# Patient Record
Sex: FEMALE | Race: WHITE | NOT HISPANIC OR LATINO | Employment: OTHER | URBAN - METROPOLITAN AREA
[De-identification: names, ages, dates, MRNs, and addresses within clinical notes are randomized per-mention and may not be internally consistent; named-entity substitution may affect disease eponyms.]

---

## 2017-06-12 ENCOUNTER — OFFICE VISIT (OUTPATIENT)
Dept: RETAIL CLINIC | Facility: CLINIC | Age: 57
End: 2017-06-12

## 2017-06-12 DIAGNOSIS — Z02.83 ENCOUNTER FOR DRUG SCREENING: Primary | ICD-10-CM

## 2017-06-12 NOTE — PROGRESS NOTES
Presents to clinic for urine drug screen. [facility] ID number and picture ID obtained from donor. Consent signed and dated. Urine drug screening performed per policy.       See scanned documents for Custody Control Form.

## 2019-07-18 ENCOUNTER — TRANSCRIBE ORDERS (OUTPATIENT)
Dept: ADMINISTRATIVE | Facility: HOSPITAL | Age: 59
End: 2019-07-18

## 2019-07-18 DIAGNOSIS — R10.11 RUQ ABDOMINAL PAIN: Primary | ICD-10-CM

## 2019-07-26 ENCOUNTER — HOSPITAL ENCOUNTER (OUTPATIENT)
Dept: ULTRASOUND IMAGING | Facility: HOSPITAL | Age: 59
Discharge: HOME | End: 2019-07-26
Admitting: NURSE PRACTITIONER

## 2019-07-26 DIAGNOSIS — R10.11 RUQ ABDOMINAL PAIN: ICD-10-CM

## 2019-07-26 PROCEDURE — 76705 ECHO EXAM OF ABDOMEN: CPT

## 2019-08-02 ENCOUNTER — LAB (OUTPATIENT)
Dept: LAB | Facility: HOSPITAL | Age: 59
End: 2019-08-02

## 2019-08-02 ENCOUNTER — CONSULT (OUTPATIENT)
Dept: ONCOLOGY | Facility: CLINIC | Age: 59
End: 2019-08-02

## 2019-08-02 VITALS
SYSTOLIC BLOOD PRESSURE: 127 MMHG | WEIGHT: 194.7 LBS | DIASTOLIC BLOOD PRESSURE: 67 MMHG | OXYGEN SATURATION: 95 % | BODY MASS INDEX: 30.56 KG/M2 | HEIGHT: 67 IN | HEART RATE: 77 BPM | TEMPERATURE: 97.3 F | RESPIRATION RATE: 20 BRPM

## 2019-08-02 DIAGNOSIS — I87.1 MAY-THURNER SYNDROME: ICD-10-CM

## 2019-08-02 DIAGNOSIS — I87.1 MAY-THURNER SYNDROME: Primary | ICD-10-CM

## 2019-08-02 LAB
ANION GAP SERPL CALCULATED.3IONS-SCNC: 12 MMOL/L (ref 5–15)
BUN BLD-MCNC: 15 MG/DL (ref 8–23)
BUN/CREAT SERPL: 12.9 (ref 7–25)
CALCIUM SPEC-SCNC: 10.2 MG/DL (ref 8.6–10.5)
CHLORIDE SERPL-SCNC: 104 MMOL/L (ref 98–107)
CO2 SERPL-SCNC: 26 MMOL/L (ref 22–29)
CREAT BLD-MCNC: 1.16 MG/DL (ref 0.57–1)
ERYTHROCYTE [DISTWIDTH] IN BLOOD BY AUTOMATED COUNT: 18.2 % (ref 12.3–15.4)
GFR SERPL CREATININE-BSD FRML MDRD: 47 ML/MIN/1.73
GLUCOSE BLD-MCNC: 108 MG/DL (ref 65–99)
HCT VFR BLD AUTO: 49.2 % (ref 34–46.6)
HGB BLD-MCNC: 16.1 G/DL (ref 12–15.9)
LYMPHOCYTES # BLD AUTO: 1.2 10*3/MM3 (ref 0.7–3.1)
LYMPHOCYTES NFR BLD AUTO: 15.2 % (ref 19.6–45.3)
MCH RBC QN AUTO: 27.4 PG (ref 26.6–33)
MCHC RBC AUTO-ENTMCNC: 32.7 G/DL (ref 31.5–35.7)
MCV RBC AUTO: 83.7 FL (ref 79–97)
MONOCYTES # BLD AUTO: 0.1 10*3/MM3 (ref 0.1–0.9)
MONOCYTES NFR BLD AUTO: 1.7 % (ref 5–12)
NEUTROPHILS # BLD AUTO: 6.3 10*3/MM3 (ref 1.7–7)
NEUTROPHILS NFR BLD AUTO: 83.1 % (ref 42.7–76)
PLATELET # BLD AUTO: 655 10*3/MM3 (ref 140–450)
PMV BLD AUTO: 6.1 FL (ref 6–12)
POTASSIUM BLD-SCNC: 4.4 MMOL/L (ref 3.5–5.2)
RBC # BLD AUTO: 5.88 10*6/MM3 (ref 3.77–5.28)
SODIUM BLD-SCNC: 142 MMOL/L (ref 136–145)
WBC NRBC COR # BLD: 7.6 10*3/MM3 (ref 3.4–10.8)

## 2019-08-02 PROCEDURE — 99205 OFFICE O/P NEW HI 60 MIN: CPT | Performed by: INTERNAL MEDICINE

## 2019-08-02 PROCEDURE — 80048 BASIC METABOLIC PNL TOTAL CA: CPT

## 2019-08-02 PROCEDURE — 36415 COLL VENOUS BLD VENIPUNCTURE: CPT

## 2019-08-02 PROCEDURE — 85025 COMPLETE CBC W/AUTO DIFF WBC: CPT

## 2019-08-02 RX ORDER — PRAVASTATIN SODIUM 40 MG
40 TABLET ORAL DAILY
Refills: 0 | COMMUNITY
Start: 2019-06-04

## 2019-08-02 NOTE — PROGRESS NOTES
CHIEF COMPLAINT: Recurrent DVT    REASON FOR REFERRAL: Same      RECORDS OBTAINED  Records of the patients history including those obtained from [facility] were reviewed and summarized in detail.    Oncology/Hematology History    1. Recurrent DVT and PE due to problem #2  2. May Thurner  3. Asending aortic aneurysm  4. History of SOLOMON  5. Obstructive sleep apnea  6. Contrast-induced nephropathy  7. Reflux with nausea  8. Thrombocytosis    -8/2/2019 initial [facility] medical oncology consultation: May 2016 developed unprovoked left lower extremity deep vein thrombosis treated with left common iliac stent, thrombolyzes, temporary vena cava filter placement and retrieval.  Saw hematologist who told her she might have a low level lupus anticoagulant but she does not have that data and they were told that they did not think that was significant enough to warrant lifetime anticoagulation.  She stopped Xarelto after 8 months.  Starting the end of June she was developing pain in the left lower extremity again with progressive swelling and shortness of breath.  Went to [facility] ER on 4 July.  Was found to have left lower extremity extensive deep vein thrombosis and right lower lobe pulmonary embolus.  She was treated with thrombectomy and thrombolysis by Rigoberto and Pavel.  Previously she could not afford her Xarelto and they have switched her from heparin to Coumadin but before discharge switched her to Xarelto.  Her creatinine during her hospitalization increased to 1.38 from a baseline of 1.2.  She comes to hematology at [facility] on 8/2/2019 with labs showing creatinine of 1.23 on 7/11/2019, 1.69 on 7/17/2019, and she says that it was repeated again but she does not have that lab and was told that it was back down but not at normal.  Her CBC reveals platelets of 697,000 on 7/17/2019 and 589,000 on 7/10/2019.Labs from 7/10/2019 revealed platelet count of 490,000 with hematocrit 54.7%.  Creatinine on 7/3/2019 was  0.92.  She understandably had a d-dimer elevation to 1.22 on 7/3/2019 with her clot.  Given the magnitude of her clot and the likelihood of may Thurner syndrome, she needs lifetime anticoagulation.  On patients with deep vein thrombosis and/or PE and indefinite anticoagulation, use of Xarelto in patients with GFR less than 30 is recommended to be avoided varied will need to watch that.  With her prior recent elevation of hematocrit and persistent elevation of platelets, we will continue to watch that and if they remain persistent suggesting this being more than just reactive to her PE and clots, then we need to look for myeloproliferative disorder contributing to her clotting.  She is going to get her records from her other hematologist she saw back in 2016 just for completeness sake but I would not repeat, nor would I do for the first time now, her hypercoagulable panel as any hypercoagulable disorder we stumbled upon would not alter her need for lifetime full dose anticoagulation.  The only question is which drug, and whether her creatinine will allow DOAC's.  I will check her BMP weekly for the next month to make sure her creatinine is doing adequate relative to her use of Xarelto and will get her blood count now and again prior to return in a month and if the platelets are remaining elevated we will do Collin 2 with reflex testing.  Since her discharge from the hospital she had significant nausea for which she had an ultrasound that showed fatty liver but normal gallbladder.  She had an EGD in the past few days with Dr. Heath that showed significant reflux and on Protonix her nausea has improved.  She will need close follow-up of her ascending aortic aneurysm as well with her thoracic surgeons.        May-Thurner syndrome    8/2/2019 Initial Diagnosis     May-Thurner syndrome            HISTORY OF PRESENT ILLNESS:  The patient is a 59 y.o.  female, referred for recurrent DVT.  See above for hematology history of  "present illness.    REVIEW OF SYSTEMS:  A 14 point review of systems was performed and is negative except as noted above.    Past Medical History:   Diagnosis Date   • Deep vein thrombophlebitis of calf, left (CMS/HCC)      Past Surgical History:   Procedure Laterality Date   • BILATERAL BREAST REDUCTION     • CAROTID STENT     •  SECTION     • ELBOW FUSION     • HYSTERECTOMY     • TONSILLECTOMY         Current Outpatient Medications on File Prior to Visit   Medication Sig Dispense Refill   • pravastatin (PRAVACHOL) 40 MG tablet Take 40 mg by mouth Daily.  0   • rivaroxaban (XARELTO) 15 MG tablet Every 12 (Twelve) Hours.       No current facility-administered medications on file prior to visit.        Allergies   Allergen Reactions   • Clarithromycin Other (See Comments)       Social History     Socioeconomic History   • Marital status:      Spouse name: Not on file   • Number of children: Not on file   • Years of education: Not on file   • Highest education level: Not on file   Tobacco Use   • Smoking status: Never Smoker   • Smokeless tobacco: Never Used   Substance and Sexual Activity   • Alcohol use: No     Frequency: Never   • Drug use: No       Family History   Problem Relation Age of Onset   • Atrial fibrillation Mother    • Dementia Mother    • Diabetes Mother    • Heart disease Mother    • Colon cancer Maternal Aunt    • Leukemia Paternal Grandmother    • Colon cancer Cousin    • Lung cancer Cousin        PHYSICAL EXAM:    /67   Pulse 77   Temp 97.3 °F (36.3 °C) (Temporal)   Resp 20   Ht 170.2 cm (67\")   Wt 88.3 kg (194 lb 11.2 oz)   SpO2 95%   BMI 30.49 kg/m²     ECOG: (0) Fully active, able to carry on all predisease performance without restriction  General: well appearing female in no acute distress  HEENT: sclera anicteric, oropharynx clear  Lymphatics: no cervical, supraclavicular, inguinal, or axillary adenopathy  Cardiovascular: regular rate and rhythm, no murmurs  Neck: " Supple; No thyromegaly  Lungs: clear to auscultation bilaterally. No respiratory distress.   Abdomen: soft, nontender, nondistended.  No palpable organomegaly  Extremities: no cyanosis, clubbing, edema, or cords  Skin: no rashes, lesions, bruising, or petechiae  Neuro: Alert and oriented x 4; Moving all extremities.  Psych: No anxiety or depression    No results found for: HGB, HCT, MCV, PLT, WBC, NEUTROABS, LYMPHSABS, MONOSABS, EOSABS, BASOSABS  No results found for: GLUCOSE, BUN, CREATININE, NA, K, CL, CO2, CALCIUM, PROTEINTOT, ALBUMIN, BILITOT, ALKPHOS, AST, ALT        Assessment/Plan   1. Recurrent DVT and PE due to problem #2  2. May Thurner  3. Asending aortic aneurysm  4. History of SOLOMON  5. Obstructive sleep apnea  6. Contrast-induced nephropathy  7. Reflux with nausea  8. Thrombocytosis    -8/2/2019 initial [facility], [place], operated by [facility] medical oncology consultation: May 2016 developed unprovoked left lower extremity deep vein thrombosis treated with left common iliac stent, thrombolyzes, temporary vena cava filter placement and retrieval.  Saw hematologist who told her she might have a low level lupus anticoagulant but she does not have that data and they were told that they did not think that was significant enough to warrant lifetime anticoagulation.  She stopped Xarelto after 8 months.  Starting the end of June she was developing pain in the left lower extremity again with progressive swelling and shortness of breath.  Went to [facility] ER on 4 July.  Was found to have left lower extremity extensive deep vein thrombosis and right lower lobe pulmonary embolus.  She was treated with thrombectomy and thrombolysis by Rigoberto and Pavel.  Previously she could not afford her Xarelto and they have switched her from heparin to Coumadin but before discharge switched her to Xarelto.  Her creatinine during her hospitalization increased to 1.38 from a baseline of 1.2.  She comes to hematology at [facility], [place], operated by [facility] on 8/2/2019 with labs  showing creatinine of 1.23 on 7/11/2019, 1.69 on 7/17/2019, and she says that it was repeated again but she does not have that lab and was told that it was back down but not at normal.  Her CBC reveals platelets of 697,000 on 7/17/2019 and 589,000 on 7/10/2019.Labs from 7/10/2019 revealed platelet count of 490,000 with hematocrit 54.7%.  Creatinine on 7/3/2019 was 0.92.  She understandably had a d-dimer elevation to 1.22 on 7/3/2019 with her clot.  Given the magnitude of her clot and the likelihood of may Thurner syndrome, she needs lifetime anticoagulation.  On patients with deep vein thrombosis and/or PE and indefinite anticoagulation, use of Xarelto in patients with GFR less than 30 is recommended to be avoided varied will need to watch that.  With her prior recent elevation of hematocrit and persistent elevation of platelets, we will continue to watch that and if they remain persistent suggesting this being more than just reactive to her PE and clots, then we need to look for myeloproliferative disorder contributing to her clotting.  She is going to get her records from her other hematologist she saw back in 2016 just for completeness sake but I would not repeat, nor would I do for the first time now, her hypercoagulable panel as any hypercoagulable disorder we stumbled upon would not alter her need for lifetime full dose anticoagulation.  The only question is which drug, and whether her creatinine will allow DOAC's.  I will check her BMP weekly for the next month to make sure her creatinine is doing adequate relative to her use of Xarelto and will get her blood count now and again prior to return in a month and if the platelets are remaining elevated we will do Collin 2 with reflex testing.  Since her discharge from the hospital she had significant nausea for which she had an ultrasound that showed fatty liver but normal gallbladder.  She had an EGD in the past few days with Dr. Heath that showed significant  reflux and on Protonix her nausea has improved.  She will need close follow-up of her ascending aortic aneurysm as well with her thoracic surgeons.    Discussed with patient 1 hour greater than 50% spent counseling face-to-face regarding the complexity of the above decision tree.  Yogesh aDmon MD    8/2/2019

## 2019-08-09 ENCOUNTER — LAB (OUTPATIENT)
Dept: LAB | Facility: HOSPITAL | Age: 59
End: 2019-08-09

## 2019-08-09 DIAGNOSIS — I87.1 MAY-THURNER SYNDROME: ICD-10-CM

## 2019-08-09 LAB
ANION GAP SERPL CALCULATED.3IONS-SCNC: 13 MMOL/L (ref 5–15)
BASOPHILS # BLD AUTO: 0.09 10*3/MM3 (ref 0–0.2)
BASOPHILS NFR BLD AUTO: 1.3 % (ref 0–1.5)
BUN BLD-MCNC: 17 MG/DL (ref 8–23)
BUN/CREAT SERPL: 15.9 (ref 7–25)
CALCIUM SPEC-SCNC: 10.1 MG/DL (ref 8.6–10.5)
CHLORIDE SERPL-SCNC: 105 MMOL/L (ref 98–107)
CO2 SERPL-SCNC: 23 MMOL/L (ref 22–29)
CREAT BLD-MCNC: 1.07 MG/DL (ref 0.57–1)
DEPRECATED RDW RBC AUTO: 57.8 FL (ref 37–54)
EOSINOPHIL # BLD AUTO: 0.3 10*3/MM3 (ref 0–0.4)
EOSINOPHIL NFR BLD AUTO: 4.2 % (ref 0.3–6.2)
ERYTHROCYTE [DISTWIDTH] IN BLOOD BY AUTOMATED COUNT: 19.3 % (ref 12.3–15.4)
GFR SERPL CREATININE-BSD FRML MDRD: 52 ML/MIN/1.73
GLUCOSE BLD-MCNC: 126 MG/DL (ref 65–99)
HCT VFR BLD AUTO: 50.9 % (ref 34–46.6)
HGB BLD-MCNC: 15.7 G/DL (ref 12–15.9)
IMM GRANULOCYTES # BLD AUTO: 0.05 10*3/MM3 (ref 0–0.05)
IMM GRANULOCYTES NFR BLD AUTO: 0.7 % (ref 0–0.5)
LYMPHOCYTES # BLD AUTO: 1.21 10*3/MM3 (ref 0.7–3.1)
LYMPHOCYTES NFR BLD AUTO: 17.1 % (ref 19.6–45.3)
MCH RBC QN AUTO: 27 PG (ref 26.6–33)
MCHC RBC AUTO-ENTMCNC: 30.8 G/DL (ref 31.5–35.7)
MCV RBC AUTO: 87.5 FL (ref 79–97)
MONOCYTES # BLD AUTO: 0.31 10*3/MM3 (ref 0.1–0.9)
MONOCYTES NFR BLD AUTO: 4.4 % (ref 5–12)
NEUTROPHILS # BLD AUTO: 5.11 10*3/MM3 (ref 1.7–7)
NEUTROPHILS NFR BLD AUTO: 72.3 % (ref 42.7–76)
NRBC BLD AUTO-RTO: 0 /100 WBC (ref 0–0.2)
PLATELET # BLD AUTO: 570 10*3/MM3 (ref 140–450)
PMV BLD AUTO: 8.9 FL (ref 6–12)
POTASSIUM BLD-SCNC: 4.2 MMOL/L (ref 3.5–5.2)
RBC # BLD AUTO: 5.82 10*6/MM3 (ref 3.77–5.28)
SODIUM BLD-SCNC: 141 MMOL/L (ref 136–145)
WBC NRBC COR # BLD: 7.07 10*3/MM3 (ref 3.4–10.8)

## 2019-08-09 PROCEDURE — 85025 COMPLETE CBC W/AUTO DIFF WBC: CPT

## 2019-08-09 PROCEDURE — 36415 COLL VENOUS BLD VENIPUNCTURE: CPT

## 2019-08-09 PROCEDURE — 80048 BASIC METABOLIC PNL TOTAL CA: CPT

## 2019-08-16 ENCOUNTER — LAB (OUTPATIENT)
Dept: LAB | Facility: HOSPITAL | Age: 59
End: 2019-08-16

## 2019-08-16 DIAGNOSIS — I87.1 MAY-THURNER SYNDROME: ICD-10-CM

## 2019-08-16 LAB
ANION GAP SERPL CALCULATED.3IONS-SCNC: 10 MMOL/L (ref 5–15)
BUN BLD-MCNC: 17 MG/DL (ref 8–23)
BUN/CREAT SERPL: 18.3 (ref 7–25)
CALCIUM SPEC-SCNC: 10.2 MG/DL (ref 8.6–10.5)
CHLORIDE SERPL-SCNC: 105 MMOL/L (ref 98–107)
CO2 SERPL-SCNC: 25 MMOL/L (ref 22–29)
CREAT BLD-MCNC: 0.93 MG/DL (ref 0.57–1)
GFR SERPL CREATININE-BSD FRML MDRD: 61 ML/MIN/1.73
GLUCOSE BLD-MCNC: 123 MG/DL (ref 65–99)
POTASSIUM BLD-SCNC: 4.5 MMOL/L (ref 3.5–5.2)
SODIUM BLD-SCNC: 140 MMOL/L (ref 136–145)

## 2019-08-16 PROCEDURE — 36415 COLL VENOUS BLD VENIPUNCTURE: CPT

## 2019-08-16 PROCEDURE — 80048 BASIC METABOLIC PNL TOTAL CA: CPT

## 2019-08-23 ENCOUNTER — LAB (OUTPATIENT)
Dept: LAB | Facility: HOSPITAL | Age: 59
End: 2019-08-23

## 2019-08-23 DIAGNOSIS — I87.1 MAY-THURNER SYNDROME: ICD-10-CM

## 2019-08-23 DIAGNOSIS — I87.1 MAY-THURNER SYNDROME: Primary | ICD-10-CM

## 2019-08-23 LAB
ANION GAP SERPL CALCULATED.3IONS-SCNC: 12 MMOL/L (ref 5–15)
BUN BLD-MCNC: 17 MG/DL (ref 8–23)
BUN/CREAT SERPL: 17.9 (ref 7–25)
CALCIUM SPEC-SCNC: 9.7 MG/DL (ref 8.6–10.5)
CHLORIDE SERPL-SCNC: 105 MMOL/L (ref 98–107)
CO2 SERPL-SCNC: 25 MMOL/L (ref 22–29)
CREAT BLD-MCNC: 0.95 MG/DL (ref 0.57–1)
GFR SERPL CREATININE-BSD FRML MDRD: 60 ML/MIN/1.73
GLUCOSE BLD-MCNC: 114 MG/DL (ref 65–99)
POTASSIUM BLD-SCNC: 4.1 MMOL/L (ref 3.5–5.2)
SODIUM BLD-SCNC: 142 MMOL/L (ref 136–145)

## 2019-08-23 PROCEDURE — 80048 BASIC METABOLIC PNL TOTAL CA: CPT

## 2019-08-23 PROCEDURE — 36415 COLL VENOUS BLD VENIPUNCTURE: CPT

## 2019-08-30 ENCOUNTER — LAB (OUTPATIENT)
Dept: LAB | Facility: HOSPITAL | Age: 59
End: 2019-08-30

## 2019-08-30 DIAGNOSIS — I87.1 MAY-THURNER SYNDROME: ICD-10-CM

## 2019-08-30 LAB
ANION GAP SERPL CALCULATED.3IONS-SCNC: 15 MMOL/L (ref 5–15)
BASOPHILS # BLD AUTO: 0.13 10*3/MM3 (ref 0–0.2)
BASOPHILS NFR BLD AUTO: 1.6 % (ref 0–1.5)
BUN BLD-MCNC: 17 MG/DL (ref 8–23)
BUN/CREAT SERPL: 17.7 (ref 7–25)
CALCIUM SPEC-SCNC: 10 MG/DL (ref 8.6–10.5)
CHLORIDE SERPL-SCNC: 104 MMOL/L (ref 98–107)
CO2 SERPL-SCNC: 24 MMOL/L (ref 22–29)
CREAT BLD-MCNC: 0.96 MG/DL (ref 0.57–1)
DEPRECATED RDW RBC AUTO: 58.4 FL (ref 37–54)
EOSINOPHIL # BLD AUTO: 0.24 10*3/MM3 (ref 0–0.4)
EOSINOPHIL NFR BLD AUTO: 2.9 % (ref 0.3–6.2)
ERYTHROCYTE [DISTWIDTH] IN BLOOD BY AUTOMATED COUNT: 19.2 % (ref 12.3–15.4)
GFR SERPL CREATININE-BSD FRML MDRD: 59 ML/MIN/1.73
GLUCOSE BLD-MCNC: 101 MG/DL (ref 65–99)
HCT VFR BLD AUTO: 52.7 % (ref 34–46.6)
HGB BLD-MCNC: 16.4 G/DL (ref 12–15.9)
IMM GRANULOCYTES # BLD AUTO: 0.1 10*3/MM3 (ref 0–0.05)
IMM GRANULOCYTES NFR BLD AUTO: 1.2 % (ref 0–0.5)
LYMPHOCYTES # BLD AUTO: 1.38 10*3/MM3 (ref 0.7–3.1)
LYMPHOCYTES NFR BLD AUTO: 16.6 % (ref 19.6–45.3)
MCH RBC QN AUTO: 27.2 PG (ref 26.6–33)
MCHC RBC AUTO-ENTMCNC: 31.1 G/DL (ref 31.5–35.7)
MCV RBC AUTO: 87.3 FL (ref 79–97)
MONOCYTES # BLD AUTO: 0.39 10*3/MM3 (ref 0.1–0.9)
MONOCYTES NFR BLD AUTO: 4.7 % (ref 5–12)
NEUTROPHILS # BLD AUTO: 6.08 10*3/MM3 (ref 1.7–7)
NEUTROPHILS NFR BLD AUTO: 73 % (ref 42.7–76)
NRBC BLD AUTO-RTO: 0 /100 WBC (ref 0–0.2)
PLATELET # BLD AUTO: 807 10*3/MM3 (ref 140–450)
PMV BLD AUTO: 8.9 FL (ref 6–12)
POTASSIUM BLD-SCNC: 4.3 MMOL/L (ref 3.5–5.2)
RBC # BLD AUTO: 6.04 10*6/MM3 (ref 3.77–5.28)
SODIUM BLD-SCNC: 143 MMOL/L (ref 136–145)
WBC NRBC COR # BLD: 8.32 10*3/MM3 (ref 3.4–10.8)

## 2019-08-30 PROCEDURE — 80048 BASIC METABOLIC PNL TOTAL CA: CPT

## 2019-08-30 PROCEDURE — 85025 COMPLETE CBC W/AUTO DIFF WBC: CPT

## 2019-08-30 PROCEDURE — 36415 COLL VENOUS BLD VENIPUNCTURE: CPT

## 2019-09-06 ENCOUNTER — APPOINTMENT (OUTPATIENT)
Dept: LAB | Facility: HOSPITAL | Age: 59
End: 2019-09-06

## 2019-09-09 ENCOUNTER — OFFICE VISIT (OUTPATIENT)
Dept: ONCOLOGY | Facility: CLINIC | Age: 59
End: 2019-09-09

## 2019-09-09 ENCOUNTER — LAB (OUTPATIENT)
Dept: LAB | Facility: HOSPITAL | Age: 59
End: 2019-09-09

## 2019-09-09 VITALS
DIASTOLIC BLOOD PRESSURE: 67 MMHG | BODY MASS INDEX: 31.39 KG/M2 | HEART RATE: 73 BPM | HEIGHT: 67 IN | RESPIRATION RATE: 16 BRPM | WEIGHT: 200 LBS | OXYGEN SATURATION: 95 % | TEMPERATURE: 97.8 F | SYSTOLIC BLOOD PRESSURE: 122 MMHG

## 2019-09-09 DIAGNOSIS — I87.1 MAY-THURNER SYNDROME: Chronic | ICD-10-CM

## 2019-09-09 DIAGNOSIS — I87.1 MAY-THURNER SYNDROME: Primary | Chronic | ICD-10-CM

## 2019-09-09 PROCEDURE — 99215 OFFICE O/P EST HI 40 MIN: CPT | Performed by: INTERNAL MEDICINE

## 2019-09-09 PROCEDURE — 36415 COLL VENOUS BLD VENIPUNCTURE: CPT

## 2019-09-09 PROCEDURE — 81270 JAK2 GENE: CPT

## 2019-09-09 PROCEDURE — 82668 ASSAY OF ERYTHROPOIETIN: CPT

## 2019-09-09 RX ORDER — RIVAROXABAN 20 MG/1
20 TABLET, FILM COATED ORAL EVERY EVENING
Refills: 0 | COMMUNITY
Start: 2019-08-12

## 2019-09-09 NOTE — PROGRESS NOTES
CHIEF COMPLAINT: Recurrent DVT with thrombocytosis and erythrocytosis    Problem List:  Oncology/Hematology History    1. Recurrent DVT and PE due to problem #2  2. May Thurner  3. Asending aortic aneurysm  4. History of SOLOMON  5. Obstructive sleep apnea  6. Contrast-induced nephropathy  7. Reflux with nausea  8. Thrombocytosis  9. Erythrocytosis    -8/2/2019 initial [facility] medical oncology consultation: May 2016 developed unprovoked left lower extremity deep vein thrombosis treated with left common iliac stent, thrombolyzes, temporary vena cava filter placement and retrieval.  Saw hematologist who told her she might have a low level lupus anticoagulant but she does not have that data and they were told that they did not think that was significant enough to warrant lifetime anticoagulation.  She stopped Xarelto after 8 months.  Starting the end of June she was developing pain in the left lower extremity again with progressive swelling and shortness of breath.  Went to [facility] ER on 4 July.  Was found to have left lower extremity extensive deep vein thrombosis and right lower lobe pulmonary embolus.  She was treated with thrombectomy and thrombolysis by Rigoberto and Pavel.  Previously she could not afford her Xarelto and they have switched her from heparin to Coumadin but before discharge switched her to Xarelto.  Her creatinine during her hospitalization increased to 1.38 from a baseline of 1.2.  She comes to hematology at [facility] on 8/2/2019 with labs showing creatinine of 1.23 on 7/11/2019, 1.69 on 7/17/2019, and she says that it was repeated again but she does not have that lab and was told that it was back down but not at normal.  Her CBC reveals platelets of 697,000 on 7/17/2019 and 589,000 on 7/10/2019.Labs from 7/10/2019 revealed platelet count of 490,000 with hematocrit 54.7%.  Creatinine on 7/3/2019 was 0.92.  She understandably had a d-dimer elevation to 1.22 on 7/3/2019 with her clot.  Given  the magnitude of her clot and the likelihood of may Thurner syndrome, she needs lifetime anticoagulation.  On patients with deep vein thrombosis and/or PE and indefinite anticoagulation, use of Xarelto in patients with GFR less than 30 is recommended to be avoided varied will need to watch that.  With her prior recent elevation of hematocrit and persistent elevation of platelets, we will continue to watch that and if they remain persistent suggesting this being more than just reactive to her PE and clots, then we need to look for myeloproliferative disorder contributing to her clotting.  She is going to get her records from her other hematologist she saw back in 2016 just for completeness sake but I would not repeat, nor would I do for the first time now, her hypercoagulable panel as any hypercoagulable disorder we stumbled upon would not alter her need for lifetime full dose anticoagulation.  The only question is which drug, and whether her creatinine will allow DOAC's.  I will check her BMP weekly for the next month to make sure her creatinine is doing adequate relative to her use of Xarelto and will get her blood count now and again prior to return in a month and if the platelets are remaining elevated we will do Collin 2 with reflex testing.  Since her discharge from the hospital she had significant nausea for which she had an ultrasound that showed fatty liver but normal gallbladder.  She had an EGD in the past few days with Dr. Heath that showed significant reflux and on Protonix her nausea has improved.  She will need close follow-up of her ascending aortic aneurysm as well with her thoracic surgeons.    -9/9/2019 medical oncology follow-up: Platelets persistently elevated 807,000 with hematocrit 52.7% and white count 8320 on 8/30/2019.  GFR staying over 59 on Xarelto.  We will check Collin 2 with reflex testing and treat with Hydrea plus or minus phlebotomy if we confirm myeloproliferative disorder.  Will need  bone marrow before start of that to assess for myelofibrosis if this is confirmed.  Records from Dr.Scott Major of 2016 mention that she had been anemic during her hospitalization.  Factor V Leiden, prothrombin mutation, Antithrombin III, protein C&S levels were all normal and the anticardiolipin and beta-2 microglobulin were all negative according to Dr. Major.  The lupus anticoagulant was borderline positive at 10.5 with him and he recommended repeat of that done 3 months apart to prove the diagnosis but this cannot be done accurately while he on anticoagulants which she continues.  Given her presence of May Thurner despite the stenting, I would be inclined towards keeping her on anticoagulants regardless.  She brings to me records that includes labs back to  where her white count, hemoglobin and platelets were normal but as recently as 2019 her hematocrit was 52% whereas back in May 2016 her hemoglobin was actually down in the eights with normochromic normocytic indices.  On Xarelto, there is increased risk with aspirin I will hold on that for present but we may have to make such a decision as Xarelto may not be sufficient for viscosity issues related to her elevated platelets and hematocrit if this is a myeloproliferative disorder.        May-Thurner syndrome    2019 Initial Diagnosis     May-Thurner syndrome            HISTORY OF PRESENT ILLNESS:  The patient is a 59 y.o. female, here for follow up on management of erythrocytosis and thrombocytosis with history of recurrent deep vein thrombosis and pulmonary embolus in the face of May Thurner syndrome.      Past Medical History:   Diagnosis Date   • Deep vein thrombophlebitis of calf, left (CMS/HCC)      Past Surgical History:   Procedure Laterality Date   • BILATERAL BREAST REDUCTION     • CAROTID STENT     •  SECTION     • ELBOW FUSION     • HYSTERECTOMY     • TONSILLECTOMY         Allergies   Allergen Reactions   • Clarithromycin  "Other (See Comments)       Family History and Social History reviewed and changed as necessary      REVIEW OF SYSTEM:   Review of Systems   Constitutional: Negative for appetite change, chills, diaphoresis, fatigue, fever and unexpected weight change.   HENT:   Negative for mouth sores, sore throat and trouble swallowing.    Eyes: Negative for icterus.   Respiratory: Negative for cough, hemoptysis and shortness of breath.    Cardiovascular: Negative for chest pain, leg swelling and palpitations.   Gastrointestinal: Negative for abdominal distention, abdominal pain, blood in stool, constipation, diarrhea, nausea and vomiting.   Endocrine: Negative for hot flashes.   Genitourinary: Negative for bladder incontinence, difficulty urinating, dysuria, frequency and hematuria.    Musculoskeletal: Negative for gait problem, neck pain and neck stiffness.   Skin: Negative for rash.   Neurological: Negative for dizziness, gait problem, headaches, light-headedness and numbness.   Hematological: Negative for adenopathy. Does not bruise/bleed easily.   Psychiatric/Behavioral: Negative for depression. The patient is not nervous/anxious.    All other systems reviewed and are negative.       PHYSICAL EXAM    Vitals:    09/09/19 0825   BP: 122/67   Pulse: 73   Resp: 16   Temp: 97.8 °F (36.6 °C)   SpO2: 95%   Weight: 90.7 kg (200 lb)   Height: 170.2 cm (67\")     Constitutional: Appears well-developed and well-nourished. No distress.   ECOG: (0) Fully active, able to carry on all predisease performance without restriction  HENT:   Head: Normocephalic.   Mouth/Throat: Oropharynx is clear and moist.   Eyes: Conjunctivae are normal. Pupils are equal, round, and reactive to light. No scleral icterus.   Neck: Neck supple. No JVD present. No thyromegaly present.   Cardiovascular: Normal rate, regular rhythm and normal heart sounds.    Pulmonary/Chest: Breath sounds normal. No respiratory distress.   Abdominal: Soft. Exhibits no distension and " no mass. There is no hepatosplenomegaly. There is no tenderness. There is no rebound and no guarding.   Musculoskeletal:Exhibits no edema, tenderness or deformity.   Neurological: Alert and oriented to person, place, and time. Exhibits normal muscle tone.   Skin: No ecchymosis, no petechiae and no rash noted. Not diaphoretic. No cyanosis. Nails show no clubbing.   Psychiatric: Normal mood and affect.   Vitals reviewed.      Lab Results   Component Value Date    HGB 16.4 (H) 08/30/2019    HCT 52.7 (H) 08/30/2019    MCV 87.3 08/30/2019     (H) 08/30/2019    WBC 8.32 08/30/2019    NEUTROABS 6.08 08/30/2019    LYMPHSABS 1.38 08/30/2019    MONOSABS 0.39 08/30/2019    EOSABS 0.24 08/30/2019    BASOSABS 0.13 08/30/2019       Lab Results   Component Value Date    GLUCOSE 101 (H) 08/30/2019    BUN 17 08/30/2019    CREATININE 0.96 08/30/2019     08/30/2019    K 4.3 08/30/2019     08/30/2019    CO2 24.0 08/30/2019    CALCIUM 10.0 08/30/2019                   ASSESSMENT & PLAN:    10. Recurrent DVT and PE due to problem #2  11. May Thurner  12. Asending aortic aneurysm  13. History of SOLOMON  14. Obstructive sleep apnea  15. Contrast-induced nephropathy  16. Reflux with nausea  17. Thrombocytosis  18. Erythrocytosis    -8/2/2019 initial [facility] medical oncology consultation: May 2016 developed unprovoked left lower extremity deep vein thrombosis treated with left common iliac stent, thrombolyzes, temporary vena cava filter placement and retrieval.  Saw hematologist who told her she might have a low level lupus anticoagulant but she does not have that data and they were told that they did not think that was significant enough to warrant lifetime anticoagulation.  She stopped Xarelto after 8 months.  Starting the end of June she was developing pain in the left lower extremity again with progressive swelling and shortness of breath.  Went to [facility] ER on 4 July.  Was found to have left lower extremity  extensive deep vein thrombosis and right lower lobe pulmonary embolus.  She was treated with thrombectomy and thrombolysis by Rigoberto and Pavel.  Previously she could not afford her Xarelto and they have switched her from heparin to Coumadin but before discharge switched her to Xarelto.  Her creatinine during her hospitalization increased to 1.38 from a baseline of 1.2.  She comes to hematology at [facility] on 8/2/2019 with labs showing creatinine of 1.23 on 7/11/2019, 1.69 on 7/17/2019, and she says that it was repeated again but she does not have that lab and was told that it was back down but not at normal.  Her CBC reveals platelets of 697,000 on 7/17/2019 and 589,000 on 7/10/2019.Labs from 7/10/2019 revealed platelet count of 490,000 with hematocrit 54.7%.  Creatinine on 7/3/2019 was 0.92.  She understandably had a d-dimer elevation to 1.22 on 7/3/2019 with her clot.  Given the magnitude of her clot and the likelihood of may Thurner syndrome, she needs lifetime anticoagulation.  On patients with deep vein thrombosis and/or PE and indefinite anticoagulation, use of Xarelto in patients with GFR less than 30 is recommended to be avoided varied will need to watch that.  With her prior recent elevation of hematocrit and persistent elevation of platelets, we will continue to watch that and if they remain persistent suggesting this being more than just reactive to her PE and clots, then we need to look for myeloproliferative disorder contributing to her clotting.  She is going to get her records from her other hematologist she saw back in 2016 just for completeness sake but I would not repeat, nor would I do for the first time now, her hypercoagulable panel as any hypercoagulable disorder we stumbled upon would not alter her need for lifetime full dose anticoagulation.  The only question is which drug, and whether her creatinine will allow DOAC's.  I will check her BMP weekly for the next month to make sure her  creatinine is doing adequate relative to her use of Xarelto and will get her blood count now and again prior to return in a month and if the platelets are remaining elevated we will do Collin 2 with reflex testing.  Since her discharge from the hospital she had significant nausea for which she had an ultrasound that showed fatty liver but normal gallbladder.  She had an EGD in the past few days with Dr. eHath that showed significant reflux and on Protonix her nausea has improved.  She will need close follow-up of her ascending aortic aneurysm as well with her thoracic surgeons.    -9/9/2019 medical oncology follow-up: Platelets persistently elevated 807,000 with hematocrit 52.7% and white count 8320 on 8/30/2019.  GFR staying over 59 on Xarelto.  We will check Collin 2 with reflex testing and treat with Hydrea plus or minus phlebotomy if we confirm myeloproliferative disorder.  Will need bone marrow before start of that to assess for myelofibrosis if this is confirmed.  Records from Dr.Scott Major of 6/7/2016 mention that she had been anemic during her hospitalization.  Factor V Leiden, prothrombin mutation, Antithrombin III, protein C&S levels were all normal and the anticardiolipin and beta-2 microglobulin were all negative according to Dr. Major.  The lupus anticoagulant was borderline positive at 10.5 with him and he recommended repeat of that done 3 months apart to prove the diagnosis but this cannot be done accurately while he on anticoagulants which she continues.  Given her presence of May Thurner despite the stenting, I would be inclined towards keeping her on anticoagulants regardless.  She brings to me records that includes labs back to 2016 where her white count, hemoglobin and platelets were normal but as recently as June 2019 her hematocrit was 52% whereas back in May 2016 her hemoglobin was actually down in the eights with normochromic normocytic indices.  On Xarelto, there is increased risk with aspirin  I will hold on that for present but we may have to make such a decision as Xarelto may not be sufficient for viscosity issues related to her elevated platelets and hematocrit if this is a myeloproliferative disorder.    I reviewed the above data and discussed with the patient face-to-face the complexity of this decision tree as I have outlined above for over 40 minutes greater than 50% spent counseling face-to-face regarding this very complex process and decision tree.    Yogesh Damon MD    09/09/2019

## 2019-09-10 LAB — ETHNIC BACKGROUND STATED: 1 MIU/ML (ref 2.6–18.5)

## 2019-09-12 LAB
JAK2 P.V617F BLD/T QL: ABNORMAL
LAB DIRECTOR NAME PROVIDER: ABNORMAL
LABORATORY COMMENT REPORT: ABNORMAL
REFLEX: ABNORMAL
SPECIMEN PREPARATION: ABNORMAL

## 2019-09-30 ENCOUNTER — SPECIALTY PHARMACY (OUTPATIENT)
Dept: ONCOLOGY | Facility: HOSPITAL | Age: 59
End: 2019-09-30

## 2019-09-30 ENCOUNTER — OFFICE VISIT (OUTPATIENT)
Dept: ONCOLOGY | Facility: CLINIC | Age: 59
End: 2019-09-30

## 2019-09-30 VITALS
WEIGHT: 198 LBS | BODY MASS INDEX: 31.08 KG/M2 | OXYGEN SATURATION: 95 % | SYSTOLIC BLOOD PRESSURE: 128 MMHG | DIASTOLIC BLOOD PRESSURE: 71 MMHG | HEART RATE: 77 BPM | RESPIRATION RATE: 16 BRPM | TEMPERATURE: 98.4 F | HEIGHT: 67 IN

## 2019-09-30 DIAGNOSIS — D45 POLYCYTHEMIA RUBRA VERA (HCC): Primary | Chronic | ICD-10-CM

## 2019-09-30 DIAGNOSIS — D45 POLYCYTHEMIA RUBRA VERA (HCC): Primary | ICD-10-CM

## 2019-09-30 PROCEDURE — 99215 OFFICE O/P EST HI 40 MIN: CPT | Performed by: INTERNAL MEDICINE

## 2019-09-30 RX ORDER — PANTOPRAZOLE SODIUM 40 MG/1
40 TABLET, DELAYED RELEASE ORAL DAILY
COMMUNITY
End: 2022-08-15

## 2019-09-30 RX ORDER — HYDROXYUREA 500 MG/1
500 CAPSULE ORAL DAILY
Qty: 28 CAPSULE | Refills: 3 | Status: SHIPPED | OUTPATIENT
Start: 2019-09-30 | End: 2019-12-02 | Stop reason: SDUPTHER

## 2019-09-30 NOTE — PROGRESS NOTES
CHIEF COMPLAINT: PRV    Problem List:  Oncology/Hematology History    1. Collin 2+ polycythemia rubra vera  2. Recurrent DVT and PE due to problem #2  3. May Thurner  4. Asending aortic aneurysm  5. History of SOLOMON  6. Obstructive sleep apnea  7. Contrast-induced nephropathy  8. Reflux with nausea  9. Thrombocytosis  10. Erythrocytosis    -8/2/2019 initial [facility] medical oncology consultation: May 2016 developed unprovoked left lower extremity deep vein thrombosis treated with left common iliac stent, thrombolyzes, temporary vena cava filter placement and retrieval.  Saw hematologist who told her she might have a low level lupus anticoagulant but she does not have that data and they were told that they did not think that was significant enough to warrant lifetime anticoagulation.  She stopped Xarelto after 8 months.  Starting the end of June she was developing pain in the left lower extremity again with progressive swelling and shortness of breath.  Went to [facility] ER on 4 July.  Was found to have left lower extremity extensive deep vein thrombosis and right lower lobe pulmonary embolus.  She was treated with thrombectomy and thrombolysis by Rigoberto and Pavel.  Previously she could not afford her Xarelto and they have switched her from heparin to Coumadin but before discharge switched her to Xarelto.  Her creatinine during her hospitalization increased to 1.38 from a baseline of 1.2.  She comes to hematology at [facility] on 8/2/2019 with labs showing creatinine of 1.23 on 7/11/2019, 1.69 on 7/17/2019, and she says that it was repeated again but she does not have that lab and was told that it was back down but not at normal.  Her CBC reveals platelets of 697,000 on 7/17/2019 and 589,000 on 7/10/2019.Labs from 7/10/2019 revealed platelet count of 490,000 with hematocrit 54.7%.  Creatinine on 7/3/2019 was 0.92.  She understandably had a d-dimer elevation to 1.22 on 7/3/2019 with her clot.  Given the magnitude  of her clot and the likelihood of may Thurner syndrome, she needs lifetime anticoagulation.  On patients with deep vein thrombosis and/or PE and indefinite anticoagulation, use of Xarelto in patients with GFR less than 30 is recommended to be avoided varied will need to watch that.  With her prior recent elevation of hematocrit and persistent elevation of platelets, we will continue to watch that and if they remain persistent suggesting this being more than just reactive to her PE and clots, then we need to look for myeloproliferative disorder contributing to her clotting.  She is going to get her records from her other hematologist she saw back in 2016 just for completeness sake but I would not repeat, nor would I do for the first time now, her hypercoagulable panel as any hypercoagulable disorder we stumbled upon would not alter her need for lifetime full dose anticoagulation.  The only question is which drug, and whether her creatinine will allow DOAC's.  I will check her BMP weekly for the next month to make sure her creatinine is doing adequate relative to her use of Xarelto and will get her blood count now and again prior to return in a month and if the platelets are remaining elevated we will do Collin 2 with reflex testing.  Since her discharge from the hospital she had significant nausea for which she had an ultrasound that showed fatty liver but normal gallbladder.  She had an EGD in the past few days with Dr. Heath that showed significant reflux and on Protonix her nausea has improved.  She will need close follow-up of her ascending aortic aneurysm as well with her thoracic surgeons.    -9/9/2019 medical oncology follow-up: Platelets persistently elevated 807,000 with hematocrit 52.7% and white count 8320 on 8/30/2019.  GFR staying over 59 on Xarelto.  We will check Collin 2 with reflex testing and treat with Hydrea plus or minus phlebotomy if we confirm myeloproliferative disorder.  Will need bone marrow  before start of that to assess for myelofibrosis if this is confirmed.  Records from Dr.Scott Major of 6/7/2016 mention that she had been anemic during her hospitalization.  Factor V Leiden, prothrombin mutation, Antithrombin III, protein C&S levels were all normal and the anticardiolipin and beta-2 microglobulin were all negative according to Dr. Major.  The lupus anticoagulant was borderline positive at 10.5 with him and he recommended repeat of that done 3 months apart to prove the diagnosis but this came back low positive according to his notes and he suggested switching to aspirin after a full 6 months of anticoagulation.  This was of course before the low-dose DOAC trials have been published she had had thrombolytics and stenting of her May Thurner according to his records.  Given her presence of May Thurner despite the stenting, I would be inclined towards keeping her on anticoagulants regardless.  She brings to me records that includes labs back to 2016 where her white count, hemoglobin and platelets were normal but as recently as June 2019 her hematocrit was 52% whereas back in May 2016 her hemoglobin was actually down in the eights with normochromic normocytic indices.  On Xarelto, there is increased risk with aspirin I will hold on that for present but we may have to make such a decision as Xarelto may not be sufficient for viscosity issues related to her elevated platelets and hematocrit if this is a myeloproliferative disorder.    -9/30/2019 [facility] hematology oncology follow-up visit: 9/9/2019 Collin 2 V617F mutation positive with erythropoietin level low at 1 commensurate with polycythemia rubra vera and a hematocrit of 53% with platelets of 807,000 and normal white count.  Normally would do bone marrow to look for myelofibrosis but would not risk taking her off Xarelto at the moment.  We will start her on Hydrea 500 mg daily with CBC weekly and if we get her hematocrit below 45% platelets in the  400,000 range without significant leukopenia that would be my goal.  No phlebotomies for now until we see what Hydrea does.  After we see her back, if her platelets are down and tolerating Hydrea, I will consider stopping the Xarelto and switching her to full dose aspirin for life for the PRV.  This PRV did not cause her original venous thromboembolic phenomenon in fact was anemic when Dr. Major saw her in 2016.  We talked about the potential natural history of PRV including spent phase, leukemic transformation, myelofibrosis.        May-Thurner syndrome    2019 Initial Diagnosis     May-Thurner syndrome            HISTORY OF PRESENT ILLNESS:  The patient is a 59 y.o. female, here for follow up on management of PRV with history of low titer lupus anticoagulant and history of VTE as above.      Past Medical History:   Diagnosis Date   • Deep vein thrombophlebitis of calf, left (CMS/HCC)      Past Surgical History:   Procedure Laterality Date   • BILATERAL BREAST REDUCTION     • CAROTID STENT     •  SECTION     • ELBOW FUSION     • HYSTERECTOMY     • TONSILLECTOMY         Allergies   Allergen Reactions   • Clarithromycin Other (See Comments)       Family History and Social History reviewed and changed as necessary      REVIEW OF SYSTEM:   Review of Systems   Constitutional: Negative for appetite change, chills, diaphoresis, fatigue, fever and unexpected weight change.   HENT:   Negative for mouth sores, sore throat and trouble swallowing.    Eyes: Negative for icterus.   Respiratory: Negative for cough, hemoptysis and shortness of breath.    Cardiovascular: Negative for chest pain, leg swelling and palpitations.   Gastrointestinal: Negative for abdominal distention, abdominal pain, blood in stool, constipation, diarrhea, nausea and vomiting.   Endocrine: Negative for hot flashes.   Genitourinary: Negative for bladder incontinence, difficulty urinating, dysuria, frequency and hematuria.    Musculoskeletal:  "Negative for gait problem, neck pain and neck stiffness.   Skin: Negative for rash.   Neurological: Negative for dizziness, gait problem, headaches, light-headedness and numbness.   Hematological: Negative for adenopathy. Does not bruise/bleed easily.   Psychiatric/Behavioral: Negative for depression. The patient is not nervous/anxious.    All other systems reviewed and are negative.       PHYSICAL EXAM    Vitals:    09/30/19 1452   BP: 128/71   Pulse: 77   Resp: 16   Temp: 98.4 °F (36.9 °C)   SpO2: 95%   Weight: 89.8 kg (198 lb)   Height: 170.2 cm (67\")     Constitutional: Appears well-developed and well-nourished. No distress.   ECOG: (0) Fully active, able to carry on all predisease performance without restriction  HENT:   Head: Normocephalic.   Mouth/Throat: Oropharynx is clear and moist.   Eyes: Conjunctivae are normal. Pupils are equal, round, and reactive to light. No scleral icterus.   Neck: Neck supple. No JVD present. No thyromegaly present.   Cardiovascular: Normal rate, regular rhythm and normal heart sounds.    Pulmonary/Chest: Breath sounds normal. No respiratory distress.   Abdominal: Soft. Exhibits no distension and no mass. There is no hepatosplenomegaly. There is no tenderness. There is no rebound and no guarding.   Musculoskeletal:Exhibits no edema, tenderness or deformity.   Neurological: Alert and oriented to person, place, and time. Exhibits normal muscle tone.   Skin: No ecchymosis, no petechiae and no rash noted. Not diaphoretic. No cyanosis. Nails show no clubbing.   Psychiatric: Normal mood and affect.   Vitals reviewed.      Lab Results   Component Value Date    HGB 16.4 (H) 08/30/2019    HCT 52.7 (H) 08/30/2019    MCV 87.3 08/30/2019     (H) 08/30/2019    WBC 8.32 08/30/2019    NEUTROABS 6.08 08/30/2019    LYMPHSABS 1.38 08/30/2019    MONOSABS 0.39 08/30/2019    EOSABS 0.24 08/30/2019    BASOSABS 0.13 08/30/2019       Lab Results   Component Value Date    GLUCOSE 101 (H) 08/30/2019 "    BUN 17 08/30/2019    CREATININE 0.96 08/30/2019     08/30/2019    K 4.3 08/30/2019     08/30/2019    CO2 24.0 08/30/2019    CALCIUM 10.0 08/30/2019                   ASSESSMENT & PLAN:    1. PRV newly diagnosed  2. May Thurner  3. VTE  4. AAA    Discussion: 9/9/2019 Collin 2 V617F mutation positive with erythropoietin level low at 1 commensurate with polycythemia rubra vera and a hematocrit of 53% with platelets of 807,000 and normal white count.  Normally would do bone marrow to look for myelofibrosis but would not risk taking her off Xarelto at the moment.  We will start her on Hydrea 500 mg daily with CBC weekly and if we get her hematocrit below 45% platelets in the 400,000 range without significant leukopenia that would be my goal.  No phlebotomies for now until we see what Hydrea does.  After we see her back, if her platelets are down and tolerating Hydrea, I will consider stopping the Xarelto and switching her to full dose aspirin for life for the PRV.  This PRV did not cause her original venous thromboembolic phenomenon in fact was anemic when Dr. Major saw her in 2016.  We talked about the potential natural history of PRV including spent phase, leukemic transformation, myelofibrosis.  She will need AAA follow-up with surgeons.  Discussed with patient 45 minutes face-to-face greater than 50% spent counseling regarding this complex decision tree as outlined above.    Discussion:      Yogesh Damon MD    09/30/2019

## 2019-09-30 NOTE — PROGRESS NOTES
Oral Chemotherapy Teaching      Patient Name/:  Gisell Wylie   1960  Oral Chemotherapy Regimen: Hydrea 500mg PO daily  Date Started Medication: pending medication acquisition    Additional Notes: Received referral from , plan to initiate Hydrea 500mg PO daily. Submitted new script for Hydrea 500mg PO daily #28 with 3 RF to [facility] to begin processing.  Will plan for education and financial navigation on 10/1/19 in oral chemo clinic.

## 2019-10-01 ENCOUNTER — HOSPITAL ENCOUNTER (OUTPATIENT)
Dept: ONCOLOGY | Facility: HOSPITAL | Age: 59
Discharge: HOME | End: 2019-10-01

## 2019-10-01 ENCOUNTER — SPECIALTY PHARMACY (OUTPATIENT)
Dept: ONCOLOGY | Facility: HOSPITAL | Age: 59
End: 2019-10-01

## 2019-10-01 DIAGNOSIS — D45 POLYCYTHEMIA RUBRA VERA (HCC): Primary | ICD-10-CM

## 2019-10-01 RX ORDER — ONDANSETRON HYDROCHLORIDE 8 MG/1
8 TABLET, FILM COATED ORAL 3 TIMES DAILY PRN
Qty: 30 TABLET | Refills: 5 | Status: SHIPPED | OUTPATIENT
Start: 2019-10-01 | End: 2022-08-15

## 2019-10-01 NOTE — PROGRESS NOTES
Oral Chemotherapy Teaching        Patient Name/:      Gisell Wylie  1960  Oral Chemotherapy Regimen:  Hydrea 500mg PO daily  Date Started Medication: patient plans to start tomorrow morning (10/2/19)           Initial Teaching Follow Up Comments      Safety      Storage instructions (away from children; away from heat/cold, sunlight, or moisture), handling - use of gloves (caregivers), washing hands after touching pills, managing waste       “How are you storing your medications?”, reminders on storage, proper handling (caregivers using gloves, washing hands, away from children, managing waste, etc.), disposal of medication with D/C or dosage change     Patient counseled on hazardous handling and storage precautions. Discussed plan to wash hands after handling. Caregivers to handle with latex gloves. Store at room temp, away from pets and children. Pt verbalized understanding.       Adherence       patient and/or caregiver on how to take medication, take with/without food, assess their adherence potential, stress importance of adherence, ways to manage adherence (pill boxes, phone reminders, calendars), what to do if miss a dose    “How are you taking your medication?” “How are you remembering to take your medication?”, “How many doses have you missed?”, determine reasons for non-adherence (not remembering, side effects, etc), ways to improve, overadherence? Remind patient of ways to improve/maintain adherence    Discussed plan for Hydrea 500mg PO daily. May be taken with or without meals. Reviewed plan to take medication at same time each day (morning). Provided drug information handout along with treatment calendar.       Side Effects/Adverse Reactions       patient on potential side effects, s/s, ways to manage, when to call MD/seek help      Determine if patient experiencing side effects, ways to manage    Reviewed the following side effects including but not limited to low blood counts,  sun sensitivity, hair thinning/texture changes, N/V/D, mouth sores, and black/tarry stools. Zofran script sent to pharmacy for patient to pickup today along with Hydrea.      Miscellaneous      Food interactions, DDIs, financial issues Determine if patient started any new medications since being placed on oral chemo (analyze for DDI) No current DDIs, but patient was made aware if begins taking a new medication or OTC drug to call and ask if there is an interaction. Also, pt states she has met deductible for this year, but expresses financial concern for when January comes. Patient met with [facility] and was made aware of financial help available when the time comes.      Additional Notes:Discussed aforementioned material with patient in clinic. All questions and concerns addressed. Provided patient with my contact information, as well as the [facility] Center phone number, and instructions to call should additional questions arise. Obtained signed CCA and consent.

## 2019-10-09 ENCOUNTER — LAB (OUTPATIENT)
Dept: LAB | Facility: HOSPITAL | Age: 59
End: 2019-10-09

## 2019-10-09 DIAGNOSIS — D45 POLYCYTHEMIA RUBRA VERA (HCC): ICD-10-CM

## 2019-10-09 LAB
ALBUMIN SERPL-MCNC: 4.3 G/DL (ref 3.5–5.2)
ALBUMIN/GLOB SERPL: 1.2 G/DL
ALP SERPL-CCNC: 87 U/L (ref 39–117)
ALT SERPL W P-5'-P-CCNC: 12 U/L (ref 1–33)
ANION GAP SERPL CALCULATED.3IONS-SCNC: 9 MMOL/L (ref 5–15)
AST SERPL-CCNC: 18 U/L (ref 1–32)
BASOPHILS # BLD AUTO: 0.17 10*3/MM3 (ref 0–0.2)
BASOPHILS NFR BLD AUTO: 2.3 % (ref 0–1.5)
BILIRUB SERPL-MCNC: 0.6 MG/DL (ref 0.2–1.2)
BUN BLD-MCNC: 19 MG/DL (ref 8–23)
BUN/CREAT SERPL: 20.2 (ref 7–25)
CALCIUM SPEC-SCNC: 10.3 MG/DL (ref 8.6–10.5)
CHLORIDE SERPL-SCNC: 106 MMOL/L (ref 98–107)
CO2 SERPL-SCNC: 27 MMOL/L (ref 22–29)
CREAT BLD-MCNC: 0.94 MG/DL (ref 0.57–1)
DEPRECATED RDW RBC AUTO: 50.9 FL (ref 37–54)
EOSINOPHIL # BLD AUTO: 0.2 10*3/MM3 (ref 0–0.4)
EOSINOPHIL NFR BLD AUTO: 2.7 % (ref 0.3–6.2)
ERYTHROCYTE [DISTWIDTH] IN BLOOD BY AUTOMATED COUNT: 17.4 % (ref 12.3–15.4)
GFR SERPL CREATININE-BSD FRML MDRD: 61 ML/MIN/1.73
GLOBULIN UR ELPH-MCNC: 3.5 GM/DL
GLUCOSE BLD-MCNC: 100 MG/DL (ref 65–99)
HCT VFR BLD AUTO: 54.5 % (ref 34–46.6)
HGB BLD-MCNC: 16.5 G/DL (ref 12–15.9)
IMM GRANULOCYTES # BLD AUTO: 0.05 10*3/MM3 (ref 0–0.05)
IMM GRANULOCYTES NFR BLD AUTO: 0.7 % (ref 0–0.5)
LYMPHOCYTES # BLD AUTO: 1.23 10*3/MM3 (ref 0.7–3.1)
LYMPHOCYTES NFR BLD AUTO: 16.5 % (ref 19.6–45.3)
MCH RBC QN AUTO: 25.7 PG (ref 26.6–33)
MCHC RBC AUTO-ENTMCNC: 30.3 G/DL (ref 31.5–35.7)
MCV RBC AUTO: 85 FL (ref 79–97)
MONOCYTES # BLD AUTO: 0.3 10*3/MM3 (ref 0.1–0.9)
MONOCYTES NFR BLD AUTO: 4 % (ref 5–12)
NEUTROPHILS # BLD AUTO: 5.52 10*3/MM3 (ref 1.7–7)
NEUTROPHILS NFR BLD AUTO: 73.8 % (ref 42.7–76)
NRBC BLD AUTO-RTO: 0 /100 WBC (ref 0–0.2)
PLATELET # BLD AUTO: 772 10*3/MM3 (ref 140–450)
PMV BLD AUTO: 8.7 FL (ref 6–12)
POTASSIUM BLD-SCNC: 4.6 MMOL/L (ref 3.5–5.2)
PROT SERPL-MCNC: 7.8 G/DL (ref 6–8.5)
RBC # BLD AUTO: 6.41 10*6/MM3 (ref 3.77–5.28)
SODIUM BLD-SCNC: 142 MMOL/L (ref 136–145)
WBC NRBC COR # BLD: 7.47 10*3/MM3 (ref 3.4–10.8)

## 2019-10-09 PROCEDURE — 85025 COMPLETE CBC W/AUTO DIFF WBC: CPT

## 2019-10-09 PROCEDURE — 80053 COMPREHEN METABOLIC PANEL: CPT

## 2019-10-09 PROCEDURE — 36415 COLL VENOUS BLD VENIPUNCTURE: CPT

## 2019-10-16 ENCOUNTER — LAB (OUTPATIENT)
Dept: LAB | Facility: HOSPITAL | Age: 59
End: 2019-10-16

## 2019-10-16 DIAGNOSIS — D45 POLYCYTHEMIA RUBRA VERA (HCC): ICD-10-CM

## 2019-10-16 LAB
ALBUMIN SERPL-MCNC: 4.3 G/DL (ref 3.5–5.2)
ALBUMIN/GLOB SERPL: 1.2 G/DL
ALP SERPL-CCNC: 86 U/L (ref 39–117)
ALT SERPL W P-5'-P-CCNC: 12 U/L (ref 1–33)
ANION GAP SERPL CALCULATED.3IONS-SCNC: 9 MMOL/L (ref 5–15)
AST SERPL-CCNC: 27 U/L (ref 1–32)
BASOPHILS # BLD AUTO: 0.11 10*3/MM3 (ref 0–0.2)
BASOPHILS NFR BLD AUTO: 1.4 % (ref 0–1.5)
BILIRUB SERPL-MCNC: 0.5 MG/DL (ref 0.2–1.2)
BUN BLD-MCNC: 18 MG/DL (ref 8–23)
BUN/CREAT SERPL: 22.5 (ref 7–25)
CALCIUM SPEC-SCNC: 10.4 MG/DL (ref 8.6–10.5)
CHLORIDE SERPL-SCNC: 106 MMOL/L (ref 98–107)
CO2 SERPL-SCNC: 27 MMOL/L (ref 22–29)
CREAT BLD-MCNC: 0.8 MG/DL (ref 0.57–1)
DEPRECATED RDW RBC AUTO: 50.7 FL (ref 37–54)
EOSINOPHIL # BLD AUTO: 0.21 10*3/MM3 (ref 0–0.4)
EOSINOPHIL NFR BLD AUTO: 2.8 % (ref 0.3–6.2)
ERYTHROCYTE [DISTWIDTH] IN BLOOD BY AUTOMATED COUNT: 17.5 % (ref 12.3–15.4)
GFR SERPL CREATININE-BSD FRML MDRD: 73 ML/MIN/1.73
GLOBULIN UR ELPH-MCNC: 3.7 GM/DL
GLUCOSE BLD-MCNC: 81 MG/DL (ref 65–99)
HCT VFR BLD AUTO: 54.8 % (ref 34–46.6)
HGB BLD-MCNC: 16.3 G/DL (ref 12–15.9)
IMM GRANULOCYTES # BLD AUTO: 0.05 10*3/MM3 (ref 0–0.05)
IMM GRANULOCYTES NFR BLD AUTO: 0.7 % (ref 0–0.5)
LYMPHOCYTES # BLD AUTO: 1.16 10*3/MM3 (ref 0.7–3.1)
LYMPHOCYTES NFR BLD AUTO: 15.2 % (ref 19.6–45.3)
MCH RBC QN AUTO: 25.3 PG (ref 26.6–33)
MCHC RBC AUTO-ENTMCNC: 29.7 G/DL (ref 31.5–35.7)
MCV RBC AUTO: 85 FL (ref 79–97)
MONOCYTES # BLD AUTO: 0.37 10*3/MM3 (ref 0.1–0.9)
MONOCYTES NFR BLD AUTO: 4.9 % (ref 5–12)
NEUTROPHILS # BLD AUTO: 5.72 10*3/MM3 (ref 1.7–7)
NEUTROPHILS NFR BLD AUTO: 75 % (ref 42.7–76)
NRBC BLD AUTO-RTO: 0 /100 WBC (ref 0–0.2)
PLATELET # BLD AUTO: 744 10*3/MM3 (ref 140–450)
PMV BLD AUTO: 8.5 FL (ref 6–12)
POTASSIUM BLD-SCNC: 4.3 MMOL/L (ref 3.5–5.2)
PROT SERPL-MCNC: 8 G/DL (ref 6–8.5)
RBC # BLD AUTO: 6.45 10*6/MM3 (ref 3.77–5.28)
SODIUM BLD-SCNC: 142 MMOL/L (ref 136–145)
WBC NRBC COR # BLD: 7.62 10*3/MM3 (ref 3.4–10.8)

## 2019-10-16 PROCEDURE — 80053 COMPREHEN METABOLIC PANEL: CPT

## 2019-10-16 PROCEDURE — 85025 COMPLETE CBC W/AUTO DIFF WBC: CPT

## 2019-10-16 PROCEDURE — 36415 COLL VENOUS BLD VENIPUNCTURE: CPT

## 2019-10-18 ENCOUNTER — TELEPHONE (OUTPATIENT)
Dept: ONCOLOGY | Facility: CLINIC | Age: 59
End: 2019-10-18

## 2019-10-18 NOTE — TELEPHONE ENCOUNTER
Labs reviewed by Dr. Damon and are stable.  Per Dr. Damon, it may be a few months before her counts start to drop.  Patient notified and verbalized understanding.

## 2019-10-18 NOTE — TELEPHONE ENCOUNTER
----- Message from Audeliahola Borja sent at 10/18/2019  8:55 AM EDT -----  Regarding: [facility]-CHEMO MEDICATION  Contact: [phone]  Patient called and wants to know her lab results and wants to see if the chemo medication is making a dent in the her diagnosis of polycythemia vera? Please call.

## 2019-10-23 ENCOUNTER — LAB (OUTPATIENT)
Dept: LAB | Facility: HOSPITAL | Age: 59
End: 2019-10-23

## 2019-10-23 DIAGNOSIS — D45 POLYCYTHEMIA RUBRA VERA (HCC): ICD-10-CM

## 2019-10-23 LAB
ALBUMIN SERPL-MCNC: 4.3 G/DL (ref 3.5–5.2)
ALBUMIN/GLOB SERPL: 1.2 G/DL
ALP SERPL-CCNC: 88 U/L (ref 39–117)
ALT SERPL W P-5'-P-CCNC: 12 U/L (ref 1–33)
ANION GAP SERPL CALCULATED.3IONS-SCNC: 11 MMOL/L (ref 5–15)
AST SERPL-CCNC: 26 U/L (ref 1–32)
BASOPHILS # BLD AUTO: 0.13 10*3/MM3 (ref 0–0.2)
BASOPHILS NFR BLD AUTO: 1.7 % (ref 0–1.5)
BILIRUB SERPL-MCNC: 0.7 MG/DL (ref 0.2–1.2)
BUN BLD-MCNC: 18 MG/DL (ref 8–23)
BUN/CREAT SERPL: 21.2 (ref 7–25)
CALCIUM SPEC-SCNC: 10.1 MG/DL (ref 8.6–10.5)
CHLORIDE SERPL-SCNC: 105 MMOL/L (ref 98–107)
CO2 SERPL-SCNC: 24 MMOL/L (ref 22–29)
CREAT BLD-MCNC: 0.85 MG/DL (ref 0.57–1)
DEPRECATED RDW RBC AUTO: 50.2 FL (ref 37–54)
EOSINOPHIL # BLD AUTO: 0.22 10*3/MM3 (ref 0–0.4)
EOSINOPHIL NFR BLD AUTO: 2.8 % (ref 0.3–6.2)
ERYTHROCYTE [DISTWIDTH] IN BLOOD BY AUTOMATED COUNT: 17.6 % (ref 12.3–15.4)
GFR SERPL CREATININE-BSD FRML MDRD: 68 ML/MIN/1.73
GLOBULIN UR ELPH-MCNC: 3.6 GM/DL
GLUCOSE BLD-MCNC: 94 MG/DL (ref 65–99)
HCT VFR BLD AUTO: 55.5 % (ref 34–46.6)
HGB BLD-MCNC: 16.6 G/DL (ref 12–15.9)
IMM GRANULOCYTES # BLD AUTO: 0.04 10*3/MM3 (ref 0–0.05)
IMM GRANULOCYTES NFR BLD AUTO: 0.5 % (ref 0–0.5)
LYMPHOCYTES # BLD AUTO: 1.18 10*3/MM3 (ref 0.7–3.1)
LYMPHOCYTES NFR BLD AUTO: 15.1 % (ref 19.6–45.3)
MCH RBC QN AUTO: 25.3 PG (ref 26.6–33)
MCHC RBC AUTO-ENTMCNC: 29.9 G/DL (ref 31.5–35.7)
MCV RBC AUTO: 84.5 FL (ref 79–97)
MONOCYTES # BLD AUTO: 0.32 10*3/MM3 (ref 0.1–0.9)
MONOCYTES NFR BLD AUTO: 4.1 % (ref 5–12)
NEUTROPHILS # BLD AUTO: 5.93 10*3/MM3 (ref 1.7–7)
NEUTROPHILS NFR BLD AUTO: 75.8 % (ref 42.7–76)
NRBC BLD AUTO-RTO: 0 /100 WBC (ref 0–0.2)
PLATELET # BLD AUTO: 669 10*3/MM3 (ref 140–450)
PMV BLD AUTO: 8.4 FL (ref 6–12)
POTASSIUM BLD-SCNC: 4.1 MMOL/L (ref 3.5–5.2)
PROT SERPL-MCNC: 7.9 G/DL (ref 6–8.5)
RBC # BLD AUTO: 6.57 10*6/MM3 (ref 3.77–5.28)
SODIUM BLD-SCNC: 140 MMOL/L (ref 136–145)
WBC NRBC COR # BLD: 7.82 10*3/MM3 (ref 3.4–10.8)

## 2019-10-23 PROCEDURE — 85025 COMPLETE CBC W/AUTO DIFF WBC: CPT

## 2019-10-23 PROCEDURE — 36415 COLL VENOUS BLD VENIPUNCTURE: CPT

## 2019-10-23 PROCEDURE — 80053 COMPREHEN METABOLIC PANEL: CPT

## 2019-10-30 ENCOUNTER — LAB (OUTPATIENT)
Dept: LAB | Facility: HOSPITAL | Age: 59
End: 2019-10-30

## 2019-10-30 DIAGNOSIS — D45 POLYCYTHEMIA RUBRA VERA (HCC): ICD-10-CM

## 2019-10-30 LAB
BASOPHILS # BLD AUTO: 0.14 10*3/MM3 (ref 0–0.2)
BASOPHILS NFR BLD AUTO: 1.7 % (ref 0–1.5)
DEPRECATED RDW RBC AUTO: 50.4 FL (ref 37–54)
EOSINOPHIL # BLD AUTO: 0.24 10*3/MM3 (ref 0–0.4)
EOSINOPHIL NFR BLD AUTO: 2.9 % (ref 0.3–6.2)
ERYTHROCYTE [DISTWIDTH] IN BLOOD BY AUTOMATED COUNT: 17.7 % (ref 12.3–15.4)
HCT VFR BLD AUTO: 53.2 % (ref 34–46.6)
HGB BLD-MCNC: 16 G/DL (ref 12–15.9)
IMM GRANULOCYTES # BLD AUTO: 0.05 10*3/MM3 (ref 0–0.05)
IMM GRANULOCYTES NFR BLD AUTO: 0.6 % (ref 0–0.5)
LYMPHOCYTES # BLD AUTO: 1.35 10*3/MM3 (ref 0.7–3.1)
LYMPHOCYTES NFR BLD AUTO: 16.6 % (ref 19.6–45.3)
MCH RBC QN AUTO: 25.1 PG (ref 26.6–33)
MCHC RBC AUTO-ENTMCNC: 30.1 G/DL (ref 31.5–35.7)
MCV RBC AUTO: 83.4 FL (ref 79–97)
MONOCYTES # BLD AUTO: 0.43 10*3/MM3 (ref 0.1–0.9)
MONOCYTES NFR BLD AUTO: 5.3 % (ref 5–12)
NEUTROPHILS # BLD AUTO: 5.94 10*3/MM3 (ref 1.7–7)
NEUTROPHILS NFR BLD AUTO: 72.9 % (ref 42.7–76)
NRBC BLD AUTO-RTO: 0 /100 WBC (ref 0–0.2)
PLATELET # BLD AUTO: 626 10*3/MM3 (ref 140–450)
PMV BLD AUTO: 8.7 FL (ref 6–12)
RBC # BLD AUTO: 6.38 10*6/MM3 (ref 3.77–5.28)
WBC NRBC COR # BLD: 8.15 10*3/MM3 (ref 3.4–10.8)

## 2019-10-30 PROCEDURE — 85025 COMPLETE CBC W/AUTO DIFF WBC: CPT

## 2019-10-30 PROCEDURE — 36415 COLL VENOUS BLD VENIPUNCTURE: CPT

## 2019-11-06 ENCOUNTER — LAB (OUTPATIENT)
Dept: LAB | Facility: HOSPITAL | Age: 59
End: 2019-11-06

## 2019-11-06 DIAGNOSIS — D45 POLYCYTHEMIA RUBRA VERA (HCC): ICD-10-CM

## 2019-11-06 LAB
ALBUMIN SERPL-MCNC: 4.1 G/DL (ref 3.5–5.2)
ALBUMIN/GLOB SERPL: 1.1 G/DL
ALP SERPL-CCNC: 92 U/L (ref 39–117)
ALT SERPL W P-5'-P-CCNC: 12 U/L (ref 1–33)
ANION GAP SERPL CALCULATED.3IONS-SCNC: 13 MMOL/L (ref 5–15)
AST SERPL-CCNC: 16 U/L (ref 1–32)
BASOPHILS # BLD AUTO: 0.14 10*3/MM3 (ref 0–0.2)
BASOPHILS NFR BLD AUTO: 1.8 % (ref 0–1.5)
BILIRUB SERPL-MCNC: 0.5 MG/DL (ref 0.2–1.2)
BUN BLD-MCNC: 17 MG/DL (ref 8–23)
BUN/CREAT SERPL: 19.3 (ref 7–25)
CALCIUM SPEC-SCNC: 10.2 MG/DL (ref 8.6–10.5)
CHLORIDE SERPL-SCNC: 104 MMOL/L (ref 98–107)
CO2 SERPL-SCNC: 26 MMOL/L (ref 22–29)
CREAT BLD-MCNC: 0.88 MG/DL (ref 0.57–1)
DEPRECATED RDW RBC AUTO: 51.6 FL (ref 37–54)
EOSINOPHIL # BLD AUTO: 0.19 10*3/MM3 (ref 0–0.4)
EOSINOPHIL NFR BLD AUTO: 2.4 % (ref 0.3–6.2)
ERYTHROCYTE [DISTWIDTH] IN BLOOD BY AUTOMATED COUNT: 18.3 % (ref 12.3–15.4)
GFR SERPL CREATININE-BSD FRML MDRD: 65 ML/MIN/1.73
GLOBULIN UR ELPH-MCNC: 3.6 GM/DL
GLUCOSE BLD-MCNC: 99 MG/DL (ref 65–99)
HCT VFR BLD AUTO: 55.1 % (ref 34–46.6)
HGB BLD-MCNC: 16.4 G/DL (ref 12–15.9)
IMM GRANULOCYTES # BLD AUTO: 0.04 10*3/MM3 (ref 0–0.05)
IMM GRANULOCYTES NFR BLD AUTO: 0.5 % (ref 0–0.5)
LYMPHOCYTES # BLD AUTO: 1.1 10*3/MM3 (ref 0.7–3.1)
LYMPHOCYTES NFR BLD AUTO: 13.8 % (ref 19.6–45.3)
MCH RBC QN AUTO: 25 PG (ref 26.6–33)
MCHC RBC AUTO-ENTMCNC: 29.8 G/DL (ref 31.5–35.7)
MCV RBC AUTO: 84.1 FL (ref 79–97)
MONOCYTES # BLD AUTO: 0.36 10*3/MM3 (ref 0.1–0.9)
MONOCYTES NFR BLD AUTO: 4.5 % (ref 5–12)
NEUTROPHILS # BLD AUTO: 6.16 10*3/MM3 (ref 1.7–7)
NEUTROPHILS NFR BLD AUTO: 77 % (ref 42.7–76)
NRBC BLD AUTO-RTO: 0 /100 WBC (ref 0–0.2)
PLATELET # BLD AUTO: 710 10*3/MM3 (ref 140–450)
PMV BLD AUTO: 8.5 FL (ref 6–12)
POTASSIUM BLD-SCNC: 4.5 MMOL/L (ref 3.5–5.2)
PROT SERPL-MCNC: 7.7 G/DL (ref 6–8.5)
RBC # BLD AUTO: 6.55 10*6/MM3 (ref 3.77–5.28)
SODIUM BLD-SCNC: 143 MMOL/L (ref 136–145)
WBC NRBC COR # BLD: 7.99 10*3/MM3 (ref 3.4–10.8)

## 2019-11-06 PROCEDURE — 85025 COMPLETE CBC W/AUTO DIFF WBC: CPT

## 2019-11-06 PROCEDURE — 36415 COLL VENOUS BLD VENIPUNCTURE: CPT

## 2019-11-06 PROCEDURE — 80053 COMPREHEN METABOLIC PANEL: CPT

## 2019-11-13 ENCOUNTER — LAB (OUTPATIENT)
Dept: LAB | Facility: HOSPITAL | Age: 59
End: 2019-11-13

## 2019-11-13 DIAGNOSIS — D45 POLYCYTHEMIA RUBRA VERA (HCC): ICD-10-CM

## 2019-11-13 LAB
ALBUMIN SERPL-MCNC: 4.2 G/DL (ref 3.5–5.2)
ALBUMIN/GLOB SERPL: 1.2 G/DL
ALP SERPL-CCNC: 91 U/L (ref 39–117)
ALT SERPL W P-5'-P-CCNC: 10 U/L (ref 1–33)
ANION GAP SERPL CALCULATED.3IONS-SCNC: 15 MMOL/L (ref 5–15)
AST SERPL-CCNC: 18 U/L (ref 1–32)
BASOPHILS # BLD AUTO: 0.17 10*3/MM3 (ref 0–0.2)
BASOPHILS NFR BLD AUTO: 2.2 % (ref 0–1.5)
BILIRUB SERPL-MCNC: 0.5 MG/DL (ref 0.2–1.2)
BUN BLD-MCNC: 23 MG/DL (ref 8–23)
BUN/CREAT SERPL: 25.3 (ref 7–25)
CALCIUM SPEC-SCNC: 9.9 MG/DL (ref 8.6–10.5)
CHLORIDE SERPL-SCNC: 102 MMOL/L (ref 98–107)
CO2 SERPL-SCNC: 22 MMOL/L (ref 22–29)
CREAT BLD-MCNC: 0.91 MG/DL (ref 0.57–1)
DEPRECATED RDW RBC AUTO: 51 FL (ref 37–54)
EOSINOPHIL # BLD AUTO: 0.18 10*3/MM3 (ref 0–0.4)
EOSINOPHIL NFR BLD AUTO: 2.3 % (ref 0.3–6.2)
ERYTHROCYTE [DISTWIDTH] IN BLOOD BY AUTOMATED COUNT: 18.6 % (ref 12.3–15.4)
GFR SERPL CREATININE-BSD FRML MDRD: 63 ML/MIN/1.73
GLOBULIN UR ELPH-MCNC: 3.4 GM/DL
GLUCOSE BLD-MCNC: 111 MG/DL (ref 65–99)
HCT VFR BLD AUTO: 54.9 % (ref 34–46.6)
HGB BLD-MCNC: 16.6 G/DL (ref 12–15.9)
IMM GRANULOCYTES # BLD AUTO: 0.04 10*3/MM3 (ref 0–0.05)
IMM GRANULOCYTES NFR BLD AUTO: 0.5 % (ref 0–0.5)
LYMPHOCYTES # BLD AUTO: 1.23 10*3/MM3 (ref 0.7–3.1)
LYMPHOCYTES NFR BLD AUTO: 15.9 % (ref 19.6–45.3)
MCH RBC QN AUTO: 25.2 PG (ref 26.6–33)
MCHC RBC AUTO-ENTMCNC: 30.2 G/DL (ref 31.5–35.7)
MCV RBC AUTO: 83.2 FL (ref 79–97)
MONOCYTES # BLD AUTO: 0.32 10*3/MM3 (ref 0.1–0.9)
MONOCYTES NFR BLD AUTO: 4.1 % (ref 5–12)
NEUTROPHILS # BLD AUTO: 5.82 10*3/MM3 (ref 1.7–7)
NEUTROPHILS NFR BLD AUTO: 75 % (ref 42.7–76)
NRBC BLD AUTO-RTO: 0 /100 WBC (ref 0–0.2)
PLATELET # BLD AUTO: 712 10*3/MM3 (ref 140–450)
PMV BLD AUTO: 9.2 FL (ref 6–12)
POTASSIUM BLD-SCNC: 4.6 MMOL/L (ref 3.5–5.2)
PROT SERPL-MCNC: 7.6 G/DL (ref 6–8.5)
RBC # BLD AUTO: 6.6 10*6/MM3 (ref 3.77–5.28)
SODIUM BLD-SCNC: 139 MMOL/L (ref 136–145)
WBC NRBC COR # BLD: 7.76 10*3/MM3 (ref 3.4–10.8)

## 2019-11-13 PROCEDURE — 36415 COLL VENOUS BLD VENIPUNCTURE: CPT

## 2019-11-13 PROCEDURE — 85025 COMPLETE CBC W/AUTO DIFF WBC: CPT

## 2019-11-13 PROCEDURE — 80053 COMPREHEN METABOLIC PANEL: CPT

## 2019-11-20 ENCOUNTER — LAB (OUTPATIENT)
Dept: LAB | Facility: HOSPITAL | Age: 59
End: 2019-11-20

## 2019-11-20 DIAGNOSIS — D45 POLYCYTHEMIA RUBRA VERA (HCC): ICD-10-CM

## 2019-11-20 LAB
ALBUMIN SERPL-MCNC: 4.3 G/DL (ref 3.5–5.2)
ALBUMIN/GLOB SERPL: 1.2 G/DL
ALP SERPL-CCNC: 84 U/L (ref 39–117)
ALT SERPL W P-5'-P-CCNC: 10 U/L (ref 1–33)
ANION GAP SERPL CALCULATED.3IONS-SCNC: 11 MMOL/L (ref 5–15)
AST SERPL-CCNC: 18 U/L (ref 1–32)
BASOPHILS # BLD AUTO: 0.17 10*3/MM3 (ref 0–0.2)
BASOPHILS NFR BLD AUTO: 2.3 % (ref 0–1.5)
BILIRUB SERPL-MCNC: 0.5 MG/DL (ref 0.2–1.2)
BUN BLD-MCNC: 24 MG/DL (ref 8–23)
BUN/CREAT SERPL: 26.4 (ref 7–25)
CALCIUM SPEC-SCNC: 10.1 MG/DL (ref 8.6–10.5)
CHLORIDE SERPL-SCNC: 107 MMOL/L (ref 98–107)
CO2 SERPL-SCNC: 25 MMOL/L (ref 22–29)
CREAT BLD-MCNC: 0.91 MG/DL (ref 0.57–1)
DEPRECATED RDW RBC AUTO: 53.9 FL (ref 37–54)
EOSINOPHIL # BLD AUTO: 0.19 10*3/MM3 (ref 0–0.4)
EOSINOPHIL NFR BLD AUTO: 2.6 % (ref 0.3–6.2)
ERYTHROCYTE [DISTWIDTH] IN BLOOD BY AUTOMATED COUNT: 19 % (ref 12.3–15.4)
GFR SERPL CREATININE-BSD FRML MDRD: 63 ML/MIN/1.73
GLOBULIN UR ELPH-MCNC: 3.5 GM/DL
GLUCOSE BLD-MCNC: 98 MG/DL (ref 65–99)
HCT VFR BLD AUTO: 54.1 % (ref 34–46.6)
HGB BLD-MCNC: 15.8 G/DL (ref 12–15.9)
IMM GRANULOCYTES # BLD AUTO: 0.04 10*3/MM3 (ref 0–0.05)
IMM GRANULOCYTES NFR BLD AUTO: 0.5 % (ref 0–0.5)
LYMPHOCYTES # BLD AUTO: 1.06 10*3/MM3 (ref 0.7–3.1)
LYMPHOCYTES NFR BLD AUTO: 14.3 % (ref 19.6–45.3)
MCH RBC QN AUTO: 24.7 PG (ref 26.6–33)
MCHC RBC AUTO-ENTMCNC: 29.2 G/DL (ref 31.5–35.7)
MCV RBC AUTO: 84.5 FL (ref 79–97)
MONOCYTES # BLD AUTO: 0.37 10*3/MM3 (ref 0.1–0.9)
MONOCYTES NFR BLD AUTO: 5 % (ref 5–12)
NEUTROPHILS # BLD AUTO: 5.57 10*3/MM3 (ref 1.7–7)
NEUTROPHILS NFR BLD AUTO: 75.3 % (ref 42.7–76)
NRBC BLD AUTO-RTO: 0 /100 WBC (ref 0–0.2)
PLATELET # BLD AUTO: 624 10*3/MM3 (ref 140–450)
PMV BLD AUTO: 8.7 FL (ref 6–12)
POTASSIUM BLD-SCNC: 4.7 MMOL/L (ref 3.5–5.2)
PROT SERPL-MCNC: 7.8 G/DL (ref 6–8.5)
RBC # BLD AUTO: 6.4 10*6/MM3 (ref 3.77–5.28)
SODIUM BLD-SCNC: 143 MMOL/L (ref 136–145)
WBC NRBC COR # BLD: 7.4 10*3/MM3 (ref 3.4–10.8)

## 2019-11-20 PROCEDURE — 36415 COLL VENOUS BLD VENIPUNCTURE: CPT

## 2019-11-20 PROCEDURE — 85025 COMPLETE CBC W/AUTO DIFF WBC: CPT

## 2019-11-20 PROCEDURE — 80053 COMPREHEN METABOLIC PANEL: CPT

## 2019-11-27 ENCOUNTER — APPOINTMENT (OUTPATIENT)
Dept: LAB | Facility: HOSPITAL | Age: 59
End: 2019-11-27

## 2019-11-27 ENCOUNTER — LAB REQUISITION (OUTPATIENT)
Dept: LAB | Facility: HOSPITAL | Age: 59
End: 2019-11-27

## 2019-11-27 LAB
BASOPHILS # BLD AUTO: 0.13 10*3/MM3 (ref 0–0.2)
BASOPHILS NFR BLD AUTO: 2 % (ref 0–1.5)
DEPRECATED RDW RBC AUTO: 54.2 FL (ref 37–54)
EOSINOPHIL # BLD AUTO: 0.17 10*3/MM3 (ref 0–0.4)
EOSINOPHIL NFR BLD AUTO: 2.6 % (ref 0.3–6.2)
ERYTHROCYTE [DISTWIDTH] IN BLOOD BY AUTOMATED COUNT: 19.6 % (ref 12.3–15.4)
HCT VFR BLD AUTO: 55.1 % (ref 34–46.6)
HGB BLD-MCNC: 16.6 G/DL (ref 12–15.9)
IMM GRANULOCYTES # BLD AUTO: 0.02 10*3/MM3 (ref 0–0.05)
IMM GRANULOCYTES NFR BLD AUTO: 0.3 % (ref 0–0.5)
LYMPHOCYTES # BLD AUTO: 1.04 10*3/MM3 (ref 0.7–3.1)
LYMPHOCYTES NFR BLD AUTO: 15.6 % (ref 19.6–45.3)
MCH RBC QN AUTO: 25.3 PG (ref 26.6–33)
MCHC RBC AUTO-ENTMCNC: 30.1 G/DL (ref 31.5–35.7)
MCV RBC AUTO: 84 FL (ref 79–97)
MONOCYTES # BLD AUTO: 0.36 10*3/MM3 (ref 0.1–0.9)
MONOCYTES NFR BLD AUTO: 5.4 % (ref 5–12)
NEUTROPHILS # BLD AUTO: 4.93 10*3/MM3 (ref 1.7–7)
NEUTROPHILS NFR BLD AUTO: 74.1 % (ref 42.7–76)
NRBC BLD AUTO-RTO: 0 /100 WBC (ref 0–0.2)
PLATELET # BLD AUTO: 602 10*3/MM3 (ref 140–450)
PMV BLD AUTO: 8.8 FL (ref 6–12)
RBC # BLD AUTO: 6.56 10*6/MM3 (ref 3.77–5.28)
WBC NRBC COR # BLD: 6.65 10*3/MM3 (ref 3.4–10.8)

## 2019-11-27 PROCEDURE — 85025 COMPLETE CBC W/AUTO DIFF WBC: CPT

## 2019-11-27 PROCEDURE — 80053 COMPREHEN METABOLIC PANEL: CPT

## 2019-11-28 LAB
ALBUMIN SERPL-MCNC: 4.6 G/DL (ref 3.5–5.2)
ALBUMIN/GLOB SERPL: 1.4 G/DL
ALP SERPL-CCNC: 92 U/L (ref 39–117)
ALT SERPL W P-5'-P-CCNC: 13 U/L (ref 1–33)
ANION GAP SERPL CALCULATED.3IONS-SCNC: 11.7 MMOL/L (ref 5–15)
AST SERPL-CCNC: 19 U/L (ref 1–32)
BILIRUB SERPL-MCNC: 0.8 MG/DL (ref 0.2–1.2)
BUN BLD-MCNC: 18 MG/DL (ref 8–23)
BUN/CREAT SERPL: 22 (ref 7–25)
CALCIUM SPEC-SCNC: 10.1 MG/DL (ref 8.6–10.5)
CHLORIDE SERPL-SCNC: 103 MMOL/L (ref 98–107)
CO2 SERPL-SCNC: 27.3 MMOL/L (ref 22–29)
CREAT BLD-MCNC: 0.82 MG/DL (ref 0.57–1)
GFR SERPL CREATININE-BSD FRML MDRD: 71 ML/MIN/1.73
GLOBULIN UR ELPH-MCNC: 3.3 GM/DL
GLUCOSE BLD-MCNC: 95 MG/DL (ref 65–99)
POTASSIUM BLD-SCNC: 4.9 MMOL/L (ref 3.5–5.2)
PROT SERPL-MCNC: 7.9 G/DL (ref 6–8.5)
SODIUM BLD-SCNC: 142 MMOL/L (ref 136–145)

## 2019-12-02 ENCOUNTER — SPECIALTY PHARMACY (OUTPATIENT)
Dept: ONCOLOGY | Facility: HOSPITAL | Age: 59
End: 2019-12-02

## 2019-12-02 ENCOUNTER — OFFICE VISIT (OUTPATIENT)
Dept: ONCOLOGY | Facility: CLINIC | Age: 59
End: 2019-12-02

## 2019-12-02 VITALS
OXYGEN SATURATION: 95 % | HEIGHT: 67 IN | HEART RATE: 74 BPM | SYSTOLIC BLOOD PRESSURE: 136 MMHG | TEMPERATURE: 98 F | DIASTOLIC BLOOD PRESSURE: 68 MMHG | BODY MASS INDEX: 31.39 KG/M2 | WEIGHT: 200 LBS | RESPIRATION RATE: 18 BRPM

## 2019-12-02 DIAGNOSIS — D45 POLYCYTHEMIA RUBRA VERA (HCC): ICD-10-CM

## 2019-12-02 DIAGNOSIS — D45 POLYCYTHEMIA RUBRA VERA (HCC): Primary | Chronic | ICD-10-CM

## 2019-12-02 PROCEDURE — 99214 OFFICE O/P EST MOD 30 MIN: CPT | Performed by: INTERNAL MEDICINE

## 2019-12-02 RX ORDER — HYDROXYUREA 500 MG/1
1000 CAPSULE ORAL DAILY
Qty: 60 CAPSULE | Refills: 11 | Status: SHIPPED | OUTPATIENT
Start: 2019-12-02 | End: 2020-12-09 | Stop reason: SDUPTHER

## 2019-12-02 NOTE — PROGRESS NOTES
Oral Chemotherapy Teaching      Patient Name/:  Gisell Wylie   1960  Oral Chemotherapy Regimen: Hydrea 500mg PO daily  Date Started Medication: 10/2/19    Additional Notes: Received referral from , plan to initiate INCREASE  Hydrea to Hydrea 1000mg PO daily. Received verbal order for Hydrea 1000mg PO daily #60 with 11 RF. Submitted to [facility] for processing. Pt stopped by pharmacy to  updated script, given recent fill, insurance will not process until the end of the week as refill too soon. Instructed patient to utilize current home supply to increase to Hydrea 1000mg PO daily (two tablets once a day). Will plan to reach out to patient next Monday to refill script through insurance.

## 2019-12-02 NOTE — PROGRESS NOTES
CHIEF COMPLAINT: Follow-up PRV    Problem List:  Oncology/Hematology History    1. Collin 2+ polycythemia rubra vera  2. Recurrent DVT and PE due to problem #2 with low titer positive lupus anticoagulant per prior testing by Dr. Major apparently on 2 separate occasions confirmatory 3 months apart  3. May Thurner  4. Asending aortic aneurysm  5. History of SOLOMON  6. Obstructive sleep apnea  7. Contrast-induced nephropathy  8. Reflux with nausea      -8/2/2019 initial [facility] medical oncology consultation: May 2016 developed unprovoked left lower extremity deep vein thrombosis treated with left common iliac stent, thrombolyzes, temporary vena cava filter placement and retrieval.  Saw hematologist who told her she might have a low level lupus anticoagulant but she does not have that data and they were told that they did not think that was significant enough to warrant lifetime anticoagulation.  She stopped Xarelto after 8 months.  Starting the end of June she was developing pain in the left lower extremity again with progressive swelling and shortness of breath.  Went to [facility] ER on 4 July.  Was found to have left lower extremity extensive deep vein thrombosis and right lower lobe pulmonary embolus.  She was treated with thrombectomy and thrombolysis by Rigoberto and Pavel.  Previously she could not afford her Xarelto and they have switched her from heparin to Coumadin but before discharge switched her to Xarelto.  Her creatinine during her hospitalization increased to 1.38 from a baseline of 1.2.  She comes to hematology at [facility] on 8/2/2019 with labs showing creatinine of 1.23 on 7/11/2019, 1.69 on 7/17/2019, and she says that it was repeated again but she does not have that lab and was told that it was back down but not at normal.  Her CBC reveals platelets of 697,000 on 7/17/2019 and 589,000 on 7/10/2019.Labs from 7/10/2019 revealed platelet count of 490,000 with hematocrit 54.7%.  Creatinine on  7/3/2019 was 0.92.  She understandably had a d-dimer elevation to 1.22 on 7/3/2019 with her clot.  Given the magnitude of her clot and the likelihood of may Thurner syndrome, she needs lifetime anticoagulation.  On patients with deep vein thrombosis and/or PE and indefinite anticoagulation, use of Xarelto in patients with GFR less than 30 is recommended to be avoided varied will need to watch that.  With her prior recent elevation of hematocrit and persistent elevation of platelets, we will continue to watch that and if they remain persistent suggesting this being more than just reactive to her PE and clots, then we need to look for myeloproliferative disorder contributing to her clotting.  She is going to get her records from her other hematologist she saw back in 2016 just for completeness sake but I would not repeat, nor would I do for the first time now, her hypercoagulable panel as any hypercoagulable disorder we stumbled upon would not alter her need for lifetime full dose anticoagulation.  The only question is which drug, and whether her creatinine will allow DOAC's.  I will check her BMP weekly for the next month to make sure her creatinine is doing adequate relative to her use of Xarelto and will get her blood count now and again prior to return in a month and if the platelets are remaining elevated we will do Collin 2 with reflex testing.  Since her discharge from the hospital she had significant nausea for which she had an ultrasound that showed fatty liver but normal gallbladder.  She had an EGD in the past few days with Dr. Heath that showed significant reflux and on Protonix her nausea has improved.  She will need close follow-up of her ascending aortic aneurysm as well with her thoracic surgeons.    -9/9/2019 medical oncology follow-up: Platelets persistently elevated 807,000 with hematocrit 52.7% and white count 8320 on 8/30/2019.  GFR staying over 59 on Xarelto.  We will check Collin 2 with reflex  testing and treat with Hydrea plus or minus phlebotomy if we confirm myeloproliferative disorder.  Will need bone marrow before start of that to assess for myelofibrosis if this is confirmed.  Records from Dr.Scott Major of 6/7/2016 mention that she had been anemic during her hospitalization.  Factor V Leiden, prothrombin mutation, Antithrombin III, protein C&S levels were all normal and the anticardiolipin and beta-2 microglobulin were all negative according to Dr. Major.  The lupus anticoagulant was borderline positive at 10.5 with him and he recommended repeat of that done 3 months apart to prove the diagnosis but this came back low positive according to his notes and he suggested switching to aspirin after a full 6 months of anticoagulation.  This was of course before the low-dose DOAC trials have been published she had had thrombolytics and stenting of her May Thurner according to his records.  Given her presence of May Thurner despite the stenting, I would be inclined towards keeping her on anticoagulants regardless.  She brings to me records that includes labs back to 2016 where her white count, hemoglobin and platelets were normal but as recently as June 2019 her hematocrit was 52% whereas back in May 2016 her hemoglobin was actually down in the eights with normochromic normocytic indices.  On Xarelto, there is increased risk with aspirin I will hold on that for present but we may have to make such a decision as Xarelto may not be sufficient for viscosity issues related to her elevated platelets and hematocrit if this is a myeloproliferative disorder.    -9/30/2019 [facility] hematology oncology follow-up visit: 9/9/2019 Collin 2 [id number] mutation positive with erythropoietin level low at 1 commensurate with polycythemia rubra vera and a hematocrit of 53% with platelets of 807,000 and normal white count.  Normally would do bone marrow to look for myelofibrosis but would not risk taking her off Xarelto at the  moment.  We will start her on Hydrea 500 mg daily with CBC weekly and if we get her hematocrit below 45% platelets in the 400,000 range without significant leukopenia that would be my goal.  No phlebotomies for now until we see what Hydrea does.  After we see her back, if her platelets are down and tolerating Hydrea, I will consider stopping the Xarelto and switching her to full dose aspirin for life for the PRV.  This PRV did not cause her original venous thromboembolic phenomenon in fact was anemic when Dr. Major saw her in 2016.  We talked about the potential natural history of PRV including spent phase, leukemic transformation, myelofibrosis.  She will need AAA follow-up with surgeons.        May-Thurner syndrome    2019 Initial Diagnosis     May-Thurner syndrome            HISTORY OF PRESENT ILLNESS:  The patient is a 59 y.o. female, here for follow up on management of PRV.  On Hydrea her hematocrit is staying 55% with platelets 602,000 presently.  On aspirin and 1 Hydrea tablet a day.  No complications.      Past Medical History:   Diagnosis Date   • Deep vein thrombophlebitis of calf, left (CMS/HCC)      Past Surgical History:   Procedure Laterality Date   • BILATERAL BREAST REDUCTION     • CAROTID STENT     •  SECTION     • ELBOW FUSION     • HYSTERECTOMY     • TONSILLECTOMY         Allergies   Allergen Reactions   • Clarithromycin Other (See Comments)       Family History and Social History reviewed and changed as necessary      REVIEW OF SYSTEM:   Review of Systems   Constitutional: Negative for appetite change, chills, diaphoresis, fatigue, fever and unexpected weight change.   HENT:   Negative for mouth sores, sore throat and trouble swallowing.    Eyes: Negative for icterus.   Respiratory: Negative for cough, hemoptysis and shortness of breath.    Cardiovascular: Negative for chest pain, leg swelling and palpitations.   Gastrointestinal: Negative for abdominal distention, abdominal pain,  "blood in stool, constipation, diarrhea, nausea and vomiting.   Endocrine: Negative for hot flashes.   Genitourinary: Negative for bladder incontinence, difficulty urinating, dysuria, frequency and hematuria.    Musculoskeletal: Negative for gait problem, neck pain and neck stiffness.   Skin: Negative for rash.   Neurological: Negative for dizziness, gait problem, headaches, light-headedness and numbness.   Hematological: Negative for adenopathy. Does not bruise/bleed easily.   Psychiatric/Behavioral: Negative for depression. The patient is not nervous/anxious.    All other systems reviewed and are negative.       PHYSICAL EXAM    Vitals:    12/02/19 1507   BP: 136/68   Pulse: 74   Resp: 18   Temp: 98 °F (36.7 °C)   SpO2: 95%   Weight: 90.7 kg (200 lb)   Height: 170.2 cm (67\")     Constitutional: Appears well-developed and well-nourished. No distress.   ECOG: (1) Restricted in physically strenuous activity, ambulatory and able to do work of light nature  HENT:   Head: Normocephalic.   Mouth/Throat: Oropharynx is clear and moist.   Eyes: Conjunctivae are normal. Pupils are equal, round, and reactive to light. No scleral icterus.   Neck: Neck supple. No JVD present. No thyromegaly present.   Cardiovascular: Normal rate, regular rhythm and normal heart sounds.    Pulmonary/Chest: Breath sounds normal. No respiratory distress.   Abdominal: Soft. Exhibits no distension and no mass. There is no hepatosplenomegaly. There is no tenderness. There is no rebound and no guarding.   Musculoskeletal:Exhibits no edema, tenderness or deformity.   Neurological: Alert and oriented to person, place, and time. Exhibits normal muscle tone.   Skin: No ecchymosis, no petechiae and no rash noted. Not diaphoretic. No cyanosis. Nails show no clubbing.   Psychiatric: Normal mood and affect.   Vitals reviewed.      Lab Results   Component Value Date    HGB 16.6 (H) 11/27/2019    HCT 55.1 (H) 11/27/2019    MCV 84.0 11/27/2019     (H) " 11/27/2019    WBC 6.65 11/27/2019    NEUTROABS 4.93 11/27/2019    LYMPHSABS 1.04 11/27/2019    MONOSABS 0.36 11/27/2019    EOSABS 0.17 11/27/2019    BASOSABS 0.13 11/27/2019       Lab Results   Component Value Date    GLUCOSE 95 11/27/2019    BUN 18 11/27/2019    CREATININE 0.82 11/27/2019     11/27/2019    K 4.9 11/27/2019     11/27/2019    CO2 27.3 11/27/2019    CALCIUM 10.1 11/27/2019    PROTEINTOT 7.9 11/27/2019    ALBUMIN 4.60 11/27/2019    BILITOT 0.8 11/27/2019    ALKPHOS 92 11/27/2019    AST 19 11/27/2019    ALT 13 11/27/2019                   ASSESSMENT & PLAN:  1. Collin 2+ polycythemia rubra vera  2. Recurrent DVT and PE due to problem #2 with low titer positive lupus anticoagulant per prior testing by Dr. Major apparently on 2 separate occasions confirmatory 3 months apart  3. May Thurner  4. Ascending aortic aneurysm  5. History of SOLOMON  6. Obstructive sleep apnea  7. Contrast-induced nephropathy  8. Reflux with nausea    Discussion: Her hematocrit is staying 55% with platelets 602,000.  Will increase Hydrea to 2 tablets daily we will see her back in 3 months with blood counts serially.  She needs to follow-up with surgery regarding surveillance of her ascending aortic aneurysm.  Discussed with patient 25 minutes face-to-face greater than 50% spent counseling regarding this plan.      Yogesh Damon MD    12/02/2019

## 2019-12-09 ENCOUNTER — SPECIALTY PHARMACY (OUTPATIENT)
Dept: ONCOLOGY | Facility: HOSPITAL | Age: 59
End: 2019-12-09

## 2019-12-09 NOTE — PROGRESS NOTES
Oral Chemotherapy Teaching      Patient Name/:  Gisell Wylie   1960  Oral Chemotherapy Regimen: Hydrea 1000mg PO daily  Date Started Medication: 10/2/19    Additional Notes: Reached out to patient, ~ 8 tablets remaining on hand. Recently increased dose at last MD visit. Originally too early to refill last week after MD appt. Pt requesting refill to  in person this week from [facility]. Reached out to [facility] to [facility] for dispense. Pt confirms administration of Hydrea 1000mg ( 2 tablets) by mouth once daily.

## 2019-12-11 ENCOUNTER — LAB (OUTPATIENT)
Dept: LAB | Facility: HOSPITAL | Age: 59
End: 2019-12-11

## 2019-12-11 DIAGNOSIS — D45 POLYCYTHEMIA RUBRA VERA (HCC): ICD-10-CM

## 2019-12-11 LAB
ALBUMIN SERPL-MCNC: 4.3 G/DL (ref 3.5–5.2)
ALBUMIN/GLOB SERPL: 1.3 G/DL
ALP SERPL-CCNC: 90 U/L (ref 39–117)
ALT SERPL W P-5'-P-CCNC: 15 U/L (ref 1–33)
ANION GAP SERPL CALCULATED.3IONS-SCNC: 12 MMOL/L (ref 5–15)
AST SERPL-CCNC: 19 U/L (ref 1–32)
BASOPHILS # BLD AUTO: 0.12 10*3/MM3 (ref 0–0.2)
BASOPHILS NFR BLD AUTO: 1.6 % (ref 0–1.5)
BILIRUB SERPL-MCNC: 0.5 MG/DL (ref 0.2–1.2)
BUN BLD-MCNC: 18 MG/DL (ref 8–23)
BUN/CREAT SERPL: 23.1 (ref 7–25)
CALCIUM SPEC-SCNC: 9.6 MG/DL (ref 8.6–10.5)
CHLORIDE SERPL-SCNC: 103 MMOL/L (ref 98–107)
CO2 SERPL-SCNC: 24 MMOL/L (ref 22–29)
CREAT BLD-MCNC: 0.78 MG/DL (ref 0.57–1)
DEPRECATED RDW RBC AUTO: 55.9 FL (ref 37–54)
EOSINOPHIL # BLD AUTO: 0.2 10*3/MM3 (ref 0–0.4)
EOSINOPHIL NFR BLD AUTO: 2.7 % (ref 0.3–6.2)
ERYTHROCYTE [DISTWIDTH] IN BLOOD BY AUTOMATED COUNT: 20.3 % (ref 12.3–15.4)
GFR SERPL CREATININE-BSD FRML MDRD: 75 ML/MIN/1.73
GLOBULIN UR ELPH-MCNC: 3.4 GM/DL
GLUCOSE BLD-MCNC: 96 MG/DL (ref 65–99)
HCT VFR BLD AUTO: 54.2 % (ref 34–46.6)
HGB BLD-MCNC: 16.4 G/DL (ref 12–15.9)
IMM GRANULOCYTES # BLD AUTO: 0.05 10*3/MM3 (ref 0–0.05)
IMM GRANULOCYTES NFR BLD AUTO: 0.7 % (ref 0–0.5)
LYMPHOCYTES # BLD AUTO: 1.07 10*3/MM3 (ref 0.7–3.1)
LYMPHOCYTES NFR BLD AUTO: 14.4 % (ref 19.6–45.3)
MCH RBC QN AUTO: 25 PG (ref 26.6–33)
MCHC RBC AUTO-ENTMCNC: 30.3 G/DL (ref 31.5–35.7)
MCV RBC AUTO: 82.6 FL (ref 79–97)
MONOCYTES # BLD AUTO: 0.4 10*3/MM3 (ref 0.1–0.9)
MONOCYTES NFR BLD AUTO: 5.4 % (ref 5–12)
NEUTROPHILS # BLD AUTO: 5.58 10*3/MM3 (ref 1.7–7)
NEUTROPHILS NFR BLD AUTO: 75.2 % (ref 42.7–76)
NRBC BLD AUTO-RTO: 0 /100 WBC (ref 0–0.2)
PLATELET # BLD AUTO: 609 10*3/MM3 (ref 140–450)
PMV BLD AUTO: 8.5 FL (ref 6–12)
POTASSIUM BLD-SCNC: 4.7 MMOL/L (ref 3.5–5.2)
PROT SERPL-MCNC: 7.7 G/DL (ref 6–8.5)
RBC # BLD AUTO: 6.56 10*6/MM3 (ref 3.77–5.28)
SODIUM BLD-SCNC: 139 MMOL/L (ref 136–145)
WBC NRBC COR # BLD: 7.42 10*3/MM3 (ref 3.4–10.8)

## 2019-12-11 PROCEDURE — 36415 COLL VENOUS BLD VENIPUNCTURE: CPT

## 2019-12-11 PROCEDURE — 80053 COMPREHEN METABOLIC PANEL: CPT

## 2019-12-11 PROCEDURE — 85025 COMPLETE CBC W/AUTO DIFF WBC: CPT

## 2019-12-18 ENCOUNTER — LAB (OUTPATIENT)
Dept: LAB | Facility: HOSPITAL | Age: 59
End: 2019-12-18

## 2019-12-18 DIAGNOSIS — D45 POLYCYTHEMIA RUBRA VERA (HCC): ICD-10-CM

## 2019-12-18 LAB
ALBUMIN SERPL-MCNC: 4.2 G/DL (ref 3.5–5.2)
ALBUMIN/GLOB SERPL: 1.2 G/DL
ALP SERPL-CCNC: 86 U/L (ref 39–117)
ALT SERPL W P-5'-P-CCNC: 12 U/L (ref 1–33)
ANION GAP SERPL CALCULATED.3IONS-SCNC: 11 MMOL/L (ref 5–15)
AST SERPL-CCNC: 18 U/L (ref 1–32)
BASOPHILS # BLD AUTO: 0.13 10*3/MM3 (ref 0–0.2)
BASOPHILS NFR BLD AUTO: 1.9 % (ref 0–1.5)
BILIRUB SERPL-MCNC: 0.5 MG/DL (ref 0.2–1.2)
BUN BLD-MCNC: 19 MG/DL (ref 8–23)
BUN/CREAT SERPL: 20.4 (ref 7–25)
CALCIUM SPEC-SCNC: 10.1 MG/DL (ref 8.6–10.5)
CHLORIDE SERPL-SCNC: 104 MMOL/L (ref 98–107)
CO2 SERPL-SCNC: 25 MMOL/L (ref 22–29)
CREAT BLD-MCNC: 0.93 MG/DL (ref 0.57–1)
DEPRECATED RDW RBC AUTO: 60.8 FL (ref 37–54)
EOSINOPHIL # BLD AUTO: 0.15 10*3/MM3 (ref 0–0.4)
EOSINOPHIL NFR BLD AUTO: 2.2 % (ref 0.3–6.2)
ERYTHROCYTE [DISTWIDTH] IN BLOOD BY AUTOMATED COUNT: 21.6 % (ref 12.3–15.4)
GFR SERPL CREATININE-BSD FRML MDRD: 61 ML/MIN/1.73
GLOBULIN UR ELPH-MCNC: 3.4 GM/DL
GLUCOSE BLD-MCNC: 79 MG/DL (ref 65–99)
HCT VFR BLD AUTO: 54.9 % (ref 34–46.6)
HGB BLD-MCNC: 16.3 G/DL (ref 12–15.9)
IMM GRANULOCYTES # BLD AUTO: 0.03 10*3/MM3 (ref 0–0.05)
IMM GRANULOCYTES NFR BLD AUTO: 0.4 % (ref 0–0.5)
LYMPHOCYTES # BLD AUTO: 1.2 10*3/MM3 (ref 0.7–3.1)
LYMPHOCYTES NFR BLD AUTO: 17.2 % (ref 19.6–45.3)
MCH RBC QN AUTO: 25 PG (ref 26.6–33)
MCHC RBC AUTO-ENTMCNC: 29.7 G/DL (ref 31.5–35.7)
MCV RBC AUTO: 84.3 FL (ref 79–97)
MONOCYTES # BLD AUTO: 0.36 10*3/MM3 (ref 0.1–0.9)
MONOCYTES NFR BLD AUTO: 5.2 % (ref 5–12)
NEUTROPHILS # BLD AUTO: 5.1 10*3/MM3 (ref 1.7–7)
NEUTROPHILS NFR BLD AUTO: 73.1 % (ref 42.7–76)
NRBC BLD AUTO-RTO: 0 /100 WBC (ref 0–0.2)
PLATELET # BLD AUTO: 559 10*3/MM3 (ref 140–450)
PMV BLD AUTO: 8.7 FL (ref 6–12)
POTASSIUM BLD-SCNC: 4.8 MMOL/L (ref 3.5–5.2)
PROT SERPL-MCNC: 7.6 G/DL (ref 6–8.5)
RBC # BLD AUTO: 6.51 10*6/MM3 (ref 3.77–5.28)
SODIUM BLD-SCNC: 140 MMOL/L (ref 136–145)
WBC NRBC COR # BLD: 6.97 10*3/MM3 (ref 3.4–10.8)

## 2019-12-18 PROCEDURE — 36415 COLL VENOUS BLD VENIPUNCTURE: CPT

## 2019-12-18 PROCEDURE — 85025 COMPLETE CBC W/AUTO DIFF WBC: CPT

## 2019-12-18 PROCEDURE — 80053 COMPREHEN METABOLIC PANEL: CPT

## 2020-01-06 ENCOUNTER — SPECIALTY PHARMACY (OUTPATIENT)
Dept: ONCOLOGY | Facility: HOSPITAL | Age: 60
End: 2020-01-06

## 2020-01-06 NOTE — PROGRESS NOTES
Oral Chemotherapy Teaching      Patient Name/:  Gisell Wylie   1960  Oral Chemotherapy Regimen: Hydrea 1000mg PO daily  Date Started Medication: 10/2/19    Additional Notes: Reached out to patient about refill request, patient is unaware of current amount of tablets left but reports being due for a refill soon. Pt requesting refill to  in person next Tuesday(20) at [facility].  Pt confirms taking and tolerating Hydrea 1000mg ( 2 tablets) by mouth once daily. She has no new concerns or side effects to report.

## 2020-01-08 ENCOUNTER — APPOINTMENT (OUTPATIENT)
Dept: LAB | Facility: HOSPITAL | Age: 60
End: 2020-01-08

## 2020-01-08 ENCOUNTER — LAB (OUTPATIENT)
Dept: LAB | Facility: HOSPITAL | Age: 60
End: 2020-01-08

## 2020-01-08 DIAGNOSIS — D45 POLYCYTHEMIA RUBRA VERA (HCC): ICD-10-CM

## 2020-01-08 LAB
ALBUMIN SERPL-MCNC: 4.3 G/DL (ref 3.5–5.2)
ALBUMIN/GLOB SERPL: 1.3 G/DL
ALP SERPL-CCNC: 82 U/L (ref 39–117)
ALT SERPL W P-5'-P-CCNC: 10 U/L (ref 1–33)
ANION GAP SERPL CALCULATED.3IONS-SCNC: 13 MMOL/L (ref 5–15)
AST SERPL-CCNC: 18 U/L (ref 1–32)
BASOPHILS # BLD AUTO: 0.11 10*3/MM3 (ref 0–0.2)
BASOPHILS NFR BLD AUTO: 1.8 % (ref 0–1.5)
BILIRUB SERPL-MCNC: 0.7 MG/DL (ref 0.2–1.2)
BUN BLD-MCNC: 18 MG/DL (ref 8–23)
BUN/CREAT SERPL: 22.2 (ref 7–25)
CALCIUM SPEC-SCNC: 10 MG/DL (ref 8.6–10.5)
CHLORIDE SERPL-SCNC: 103 MMOL/L (ref 98–107)
CO2 SERPL-SCNC: 23 MMOL/L (ref 22–29)
CREAT BLD-MCNC: 0.81 MG/DL (ref 0.57–1)
DEPRECATED RDW RBC AUTO: 75.2 FL (ref 37–54)
EOSINOPHIL # BLD AUTO: 0.13 10*3/MM3 (ref 0–0.4)
EOSINOPHIL NFR BLD AUTO: 2.1 % (ref 0.3–6.2)
ERYTHROCYTE [DISTWIDTH] IN BLOOD BY AUTOMATED COUNT: 24.5 % (ref 12.3–15.4)
GFR SERPL CREATININE-BSD FRML MDRD: 72 ML/MIN/1.73
GLOBULIN UR ELPH-MCNC: 3.2 GM/DL
GLUCOSE BLD-MCNC: 92 MG/DL (ref 65–99)
HCT VFR BLD AUTO: 53.3 % (ref 34–46.6)
HGB BLD-MCNC: 16.3 G/DL (ref 12–15.9)
IMM GRANULOCYTES # BLD AUTO: 0.05 10*3/MM3 (ref 0–0.05)
IMM GRANULOCYTES NFR BLD AUTO: 0.8 % (ref 0–0.5)
LYMPHOCYTES # BLD AUTO: 0.96 10*3/MM3 (ref 0.7–3.1)
LYMPHOCYTES NFR BLD AUTO: 15.9 % (ref 19.6–45.3)
MCH RBC QN AUTO: 26.6 PG (ref 26.6–33)
MCHC RBC AUTO-ENTMCNC: 30.6 G/DL (ref 31.5–35.7)
MCV RBC AUTO: 86.9 FL (ref 79–97)
MONOCYTES # BLD AUTO: 0.27 10*3/MM3 (ref 0.1–0.9)
MONOCYTES NFR BLD AUTO: 4.5 % (ref 5–12)
NEUTROPHILS # BLD AUTO: 4.53 10*3/MM3 (ref 1.7–7)
NEUTROPHILS NFR BLD AUTO: 74.9 % (ref 42.7–76)
NRBC BLD AUTO-RTO: 0 /100 WBC (ref 0–0.2)
PLATELET # BLD AUTO: 489 10*3/MM3 (ref 140–450)
PMV BLD AUTO: 8.8 FL (ref 6–12)
POTASSIUM BLD-SCNC: 5 MMOL/L (ref 3.5–5.2)
PROT SERPL-MCNC: 7.5 G/DL (ref 6–8.5)
RBC # BLD AUTO: 6.13 10*6/MM3 (ref 3.77–5.28)
SODIUM BLD-SCNC: 139 MMOL/L (ref 136–145)
WBC NRBC COR # BLD: 6.05 10*3/MM3 (ref 3.4–10.8)

## 2020-01-08 PROCEDURE — 80053 COMPREHEN METABOLIC PANEL: CPT

## 2020-01-08 PROCEDURE — 36415 COLL VENOUS BLD VENIPUNCTURE: CPT

## 2020-01-08 PROCEDURE — 85025 COMPLETE CBC W/AUTO DIFF WBC: CPT

## 2020-01-13 ENCOUNTER — SPECIALTY PHARMACY (OUTPATIENT)
Dept: ONCOLOGY | Facility: HOSPITAL | Age: 60
End: 2020-01-13

## 2020-01-13 NOTE — PROGRESS NOTES
Oral Chemotherapy Teaching        Patient Name/:     Gisell Wylie   1960  Oral Chemotherapy Regimen: Hydrea 1000mg PO daily  Date Started Medication: 10/2/19    Additional Notes: Patient had a question about taking Hydrea and Vitamin B12 (cyanocobalamin) concurrently. Pt was informed over the phone of a lack of major drug- drug interactions between Hydrea and Vitamin B12 per [facility] Drug Interaction Results. Follow up with patient as needed.

## 2020-01-15 ENCOUNTER — LAB (OUTPATIENT)
Dept: LAB | Facility: HOSPITAL | Age: 60
End: 2020-01-15

## 2020-01-15 DIAGNOSIS — D45 POLYCYTHEMIA RUBRA VERA (HCC): ICD-10-CM

## 2020-01-15 LAB
ALBUMIN SERPL-MCNC: 4.1 G/DL (ref 3.5–5.2)
ALBUMIN/GLOB SERPL: 1.2 G/DL
ALP SERPL-CCNC: 77 U/L (ref 39–117)
ALT SERPL W P-5'-P-CCNC: 12 U/L (ref 1–33)
ANION GAP SERPL CALCULATED.3IONS-SCNC: 12 MMOL/L (ref 5–15)
AST SERPL-CCNC: 18 U/L (ref 1–32)
BASOPHILS # BLD AUTO: 0.08 10*3/MM3 (ref 0–0.2)
BASOPHILS NFR BLD AUTO: 1.4 % (ref 0–1.5)
BILIRUB SERPL-MCNC: 0.7 MG/DL (ref 0.2–1.2)
BUN BLD-MCNC: 24 MG/DL (ref 8–23)
BUN/CREAT SERPL: 28.2 (ref 7–25)
CALCIUM SPEC-SCNC: 9.9 MG/DL (ref 8.6–10.5)
CHLORIDE SERPL-SCNC: 104 MMOL/L (ref 98–107)
CO2 SERPL-SCNC: 24 MMOL/L (ref 22–29)
CREAT BLD-MCNC: 0.85 MG/DL (ref 0.57–1)
DEPRECATED RDW RBC AUTO: 78 FL (ref 37–54)
EOSINOPHIL # BLD AUTO: 0.15 10*3/MM3 (ref 0–0.4)
EOSINOPHIL NFR BLD AUTO: 2.6 % (ref 0.3–6.2)
ERYTHROCYTE [DISTWIDTH] IN BLOOD BY AUTOMATED COUNT: 24.9 % (ref 12.3–15.4)
GFR SERPL CREATININE-BSD FRML MDRD: 68 ML/MIN/1.73
GLOBULIN UR ELPH-MCNC: 3.5 GM/DL
GLUCOSE BLD-MCNC: 92 MG/DL (ref 65–99)
HCT VFR BLD AUTO: 49.8 % (ref 34–46.6)
HGB BLD-MCNC: 15.5 G/DL (ref 12–15.9)
IMM GRANULOCYTES # BLD AUTO: 0.03 10*3/MM3 (ref 0–0.05)
IMM GRANULOCYTES NFR BLD AUTO: 0.5 % (ref 0–0.5)
LYMPHOCYTES # BLD AUTO: 0.93 10*3/MM3 (ref 0.7–3.1)
LYMPHOCYTES NFR BLD AUTO: 16.4 % (ref 19.6–45.3)
MCH RBC QN AUTO: 27.5 PG (ref 26.6–33)
MCHC RBC AUTO-ENTMCNC: 31.1 G/DL (ref 31.5–35.7)
MCV RBC AUTO: 88.5 FL (ref 79–97)
MONOCYTES # BLD AUTO: 0.33 10*3/MM3 (ref 0.1–0.9)
MONOCYTES NFR BLD AUTO: 5.8 % (ref 5–12)
NEUTROPHILS # BLD AUTO: 4.16 10*3/MM3 (ref 1.7–7)
NEUTROPHILS NFR BLD AUTO: 73.3 % (ref 42.7–76)
NRBC BLD AUTO-RTO: 0 /100 WBC (ref 0–0.2)
PLATELET # BLD AUTO: 473 10*3/MM3 (ref 140–450)
PMV BLD AUTO: 8.8 FL (ref 6–12)
POTASSIUM BLD-SCNC: 4.2 MMOL/L (ref 3.5–5.2)
PROT SERPL-MCNC: 7.6 G/DL (ref 6–8.5)
RBC # BLD AUTO: 5.63 10*6/MM3 (ref 3.77–5.28)
SODIUM BLD-SCNC: 140 MMOL/L (ref 136–145)
WBC NRBC COR # BLD: 5.68 10*3/MM3 (ref 3.4–10.8)

## 2020-01-15 PROCEDURE — 80053 COMPREHEN METABOLIC PANEL: CPT

## 2020-01-15 PROCEDURE — 36415 COLL VENOUS BLD VENIPUNCTURE: CPT

## 2020-01-15 PROCEDURE — 85025 COMPLETE CBC W/AUTO DIFF WBC: CPT

## 2020-01-22 ENCOUNTER — LAB (OUTPATIENT)
Dept: LAB | Facility: HOSPITAL | Age: 60
End: 2020-01-22

## 2020-01-22 DIAGNOSIS — D45 POLYCYTHEMIA RUBRA VERA (HCC): ICD-10-CM

## 2020-01-22 LAB
ALBUMIN SERPL-MCNC: 4.3 G/DL (ref 3.5–5.2)
ALBUMIN/GLOB SERPL: 1.2 G/DL
ALP SERPL-CCNC: 83 U/L (ref 39–117)
ALT SERPL W P-5'-P-CCNC: 12 U/L (ref 1–33)
ANION GAP SERPL CALCULATED.3IONS-SCNC: 12 MMOL/L (ref 5–15)
AST SERPL-CCNC: 24 U/L (ref 1–32)
BASOPHILS # BLD AUTO: 0.08 10*3/MM3 (ref 0–0.2)
BASOPHILS NFR BLD AUTO: 1.5 % (ref 0–1.5)
BILIRUB SERPL-MCNC: 0.8 MG/DL (ref 0.2–1.2)
BUN BLD-MCNC: 20 MG/DL (ref 8–23)
BUN/CREAT SERPL: 20.6 (ref 7–25)
CALCIUM SPEC-SCNC: 10.3 MG/DL (ref 8.6–10.5)
CHLORIDE SERPL-SCNC: 103 MMOL/L (ref 98–107)
CO2 SERPL-SCNC: 26 MMOL/L (ref 22–29)
CREAT BLD-MCNC: 0.97 MG/DL (ref 0.57–1)
DEPRECATED RDW RBC AUTO: 77.6 FL (ref 37–54)
EOSINOPHIL # BLD AUTO: 0.12 10*3/MM3 (ref 0–0.4)
EOSINOPHIL NFR BLD AUTO: 2.3 % (ref 0.3–6.2)
ERYTHROCYTE [DISTWIDTH] IN BLOOD BY AUTOMATED COUNT: 24.8 % (ref 12.3–15.4)
GFR SERPL CREATININE-BSD FRML MDRD: 58 ML/MIN/1.73
GLOBULIN UR ELPH-MCNC: 3.6 GM/DL
GLUCOSE BLD-MCNC: 111 MG/DL (ref 65–99)
HCT VFR BLD AUTO: 50.9 % (ref 34–46.6)
HGB BLD-MCNC: 15.3 G/DL (ref 12–15.9)
IMM GRANULOCYTES # BLD AUTO: 0.03 10*3/MM3 (ref 0–0.05)
IMM GRANULOCYTES NFR BLD AUTO: 0.6 % (ref 0–0.5)
LYMPHOCYTES # BLD AUTO: 0.94 10*3/MM3 (ref 0.7–3.1)
LYMPHOCYTES NFR BLD AUTO: 17.9 % (ref 19.6–45.3)
MCH RBC QN AUTO: 26.7 PG (ref 26.6–33)
MCHC RBC AUTO-ENTMCNC: 30.1 G/DL (ref 31.5–35.7)
MCV RBC AUTO: 88.7 FL (ref 79–97)
MONOCYTES # BLD AUTO: 0.25 10*3/MM3 (ref 0.1–0.9)
MONOCYTES NFR BLD AUTO: 4.8 % (ref 5–12)
NEUTROPHILS # BLD AUTO: 3.83 10*3/MM3 (ref 1.7–7)
NEUTROPHILS NFR BLD AUTO: 72.9 % (ref 42.7–76)
NRBC BLD AUTO-RTO: 0 /100 WBC (ref 0–0.2)
PLATELET # BLD AUTO: 451 10*3/MM3 (ref 140–450)
PMV BLD AUTO: 8.8 FL (ref 6–12)
POTASSIUM BLD-SCNC: 4.7 MMOL/L (ref 3.5–5.2)
PROT SERPL-MCNC: 7.9 G/DL (ref 6–8.5)
RBC # BLD AUTO: 5.74 10*6/MM3 (ref 3.77–5.28)
SODIUM BLD-SCNC: 141 MMOL/L (ref 136–145)
WBC NRBC COR # BLD: 5.25 10*3/MM3 (ref 3.4–10.8)

## 2020-01-22 PROCEDURE — 36415 COLL VENOUS BLD VENIPUNCTURE: CPT

## 2020-01-22 PROCEDURE — 85025 COMPLETE CBC W/AUTO DIFF WBC: CPT

## 2020-01-22 PROCEDURE — 80053 COMPREHEN METABOLIC PANEL: CPT

## 2020-01-29 ENCOUNTER — LAB (OUTPATIENT)
Dept: LAB | Facility: HOSPITAL | Age: 60
End: 2020-01-29

## 2020-01-29 DIAGNOSIS — D45 POLYCYTHEMIA RUBRA VERA (HCC): ICD-10-CM

## 2020-01-29 LAB
ALBUMIN SERPL-MCNC: 4.8 G/DL (ref 3.5–5.2)
ALBUMIN/GLOB SERPL: 1.7 G/DL
ALP SERPL-CCNC: 93 U/L (ref 39–117)
ALT SERPL W P-5'-P-CCNC: 13 U/L (ref 1–33)
ANION GAP SERPL CALCULATED.3IONS-SCNC: 11 MMOL/L (ref 5–15)
AST SERPL-CCNC: 17 U/L (ref 1–32)
BASOPHILS # BLD AUTO: 0.07 10*3/MM3 (ref 0–0.2)
BASOPHILS NFR BLD AUTO: 1.1 % (ref 0–1.5)
BILIRUB SERPL-MCNC: 0.7 MG/DL (ref 0.2–1.2)
BUN BLD-MCNC: 24 MG/DL (ref 8–23)
BUN/CREAT SERPL: 26.7 (ref 7–25)
CALCIUM SPEC-SCNC: 10.1 MG/DL (ref 8.6–10.5)
CHLORIDE SERPL-SCNC: 102 MMOL/L (ref 98–107)
CO2 SERPL-SCNC: 28 MMOL/L (ref 22–29)
CREAT BLD-MCNC: 0.9 MG/DL (ref 0.57–1)
DEPRECATED RDW RBC AUTO: 79.7 FL (ref 37–54)
EOSINOPHIL # BLD AUTO: 0.12 10*3/MM3 (ref 0–0.4)
EOSINOPHIL NFR BLD AUTO: 1.9 % (ref 0.3–6.2)
ERYTHROCYTE [DISTWIDTH] IN BLOOD BY AUTOMATED COUNT: 25 % (ref 12.3–15.4)
GFR SERPL CREATININE-BSD FRML MDRD: 64 ML/MIN/1.73
GLOBULIN UR ELPH-MCNC: 2.9 GM/DL
GLUCOSE BLD-MCNC: 100 MG/DL (ref 65–99)
HCT VFR BLD AUTO: 50.3 % (ref 34–46.6)
HGB BLD-MCNC: 15.4 G/DL (ref 12–15.9)
IMM GRANULOCYTES # BLD AUTO: 0.02 10*3/MM3 (ref 0–0.05)
IMM GRANULOCYTES NFR BLD AUTO: 0.3 % (ref 0–0.5)
LYMPHOCYTES # BLD AUTO: 0.96 10*3/MM3 (ref 0.7–3.1)
LYMPHOCYTES NFR BLD AUTO: 15.2 % (ref 19.6–45.3)
MCH RBC QN AUTO: 27.3 PG (ref 26.6–33)
MCHC RBC AUTO-ENTMCNC: 30.6 G/DL (ref 31.5–35.7)
MCV RBC AUTO: 89 FL (ref 79–97)
MONOCYTES # BLD AUTO: 0.32 10*3/MM3 (ref 0.1–0.9)
MONOCYTES NFR BLD AUTO: 5.1 % (ref 5–12)
NEUTROPHILS # BLD AUTO: 4.82 10*3/MM3 (ref 1.7–7)
NEUTROPHILS NFR BLD AUTO: 76.4 % (ref 42.7–76)
NRBC BLD AUTO-RTO: 0 /100 WBC (ref 0–0.2)
PLATELET # BLD AUTO: 467 10*3/MM3 (ref 140–450)
PMV BLD AUTO: 8.5 FL (ref 6–12)
POTASSIUM BLD-SCNC: 4.8 MMOL/L (ref 3.5–5.2)
PROT SERPL-MCNC: 7.7 G/DL (ref 6–8.5)
RBC # BLD AUTO: 5.65 10*6/MM3 (ref 3.77–5.28)
SODIUM BLD-SCNC: 141 MMOL/L (ref 136–145)
WBC NRBC COR # BLD: 6.31 10*3/MM3 (ref 3.4–10.8)

## 2020-01-29 PROCEDURE — 80053 COMPREHEN METABOLIC PANEL: CPT

## 2020-01-29 PROCEDURE — 85025 COMPLETE CBC W/AUTO DIFF WBC: CPT

## 2020-01-29 PROCEDURE — 36415 COLL VENOUS BLD VENIPUNCTURE: CPT

## 2020-02-06 ENCOUNTER — SPECIALTY PHARMACY (OUTPATIENT)
Dept: ONCOLOGY | Facility: HOSPITAL | Age: 60
End: 2020-02-06

## 2020-02-06 NOTE — PROGRESS NOTES
Oral Chemotherapy Teaching        Patient Name/:     Gisell Wylie   1960  Oral Chemotherapy Regimen: Hydrea 1000mg PO daily  Date Started Medication: 10/2/19     Additional Notes: Reached out to patient about refill request, patient is unaware of current amount of tablets left but reports being due for a refill soon. Pt requesting refill to  in person next Monday (2/10/20) at [facility].  Pt confirms taking and tolerating Hydrea 1000mg ( 2 tablets) by mouth once daily. She has no new concerns or side effects to report. Provided patient with my phone number and advised her to call with any questions/concerns.

## 2020-02-12 ENCOUNTER — LAB (OUTPATIENT)
Dept: LAB | Facility: HOSPITAL | Age: 60
End: 2020-02-12

## 2020-02-12 DIAGNOSIS — D45 POLYCYTHEMIA RUBRA VERA (HCC): ICD-10-CM

## 2020-02-12 LAB
ALBUMIN SERPL-MCNC: 4.2 G/DL (ref 3.5–5.2)
ALBUMIN/GLOB SERPL: 1.2 G/DL
ALP SERPL-CCNC: 88 U/L (ref 39–117)
ALT SERPL W P-5'-P-CCNC: 15 U/L (ref 1–33)
ANION GAP SERPL CALCULATED.3IONS-SCNC: 9 MMOL/L (ref 5–15)
AST SERPL-CCNC: 23 U/L (ref 1–32)
BASOPHILS # BLD AUTO: 0.08 10*3/MM3 (ref 0–0.2)
BASOPHILS NFR BLD AUTO: 1.4 % (ref 0–1.5)
BILIRUB SERPL-MCNC: 0.6 MG/DL (ref 0.2–1.2)
BUN BLD-MCNC: 24 MG/DL (ref 8–23)
BUN/CREAT SERPL: 25 (ref 7–25)
CALCIUM SPEC-SCNC: 9.6 MG/DL (ref 8.6–10.5)
CHLORIDE SERPL-SCNC: 104 MMOL/L (ref 98–107)
CO2 SERPL-SCNC: 25 MMOL/L (ref 22–29)
CREAT BLD-MCNC: 0.96 MG/DL (ref 0.57–1)
DEPRECATED RDW RBC AUTO: 80.1 FL (ref 37–54)
EOSINOPHIL # BLD AUTO: 0.09 10*3/MM3 (ref 0–0.4)
EOSINOPHIL NFR BLD AUTO: 1.5 % (ref 0.3–6.2)
ERYTHROCYTE [DISTWIDTH] IN BLOOD BY AUTOMATED COUNT: 24.2 % (ref 12.3–15.4)
GFR SERPL CREATININE-BSD FRML MDRD: 59 ML/MIN/1.73
GLOBULIN UR ELPH-MCNC: 3.6 GM/DL
GLUCOSE BLD-MCNC: 105 MG/DL (ref 65–99)
HCT VFR BLD AUTO: 46.5 % (ref 34–46.6)
HGB BLD-MCNC: 14.1 G/DL (ref 12–15.9)
IMM GRANULOCYTES # BLD AUTO: 0.02 10*3/MM3 (ref 0–0.05)
IMM GRANULOCYTES NFR BLD AUTO: 0.3 % (ref 0–0.5)
LYMPHOCYTES # BLD AUTO: 0.93 10*3/MM3 (ref 0.7–3.1)
LYMPHOCYTES NFR BLD AUTO: 16 % (ref 19.6–45.3)
MCH RBC QN AUTO: 28 PG (ref 26.6–33)
MCHC RBC AUTO-ENTMCNC: 30.3 G/DL (ref 31.5–35.7)
MCV RBC AUTO: 92.3 FL (ref 79–97)
MONOCYTES # BLD AUTO: 0.25 10*3/MM3 (ref 0.1–0.9)
MONOCYTES NFR BLD AUTO: 4.3 % (ref 5–12)
NEUTROPHILS # BLD AUTO: 4.45 10*3/MM3 (ref 1.7–7)
NEUTROPHILS NFR BLD AUTO: 76.5 % (ref 42.7–76)
NRBC BLD AUTO-RTO: 0 /100 WBC (ref 0–0.2)
PLAT MORPH BLD: NORMAL
PLATELET # BLD AUTO: 526 10*3/MM3 (ref 140–450)
PMV BLD AUTO: 8.9 FL (ref 6–12)
POTASSIUM BLD-SCNC: 4.9 MMOL/L (ref 3.5–5.2)
PROT SERPL-MCNC: 7.8 G/DL (ref 6–8.5)
RBC # BLD AUTO: 5.04 10*6/MM3 (ref 3.77–5.28)
RBC MORPH BLD: NORMAL
SODIUM BLD-SCNC: 138 MMOL/L (ref 136–145)
WBC MORPH BLD: NORMAL
WBC NRBC COR # BLD: 5.82 10*3/MM3 (ref 3.4–10.8)

## 2020-02-12 PROCEDURE — 80053 COMPREHEN METABOLIC PANEL: CPT

## 2020-02-12 PROCEDURE — 36415 COLL VENOUS BLD VENIPUNCTURE: CPT

## 2020-02-12 PROCEDURE — 85007 BL SMEAR W/DIFF WBC COUNT: CPT

## 2020-02-12 PROCEDURE — 85025 COMPLETE CBC W/AUTO DIFF WBC: CPT

## 2020-03-06 ENCOUNTER — SPECIALTY PHARMACY (OUTPATIENT)
Dept: ONCOLOGY | Facility: HOSPITAL | Age: 60
End: 2020-03-06

## 2020-03-06 NOTE — PROGRESS NOTES
Oral Chemotherapy Teaching        Patient Name/:     Gisell Wylie   1960  Oral Chemotherapy Regimen: Hydrea 1000mg PO daily  Date Started Medication: 10/2/19     Additional Notes: Reached out to patient about refill request, patient is unaware of current amount of tablets left but reports being due for a refill soon. Pt requesting refill to  in person sometime next week at [facility] pharmacy.  Pharmacist will send refill request to [facility]. Pt confirms adherence and toleration of Hydrea 1000mg ( 2 tablets) by mouth once daily. She has no new concerns or side effects to report. Patient stated she is appreciative of the refill call since she rarely pays attention to how many tablets she has left and would not want to run out.

## 2020-03-11 ENCOUNTER — LAB (OUTPATIENT)
Dept: LAB | Facility: HOSPITAL | Age: 60
End: 2020-03-11

## 2020-03-11 DIAGNOSIS — D45 POLYCYTHEMIA RUBRA VERA (HCC): ICD-10-CM

## 2020-03-11 LAB
ALBUMIN SERPL-MCNC: 4.3 G/DL (ref 3.5–5.2)
ALBUMIN/GLOB SERPL: 1.4 G/DL
ALP SERPL-CCNC: 72 U/L (ref 39–117)
ALT SERPL W P-5'-P-CCNC: 9 U/L (ref 1–33)
ANION GAP SERPL CALCULATED.3IONS-SCNC: 9 MMOL/L (ref 5–15)
AST SERPL-CCNC: 16 U/L (ref 1–32)
BASOPHILS # BLD AUTO: 0.04 10*3/MM3 (ref 0–0.2)
BASOPHILS NFR BLD AUTO: 0.9 % (ref 0–1.5)
BILIRUB SERPL-MCNC: 1 MG/DL (ref 0.2–1.2)
BUN BLD-MCNC: 16 MG/DL (ref 8–23)
BUN/CREAT SERPL: 19.8 (ref 7–25)
CALCIUM SPEC-SCNC: 9.8 MG/DL (ref 8.6–10.5)
CHLORIDE SERPL-SCNC: 106 MMOL/L (ref 98–107)
CO2 SERPL-SCNC: 27 MMOL/L (ref 22–29)
CREAT BLD-MCNC: 0.81 MG/DL (ref 0.57–1)
DEPRECATED RDW RBC AUTO: 75.4 FL (ref 37–54)
EOSINOPHIL # BLD AUTO: 0.06 10*3/MM3 (ref 0–0.4)
EOSINOPHIL NFR BLD AUTO: 1.3 % (ref 0.3–6.2)
ERYTHROCYTE [DISTWIDTH] IN BLOOD BY AUTOMATED COUNT: 21.4 % (ref 12.3–15.4)
GFR SERPL CREATININE-BSD FRML MDRD: 72 ML/MIN/1.73
GLOBULIN UR ELPH-MCNC: 3 GM/DL
GLUCOSE BLD-MCNC: 100 MG/DL (ref 65–99)
HCT VFR BLD AUTO: 38 % (ref 34–46.6)
HGB BLD-MCNC: 12.3 G/DL (ref 12–15.9)
IMM GRANULOCYTES # BLD AUTO: 0.03 10*3/MM3 (ref 0–0.05)
IMM GRANULOCYTES NFR BLD AUTO: 0.7 % (ref 0–0.5)
LYMPHOCYTES # BLD AUTO: 0.99 10*3/MM3 (ref 0.7–3.1)
LYMPHOCYTES NFR BLD AUTO: 22.1 % (ref 19.6–45.3)
MCH RBC QN AUTO: 31 PG (ref 26.6–33)
MCHC RBC AUTO-ENTMCNC: 32.4 G/DL (ref 31.5–35.7)
MCV RBC AUTO: 95.7 FL (ref 79–97)
MONOCYTES # BLD AUTO: 0.24 10*3/MM3 (ref 0.1–0.9)
MONOCYTES NFR BLD AUTO: 5.4 % (ref 5–12)
NEUTROPHILS # BLD AUTO: 3.12 10*3/MM3 (ref 1.7–7)
NEUTROPHILS NFR BLD AUTO: 69.6 % (ref 42.7–76)
NRBC BLD AUTO-RTO: 0 /100 WBC (ref 0–0.2)
PLATELET # BLD AUTO: 376 10*3/MM3 (ref 140–450)
PMV BLD AUTO: 9.1 FL (ref 6–12)
POTASSIUM BLD-SCNC: 4.2 MMOL/L (ref 3.5–5.2)
PROT SERPL-MCNC: 7.3 G/DL (ref 6–8.5)
RBC # BLD AUTO: 3.97 10*6/MM3 (ref 3.77–5.28)
SODIUM BLD-SCNC: 142 MMOL/L (ref 136–145)
WBC NRBC COR # BLD: 4.48 10*3/MM3 (ref 3.4–10.8)

## 2020-03-11 PROCEDURE — 85025 COMPLETE CBC W/AUTO DIFF WBC: CPT

## 2020-03-11 PROCEDURE — 80053 COMPREHEN METABOLIC PANEL: CPT

## 2020-03-11 PROCEDURE — 36415 COLL VENOUS BLD VENIPUNCTURE: CPT

## 2020-03-16 ENCOUNTER — OFFICE VISIT (OUTPATIENT)
Dept: ONCOLOGY | Facility: CLINIC | Age: 60
End: 2020-03-16

## 2020-03-16 VITALS
HEIGHT: 67 IN | OXYGEN SATURATION: 96 % | RESPIRATION RATE: 16 BRPM | HEART RATE: 76 BPM | WEIGHT: 203 LBS | BODY MASS INDEX: 31.86 KG/M2 | SYSTOLIC BLOOD PRESSURE: 131 MMHG | TEMPERATURE: 98.6 F | DIASTOLIC BLOOD PRESSURE: 67 MMHG

## 2020-03-16 DIAGNOSIS — D45 POLYCYTHEMIA RUBRA VERA (HCC): Chronic | ICD-10-CM

## 2020-03-16 DIAGNOSIS — I87.1 MAY-THURNER SYNDROME: Primary | Chronic | ICD-10-CM

## 2020-03-16 PROCEDURE — 99213 OFFICE O/P EST LOW 20 MIN: CPT | Performed by: INTERNAL MEDICINE

## 2020-03-16 NOTE — PROGRESS NOTES
CHIEF COMPLAINT PRV    Problem List:  Oncology/Hematology History    1. Collin 2+ polycythemia rubra vera  2. Recurrent DVT and PE due to problem #2 with low titer positive lupus anticoagulant per prior testing by Dr. Major apparently on 2 separate occasions confirmatory 3 months apart  3. May Thurner  4. Asending aortic aneurysm  5. History of SOLOMON  6. Obstructive sleep apnea  7. Contrast-induced nephropathy  8. Reflux with nausea      -8/2/2019 initial [facility] medical oncology consultation: May 2016 developed unprovoked left lower extremity deep vein thrombosis treated with left common iliac stent, thrombolyzes, temporary vena cava filter placement and retrieval.  Saw hematologist who told her she might have a low level lupus anticoagulant but she does not have that data and they were told that they did not think that was significant enough to warrant lifetime anticoagulation.  She stopped Xarelto after 8 months.  Starting the end of June she was developing pain in the left lower extremity again with progressive swelling and shortness of breath.  Went to [facility] ER on 4 July.  Was found to have left lower extremity extensive deep vein thrombosis and right lower lobe pulmonary embolus.  She was treated with thrombectomy and thrombolysis by Rigoberto and Pavel.  Previously she could not afford her Xarelto and they have switched her from heparin to Coumadin but before discharge switched her to Xarelto.  Her creatinine during her hospitalization increased to 1.38 from a baseline of 1.2.  She comes to hematology at [facility] on 8/2/2019 with labs showing creatinine of 1.23 on 7/11/2019, 1.69 on 7/17/2019, and she says that it was repeated again but she does not have that lab and was told that it was back down but not at normal.  Her CBC reveals platelets of 697,000 on 7/17/2019 and 589,000 on 7/10/2019.Labs from 7/10/2019 revealed platelet count of 490,000 with hematocrit 54.7%.  Creatinine on 7/3/2019 was 0.92.   She understandably had a d-dimer elevation to 1.22 on 7/3/2019 with her clot.  Given the magnitude of her clot and the likelihood of may Thurner syndrome, she needs lifetime anticoagulation.  On patients with deep vein thrombosis and/or PE and indefinite anticoagulation, use of Xarelto in patients with GFR less than 30 is recommended to be avoided varied will need to watch that.  With her prior recent elevation of hematocrit and persistent elevation of platelets, we will continue to watch that and if they remain persistent suggesting this being more than just reactive to her PE and clots, then we need to look for myeloproliferative disorder contributing to her clotting.  She is going to get her records from her other hematologist she saw back in 2016 just for completeness sake but I would not repeat, nor would I do for the first time now, her hypercoagulable panel as any hypercoagulable disorder we stumbled upon would not alter her need for lifetime full dose anticoagulation.  The only question is which drug, and whether her creatinine will allow DOAC's.  I will check her BMP weekly for the next month to make sure her creatinine is doing adequate relative to her use of Xarelto and will get her blood count now and again prior to return in a month and if the platelets are remaining elevated we will do Collin 2 with reflex testing.  Since her discharge from the hospital she had significant nausea for which she had an ultrasound that showed fatty liver but normal gallbladder.  She had an EGD in the past few days with Dr. Heath that showed significant reflux and on Protonix her nausea has improved.  She will need close follow-up of her ascending aortic aneurysm as well with her thoracic surgeons.    -9/9/2019 medical oncology follow-up: Platelets persistently elevated 807,000 with hematocrit 52.7% and white count 8320 on 8/30/2019.  GFR staying over 59 on Xarelto.  We will check Collin 2 with reflex testing and treat with  Hydrea plus or minus phlebotomy if we confirm myeloproliferative disorder.  Will need bone marrow before start of that to assess for myelofibrosis if this is confirmed.  Records from Dr.Scott Major of 6/7/2016 mention that she had been anemic during her hospitalization.  Factor V Leiden, prothrombin mutation, Antithrombin III, protein C&S levels were all normal and the anticardiolipin and beta-2 microglobulin were all negative according to Dr. Major.  The lupus anticoagulant was borderline positive at 10.5 with him and he recommended repeat of that done 3 months apart to prove the diagnosis but this came back low positive according to his notes and he suggested switching to aspirin after a full 6 months of anticoagulation.  This was of course before the low-dose DOAC trials have been published she had had thrombolytics and stenting of her May Thurner according to his records.  Given her presence of May Thurner despite the stenting, I would be inclined towards keeping her on anticoagulants regardless.  She brings to me records that includes labs back to 2016 where her white count, hemoglobin and platelets were normal but as recently as June 2019 her hematocrit was 52% whereas back in May 2016 her hemoglobin was actually down in the eights with normochromic normocytic indices.  On Xarelto, there is increased risk with aspirin I will hold on that for present but we may have to make such a decision as Xarelto may not be sufficient for viscosity issues related to her elevated platelets and hematocrit if this is a myeloproliferative disorder.    -9/30/2019 [facility] hematology oncology follow-up visit: 9/9/2019 Collin 2 [id number] mutation positive with erythropoietin level low at 1 commensurate with polycythemia rubra vera and a hematocrit of 53% with platelets of 807,000 and normal white count.  Normally would do bone marrow to look for myelofibrosis but would not risk taking her off Xarelto at the moment.  We will start her on  Hydrea 500 mg daily with CBC weekly and if we get her hematocrit below 45% platelets in the 400,000 range without significant leukopenia that would be my goal.  No phlebotomies for now until we see what Hydrea does.  After we see her back, if her platelets are down and tolerating Hydrea, I will consider stopping the Xarelto and switching her to full dose aspirin for life for the PRV.  This PRV did not cause her original venous thromboembolic phenomenon in fact was anemic when Dr. Major saw her in 2016.  We talked about the potential natural history of PRV including spent phase, leukemic transformation, myelofibrosis.  She will need AAA follow-up with surgeons.        May-Thurner syndrome    2019 Initial Diagnosis     May-Thurner syndrome        Polycythemia rubra vera (CMS/HCC)    2019 Initial Diagnosis     Polycythemia rubra vera (CMS/HCC)         HISTORY OF PRESENT ILLNESS:  The patient is a 59 y.o. female, here for follow up on management of PRV her.  Tolerating Hydrea well.  2 tablets daily with blood counts essentially normal at this point with platelets in the 300,000's and hematocrit 38%.  No recurrent clot on Hydrea and Xarelto.      Past Medical History:   Diagnosis Date   • Deep vein thrombophlebitis of calf, left (CMS/HCC)      Past Surgical History:   Procedure Laterality Date   • BILATERAL BREAST REDUCTION     • CAROTID STENT     •  SECTION     • ELBOW FUSION     • HYSTERECTOMY     • TONSILLECTOMY         Allergies   Allergen Reactions   • Clarithromycin Other (See Comments)       Family History and Social History reviewed and changed as necessary      REVIEW OF SYSTEM:   Review of Systems   Constitutional: Negative for appetite change, chills, diaphoresis, fatigue, fever and unexpected weight change.   HENT:   Negative for mouth sores, sore throat and trouble swallowing.    Eyes: Negative for icterus.   Respiratory: Negative for cough, hemoptysis and shortness of breath.     Cardiovascular: Negative for chest pain, leg swelling and palpitations.   Gastrointestinal: Negative for abdominal distention, abdominal pain, blood in stool, constipation, diarrhea, nausea and vomiting.   Endocrine: Negative for hot flashes.   Genitourinary: Negative for bladder incontinence, difficulty urinating, dysuria, frequency and hematuria.    Musculoskeletal: Negative for gait problem, neck pain and neck stiffness.   Skin: Negative for rash.   Neurological: Negative for dizziness, gait problem, headaches, light-headedness and numbness.   Hematological: Negative for adenopathy. Does not bruise/bleed easily.   Psychiatric/Behavioral: Negative for depression. The patient is not nervous/anxious.    All other systems reviewed and are negative.       PHYSICAL EXAM    There were no vitals filed for this visit.  There were no vitals filed for this visit.     Constitutional: Appears well-developed and well-nourished. No distress.   ECOG: (0) Fully Active - Able to Carry On All Pre-disease Performance Without Restriction  HENT:   Head: Normocephalic.   Mouth/Throat: Oropharynx is clear and moist.   Eyes: Conjunctivae are normal. Pupils are equal, round, and reactive to light. No scleral icterus.   Neck: Neck supple. No JVD present. No thyromegaly present.   Cardiovascular: Normal rate, regular rhythm and normal heart sounds.    Pulmonary/Chest: Breath sounds normal. No respiratory distress.   Abdominal: Soft. Exhibits no distension and no mass. There is no hepatosplenomegaly. There is no tenderness. There is no rebound and no guarding.   Musculoskeletal:Exhibits no edema, tenderness or deformity.  No signs to suggest recurrent deep vein thrombosis.  No Homans sign.  Neurological: Alert and oriented to person, place, and time. Exhibits normal muscle tone.   Skin: No ecchymosis, no petechiae and no rash noted. Not diaphoretic. No cyanosis. Nails show no clubbing.   Psychiatric: Normal mood and affect.   Vitals  reviewed.      Lab Results   Component Value Date    HGB 12.3 03/11/2020    HCT 38.0 03/11/2020    MCV 95.7 03/11/2020     03/11/2020    WBC 4.48 03/11/2020    NEUTROABS 3.12 03/11/2020    LYMPHSABS 0.99 03/11/2020    MONOSABS 0.24 03/11/2020    EOSABS 0.06 03/11/2020    BASOSABS 0.04 03/11/2020       Lab Results   Component Value Date    GLUCOSE 100 (H) 03/11/2020    BUN 16 03/11/2020    CREATININE 0.81 03/11/2020     03/11/2020    K 4.2 03/11/2020     03/11/2020    CO2 27.0 03/11/2020    CALCIUM 9.8 03/11/2020    PROTEINTOT 7.3 03/11/2020    ALBUMIN 4.30 03/11/2020    BILITOT 1.0 03/11/2020    ALKPHOS 72 03/11/2020    AST 16 03/11/2020    ALT 9 03/11/2020                   ASSESSMENT & PLAN:  1. Collin 2+ polycythemia rubra vera  2. Recurrent DVT and PE due to problem #2 with low titer positive lupus anticoagulant per testing by Dr. Major apparently on 2 separate occasions confirmatory 3 months apart  3. May Thurner  4. A sending aortic aneurysm monitored by surgery  5. Contrast-induced nephropathy    Discussion on 2 Hydrea tablets daily, her CMP unremarkable and CBC has hematocrit 38% platelets 376,000 and white count 4480 which is ideal.  Platelet counts for the most part have been in the 400s to 500,000's over the last few months.  Her hematocrit is come down well now.  We will decrease CBCs to monthly at this point and see her back in 6 months on Hydrea 2 tablets daily.. discussed with patient 15 minutes greater than 50% spent counseling regarding this plan as outlined above.    Yogesh Damon MD    03/16/2020

## 2020-04-09 ENCOUNTER — SPECIALTY PHARMACY (OUTPATIENT)
Dept: ONCOLOGY | Facility: HOSPITAL | Age: 60
End: 2020-04-09

## 2020-04-09 NOTE — PROGRESS NOTES
Oral Chemotherapy Teaching        Patient Name/:     Gisell Wylie   1960  Oral Chemotherapy Regimen: Hydrea 1000mg PO daily  Date Started Medication: 10/2/19     Additional Notes: Reached out to patient about refill request, patient reports being due for a refill. Pt requesting refill to  in person this week at [facility].  Pt confirms taking and tolerating Hydrea 1000mg ( 2 tablets) by mouth once daily. She has no new concerns or side effects to report. Provided patient with my phone number and advised her to call with any questions/concerns.  Notified patient regarding new pharmacy hours.

## 2020-05-07 ENCOUNTER — SPECIALTY PHARMACY (OUTPATIENT)
Dept: ONCOLOGY | Facility: HOSPITAL | Age: 60
End: 2020-05-07

## 2020-05-07 NOTE — PLAN OF CARE
Oral Chemotherapy Teaching        Patient Name/:     Gisell Wylie   1960  Oral Chemotherapy Regimen: Hydrea 1000mg PO daily  Date Started Medication: 10/2/19     Additional Notes: Reached out to patient about refill request, patient reports being due for a refill. Pt confirms taking and tolerating Hydrea 1000mg ( 2 tablets) by mouth once daily. She has no new concerns or side effects to report.  Patient would like the medication mailed during the COVID- pandemic.  Confirmed address in [employer].  Provided patient with my phone number and advised her to call with any questions/concerns.  Notified patient regarding new pharmacy hours.

## 2020-05-22 ENCOUNTER — TRANSCRIBE ORDERS (OUTPATIENT)
Dept: ADMINISTRATIVE | Facility: HOSPITAL | Age: 60
End: 2020-05-22

## 2020-05-22 DIAGNOSIS — R07.89 CHEST DISCOMFORT: Primary | ICD-10-CM

## 2020-06-02 ENCOUNTER — SPECIALTY PHARMACY (OUTPATIENT)
Dept: ONCOLOGY | Facility: HOSPITAL | Age: 60
End: 2020-06-02

## 2020-06-02 NOTE — PROGRESS NOTES
Oral Chemotherapy Teaching        Patient Name/:     Gisell Wylie   1960  Oral Chemotherapy Regimen: Hydrea 1000mg PO daily  Date Started Medication: 10/2/19     Additional Notes: Reached out to patient about refill request, patient reports being due for a refill. Pt confirms taking and tolerating Hydrea 1000mg ( 2 tablets) by mouth once daily. She has no new concerns or side effects to report.  Patient would like the medication mailed during the COVID- pandemic.  Confirmed address in [employer]. Will alert [address] to begin processing.

## 2020-06-16 ENCOUNTER — HOSPITAL ENCOUNTER (OUTPATIENT)
Dept: CARDIOLOGY | Facility: HOSPITAL | Age: 60
Discharge: HOME | End: 2020-06-16
Admitting: INTERNAL MEDICINE

## 2020-06-16 VITALS
WEIGHT: 203 LBS | RESPIRATION RATE: 18 BRPM | SYSTOLIC BLOOD PRESSURE: 124 MMHG | OXYGEN SATURATION: 100 % | BODY MASS INDEX: 31.86 KG/M2 | DIASTOLIC BLOOD PRESSURE: 80 MMHG | HEART RATE: 75 BPM | HEIGHT: 67 IN

## 2020-06-16 DIAGNOSIS — R07.89 CHEST DISCOMFORT: ICD-10-CM

## 2020-06-16 PROCEDURE — 93350 STRESS TTE ONLY: CPT | Performed by: INTERNAL MEDICINE

## 2020-06-16 PROCEDURE — 93320 DOPPLER ECHO COMPLETE: CPT

## 2020-06-16 PROCEDURE — 93018 CV STRESS TEST I&R ONLY: CPT | Performed by: INTERNAL MEDICINE

## 2020-06-16 PROCEDURE — 25010000002 SULFUR HEXAFLUORIDE MICROSPH 60.7-25 MG RECONSTITUTED SUSPENSION: Performed by: INTERNAL MEDICINE

## 2020-06-16 PROCEDURE — 93017 CV STRESS TEST TRACING ONLY: CPT

## 2020-06-16 PROCEDURE — 93350 STRESS TTE ONLY: CPT

## 2020-06-16 PROCEDURE — 93352 ADMIN ECG CONTRAST AGENT: CPT | Performed by: INTERNAL MEDICINE

## 2020-06-16 PROCEDURE — 93325 DOPPLER ECHO COLOR FLOW MAPG: CPT

## 2020-06-16 RX ORDER — ASPIRIN 81 MG/1
81 TABLET ORAL DAILY
COMMUNITY

## 2020-06-16 RX ADMIN — SULFUR HEXAFLUORIDE 5 ML: KIT at 14:35

## 2020-06-17 LAB
ASCENDING AORTA: 3.8 CM
BH CV ECHO MEAS - AO ROOT AREA (BSA CORRECTED): 1.7
BH CV ECHO MEAS - AO ROOT AREA: 10 CM^2
BH CV ECHO MEAS - AO ROOT DIAM: 3.6 CM
BH CV ECHO MEAS - ASC AORTA: 3.8 CM
BH CV ECHO MEAS - BSA(HAYCOCK): 2.1 M^2
BH CV ECHO MEAS - BSA: 2 M^2
BH CV ECHO MEAS - BZI_BMI: 31.8 KILOGRAMS/M^2
BH CV ECHO MEAS - BZI_METRIC_HEIGHT: 170.2 CM
BH CV ECHO MEAS - BZI_METRIC_WEIGHT: 92.1 KG
BH CV ECHO MEAS - EDV(CUBED): 162.6 ML
BH CV ECHO MEAS - EDV(MOD-SP2): 47.6 ML
BH CV ECHO MEAS - EDV(MOD-SP4): 83.9 ML
BH CV ECHO MEAS - EDV(TEICH): 144.8 ML
BH CV ECHO MEAS - EF(CUBED): 77.8 %
BH CV ECHO MEAS - EF(MOD-BP): 63 %
BH CV ECHO MEAS - EF(MOD-SP2): 59.9 %
BH CV ECHO MEAS - EF(MOD-SP4): 65.1 %
BH CV ECHO MEAS - EF(TEICH): 69.5 %
BH CV ECHO MEAS - EF{MOD-BP}: 63 %
BH CV ECHO MEAS - ESV(CUBED): 36 ML
BH CV ECHO MEAS - ESV(MOD-SP2): 19.1 ML
BH CV ECHO MEAS - ESV(MOD-SP4): 29.3 ML
BH CV ECHO MEAS - ESV(TEICH): 44.2 ML
BH CV ECHO MEAS - FS: 39.5 %
BH CV ECHO MEAS - IVS/LVPW: 0.93
BH CV ECHO MEAS - IVSD: 0.95 CM
BH CV ECHO MEAS - LA DIMENSION: 3.9 CM
BH CV ECHO MEAS - LA/AO: 1.1
BH CV ECHO MEAS - LV DIASTOLIC VOL/BSA (35-75): 41.2 ML/M^2
BH CV ECHO MEAS - LV MASS(C)D: 206.5 GRAMS
BH CV ECHO MEAS - LV MASS(C)DI: 101.5 GRAMS/M^2
BH CV ECHO MEAS - LV SYSTOLIC VOL/BSA (12-30): 14.4 ML/M^2
BH CV ECHO MEAS - LVIDD: 5.5 CM
BH CV ECHO MEAS - LVIDS: 3.3 CM
BH CV ECHO MEAS - LVLD AP2: 6.4 CM
BH CV ECHO MEAS - LVLD AP4: 7.1 CM
BH CV ECHO MEAS - LVLS AP2: 5.9 CM
BH CV ECHO MEAS - LVLS AP4: 6.4 CM
BH CV ECHO MEAS - LVOT AREA (M): 3.1 CM^2
BH CV ECHO MEAS - LVOT AREA: 3.2 CM^2
BH CV ECHO MEAS - LVOT DIAM: 2 CM
BH CV ECHO MEAS - LVPWD: 1 CM
BH CV ECHO MEAS - RAP SYSTOLE: 3 MMHG
BH CV ECHO MEAS - SI(CUBED): 62.2 ML/M^2
BH CV ECHO MEAS - SI(MOD-SP2): 14 ML/M^2
BH CV ECHO MEAS - SI(MOD-SP4): 26.8 ML/M^2
BH CV ECHO MEAS - SI(TEICH): 49.4 ML/M^2
BH CV ECHO MEAS - SV(CUBED): 126.5 ML
BH CV ECHO MEAS - SV(MOD-SP2): 28.5 ML
BH CV ECHO MEAS - SV(MOD-SP4): 54.6 ML
BH CV ECHO MEAS - SV(TEICH): 100.6 ML
BH CV STRESS BP STAGE 1: NORMAL
BH CV STRESS BP STAGE 2: NORMAL
BH CV STRESS DURATION MIN STAGE 1: 3
BH CV STRESS DURATION MIN STAGE 2: 3
BH CV STRESS DURATION SEC STAGE 3: 24
BH CV STRESS GRADE STAGE 1: 10
BH CV STRESS GRADE STAGE 2: 12
BH CV STRESS GRADE STAGE 3: 14
BH CV STRESS HR STAGE 1: 116
BH CV STRESS HR STAGE 2: 139
BH CV STRESS HR STAGE 3: 141
BH CV STRESS METS STAGE 1: 5
BH CV STRESS METS STAGE 2: 7.5
BH CV STRESS METS STAGE 3: 10
BH CV STRESS O2 STAGE 1: 97
BH CV STRESS O2 STAGE 2: 97
BH CV STRESS O2 STAGE 3: 97
BH CV STRESS PROTOCOL 1: NORMAL
BH CV STRESS RECOVERY BP: NORMAL MMHG
BH CV STRESS RECOVERY HR: 83 BPM
BH CV STRESS RECOVERY O2: 98 %
BH CV STRESS SPEED STAGE 1: 1.7
BH CV STRESS SPEED STAGE 2: 2.5
BH CV STRESS SPEED STAGE 3: 3.4
BH CV STRESS STAGE 1: 1
BH CV STRESS STAGE 2: 2
BH CV STRESS STAGE 3: 3
BH CV VAS BP LEFT ARM: NORMAL MMHG
PERCENT MAX PREDICTED HR: 88.68 %
STRESS BASELINE BP: NORMAL MMHG
STRESS BASELINE HR: 73 BPM
STRESS O2 SAT REST: 100 %
STRESS PERCENT HR: 104 %
STRESS POST ESTIMATED WORKLOAD: 8.1 METS
STRESS POST EXERCISE DUR MIN: 6 MIN
STRESS POST EXERCISE DUR SEC: 24 SEC
STRESS POST O2 SAT PEAK: 97 %
STRESS POST PEAK BP: NORMAL MMHG
STRESS POST PEAK HR: 141 BPM

## 2020-07-01 ENCOUNTER — SPECIALTY PHARMACY (OUTPATIENT)
Dept: ONCOLOGY | Facility: HOSPITAL | Age: 60
End: 2020-07-01

## 2020-07-01 NOTE — PROGRESS NOTES
Oral Chemotherapy Teaching        Patient Name/:     Gisell Wylie   1960  Oral Chemotherapy Regimen: Hydrea 1000mg PO daily  Date Started Medication: 10/2/19     Additional Notes: Reached out to patient about refill request, patient reports being due for a refill. Pt confirms taking and tolerating Hydrea 1000mg ( 2 tablets) by mouth once daily. She has no new concerns or side effects to report. Patient's last lab work is from 3/11/2020.  Discussed with the patient that she needs lab work monthly. She has been having difficultly with her leg and it has been difficult to get around. She will get her labs drawn this month.  Patient is requesting a refill and would like Hydrea mailed to her house- Confirmed address in [employer]. Will alert [employer] to begin processing.

## 2020-08-05 ENCOUNTER — LAB (OUTPATIENT)
Dept: LAB | Facility: HOSPITAL | Age: 60
End: 2020-08-05

## 2020-08-05 DIAGNOSIS — D45 POLYCYTHEMIA RUBRA VERA (HCC): ICD-10-CM

## 2020-08-05 LAB
ALBUMIN SERPL-MCNC: 4.5 G/DL (ref 3.5–5.2)
ALBUMIN/GLOB SERPL: 1.5 G/DL
ALP SERPL-CCNC: 68 U/L (ref 39–117)
ALT SERPL W P-5'-P-CCNC: 14 U/L (ref 1–33)
ANION GAP SERPL CALCULATED.3IONS-SCNC: 9 MMOL/L (ref 5–15)
AST SERPL-CCNC: 21 U/L (ref 1–32)
BASOPHILS # BLD AUTO: 0.02 10*3/MM3 (ref 0–0.2)
BASOPHILS NFR BLD AUTO: 0.6 % (ref 0–1.5)
BILIRUB SERPL-MCNC: 0.9 MG/DL (ref 0–1.2)
BUN SERPL-MCNC: 12 MG/DL (ref 8–23)
BUN/CREAT SERPL: 12.5 (ref 7–25)
CALCIUM SPEC-SCNC: 9.9 MG/DL (ref 8.6–10.5)
CHLORIDE SERPL-SCNC: 107 MMOL/L (ref 98–107)
CO2 SERPL-SCNC: 26 MMOL/L (ref 22–29)
CREAT SERPL-MCNC: 0.96 MG/DL (ref 0.57–1)
DEPRECATED RDW RBC AUTO: 51.1 FL (ref 37–54)
EOSINOPHIL # BLD AUTO: 0.03 10*3/MM3 (ref 0–0.4)
EOSINOPHIL NFR BLD AUTO: 0.9 % (ref 0.3–6.2)
ERYTHROCYTE [DISTWIDTH] IN BLOOD BY AUTOMATED COUNT: 13.2 % (ref 12.3–15.4)
GFR SERPL CREATININE-BSD FRML MDRD: 59 ML/MIN/1.73
GLOBULIN UR ELPH-MCNC: 3.1 GM/DL
GLUCOSE SERPL-MCNC: 120 MG/DL (ref 65–99)
HCT VFR BLD AUTO: 38 % (ref 34–46.6)
HGB BLD-MCNC: 12.5 G/DL (ref 12–15.9)
IMM GRANULOCYTES # BLD AUTO: 0.03 10*3/MM3 (ref 0–0.05)
IMM GRANULOCYTES NFR BLD AUTO: 0.9 % (ref 0–0.5)
LYMPHOCYTES # BLD AUTO: 0.81 10*3/MM3 (ref 0.7–3.1)
LYMPHOCYTES NFR BLD AUTO: 23.5 % (ref 19.6–45.3)
MCH RBC QN AUTO: 35.4 PG (ref 26.6–33)
MCHC RBC AUTO-ENTMCNC: 32.9 G/DL (ref 31.5–35.7)
MCV RBC AUTO: 107.6 FL (ref 79–97)
MONOCYTES # BLD AUTO: 0.15 10*3/MM3 (ref 0.1–0.9)
MONOCYTES NFR BLD AUTO: 4.4 % (ref 5–12)
NEUTROPHILS NFR BLD AUTO: 2.4 10*3/MM3 (ref 1.7–7)
NEUTROPHILS NFR BLD AUTO: 69.7 % (ref 42.7–76)
NRBC BLD AUTO-RTO: 0 /100 WBC (ref 0–0.2)
PLATELET # BLD AUTO: 270 10*3/MM3 (ref 140–450)
PMV BLD AUTO: 8.8 FL (ref 6–12)
POTASSIUM SERPL-SCNC: 4.3 MMOL/L (ref 3.5–5.2)
PROT SERPL-MCNC: 7.6 G/DL (ref 6–8.5)
RBC # BLD AUTO: 3.53 10*6/MM3 (ref 3.77–5.28)
SODIUM SERPL-SCNC: 142 MMOL/L (ref 136–145)
WBC # BLD AUTO: 3.44 10*3/MM3 (ref 3.4–10.8)

## 2020-08-05 PROCEDURE — 36415 COLL VENOUS BLD VENIPUNCTURE: CPT

## 2020-08-05 PROCEDURE — 80053 COMPREHEN METABOLIC PANEL: CPT

## 2020-08-05 PROCEDURE — 85025 COMPLETE CBC W/AUTO DIFF WBC: CPT

## 2020-08-06 ENCOUNTER — SPECIALTY PHARMACY (OUTPATIENT)
Dept: ONCOLOGY | Facility: HOSPITAL | Age: 60
End: 2020-08-06

## 2020-09-08 ENCOUNTER — SPECIALTY PHARMACY (OUTPATIENT)
Dept: ONCOLOGY | Facility: HOSPITAL | Age: 60
End: 2020-09-08

## 2020-09-08 NOTE — PROGRESS NOTES
Reached out to patient for refill assessment/toxicity check. Pt confirms compliance to hydroxyurea. Reports tolerating treatment with no new concerns reported during encounter. Patient had labs on 8/5/2020. Requesting refill of hydroxyurea to be processed at [facility]. Patient would like this mailed - address confirmed in [facility]. Will alert [facility] to prep for dispense.

## 2020-09-15 ENCOUNTER — LAB (OUTPATIENT)
Dept: LAB | Facility: HOSPITAL | Age: 60
End: 2020-09-15

## 2020-09-15 ENCOUNTER — LAB REQUISITION (OUTPATIENT)
Dept: LAB | Facility: HOSPITAL | Age: 60
End: 2020-09-15

## 2020-09-15 DIAGNOSIS — Z00.00 ROUTINE GENERAL MEDICAL EXAMINATION AT A HEALTH CARE FACILITY: ICD-10-CM

## 2020-09-16 LAB
ALBUMIN SERPL-MCNC: 4.4 G/DL (ref 3.8–4.8)
ALBUMIN/GLOB SERPL: 1.7 {RATIO} (ref 1.2–2.2)
ALP SERPL-CCNC: 68 IU/L (ref 39–117)
ALT SERPL-CCNC: 12 IU/L (ref 0–32)
AMBIG ABBREV CMP14 DEFAULT: NORMAL
AST SERPL-CCNC: 19 IU/L (ref 0–40)
BASOPHILS # BLD AUTO: 0 X10E3/UL (ref 0–0.2)
BASOPHILS NFR BLD AUTO: 1 %
BILIRUB SERPL-MCNC: 0.7 MG/DL (ref 0–1.2)
BUN SERPL-MCNC: 17 MG/DL (ref 8–27)
BUN/CREAT SERPL: 19 (ref 12–28)
CALCIUM SERPL-MCNC: 9.7 MG/DL (ref 8.7–10.3)
CHLORIDE SERPL-SCNC: 105 MMOL/L (ref 96–106)
CO2 SERPL-SCNC: 26 MMOL/L (ref 20–29)
CREAT SERPL-MCNC: 0.9 MG/DL (ref 0.57–1)
EOSINOPHIL # BLD AUTO: 0 X10E3/UL (ref 0–0.4)
EOSINOPHIL NFR BLD AUTO: 1 %
ERYTHROCYTE [DISTWIDTH] IN BLOOD BY AUTOMATED COUNT: 13.7 % (ref 11.7–15.4)
GLOBULIN SER CALC-MCNC: 2.6 G/DL (ref 1.5–4.5)
GLUCOSE SERPL-MCNC: 98 MG/DL (ref 65–99)
HCT VFR BLD AUTO: 36 % (ref 34–46.6)
HGB BLD-MCNC: 12.1 G/DL (ref 11.1–15.9)
IMM GRANULOCYTES # BLD AUTO: 0 X10E3/UL (ref 0–0.1)
IMM GRANULOCYTES NFR BLD AUTO: 1 %
LYMPHOCYTES # BLD AUTO: 0.8 X10E3/UL (ref 0.7–3.1)
LYMPHOCYTES NFR BLD AUTO: 23 %
Lab: NORMAL
MCH RBC QN AUTO: 35.8 PG (ref 26.6–33)
MCHC RBC AUTO-ENTMCNC: 33.6 G/DL (ref 31.5–35.7)
MCV RBC AUTO: 107 FL (ref 79–97)
MONOCYTES # BLD AUTO: 0.2 X10E3/UL (ref 0.1–0.9)
MONOCYTES NFR BLD AUTO: 5 %
NEUTROPHILS # BLD AUTO: 2.5 X10E3/UL (ref 1.4–7)
NEUTROPHILS NFR BLD AUTO: 69 %
PLATELET # BLD AUTO: 298 X10E3/UL (ref 150–450)
POTASSIUM SERPL-SCNC: 4.6 MMOL/L (ref 3.5–5.2)
PROT SERPL-MCNC: 7 G/DL (ref 6–8.5)
RBC # BLD AUTO: 3.38 X10E6/UL (ref 3.77–5.28)
SODIUM SERPL-SCNC: 141 MMOL/L (ref 134–144)
WBC # BLD AUTO: 3.6 X10E3/UL (ref 3.4–10.8)

## 2020-09-21 ENCOUNTER — OFFICE VISIT (OUTPATIENT)
Dept: ONCOLOGY | Facility: CLINIC | Age: 60
End: 2020-09-21

## 2020-09-21 ENCOUNTER — LAB (OUTPATIENT)
Dept: LAB | Facility: HOSPITAL | Age: 60
End: 2020-09-21

## 2020-09-21 VITALS
TEMPERATURE: 96.4 F | BODY MASS INDEX: 29.98 KG/M2 | HEART RATE: 72 BPM | SYSTOLIC BLOOD PRESSURE: 113 MMHG | HEIGHT: 67 IN | DIASTOLIC BLOOD PRESSURE: 63 MMHG | WEIGHT: 191 LBS | OXYGEN SATURATION: 97 % | RESPIRATION RATE: 18 BRPM

## 2020-09-21 DIAGNOSIS — D45 POLYCYTHEMIA RUBRA VERA (HCC): Primary | Chronic | ICD-10-CM

## 2020-09-21 DIAGNOSIS — D45 POLYCYTHEMIA RUBRA VERA (HCC): ICD-10-CM

## 2020-09-21 PROCEDURE — 99213 OFFICE O/P EST LOW 20 MIN: CPT | Performed by: NURSE PRACTITIONER

## 2020-09-21 RX ORDER — SERTRALINE HYDROCHLORIDE 25 MG/1
25 TABLET, FILM COATED ORAL DAILY
COMMUNITY
Start: 2020-08-21

## 2020-09-21 NOTE — PROGRESS NOTES
CHIEF COMPLAINT PRV    Problem List:  Oncology/Hematology History Overview Note   1. Collin 2+ polycythemia rubra vera  2. Recurrent DVT and PE due to problem #2 with low titer positive lupus anticoagulant per prior testing by Dr. Major apparently on 2 separate occasions confirmatory 3 months apart  3. May Thurner  4. Asending aortic aneurysm  5. History of SOLOMON  6. Obstructive sleep apnea  7. Contrast-induced nephropathy  8. Reflux with nausea      -8/2/2019 initial [facility] medical oncology consultation: May 2016 developed unprovoked left lower extremity deep vein thrombosis treated with left common iliac stent, thrombolyzes, temporary vena cava filter placement and retrieval.  Saw hematologist who told her she might have a low level lupus anticoagulant but she does not have that data and they were told that they did not think that was significant enough to warrant lifetime anticoagulation.  She stopped Xarelto after 8 months.  Starting the end of June she was developing pain in the left lower extremity again with progressive swelling and shortness of breath.  Went to [facility] ER on 4 July.  Was found to have left lower extremity extensive deep vein thrombosis and right lower lobe pulmonary embolus.  She was treated with thrombectomy and thrombolysis by Rigoberto and Pavel.  Previously she could not afford her Xarelto and they have switched her from heparin to Coumadin but before discharge switched her to Xarelto.  Her creatinine during her hospitalization increased to 1.38 from a baseline of 1.2.  She comes to hematology at [facility] on 8/2/2019 with labs showing creatinine of 1.23 on 7/11/2019, 1.69 on 7/17/2019, and she says that it was repeated again but she does not have that lab and was told that it was back down but not at normal.  Her CBC reveals platelets of 697,000 on 7/17/2019 and 589,000 on 7/10/2019.Labs from 7/10/2019 revealed platelet count of 490,000 with hematocrit 54.7%.  Creatinine on  7/3/2019 was 0.92.  She understandably had a d-dimer elevation to 1.22 on 7/3/2019 with her clot.  Given the magnitude of her clot and the likelihood of may Thurner syndrome, she needs lifetime anticoagulation.  On patients with deep vein thrombosis and/or PE and indefinite anticoagulation, use of Xarelto in patients with GFR less than 30 is recommended to be avoided varied will need to watch that.  With her prior recent elevation of hematocrit and persistent elevation of platelets, we will continue to watch that and if they remain persistent suggesting this being more than just reactive to her PE and clots, then we need to look for myeloproliferative disorder contributing to her clotting.  She is going to get her records from her other hematologist she saw back in 2016 just for completeness sake but I would not repeat, nor would I do for the first time now, her hypercoagulable panel as any hypercoagulable disorder we stumbled upon would not alter her need for lifetime full dose anticoagulation.  The only question is which drug, and whether her creatinine will allow DOAC's.  I will check her BMP weekly for the next month to make sure her creatinine is doing adequate relative to her use of Xarelto and will get her blood count now and again prior to return in a month and if the platelets are remaining elevated we will do Collin 2 with reflex testing.  Since her discharge from the hospital she had significant nausea for which she had an ultrasound that showed fatty liver but normal gallbladder.  She had an EGD in the past few days with Dr. Heath that showed significant reflux and on Protonix her nausea has improved.  She will need close follow-up of her ascending aortic aneurysm as well with her thoracic surgeons.    -9/9/2019 medical oncology follow-up: Platelets persistently elevated 807,000 with hematocrit 52.7% and white count 8320 on 8/30/2019.  GFR staying over 59 on Xarelto.  We will check Collin 2 with reflex  testing and treat with Hydrea plus or minus phlebotomy if we confirm myeloproliferative disorder.  Will need bone marrow before start of that to assess for myelofibrosis if this is confirmed.  Records from Dr.Scott Major of 6/7/2016 mention that she had been anemic during her hospitalization.  Factor V Leiden, prothrombin mutation, Antithrombin III, protein C&S levels were all normal and the anticardiolipin and beta-2 microglobulin were all negative according to Dr. Major.  The lupus anticoagulant was borderline positive at 10.5 with him and he recommended repeat of that done 3 months apart to prove the diagnosis but this came back low positive according to his notes and he suggested switching to aspirin after a full 6 months of anticoagulation.  This was of course before the low-dose DOAC trials have been published she had had thrombolytics and stenting of her May Thurner according to his records.  Given her presence of May Thurner despite the stenting, I would be inclined towards keeping her on anticoagulants regardless.  She brings to me records that includes labs back to 2016 where her white count, hemoglobin and platelets were normal but as recently as June 2019 her hematocrit was 52% whereas back in May 2016 her hemoglobin was actually down in the eights with normochromic normocytic indices.  On Xarelto, there is increased risk with aspirin I will hold on that for present but we may have to make such a decision as Xarelto may not be sufficient for viscosity issues related to her elevated platelets and hematocrit if this is a myeloproliferative disorder.    -9/30/2019 [facility] hematology oncology follow-up visit: 9/9/2019 Collin 2 [id number] mutation positive with erythropoietin level low at 1 commensurate with polycythemia rubra vera and a hematocrit of 53% with platelets of 807,000 and normal white count.  Normally would do bone marrow to look for myelofibrosis but would not risk taking her off Xarelto at the  "moment.  We will start her on Hydrea 500 mg daily with CBC weekly and if we get her hematocrit below 45% platelets in the 400,000 range without significant leukopenia that would be my goal.  No phlebotomies for now until we see what Hydrea does.  After we see her back, if her platelets are down and tolerating Hydrea, I will consider stopping the Xarelto and switching her to full dose aspirin for life for the PRV.  This PRV did not cause her original venous thromboembolic phenomenon in fact was anemic when Dr. Major saw her in .  We talked about the potential natural history of PRV including spent phase, leukemic transformation, myelofibrosis.  She will need AAA follow-up with surgeons.     May-Thurner syndrome   2019 Initial Diagnosis    May-Thurner syndrome     Polycythemia rubra vera (CMS/HCC)   2019 Initial Diagnosis    Polycythemia rubra vera (CMS/HCC)         HISTORY OF PRESENT ILLNESS:  The patient is a 60 y.o. female, here for follow up on management of PRV.  Currently taking Hydrea two tablets daily and tolerating it well.  Blood counts essentially normal.  She reports recurrent clot in May while taking Xarelto.  She was treated by Dr. Alcantara and had \"a stent replaced and a vein procedure\".  No further issues since.        Past Medical History:   Diagnosis Date   • Deep vein thrombophlebitis of calf, left (CMS/HCC)      Past Surgical History:   Procedure Laterality Date   • BILATERAL BREAST REDUCTION     • CAROTID STENT     •  SECTION     • ELBOW FUSION     • HYSTERECTOMY     • TONSILLECTOMY         Allergies   Allergen Reactions   • Clarithromycin Other (See Comments)     [place]       Family History and Social History reviewed and changed as necessary      REVIEW OF SYSTEM:   Review of Systems   Constitutional: Negative for appetite change, chills, diaphoresis, fatigue, fever and unexpected weight change.   HENT:   Negative for mouth sores, sore throat and trouble swallowing.    Eyes: " Negative for icterus.   Respiratory: Negative for cough, hemoptysis and shortness of breath.    Cardiovascular: Negative for chest pain, leg swelling and palpitations.   Gastrointestinal: Negative for abdominal distention, abdominal pain, blood in stool, constipation, diarrhea, nausea and vomiting.   Endocrine: Negative for hot flashes.   Genitourinary: Negative for bladder incontinence, difficulty urinating, dysuria, frequency and hematuria.    Musculoskeletal: Negative for gait problem, neck pain and neck stiffness.   Skin: Negative for rash.   Neurological: Negative for dizziness, gait problem, headaches, light-headedness and numbness.   Hematological: Negative for adenopathy. Does not bruise/bleed easily.   Psychiatric/Behavioral: Negative for depression. The patient is not nervous/anxious.    All other systems reviewed and are negative.       PHYSICAL EXAM    There were no vitals filed for this visit.  Vitals:    09/21/20 1318   PainSc: 0-No pain        Constitutional: Appears well-developed and well-nourished. No distress.   ECOG: (0) Fully Active - Able to Carry On All Pre-disease Performance Without Restriction  HENT:   Head: Normocephalic.   Mouth/Throat: Oropharynx is clear and moist.   Eyes: Conjunctivae are normal. Pupils are equal, round, and reactive to light. No scleral icterus.   Neck: Neck supple. No JVD present. No thyromegaly present.   Cardiovascular: Normal rate, regular rhythm and normal heart sounds.    Pulmonary/Chest: Breath sounds normal. No respiratory distress.   Abdominal: Soft. Exhibits no distension and no mass. There is no hepatosplenomegaly. There is no tenderness. There is no rebound and no guarding.   Musculoskeletal:Exhibits no edema, tenderness or deformity.  No signs to suggest recurrent deep vein thrombosis.  No Homans sign.  Neurological: Alert and oriented to person, place, and time. Exhibits normal muscle tone.   Skin: No ecchymosis, no petechiae and no rash noted. Not  "diaphoretic. No cyanosis. Nails show no clubbing.   Psychiatric: Normal mood and affect.   Vitals reviewed.      Lab Results   Component Value Date    HGB 12.1 09/16/2020    HCT 36.0 09/16/2020     (H) 09/16/2020     09/16/2020    WBC 3.6 09/16/2020    NEUTROABS 2.5 09/16/2020    LYMPHSABS 0.8 09/16/2020    MONOSABS 0.2 09/16/2020    EOSABS 0.0 09/16/2020    BASOSABS 0.0 09/16/2020       Lab Results   Component Value Date    GLUCOSE 120 (H) 08/05/2020    BUN 17 09/16/2020    CREATININE 0.90 09/16/2020     09/16/2020    K 4.6 09/16/2020     09/16/2020    CO2 26 09/16/2020    CALCIUM 9.7 09/16/2020    PROTEINTOT 7.6 08/05/2020    ALBUMIN 4.4 09/16/2020    BILITOT 0.7 09/16/2020    ALKPHOS 68 09/16/2020    AST 19 09/16/2020    ALT 12 09/16/2020                   ASSESSMENT & PLAN:  1. Collin 2+ polycythemia rubra vera  2. Recurrent DVT and PE due to problem #2 with low titer positive lupus anticoagulant per testing by Dr. Major apparently on 2 separate occasions confirmatory 3 months apart  3. May Thurner  4. A sending aortic aneurysm monitored by surgery  5. Contrast-induced nephropathy  6. Recurrent DVT May 2020    Discussion:  Patient tolerating Hydrea 2 tablets daily.  Her counts are essentially normal.  She did have a recurrent DVT while on Xarelto in May of this year and was treated by Dr. Alcantara.  She was placed on a baby aspirin and had \"a stent replaced and a vein procedure\". Discussed with Dr. Damon who was unaware of the events.  I will obtain records from Dr. Alcantara's office and discuss with Dr. Damon.  May need to change to Lovenox given development on DVT while on Xarelto.  In the meantime, she will remain on current dose of Hydrea and continue monthly CBC checks.  She will return for follow up in 6 months.  I will notify her if any change in plans after records reviewed.      Discussed with patient 15 minutes greater than 50% spent counseling regarding this plan as outlined " above.    ADDENDUM:  Reviewed notes from Dr. Alcantara's office.  Patient presented with left lower extremity swelling and ultrasound concerning for left iliac venous stent obstruction or thrombosis.  She had left common iliac venous stent and angioplasty on 5/4/2020.  She was also found to have grade 4 left greater saphenous vein reflux and under saphenous vein ablation with phlebectomy on 6/26/2020.  Plan to be on chronic anticoagulation.  Discussed with Dr. Damon and continue Xarelto and baby aspirin.     Anna Wyman, APRN  09/21/2020

## 2020-10-07 ENCOUNTER — SPECIALTY PHARMACY (OUTPATIENT)
Dept: ONCOLOGY | Facility: HOSPITAL | Age: 60
End: 2020-10-07

## 2020-10-07 NOTE — PROGRESS NOTES
Reached out to patient for refill assessment/toxicity check. Pt confirms compliance to hydroxyurea.Reports tolerating treatment with no new concerns reported during encounter.  Patient had labs on 9/16/20 and knows to have monthly labs. Requesting refill of hydroxyurea to be processed at [facility]. Patient would like this mailed- she has moved and has a new address. Will alert [facility] to [facility] for dispense.      New address:  [address] [place] [id number]

## 2020-11-06 ENCOUNTER — SPECIALTY PHARMACY (OUTPATIENT)
Dept: ONCOLOGY | Facility: HOSPITAL | Age: 60
End: 2020-11-06

## 2020-11-06 NOTE — PLAN OF CARE
Contacted Ms. Wylie for a refill assessment/toxicity check. She confirms continued compliance to hydroxyurea. Reports that she is tolerating treatment well with no new concerns since last encounter. Patient's last labs were on 9/16/20 and she stated that she will go next week for repeat labs. Informed her that monthly labs need to be completed while she is on therapy. Ms. Wylie is requesting a refill of hydroxyurea to be processed at [facility] and would like this mailed- confirmed address in [employer].

## 2020-12-09 ENCOUNTER — SPECIALTY PHARMACY (OUTPATIENT)
Dept: ONCOLOGY | Facility: HOSPITAL | Age: 60
End: 2020-12-09

## 2020-12-09 DIAGNOSIS — D45 POLYCYTHEMIA RUBRA VERA (HCC): ICD-10-CM

## 2020-12-09 RX ORDER — HYDROXYUREA 500 MG/1
1000 CAPSULE ORAL DAILY
Qty: 60 CAPSULE | Refills: 0 | Status: SHIPPED | OUTPATIENT
Start: 2020-12-09 | End: 2021-01-11 | Stop reason: SDUPTHER

## 2020-12-09 NOTE — PLAN OF CARE
Reached out to patient to conduct a refill assessment and toxicity check. Patient confirms compliance to hydroxyurea. Reports tolerating treatment with no new concerns reported during encounter. Patient had labs drawn on 09/16/20. Patient verbalizes understanding that she is suppose to get her labs drawn monthly and that she is well overdue. Patient promises that she will have her labs drawn in the next 2 weeks. Requesting refill of hydroxyurea to be processed at [facility]. Patient would like the medication mailed - confirmed patients address in [facility].

## 2021-01-07 ENCOUNTER — SPECIALTY PHARMACY (OUTPATIENT)
Dept: ONCOLOGY | Facility: HOSPITAL | Age: 61
End: 2021-01-07

## 2021-01-07 DIAGNOSIS — D45 POLYCYTHEMIA RUBRA VERA (HCC): Primary | Chronic | ICD-10-CM

## 2021-01-07 NOTE — PLAN OF CARE
Spoke with MsFranc Inessa and informed her that Dr. Damon is not authorizing any more Hydrea prescription refills until she gets her labs. She states that she will get them tomorrow morning. Someone has already reached out and spoke with her aboout this and scheduled an appointment with her for next week per Dr. Damon' request.

## 2021-01-08 ENCOUNTER — LAB (OUTPATIENT)
Dept: LAB | Facility: HOSPITAL | Age: 61
End: 2021-01-08

## 2021-01-08 DIAGNOSIS — D45 POLYCYTHEMIA RUBRA VERA (HCC): ICD-10-CM

## 2021-01-08 LAB
ALBUMIN SERPL-MCNC: 4.4 G/DL (ref 3.5–5.2)
ALBUMIN/GLOB SERPL: 1.3 G/DL
ALP SERPL-CCNC: 94 U/L (ref 39–117)
ALT SERPL W P-5'-P-CCNC: 20 U/L (ref 1–33)
ANION GAP SERPL CALCULATED.3IONS-SCNC: 10 MMOL/L (ref 5–15)
AST SERPL-CCNC: 28 U/L (ref 1–32)
BASOPHILS # BLD AUTO: 0.06 10*3/MM3 (ref 0–0.2)
BASOPHILS NFR BLD AUTO: 1 % (ref 0–1.5)
BILIRUB SERPL-MCNC: 0.8 MG/DL (ref 0–1.2)
BUN SERPL-MCNC: 17 MG/DL (ref 8–23)
BUN/CREAT SERPL: 19.5 (ref 7–25)
CALCIUM SPEC-SCNC: 9.8 MG/DL (ref 8.6–10.5)
CHLORIDE SERPL-SCNC: 105 MMOL/L (ref 98–107)
CO2 SERPL-SCNC: 27 MMOL/L (ref 22–29)
CREAT SERPL-MCNC: 0.87 MG/DL (ref 0.57–1)
DEPRECATED RDW RBC AUTO: 48.9 FL (ref 37–54)
EOSINOPHIL # BLD AUTO: 0.1 10*3/MM3 (ref 0–0.4)
EOSINOPHIL NFR BLD AUTO: 1.7 % (ref 0.3–6.2)
ERYTHROCYTE [DISTWIDTH] IN BLOOD BY AUTOMATED COUNT: 12.8 % (ref 12.3–15.4)
GFR SERPL CREATININE-BSD FRML MDRD: 66 ML/MIN/1.73
GLOBULIN UR ELPH-MCNC: 3.4 GM/DL
GLUCOSE SERPL-MCNC: 95 MG/DL (ref 65–99)
HCT VFR BLD AUTO: 39.1 % (ref 34–46.6)
HGB BLD-MCNC: 12.6 G/DL (ref 12–15.9)
IMM GRANULOCYTES # BLD AUTO: 0.05 10*3/MM3 (ref 0–0.05)
IMM GRANULOCYTES NFR BLD AUTO: 0.9 % (ref 0–0.5)
LYMPHOCYTES # BLD AUTO: 1.27 10*3/MM3 (ref 0.7–3.1)
LYMPHOCYTES NFR BLD AUTO: 21.9 % (ref 19.6–45.3)
MCH RBC QN AUTO: 33.9 PG (ref 26.6–33)
MCHC RBC AUTO-ENTMCNC: 32.2 G/DL (ref 31.5–35.7)
MCV RBC AUTO: 105.1 FL (ref 79–97)
MONOCYTES # BLD AUTO: 0.28 10*3/MM3 (ref 0.1–0.9)
MONOCYTES NFR BLD AUTO: 4.8 % (ref 5–12)
NEUTROPHILS NFR BLD AUTO: 4.03 10*3/MM3 (ref 1.7–7)
NEUTROPHILS NFR BLD AUTO: 69.7 % (ref 42.7–76)
NRBC BLD AUTO-RTO: 0 /100 WBC (ref 0–0.2)
PLATELET # BLD AUTO: 392 10*3/MM3 (ref 140–450)
PMV BLD AUTO: 8.9 FL (ref 6–12)
POTASSIUM SERPL-SCNC: 4.2 MMOL/L (ref 3.5–5.2)
PROT SERPL-MCNC: 7.8 G/DL (ref 6–8.5)
RBC # BLD AUTO: 3.72 10*6/MM3 (ref 3.77–5.28)
SODIUM SERPL-SCNC: 142 MMOL/L (ref 136–145)
WBC # BLD AUTO: 5.79 10*3/MM3 (ref 3.4–10.8)

## 2021-01-08 PROCEDURE — 36415 COLL VENOUS BLD VENIPUNCTURE: CPT

## 2021-01-08 PROCEDURE — 80053 COMPREHEN METABOLIC PANEL: CPT

## 2021-01-08 PROCEDURE — 85025 COMPLETE CBC W/AUTO DIFF WBC: CPT

## 2021-01-11 ENCOUNTER — SPECIALTY PHARMACY (OUTPATIENT)
Dept: ONCOLOGY | Facility: HOSPITAL | Age: 61
End: 2021-01-11

## 2021-01-11 DIAGNOSIS — D45 POLYCYTHEMIA RUBRA VERA (HCC): ICD-10-CM

## 2021-01-11 RX ORDER — HYDROXYUREA 500 MG/1
1000 CAPSULE ORAL DAILY
Qty: 60 CAPSULE | Refills: 0 | Status: SHIPPED | OUTPATIENT
Start: 2021-01-11 | End: 2021-01-15 | Stop reason: SDUPTHER

## 2021-01-11 NOTE — PROGRESS NOTES
Reached out to patient for refill assessment/toxicity check. Pt confirms compliance to hydroxyurea.  Reviewed labs from 1/8/2021.  She has a phone appt with Dr. Damon on 1/15/21. With the next fill I will send in a hydroxyurea script with more refills. Reports tolerating treatment with no new concerns reported during encounter. Sent in a script for hydroxyurea 1000mg PO daily, #60 with 0 RF to [facility]. Patient would like this mailed. She has a new prescription card. Will alert [facility] to prep for dispense.

## 2021-01-15 ENCOUNTER — OFFICE VISIT (OUTPATIENT)
Dept: ONCOLOGY | Facility: CLINIC | Age: 61
End: 2021-01-15

## 2021-01-15 VITALS — WEIGHT: 195 LBS | HEIGHT: 67 IN | BODY MASS INDEX: 30.61 KG/M2

## 2021-01-15 DIAGNOSIS — D45 POLYCYTHEMIA RUBRA VERA (HCC): Primary | Chronic | ICD-10-CM

## 2021-01-15 PROCEDURE — 99443 PR PHYS/QHP TELEPHONE EVALUATION 21-30 MIN: CPT | Performed by: INTERNAL MEDICINE

## 2021-01-15 RX ORDER — HYDROXYUREA 500 MG/1
1000 CAPSULE ORAL DAILY
Qty: 60 CAPSULE | Refills: 11 | Status: SHIPPED | OUTPATIENT
Start: 2021-01-15 | End: 2021-02-08 | Stop reason: SDUPTHER

## 2021-01-15 NOTE — PROGRESS NOTES
Telehealth follow-up visit  This was an audio and video enabled telemedicine encounter.  Due to video difficulties we converted to audio.  Verbal consent given    CHIEF COMPLAINT: Polycythemia rubra vera    Problem List:  Oncology/Hematology History Overview Note   1. Collin 2+ polycythemia rubra vera  2. Recurrent DVT and PE due to problem #2 with low titer positive lupus anticoagulant per prior testing by Dr. Major apparently on 2 separate occasions confirmatory 3 months apart  3. May Thurner  4. Asending aortic aneurysm  5. History of SOLOMON  6. Obstructive sleep apnea  7. Contrast-induced nephropathy  8. Reflux with nausea      -8/2/2019 initial [facility] medical oncology consultation: May 2016 developed unprovoked left lower extremity deep vein thrombosis treated with left common iliac stent, thrombolyzes, temporary vena cava filter placement and retrieval.  Saw hematologist who told her she might have a low level lupus anticoagulant but she does not have that data and they were told that they did not think that was significant enough to warrant lifetime anticoagulation.  She stopped Xarelto after 8 months.  Starting the end of June she was developing pain in the left lower extremity again with progressive swelling and shortness of breath.  Went to [facility] ER on 4 July.  Was found to have left lower extremity extensive deep vein thrombosis and right lower lobe pulmonary embolus.  She was treated with thrombectomy and thrombolysis by Rigoberto and Pavel.  Previously she could not afford her Xarelto and they have switched her from heparin to Coumadin but before discharge switched her to Xarelto.  Her creatinine during her hospitalization increased to 1.38 from a baseline of 1.2.  She comes to hematology at [facility] on 8/2/2019 with labs showing creatinine of 1.23 on 7/11/2019, 1.69 on 7/17/2019, and she says that it was repeated again but she does not have that lab and was told that it was back down but not  at normal.  Her CBC reveals platelets of 697,000 on 7/17/2019 and 589,000 on 7/10/2019.Labs from 7/10/2019 revealed platelet count of 490,000 with hematocrit 54.7%.  Creatinine on 7/3/2019 was 0.92.  She understandably had a d-dimer elevation to 1.22 on 7/3/2019 with her clot.  Given the magnitude of her clot and the likelihood of may Thurner syndrome, she needs lifetime anticoagulation.  On patients with deep vein thrombosis and/or PE and indefinite anticoagulation, use of Xarelto in patients with GFR less than 30 is recommended to be avoided varied will need to watch that.  With her prior recent elevation of hematocrit and persistent elevation of platelets, we will continue to watch that and if they remain persistent suggesting this being more than just reactive to her PE and clots, then we need to look for myeloproliferative disorder contributing to her clotting.  She is going to get her records from her other hematologist she saw back in 2016 just for completeness sake but I would not repeat, nor would I do for the first time now, her hypercoagulable panel as any hypercoagulable disorder we stumbled upon would not alter her need for lifetime full dose anticoagulation.  The only question is which drug, and whether her creatinine will allow DOAC's.  I will check her BMP weekly for the next month to make sure her creatinine is doing adequate relative to her use of Xarelto and will get her blood count now and again prior to return in a month and if the platelets are remaining elevated we will do Collin 2 with reflex testing.  Since her discharge from the hospital she had significant nausea for which she had an ultrasound that showed fatty liver but normal gallbladder.  She had an EGD in the past few days with Dr. Heath that showed significant reflux and on Protonix her nausea has improved.  She will need close follow-up of her ascending aortic aneurysm as well with her thoracic surgeons.    -9/9/2019 medical oncology  follow-up: Platelets persistently elevated 807,000 with hematocrit 52.7% and white count 8320 on 8/30/2019.  GFR staying over 59 on Xarelto.  We will check Collin 2 with reflex testing and treat with Hydrea plus or minus phlebotomy if we confirm myeloproliferative disorder.  Will need bone marrow before start of that to assess for myelofibrosis if this is confirmed.  Records from Dr.Scott Major of 6/7/2016 mention that she had been anemic during her hospitalization.  Factor V Leiden, prothrombin mutation, Antithrombin III, protein C&S levels were all normal and the anticardiolipin and beta-2 microglobulin were all negative according to Dr. Major.  The lupus anticoagulant was borderline positive at 10.5 with him and he recommended repeat of that done 3 months apart to prove the diagnosis but this came back low positive according to his notes and he suggested switching to aspirin after a full 6 months of anticoagulation.  This was of course before the low-dose DOAC trials have been published she had had thrombolytics and stenting of her May Thurner according to his records.  Given her presence of May Thurner despite the stenting, I would be inclined towards keeping her on anticoagulants regardless.  She brings to me records that includes labs back to 2016 where her white count, hemoglobin and platelets were normal but as recently as June 2019 her hematocrit was 52% whereas back in May 2016 her hemoglobin was actually down in the eights with normochromic normocytic indices.  On Xarelto, there is increased risk with aspirin I will hold on that for present but we may have to make such a decision as Xarelto may not be sufficient for viscosity issues related to her elevated platelets and hematocrit if this is a myeloproliferative disorder.    -9/30/2019 [facility] hematology oncology follow-up visit: 9/9/2019 Collin 2 [id number] mutation positive with erythropoietin level low at 1 commensurate with polycythemia rubra vera and a  hematocrit of 53% with platelets of 807,000 and normal white count.  Normally would do bone marrow to look for myelofibrosis but would not risk taking her off Xarelto at the moment.  We will start her on Hydrea 500 mg daily with CBC weekly and if we get her hematocrit below 45% platelets in the 400,000 range without significant leukopenia that would be my goal.  No phlebotomies for now until we see what Hydrea does.  After we see her back, if her platelets are down and tolerating Hydrea, I will consider stopping the Xarelto and switching her to full dose aspirin for life for the PRV.  This PRV did not cause her original venous thromboembolic phenomenon in fact was anemic when Dr. Major saw her in 2016.  We talked about the potential natural history of PRV including spent phase, leukemic transformation, myelofibrosis.  She will need AAA follow-up with surgeons.    1/8/2021 white count 5790 with hemoglobin 12.6 and platelets 392,000 stable over the last 5 months but her last CBC prior to this was 4 months ago.  CMP unremarkable.  With chronic left lower extremity swelling and ultrasound showing left iliac venous stent obstruction/thrombosis with venous stent and angioplasty by vascular surgery 5/4/2020 as well as a grade 4 left greater saphenous vein reflux for which saphenous vein ablation and phlebectomy performed 6/26/2020, she will continue chronic Xarelto for this and baby aspirin for the JAK2 positive polycythemia rubra vera.  There is increased risk of bleeding but the DOAC does not necessarily mitigate the risk of thrombosis from the JAK2 positive myeloproliferative disorder and the aspirin does not necessarily obviate the need for Xarelto for her May Thurner syndrome venous obstructions occurring despite good control of her blood counts on Hydrea.  On dual DOAC and antiplatelet therapy, it is all the more important to watch her blood counts monthly and I will have her do a video visit with my nurse  "practitioner in about 3 months.  I would also ask her to be sure her vascular surgeons are following her history of abdominal aortic aneurysm with serial imaging which I would not do routinely for her myeloproliferative disorder which I am managing.  As stated previously, I do not think myeloproliferative disorder caused her venous thromboembolic phenomena and in fact her original thromboembolic event occurred when she was anemic under the care of Dr. Major in 2016.     May-Thurner syndrome   2019 Initial Diagnosis    May-Thurner syndrome     Polycythemia rubra vera (CMS/[facility])   2019 Initial Diagnosis    Polycythemia rubra vera (CMS/[facility])         HISTORY OF PRESENT ILLNESS:  The patient is a 60 y.o. female, here for follow up on management of polycythemia rubra vera    Past Medical History:   Diagnosis Date   • Deep vein thrombophlebitis of calf, left (CMS/[facility])      Past Surgical History:   Procedure Laterality Date   • BILATERAL BREAST REDUCTION     • CAROTID STENT     •  SECTION     • ELBOW FUSION     • HYSTERECTOMY     • ILIAC ARTERY STENT Left 2020    Mesh   • OTHER SURGICAL HISTORY  2020    Iliac vein carterization   • TONSILLECTOMY         Allergies   Allergen Reactions   • Clarithromycin Other (See Comments)     [place]       Family History and Social History reviewed and changed as necessary    REVIEW OF SYSTEM:   Denies any complaints    PHYSICAL EXAM:  Phone visit    Vitals:    01/15/21 0833   Weight: 88.5 kg (195 lb)   Height: 170.2 cm (67\")     Vitals:    01/15/21 0833   PainSc: 0-No pain          Vitals reviewed.    ECOG: (0) Fully Active - Able to Carry On All Pre-disease Performance Without Restriction    Lab Results   Component Value Date    HGB 12.6 2021    HCT 39.1 2021    .1 (H) 2021     2021    WBC 5.79 2021    NEUTROABS 4.03 2021    LYMPHSABS 1.27 2021    MONOSABS 0.28 2021    EOSABS 0.10 2021    BASOSABS " 0.06 01/08/2021       Lab Results   Component Value Date    GLUCOSE 95 01/08/2021    BUN 17 01/08/2021    CREATININE 0.87 01/08/2021     01/08/2021    K 4.2 01/08/2021     01/08/2021    CO2 27.0 01/08/2021    CALCIUM 9.8 01/08/2021    PROTEINTOT 7.8 01/08/2021    ALBUMIN 4.40 01/08/2021    BILITOT 0.8 01/08/2021    ALKPHOS 94 01/08/2021    AST 28 01/08/2021    ALT 20 01/08/2021             ASSESSMENT & PLAN:  1. Collin 2+ polycythemia rubra vera  2. Recurrent DVT and PE due to problem #2 with low titer positive lupus anticoagulant per testing by Dr. Major apparently on 2 separate occasions confirmatory 3 months apart  3. May Thurner  4. A sending aortic aneurysm monitored by surgery  5. Contrast-induced nephropathy  Discussion:  1/8/2021 white count 5790 with hemoglobin 12.6 and platelets 392,000 stable over the last 5 months but her last CBC prior to this was 4 months ago.  CMP unremarkable.  With chronic left lower extremity swelling and ultrasound showing left iliac venous stent obstruction/thrombosis with venous stent and angioplasty by vascular surgery 5/4/2020 as well as a grade 4 left greater saphenous vein reflux for which saphenous vein ablation and phlebectomy performed 6/26/2020, she will continue chronic Xarelto for this and baby aspirin for the JAK2 positive polycythemia rubra vera.  There is increased risk of bleeding but the DOAC does not necessarily mitigate the risk of thrombosis from the JAK2 positive myeloproliferative disorder and the aspirin does not necessarily obviate the need for Xarelto for her May Thurner syndrome venous obstructions occurring despite good control of her blood counts on Hydrea.  On dual DOAC and antiplatelet therapy, it is all the more important to watch her blood counts monthly and I will have her do a video visit with my nurse practitioner in about 6 months.  I would also ask her to be sure her vascular surgeons are following her history of abdominal aortic  aneurysm with serial imaging which I would not do routinely for her myeloproliferative disorder which I am managing.  As stated previously, I do not think myeloproliferative disorder caused her venous thromboembolic phenomena and in fact her original thromboembolic event occurred when she was anemic under the care of Dr. Major in 2016.  She will continue Hydrea 1000 mg daily with monthly CBCs in the interim.    This visit addresses a chronic illness that poses a threat to life on drug therapy requiring intensive monitoring for toxicity and warrants a level 5 MDM.        Yogesh Damon MD    01/15/2021

## 2021-02-08 ENCOUNTER — SPECIALTY PHARMACY (OUTPATIENT)
Dept: ONCOLOGY | Facility: HOSPITAL | Age: 61
End: 2021-02-08

## 2021-02-08 DIAGNOSIS — D45 POLYCYTHEMIA RUBRA VERA (HCC): Chronic | ICD-10-CM

## 2021-02-08 RX ORDER — HYDROXYUREA 500 MG/1
1000 CAPSULE ORAL DAILY
Qty: 60 CAPSULE | Refills: 5 | Status: SHIPPED | OUTPATIENT
Start: 2021-02-08 | End: 2021-07-29 | Stop reason: SDUPTHER

## 2021-02-08 NOTE — PROGRESS NOTES
Specialty Pharmacy Assessment    Hydroxyurea (Hydrea)  Dose: 1000 mg  Frequency: Once a day  Indication: Polycythemia Vera  Follow-Up: Yes  Dispensing pharmacy: [facility]  Refill status: Refills submitted  Dispense status: Mail  Side effect assessment: No/minimal side effects  Intervention?: No  Additional notes: Reviewed patient's labs from 1/8/21.  Patient is to get monthly labs per Dr. Damon.  Patient states she will be getting lab work this week.  Patient would like the hydroxyurea mailed from [facility] - confirmed [facility] address.    Dr. Damon e-scribed a RX for hydroxyurea to [facility] during the last office visit.  I cancelled that RX with [facility].  Sent in a refill script for hydroxyurea to [facility].

## 2021-03-09 ENCOUNTER — SPECIALTY PHARMACY (OUTPATIENT)
Dept: ONCOLOGY | Facility: HOSPITAL | Age: 61
End: 2021-03-09

## 2021-03-09 ENCOUNTER — HOSPITAL ENCOUNTER (OUTPATIENT)
Dept: GENERAL RADIOLOGY | Facility: HOSPITAL | Age: 61
Discharge: HOME | End: 2021-03-09
Admitting: NURSE PRACTITIONER

## 2021-03-09 ENCOUNTER — TRANSCRIBE ORDERS (OUTPATIENT)
Dept: ADMINISTRATIVE | Facility: HOSPITAL | Age: 61
End: 2021-03-09

## 2021-03-09 DIAGNOSIS — R14.0 ABDOMINAL DISTENTION: Primary | ICD-10-CM

## 2021-03-09 PROCEDURE — 74019 RADEX ABDOMEN 2 VIEWS: CPT

## 2021-03-09 NOTE — PLAN OF CARE
Specialty Pharmacy Assessment    Hydroxyurea (Hydrea)  Dose: 1000 mg  Frequency: Once a day  Indication: Polycythemia Vera  Follow-Up: Yes  Dispensing pharmacy: [facility]  Refill status: Current  Side effect assessment: No/minimal side effects  Compliance: Patient reports taking capsules or tablets whole once daily, Patient reports taking around the same time every day  Additional notes: Spoke patient via phone for toxicity and refill call. Patient denies any issues with medication. She reports she has less than a weeks worth of medication left in bottle. She reports she has 5 refills left on prescription. Patient fills with [facility] via mailing. Confirmed address is [address] [place] [address] [address]. [place], [address].        Medication Adherence    Any gaps in refill history greater than 2 weeks in the last 3 months: no  Informant: patient  Reliability of informant: reliable  Estimated medication adherence level: %        Drug Interactions    Drug interactions evaluated: yes  Clinically relevant drug interactions identified: no        Patient Counseling    Counseled the patient on the following: possible adverse effects and management discussed, adherence and missed doses discussed, pharmacy contact information discussed           Thank you,       Reyes Redding, Pharm D  Pharmacy Resident   3/9/2021  10:20 EST

## 2021-03-11 ENCOUNTER — TRANSCRIBE ORDERS (OUTPATIENT)
Dept: ADMINISTRATIVE | Facility: HOSPITAL | Age: 61
End: 2021-03-11

## 2021-03-11 ENCOUNTER — HOSPITAL ENCOUNTER (OUTPATIENT)
Dept: GENERAL RADIOLOGY | Facility: HOSPITAL | Age: 61
Discharge: HOME | End: 2021-03-11
Admitting: NURSE PRACTITIONER

## 2021-03-11 DIAGNOSIS — K56.609 SMALL BOWEL OBSTRUCTION (HCC): Primary | ICD-10-CM

## 2021-03-11 PROCEDURE — 74019 RADEX ABDOMEN 2 VIEWS: CPT

## 2021-03-22 ENCOUNTER — TRANSCRIBE ORDERS (OUTPATIENT)
Dept: ADMINISTRATIVE | Facility: HOSPITAL | Age: 61
End: 2021-03-22

## 2021-03-22 DIAGNOSIS — I71.20 THORACIC AORTIC ANEURYSM WITHOUT RUPTURE (HCC): Primary | ICD-10-CM

## 2021-04-08 ENCOUNTER — SPECIALTY PHARMACY (OUTPATIENT)
Dept: ONCOLOGY | Facility: HOSPITAL | Age: 61
End: 2021-04-08

## 2021-04-08 NOTE — PLAN OF CARE
Specialty Pharmacy Assessment    Hydroxyurea (Hydrea)  Dose: 1000 mg  Frequency: Once a day  Indication: Polycythemia Vera  Follow-Up: Yes  Dispensing pharmacy: [facility]  Refill status: Current  Dispense status: Mail  Side effect assessment: No/minimal side effects  Intervention?: No  Compliance: Patient reports taking capsules or tablets whole once daily, Patient reports taking around the same time every day  Follow-up date: 7/15/21  Additional notes: Last labs: 01/15/2021   Next appt: 07/15/2021       Medication Adherence    Any gaps in refill history greater than 2 weeks in the last 3 months: no  Demonstrates understanding of importance of adherence: yes  Reliability of informant: reliable  Estimated medication adherence level: %  Reasons for non-adherence: no problems identified       Drug Interactions    Drug interactions evaluated: yes  Clinically relevant drug interactions identified: no  Provided the patient with educational material regarding drug interactions: not applicable       Patient Counseling    Counseled the patient on the following: doses and administration discussed, possible adverse effects and management discussed, possible drug and prescription drug interactions discussed, lab monitoring and follow-up discussed, adherence and missed doses discussed, pharmacy contact information discussed       Reached out to patient for refill assessment/toxicity check. Pt confirms compliance to Hydrea.Reports tolerating treatment with no new concerns reported during encounter. Requesting refill of Hydrea to be processed at [facility] retail. Patient would like medication mailed to her address on file at [address] [place] [address]. Will alert [facility] to [facility] for dispense.      Discussed aforementioned material with patient over the phone. All questions and concerns addressed. Provided patient with my contact information and instructions to call should additional questions arise.     Thank  Dominique daniel  PharmD Candidate 2021 4/8/2021 13:46 EDT

## 2021-04-09 ENCOUNTER — HOSPITAL ENCOUNTER (OUTPATIENT)
Dept: CT IMAGING | Facility: HOSPITAL | Age: 61
Discharge: HOME | End: 2021-04-09
Admitting: INTERNAL MEDICINE

## 2021-04-09 DIAGNOSIS — I71.20 THORACIC AORTIC ANEURYSM WITHOUT RUPTURE (HCC): ICD-10-CM

## 2021-04-09 LAB — CREAT BLDA-MCNC: 1.7 MG/DL (ref 0.6–1.3)

## 2021-04-09 PROCEDURE — 71250 CT THORAX DX C-: CPT

## 2021-04-09 PROCEDURE — 82565 ASSAY OF CREATININE: CPT

## 2021-04-12 ENCOUNTER — TRANSCRIBE ORDERS (OUTPATIENT)
Dept: ADMINISTRATIVE | Facility: HOSPITAL | Age: 61
End: 2021-04-12

## 2021-04-12 DIAGNOSIS — I71.20 THORACIC AORTIC ANEURYSM WITHOUT RUPTURE (HCC): Primary | ICD-10-CM

## 2021-05-13 ENCOUNTER — SPECIALTY PHARMACY (OUTPATIENT)
Dept: ONCOLOGY | Facility: HOSPITAL | Age: 61
End: 2021-05-13

## 2021-05-13 NOTE — PLAN OF CARE
Specialty Pharmacy Assessment    Hydroxyurea (Hydrea)  Dose: 1000 mg  Frequency: Once a day  Indication: Polycythemia Vera  Follow-Up: Yes  Dispensing pharmacy: [facility]  Refill status: Current  Dispense status: Mail  Side effect assessment: No/minimal side effects  Compliance: Patient reports taking capsules or tablets whole once daily, Patient reports taking around the same time every day  Additional notes: Spoke with patient over the phone and patient stated she has not been experiencing any adverse events from the patient. Patient stated she is not sure how many pills she has left, but knows she can make it through the first of the week. Patient stated she has not missed any doses this month. All questions and concerns have been addressed at this time. Refill request has been sent to [facility] to be mailed on 5/14 to [address] [place] [address].        Medication Adherence    Any gaps in refill history greater than 2 weeks in the last 3 months: no  Demonstrates understanding of importance of adherence: yes  Informant: patient  Reliability of informant: reliable  Estimated medication adherence level: %           Drug Interactions    Clinically relevant drug interactions identified: no         Please send refill request to [facility] for Hydroxyurea to be mailed on 5/14 to [address] [place], [place] [id number].    Thank you,   Yahaira Ruiz  PharmD Candidate 2022  15:22 EDT  5/13/2021

## 2021-06-10 ENCOUNTER — SPECIALTY PHARMACY (OUTPATIENT)
Dept: ONCOLOGY | Facility: HOSPITAL | Age: 61
End: 2021-06-10

## 2021-06-10 NOTE — PROGRESS NOTES
Reached out to patient to discuss the need for labs before her Hydroxyurea can be refilled again. Patient demonstrated understanding and stated she is out of town until Sunday, but will have them drawn on Monday at the [facility] location. Follow up  on Tuesday to see if labs were drawn.     Thank you,   Yahaira Ruiz  PharmD Candidate 2022  13:58 EDT  6/10/2021

## 2021-06-14 ENCOUNTER — LAB (OUTPATIENT)
Dept: LAB | Facility: HOSPITAL | Age: 61
End: 2021-06-14

## 2021-06-14 ENCOUNTER — TRANSCRIBE ORDERS (OUTPATIENT)
Dept: ADMINISTRATIVE | Facility: HOSPITAL | Age: 61
End: 2021-06-14

## 2021-06-14 DIAGNOSIS — Z12.31 VISIT FOR SCREENING MAMMOGRAM: Primary | ICD-10-CM

## 2021-06-14 DIAGNOSIS — D45 POLYCYTHEMIA RUBRA VERA (HCC): ICD-10-CM

## 2021-06-14 LAB
BASOPHILS # BLD AUTO: 0.05 10*3/MM3 (ref 0–0.2)
BASOPHILS NFR BLD AUTO: 0.9 % (ref 0–1.5)
DEPRECATED RDW RBC AUTO: 53.1 FL (ref 37–54)
EOSINOPHIL # BLD AUTO: 0.24 10*3/MM3 (ref 0–0.4)
EOSINOPHIL NFR BLD AUTO: 4.5 % (ref 0.3–6.2)
ERYTHROCYTE [DISTWIDTH] IN BLOOD BY AUTOMATED COUNT: 14 % (ref 12.3–15.4)
HCT VFR BLD AUTO: 40.7 % (ref 34–46.6)
HGB BLD-MCNC: 13.2 G/DL (ref 12–15.9)
IMM GRANULOCYTES # BLD AUTO: 0.06 10*3/MM3 (ref 0–0.05)
IMM GRANULOCYTES NFR BLD AUTO: 1.1 % (ref 0–0.5)
LYMPHOCYTES # BLD AUTO: 1.17 10*3/MM3 (ref 0.7–3.1)
LYMPHOCYTES NFR BLD AUTO: 22 % (ref 19.6–45.3)
MCH RBC QN AUTO: 33.4 PG (ref 26.6–33)
MCHC RBC AUTO-ENTMCNC: 32.4 G/DL (ref 31.5–35.7)
MCV RBC AUTO: 103 FL (ref 79–97)
MONOCYTES # BLD AUTO: 0.26 10*3/MM3 (ref 0.1–0.9)
MONOCYTES NFR BLD AUTO: 4.9 % (ref 5–12)
NEUTROPHILS NFR BLD AUTO: 3.55 10*3/MM3 (ref 1.7–7)
NEUTROPHILS NFR BLD AUTO: 66.6 % (ref 42.7–76)
NRBC BLD AUTO-RTO: 0 /100 WBC (ref 0–0.2)
PLATELET # BLD AUTO: 408 10*3/MM3 (ref 140–450)
PMV BLD AUTO: 9.1 FL (ref 6–12)
RBC # BLD AUTO: 3.95 10*6/MM3 (ref 3.77–5.28)
WBC # BLD AUTO: 5.33 10*3/MM3 (ref 3.4–10.8)

## 2021-06-14 PROCEDURE — 36415 COLL VENOUS BLD VENIPUNCTURE: CPT

## 2021-06-14 PROCEDURE — 85025 COMPLETE CBC W/AUTO DIFF WBC: CPT

## 2021-06-15 ENCOUNTER — SPECIALTY PHARMACY (OUTPATIENT)
Dept: ONCOLOGY | Facility: HOSPITAL | Age: 61
End: 2021-06-15

## 2021-06-15 NOTE — PROGRESS NOTES
Specialty Pharmacy Assessment    Hydroxyurea (Hydrea)  Dose: 1000 mg  Frequency: Once a day  Indication: Polycythemia Vera  Follow-Up: Yes  Dispensing pharmacy: [facility]  Refill status: Current  Dispense status: Mail  Side effect assessment: No/minimal side effects  Intervention?: No  Compliance: Patient reports taking capsules or tablets whole once daily, Patient reports taking around the same time every day  Additional notes: Reached out to patient for refill assessment/toxicity check. Patient had labs drawn on 6/14. Patient is requesting refill of Hydroxyurea to be processed at [facility] retail. Patient would like medication mailed to address on file at: [address] [place], [place] [id number] as soon as possible. Please alert [facility] to [facility] for dispense and mail.               Drug Interactions    Clinically relevant drug interactions identified: no           Thank you,   Yahaira Ruiz  PharmD Candidate 2022  09:46 EDT  6/15/2021

## 2021-07-16 ENCOUNTER — TELEPHONE (OUTPATIENT)
Dept: ONCOLOGY | Facility: HOSPITAL | Age: 61
End: 2021-07-16

## 2021-07-16 NOTE — TELEPHONE ENCOUNTER
[facility] Refill Outreach    Called regarding refill of medication: Hydrea 1000mg PO QD  Spoke with patient.    Assessment  • It looks like it is almost time for your refill, how many doses do you have left? 9 days left  • How are you doing on the medication, are you experiencing any side effects? Patient denies side effects   • How many doses have you missed since you last filled your prescription? 0 doses missed.    Shipment   • We can go ahead and coordinate your next refill, would you like to pick this up at the pharmacy/[facility], or have this delivered to you vs. the clinic? Patient request medication be mailed to patient home address. Verified in epic.  o If government plan (Medicaid and Medicare B), delivery will require signature -> identify a day the patient will be home for delivery set up (note the package should arrive prior to noon as pharmacy will ship priority overnight)  • Patient was advised medication will be shipped via [employer] (standard overnight) on 7/19/21 with expected delivery on 7/20/21. Shipping comments patient is requesting of [employer] : .. .   • Shipping address confirmed: [address] [place], [place] [id number]  • Patient is aware and willing to pay copay of $30.42, [facility].   • Do you have any questions for the pharmacist? No.  • Ensured patient has clinic contact information for questions.      Follow up: Plan to reach out to patient on/around 8/13/21    Anita Persaud, Pharmacy Technician   7/16/2021  09:34 EDT

## 2021-07-22 ENCOUNTER — LAB (OUTPATIENT)
Dept: LAB | Facility: HOSPITAL | Age: 61
End: 2021-07-22

## 2021-07-22 DIAGNOSIS — D45 POLYCYTHEMIA RUBRA VERA (HCC): ICD-10-CM

## 2021-07-22 LAB
ALBUMIN SERPL-MCNC: 4.3 G/DL (ref 3.5–5.2)
ALBUMIN/GLOB SERPL: 1.3 G/DL
ALP SERPL-CCNC: 93 U/L (ref 39–117)
ALT SERPL W P-5'-P-CCNC: 31 U/L (ref 1–33)
ANION GAP SERPL CALCULATED.3IONS-SCNC: 10 MMOL/L (ref 5–15)
AST SERPL-CCNC: 33 U/L (ref 1–32)
BASOPHILS # BLD AUTO: 0.05 10*3/MM3 (ref 0–0.2)
BASOPHILS NFR BLD AUTO: 0.9 % (ref 0–1.5)
BILIRUB SERPL-MCNC: 0.5 MG/DL (ref 0–1.2)
BUN SERPL-MCNC: 22 MG/DL (ref 8–23)
BUN/CREAT SERPL: 26.8 (ref 7–25)
CALCIUM SPEC-SCNC: 9.9 MG/DL (ref 8.6–10.5)
CHLORIDE SERPL-SCNC: 103 MMOL/L (ref 98–107)
CO2 SERPL-SCNC: 23 MMOL/L (ref 22–29)
CREAT SERPL-MCNC: 0.82 MG/DL (ref 0.57–1)
DEPRECATED RDW RBC AUTO: 51.7 FL (ref 37–54)
EOSINOPHIL # BLD AUTO: 0.17 10*3/MM3 (ref 0–0.4)
EOSINOPHIL NFR BLD AUTO: 3 % (ref 0.3–6.2)
ERYTHROCYTE [DISTWIDTH] IN BLOOD BY AUTOMATED COUNT: 13.5 % (ref 12.3–15.4)
GFR SERPL CREATININE-BSD FRML MDRD: 70 ML/MIN/1.73
GLOBULIN UR ELPH-MCNC: 3.3 GM/DL
GLUCOSE SERPL-MCNC: 106 MG/DL (ref 65–99)
HCT VFR BLD AUTO: 40 % (ref 34–46.6)
HGB BLD-MCNC: 13 G/DL (ref 12–15.9)
IMM GRANULOCYTES # BLD AUTO: 0.05 10*3/MM3 (ref 0–0.05)
IMM GRANULOCYTES NFR BLD AUTO: 0.9 % (ref 0–0.5)
LYMPHOCYTES # BLD AUTO: 1.18 10*3/MM3 (ref 0.7–3.1)
LYMPHOCYTES NFR BLD AUTO: 20.7 % (ref 19.6–45.3)
MCH RBC QN AUTO: 33.7 PG (ref 26.6–33)
MCHC RBC AUTO-ENTMCNC: 32.5 G/DL (ref 31.5–35.7)
MCV RBC AUTO: 103.6 FL (ref 79–97)
MONOCYTES # BLD AUTO: 0.32 10*3/MM3 (ref 0.1–0.9)
MONOCYTES NFR BLD AUTO: 5.6 % (ref 5–12)
NEUTROPHILS NFR BLD AUTO: 3.92 10*3/MM3 (ref 1.7–7)
NEUTROPHILS NFR BLD AUTO: 68.9 % (ref 42.7–76)
NRBC BLD AUTO-RTO: 0 /100 WBC (ref 0–0.2)
PLATELET # BLD AUTO: 407 10*3/MM3 (ref 140–450)
PMV BLD AUTO: 9.1 FL (ref 6–12)
POTASSIUM SERPL-SCNC: 4.4 MMOL/L (ref 3.5–5.2)
PROT SERPL-MCNC: 7.6 G/DL (ref 6–8.5)
RBC # BLD AUTO: 3.86 10*6/MM3 (ref 3.77–5.28)
SODIUM SERPL-SCNC: 136 MMOL/L (ref 136–145)
WBC # BLD AUTO: 5.69 10*3/MM3 (ref 3.4–10.8)

## 2021-07-22 PROCEDURE — 80053 COMPREHEN METABOLIC PANEL: CPT

## 2021-07-22 PROCEDURE — 85025 COMPLETE CBC W/AUTO DIFF WBC: CPT

## 2021-07-22 PROCEDURE — 36415 COLL VENOUS BLD VENIPUNCTURE: CPT

## 2021-07-29 ENCOUNTER — TELEMEDICINE (OUTPATIENT)
Dept: ONCOLOGY | Facility: CLINIC | Age: 61
End: 2021-07-29

## 2021-07-29 DIAGNOSIS — D45 POLYCYTHEMIA RUBRA VERA (HCC): Primary | ICD-10-CM

## 2021-07-29 DIAGNOSIS — Z79.899 ENCOUNTER FOR LONG-TERM (CURRENT) USE OF HIGH-RISK MEDICATION: ICD-10-CM

## 2021-07-29 PROCEDURE — 99214 OFFICE O/P EST MOD 30 MIN: CPT | Performed by: NURSE PRACTITIONER

## 2021-07-29 RX ORDER — HYDROXYUREA 500 MG/1
1000 CAPSULE ORAL DAILY
Qty: 60 CAPSULE | Refills: 5 | Status: SHIPPED | OUTPATIENT
Start: 2021-07-29 | End: 2022-01-18 | Stop reason: SDUPTHER

## 2021-07-29 NOTE — PROGRESS NOTES
Telehealth follow-up visit  This was an audio and video enabled telemedicine encounter via [employer].    CHIEF COMPLAINT: Polycythemia rubra vera    Problem List:  Oncology/Hematology History Overview Note   1. Collin 2+ polycythemia rubra vera  2. Recurrent DVT and PE due to problem #2 with low titer positive lupus anticoagulant per prior testing by Dr. Major apparently on 2 separate occasions confirmatory 3 months apart  3. May Thurner  4. Asending aortic aneurysm  5. History of SOLOMON  6. Obstructive sleep apnea  7. Contrast-induced nephropathy  8. Reflux with nausea      -8/2/2019 initial [facility] medical oncology consultation: May 2016 developed unprovoked left lower extremity deep vein thrombosis treated with left common iliac stent, thrombolyzes, temporary vena cava filter placement and retrieval.  Saw hematologist who told her she might have a low level lupus anticoagulant but she does not have that data and they were told that they did not think that was significant enough to warrant lifetime anticoagulation.  She stopped Xarelto after 8 months.  Starting the end of June she was developing pain in the left lower extremity again with progressive swelling and shortness of breath.  Went to [facility] ER on 4 July.  Was found to have left lower extremity extensive deep vein thrombosis and right lower lobe pulmonary embolus.  She was treated with thrombectomy and thrombolysis by Rigoberto and Pavel.  Previously she could not afford her Xarelto and they have switched her from heparin to Coumadin but before discharge switched her to Xarelto.  Her creatinine during her hospitalization increased to 1.38 from a baseline of 1.2.  She comes to hematology at [facility] on 8/2/2019 with labs showing creatinine of 1.23 on 7/11/2019, 1.69 on 7/17/2019, and she says that it was repeated again but she does not have that lab and was told that it was back down but not at normal.  Her CBC reveals platelets of 697,000 on 7/17/2019  and 589,000 on 7/10/2019.Labs from 7/10/2019 revealed platelet count of 490,000 with hematocrit 54.7%.  Creatinine on 7/3/2019 was 0.92.  She understandably had a d-dimer elevation to 1.22 on 7/3/2019 with her clot.  Given the magnitude of her clot and the likelihood of may Thurner syndrome, she needs lifetime anticoagulation.  On patients with deep vein thrombosis and/or PE and indefinite anticoagulation, use of Xarelto in patients with GFR less than 30 is recommended to be avoided varied will need to watch that.  With her prior recent elevation of hematocrit and persistent elevation of platelets, we will continue to watch that and if they remain persistent suggesting this being more than just reactive to her PE and clots, then we need to look for myeloproliferative disorder contributing to her clotting.  She is going to get her records from her other hematologist she saw back in 2016 just for completeness sake but I would not repeat, nor would I do for the first time now, her hypercoagulable panel as any hypercoagulable disorder we stumbled upon would not alter her need for lifetime full dose anticoagulation.  The only question is which drug, and whether her creatinine will allow DOAC's.  I will check her BMP weekly for the next month to make sure her creatinine is doing adequate relative to her use of Xarelto and will get her blood count now and again prior to return in a month and if the platelets are remaining elevated we will do Collin 2 with reflex testing.  Since her discharge from the hospital she had significant nausea for which she had an ultrasound that showed fatty liver but normal gallbladder.  She had an EGD in the past few days with Dr. Heath that showed significant reflux and on Protonix her nausea has improved.  She will need close follow-up of her ascending aortic aneurysm as well with her thoracic surgeons.    -9/9/2019 medical oncology follow-up: Platelets persistently elevated 807,000 with  hematocrit 52.7% and white count 8320 on 8/30/2019.  GFR staying over 59 on Xarelto.  We will check Collin 2 with reflex testing and treat with Hydrea plus or minus phlebotomy if we confirm myeloproliferative disorder.  Will need bone marrow before start of that to assess for myelofibrosis if this is confirmed.  Records from Dr.Scott Major of 6/7/2016 mention that she had been anemic during her hospitalization.  Factor V Leiden, prothrombin mutation, Antithrombin III, protein C&S levels were all normal and the anticardiolipin and beta-2 microglobulin were all negative according to Dr. Major.  The lupus anticoagulant was borderline positive at 10.5 with him and he recommended repeat of that done 3 months apart to prove the diagnosis but this came back low positive according to his notes and he suggested switching to aspirin after a full 6 months of anticoagulation.  This was of course before the low-dose DOAC trials have been published she had had thrombolytics and stenting of her May Thurner according to his records.  Given her presence of May Thurner despite the stenting, I would be inclined towards keeping her on anticoagulants regardless.  She brings to me records that includes labs back to 2016 where her white count, hemoglobin and platelets were normal but as recently as June 2019 her hematocrit was 52% whereas back in May 2016 her hemoglobin was actually down in the eights with normochromic normocytic indices.  On Xarelto, there is increased risk with aspirin I will hold on that for present but we may have to make such a decision as Xarelto may not be sufficient for viscosity issues related to her elevated platelets and hematocrit if this is a myeloproliferative disorder.    -9/30/2019 [facility] [place], operated by [facility] hematology oncology follow-up visit: 9/9/2019 Collin 2 [id number] mutation positive with erythropoietin level low at 1 commensurate with polycythemia rubra vera and a hematocrit of 53% with platelets of 807,000 and normal white  count.  Normally would do bone marrow to look for myelofibrosis but would not risk taking her off Xarelto at the moment.  We will start her on Hydrea 500 mg daily with CBC weekly and if we get her hematocrit below 45% platelets in the 400,000 range without significant leukopenia that would be my goal.  No phlebotomies for now until we see what Hydrea does.  After we see her back, if her platelets are down and tolerating Hydrea, I will consider stopping the Xarelto and switching her to full dose aspirin for life for the PRV.  This PRV did not cause her original venous thromboembolic phenomenon in fact was anemic when Dr. Major saw her in 2016.  We talked about the potential natural history of PRV including spent phase, leukemic transformation, myelofibrosis.  She will need AAA follow-up with surgeons.    -1/15/2021 [facility] hematology follow-up: 1/8/2021 white count 5790 with hemoglobin 12.6 and platelets 392,000 stable over the last 5 months but her last CBC prior to this was 4 months ago.  CMP unremarkable.  With chronic left lower extremity swelling and ultrasound showing left iliac venous stent obstruction/thrombosis with venous stent and angioplasty by vascular surgery 5/4/2020 as well as a grade 4 left greater saphenous vein reflux for which saphenous vein ablation and phlebectomy performed 6/26/2020, she will continue chronic Xarelto for this and baby aspirin for the JAK2 positive polycythemia rubra vera.  There is increased risk of bleeding but the DOAC does not necessarily mitigate the risk of thrombosis from the JAK2 positive myeloproliferative disorder and the aspirin does not necessarily obviate the need for Xarelto for her May Thurner syndrome venous obstructions occurring despite good control of her blood counts on Hydrea.  On dual DOAC and antiplatelet therapy, it is all the more important to watch her blood counts monthly and I will have her do a video visit with my nurse practitioner in about 3  months.  I would also ask her to be sure her vascular surgeons are following her history of abdominal aortic aneurysm with serial imaging which I would not do routinely for her myeloproliferative disorder which I am managing.  As stated previously, I do not think myeloproliferative disorder caused her venous thromboembolic phenomena and in fact her original thromboembolic event occurred when she was anemic under the care of Dr. Major in .    -2021 [facility] hematology telemedicine clinic follow-up: Gisell continues to do well, tolerating Hydrea 1000 mg daily along with Xarelto and aspirin 81 mg.  She has had no bleeding events.  We will continue therapy unchanged.  CBC for the past 2 months looks good with no anemia, WBC normal, platelet count normal.  I reminded her to get her CBC monthly as we wanted to watch her more closely as she is on not only Hydrea but blood thinner with Xarelto along with aspirin putting her more at risk.  She states understanding and will do her CBC monthly, new order entered.     May-Thurner syndrome   2019 Initial Diagnosis    May-Thurner syndrome     Polycythemia rubra vera (CMS/[facility])   2019 Initial Diagnosis    Polycythemia rubra vera (CMS/[facility])         HISTORY OF PRESENT ILLNESS:  The patient is a 61 y.o. female, here for follow up on management of polycythemia rubra vera.  Gisell has been doing well since we saw her last with no new concerns.  She denies any major illnesses, no hospitalizations or changes in her health.  She is taking Hydrea 1000 mg daily along with aspirin 81 mg daily, she also continues on anticoagulation with Xarelto.  She has had no bleeding issues.    Past Medical History:   Diagnosis Date   • Deep vein thrombophlebitis of calf, left (CMS/HCC)      Past Surgical History:   Procedure Laterality Date   • BILATERAL BREAST REDUCTION     • CAROTID STENT     •  SECTION     • ELBOW FUSION     • HYSTERECTOMY     • ILIAC ARTERY STENT Left 2020     Mesh   • OTHER SURGICAL HISTORY  06/2020    Iliac vein carterization   • TONSILLECTOMY         Allergies   Allergen Reactions   • Clarithromycin Other (See Comments)     [place]       Family History and Social History reviewed and changed as necessary    REVIEW OF SYSTEM:   Negative for new concerns    PHYSICAL EXAM:  Video visit.  On video, patient is a healthy-appearing, well-developed, well-nourished appearing female in no distress.  Conversation is appropriate, she is able to maintain conversation with no respiratory difficulty.        ECOG: (0) Fully Active - Able to Carry On All Pre-disease Performance Without Restriction    Lab Results   Component Value Date    HGB 13.0 07/22/2021    HCT 40.0 07/22/2021    .6 (H) 07/22/2021     07/22/2021    WBC 5.69 07/22/2021    NEUTROABS 3.92 07/22/2021    LYMPHSABS 1.18 07/22/2021    MONOSABS 0.32 07/22/2021    EOSABS 0.17 07/22/2021    BASOSABS 0.05 07/22/2021       Lab Results   Component Value Date    GLUCOSE 106 (H) 07/22/2021    BUN 22 07/22/2021    CREATININE 0.82 07/22/2021     07/22/2021    K 4.4 07/22/2021     07/22/2021    CO2 23.0 07/22/2021    CALCIUM 9.9 07/22/2021    PROTEINTOT 7.6 07/22/2021    ALBUMIN 4.30 07/22/2021    BILITOT 0.5 07/22/2021    ALKPHOS 93 07/22/2021    AST 33 (H) 07/22/2021    ALT 31 07/22/2021           ASSESSMENT & PLAN:  1. Collin 2+ polycythemia rubra vera  2. Recurrent DVT and PE due to problem #2 with low titer positive lupus anticoagulant per testing by Dr. Major apparently on 2 separate occasions confirmatory 3 months apart  3. May Thurner  4. A sending aortic aneurysm monitored by surgery  5. Contrast-induced nephropathy    Discussion:  Gisell continues to do well, tolerating Hydrea 1000 mg daily along with Xarelto and aspirin 81 mg.  She has had no bleeding events.  We will continue therapy unchanged.  CBC for the past 2 months looks good with no anemia, WBC normal, platelet count normal.  I reminded  her to get her CBC monthly as we wanted to watch her more closely as she is on not only Hydrea but blood thinner with Xarelto along with aspirin putting her more at risk.  She states understanding and will do her CBC monthly, new order entered.    This was a level 4, moderate MDM visit with 2 or more stable chronic illnesses along with prescription drug management.  Dottie Nova, APRN    07/29/2021

## 2021-08-10 ENCOUNTER — HOSPITAL ENCOUNTER (OUTPATIENT)
Dept: MAMMOGRAPHY | Facility: HOSPITAL | Age: 61
Discharge: HOME | End: 2021-08-10
Admitting: INTERNAL MEDICINE

## 2021-08-10 DIAGNOSIS — Z12.31 VISIT FOR SCREENING MAMMOGRAM: ICD-10-CM

## 2021-08-10 PROCEDURE — 77067 SCR MAMMO BI INCL CAD: CPT

## 2021-08-10 PROCEDURE — 77063 BREAST TOMOSYNTHESIS BI: CPT | Performed by: RADIOLOGY

## 2021-08-10 PROCEDURE — 77067 SCR MAMMO BI INCL CAD: CPT | Performed by: RADIOLOGY

## 2021-08-10 PROCEDURE — 77063 BREAST TOMOSYNTHESIS BI: CPT

## 2021-08-13 ENCOUNTER — TELEPHONE (OUTPATIENT)
Dept: ONCOLOGY | Facility: HOSPITAL | Age: 61
End: 2021-08-13

## 2021-08-13 NOTE — TELEPHONE ENCOUNTER
[facility] Specialty Pharmacy Refill Outreach    Called regarding refill of medication: Hydrea 1000mg PO QD  Spoke with patient.    Assessment  • It looks like it is almost time for your refill, how many doses do you have left? 7 days left  • How are you doing on the medication, are you experiencing any side effects? Patient denies side effects   • How many doses have you missed since you last filled your prescription? 0 doses missed.  • Have you stopped or started any medications since we last spoke? Patient has had no medication changes since last month.     Shipment   • We can go ahead and coordinate your next refill, would you like to pick this up at the pharmacy/curbside, or have this delivered to you vs. the clinic? Patient request medication to be mailed to home address. Verified in Epic.   o If government plan (Medicaid and Medicare B), delivery will require signature -> identify a day the patient will be home for delivery set up (note the package should arrive prior to noon as pharmacy will ship priority overnight)  • Patient was advised medication will be shipped via [employer] (standard overnight) on 8/16/21 with expected delivery on 8/17/21. Shipping comments patient is requesting of [employer] : .. .   • Shipping address confirmed: [address] [place], [place] [address]  • Patient is aware and willing to pay copay of $30.42, CCOF.   • Do you have any questions for the pharmacist? No.  • Ensured patient has clinic contact information for questions.     Follow up: Plan to reach out to patient on/around 9/10/21    Anita Persaud, Pharmacy Technician  8/13/2021  12:07 EDT

## 2021-08-31 ENCOUNTER — SPECIALTY PHARMACY (OUTPATIENT)
Dept: ONCOLOGY | Facility: HOSPITAL | Age: 61
End: 2021-08-31

## 2021-08-31 NOTE — PLAN OF CARE
Specialty Pharmacy Assessment    Hydroxyurea (Hydrea)  Dose: 1000 mg  Frequency: Once a day  Indication: Polycythemia Vera  Goals of therapy: Other  Other goals of therapy: control  Follow-Up: Yes  Dispensing pharmacy: [facility]  Side effect assessment: No/minimal side effects  Intervention?: No  Compliance: Patient reports taking capsules or tablets whole once daily, Patient reports taking around the same time every day  Additional notes: Last CBC and CMP on 7/29/21. Discussed aforementioned material. Reminded patient of the pharmacist's contact information and instructions to call should additional questions arise. All questions and concerns addressed. Completed a medication reconciliation. Patient to continue on treatment with no disease progression or unacceptable toxicity at this time.        Medication Adherence    Any gaps in refill history greater than 2 weeks in the last 3 months: no  Demonstrates understanding of importance of adherence: yes  Informant: patient  Reliability of informant: reliable  Estimated medication adherence level: %  Reasons for non-adherence: no problems identified  Adherence tools used: watch       Drug Interactions    Drug interactions evaluated: yes  Clinically relevant drug interactions identified: no  Provided the patient with educational material regarding drug interactions: not applicable       Patient Counseling    Counseled the patient on the following: doses and administration discussed, safe handling, storage, and disposal discussed, possible adverse effects and management discussed, lab monitoring and follow-up discussed, therapeutic rationale discussed, cost of medications and cost implications discussed, adherence and missed doses discussed, pharmacy contact information discussed

## 2021-09-28 ENCOUNTER — TELEPHONE (OUTPATIENT)
Dept: ONCOLOGY | Facility: HOSPITAL | Age: 61
End: 2021-09-28

## 2021-09-28 NOTE — TELEPHONE ENCOUNTER
[facility] Specialty Pharmacy Refill Outreach    Called regarding refill of medication: Hydroxyurea 1000mg daily  Spoke with patient.    Assessment  • It looks like it is almost time for your refill, how many doses do you have left? 10  • How are you doing on the medication, are you experiencing any side effects? Patient denies side effects   • How many doses have you missed since you last filled your prescription? 0 doses missed.  • Have you stopped or started any medications since we last spoke? Patient has had no medication changes since last month.     Shipment   • We can go ahead and coordinate your next refill, would you like to pick this up at the pharmacy/curbside, or have this delivered to you vs. the clinic? Patient request medication be mailed to home address.  o If government plan (Medicaid and Medicare B), delivery will require signature -> identify a day the patient will be home for delivery set up (note the package should arrive prior to noon as pharmacy will ship priority overnight)  • Patient was advised medication will be shipped via [employer] (standard overnight) on 9/29 with expected delivery on 9/30. Shipping comments patient is requesting of [employer] : None. .   • Shipping address confirmed: [address] [place], [place] [id number]  • Patient is aware and willing to pay copay of $30.42, CCOF.   • Do you have any questions for the pharmacist? No.  • Ensured patient has clinic contact information for questions.     Follow up: Plan to reach out to patient around 10/28 for refill.    Audelia Ramirez, Pharmacy Technician  9/28/2021  08:15 EDT

## 2021-10-28 ENCOUNTER — TELEPHONE (OUTPATIENT)
Dept: ONCOLOGY | Facility: HOSPITAL | Age: 61
End: 2021-10-28

## 2021-10-28 NOTE — TELEPHONE ENCOUNTER
[facility] Specialty Pharmacy Refill Outreach    Called regarding refill of medication: Hydrea 1000mg PO QD  Spoke with patient.    Assessment  • It looks like it is almost time for your refill, how many doses do you have left? 6 days left   • How are you doing on the medication, are you experiencing any side effects? Patient denies side effects   • How many doses have you missed since you last filled your prescription? 0 doses missed.  • Have you stopped or started any medications since we last spoke? Patient has had no medication changes since last month.     Shipment   • We can go ahead and coordinate your next refill, would you like to pick this up at the pharmacy/curbside, or have this delivered to you vs. the clinic? Patient request medication be mailed to home address. Verified in Epic.  o If government plan (Medicaid and Medicare B), delivery will require signature -> identify a day the patient will be home for delivery set up (note the package should arrive prior to noon as pharmacy will ship priority overnight)  • Patient was advised medication will be shipped via [employer] (standard overnight) on 10/28/2021 with expected delivery on 10/29/2021. Shipping comments patient is requesting of [employer] : .. .   • Shipping address confirmed: [address] [place], [place] [id number]  • Patient is aware and willing to pay copay of $30.42, CCOF.   • Do you have any questions for the pharmacist? No.  • Ensured patient has clinic contact information for questions.     Follow up: Plan to reach out to patient on/around 11/25/2021      Anita Persaud, Pharmacy Technician  10/28/2021  09:32 EDT

## 2021-11-03 ENCOUNTER — LAB (OUTPATIENT)
Dept: LAB | Facility: HOSPITAL | Age: 61
End: 2021-11-03

## 2021-11-03 DIAGNOSIS — D45 POLYCYTHEMIA RUBRA VERA (HCC): ICD-10-CM

## 2021-11-03 DIAGNOSIS — Z79.899 ENCOUNTER FOR LONG-TERM (CURRENT) USE OF HIGH-RISK MEDICATION: ICD-10-CM

## 2021-11-03 LAB
BASOPHILS # BLD AUTO: 0.06 10*3/MM3 (ref 0–0.2)
BASOPHILS NFR BLD AUTO: 1.1 % (ref 0–1.5)
DEPRECATED RDW RBC AUTO: 53.6 FL (ref 37–54)
EOSINOPHIL # BLD AUTO: 0.18 10*3/MM3 (ref 0–0.4)
EOSINOPHIL NFR BLD AUTO: 3.3 % (ref 0.3–6.2)
ERYTHROCYTE [DISTWIDTH] IN BLOOD BY AUTOMATED COUNT: 14 % (ref 12.3–15.4)
HCT VFR BLD AUTO: 41.4 % (ref 34–46.6)
HGB BLD-MCNC: 13.3 G/DL (ref 12–15.9)
IMM GRANULOCYTES # BLD AUTO: 0.03 10*3/MM3 (ref 0–0.05)
IMM GRANULOCYTES NFR BLD AUTO: 0.6 % (ref 0–0.5)
LYMPHOCYTES # BLD AUTO: 1.32 10*3/MM3 (ref 0.7–3.1)
LYMPHOCYTES NFR BLD AUTO: 24.5 % (ref 19.6–45.3)
MCH RBC QN AUTO: 33.8 PG (ref 26.6–33)
MCHC RBC AUTO-ENTMCNC: 32.1 G/DL (ref 31.5–35.7)
MCV RBC AUTO: 105.3 FL (ref 79–97)
MONOCYTES # BLD AUTO: 0.34 10*3/MM3 (ref 0.1–0.9)
MONOCYTES NFR BLD AUTO: 6.3 % (ref 5–12)
NEUTROPHILS NFR BLD AUTO: 3.46 10*3/MM3 (ref 1.7–7)
NEUTROPHILS NFR BLD AUTO: 64.2 % (ref 42.7–76)
NRBC BLD AUTO-RTO: 0 /100 WBC (ref 0–0.2)
PLATELET # BLD AUTO: 431 10*3/MM3 (ref 140–450)
PMV BLD AUTO: 9.1 FL (ref 6–12)
RBC # BLD AUTO: 3.93 10*6/MM3 (ref 3.77–5.28)
WBC # BLD AUTO: 5.39 10*3/MM3 (ref 3.4–10.8)

## 2021-11-03 PROCEDURE — 85025 COMPLETE CBC W/AUTO DIFF WBC: CPT

## 2021-11-03 PROCEDURE — 36415 COLL VENOUS BLD VENIPUNCTURE: CPT

## 2021-11-17 ENCOUNTER — SPECIALTY PHARMACY (OUTPATIENT)
Dept: ONCOLOGY | Facility: HOSPITAL | Age: 61
End: 2021-11-17

## 2021-11-24 ENCOUNTER — SPECIALTY PHARMACY (OUTPATIENT)
Dept: ONCOLOGY | Facility: HOSPITAL | Age: 61
End: 2021-11-24

## 2021-11-24 NOTE — PROGRESS NOTES
Specialty Pharmacy Refill Coordination Note     Gisell is a 61 y.o. female contacted today regarding refills of Hydroxyurea 1000mg PO QD specialty medication(s).    Reviewed and verified with patient:      Specialty medication(s) and dose(s) confirmed: yes    Refill Questions      Most Recent Value   Changes to allergies? No   Changes to medications? No   New conditions since last clinic visit No   Unplanned office visit, urgent care, ED, or hospital admission in the last 4 weeks  No   How does patient/caregiver feel medication is working? Very good   Financial problems or insurance changes  No   How many doses of your specialty medications were missed in the last 4 weeks? 0          Delivery Questions      Most Recent Value   Delivery method FedEx   Delivery address correct? Yes   Delivery phone number [phone]   Preferred delivery time? Anytime   Number of medications in delivery 1   Medication being filled and delivered hydrea   Doses left of specialty medications 7 days left   Is there any medication that is due not being filled? No   Supplies needed? No supplies needed   Cooler needed? No   Do any medications need mixed or dated? No   Copay form of payment Credit card on file   Additional comments $30.42 patient pays cash price   Questions or concerns for the pharmacist? No   Are any medications first time fills? No            Medication Adherence    Adherence tools used: watch          Follow-up: 28 day(s)     Anita Persaud, Pharmacy Technician  Specialty Pharmacy Technician

## 2021-12-22 ENCOUNTER — SPECIALTY PHARMACY (OUTPATIENT)
Dept: ONCOLOGY | Facility: HOSPITAL | Age: 61
End: 2021-12-22

## 2021-12-22 NOTE — PROGRESS NOTES
Specialty Pharmacy Refill Coordination Note     Gisell is a 61 y.o. female contacted today regarding refills of  Hydroxyurea 1000mg po QD specialty medication(s).    Reviewed and verified with patient:       Specialty medication(s) and dose(s) confirmed: yes    Refill Questions      Most Recent Value   Changes to allergies? No   Changes to medications? No   New conditions since last clinic visit No   Unplanned office visit, urgent care, ED, or hospital admission in the last 4 weeks  No   How does patient/caregiver feel medication is working? Good   Financial problems or insurance changes  No   How many doses of your specialty medications were missed in the last 4 weeks? 1   Why were doses missed? patient forgot and took later that evening          Delivery Questions      Most Recent Value   Delivery method FedEx   Delivery address correct? Yes   Delivery phone number [phone]   Preferred delivery time? Anytime   Number of medications in delivery 1   Medication being filled and delivered hydroxyurea   Doses left of specialty medications 7 days left   Is there any medication that is due not being filled? No   Cooler needed? No   Do any medications need mixed or dated? No   Copay form of payment Credit card on file   Additional comments $30.42   Questions or concerns for the pharmacist? No   Are any medications first time fills? No            Medication Adherence    Adherence tools used: watch          Follow-up: 28 day(s)     Anita Persaud, Pharmacy Technician  Specialty Pharmacy Technician

## 2022-01-18 ENCOUNTER — SPECIALTY PHARMACY (OUTPATIENT)
Dept: ONCOLOGY | Facility: HOSPITAL | Age: 62
End: 2022-01-18

## 2022-01-18 DIAGNOSIS — D45 POLYCYTHEMIA RUBRA VERA: ICD-10-CM

## 2022-01-18 RX ORDER — HYDROXYUREA 500 MG/1
1000 CAPSULE ORAL DAILY
Qty: 60 CAPSULE | Refills: 5 | Status: SHIPPED | OUTPATIENT
Start: 2022-01-18 | End: 2022-08-02 | Stop reason: SDUPTHER

## 2022-01-18 NOTE — PROGRESS NOTES
Specialty Pharmacy Refill Coordination Note     Gisell is a 61 y.o. female contacted today regarding refills of  Hydroxyurea 1000mg PO QD specialty medication(s).    Specialty medication(s) and dose(s) confirmed: yes    Refill Questions      Most Recent Value   Changes to allergies? No   Changes to medications? No   New conditions since last clinic visit No   Unplanned office visit, urgent care, ED, or hospital admission in the last 4 weeks  No   How does patient/caregiver feel medication is working? Fair   Financial problems or insurance changes  No   If yes, describe changes in insurance or financial issues. n/a   How many doses of your specialty medications were missed in the last 4 weeks? 1   Why were doses missed? patient forgot   Does this patient require a clinical escalation to a pharmacist? No          Delivery Questions      Most Recent Value   Delivery method FedEx   Delivery address correct? Yes   Delivery phone number [phone]   Preferred delivery time? Anytime   Number of medications in delivery 1   Medication being filled and delivered hydroxyurea   Doses left of specialty medications 8 days left   Is there any medication that is due not being filled? No   Supplies needed? No supplies needed   Cooler needed? No   Do any medications need mixed or dated? No   Copay form of payment Credit card on file   Additional comments $30.42   Questions or concerns for the pharmacist? No   Explain any questions or concerns for the pharmacist n/a   Are any medications first time fills? No            Medication Adherence    Adherence tools used: watch          Follow-up: 28 day(s)     Anita Persaud, Pharmacy Technician  Specialty Pharmacy Technician

## 2022-02-04 ENCOUNTER — TELEPHONE (OUTPATIENT)
Dept: ONCOLOGY | Facility: CLINIC | Age: 62
End: 2022-02-04

## 2022-02-04 NOTE — TELEPHONE ENCOUNTER
Caller: Gisell Wylie    Relationship to patient: Self    Best call back number: [phone]    Chief complaint: R/S F/U WITH DR CAPONE       Type of visit: F/U     Requested date: ON A Monday OR TUES IF POSSIBLE HAS TO PICK GRAND KIDS IN THE AFTERNOONS BY 2PM ,     PREFER IF CAN DO MORNING APPT      If rescheduling, when is the original appointment: 02/07/22    Additional notes:    HUB UNABLE TO R/S WITHIN TIME FRAME.

## 2022-02-07 ENCOUNTER — OFFICE VISIT (OUTPATIENT)
Dept: ONCOLOGY | Facility: CLINIC | Age: 62
End: 2022-02-07

## 2022-02-07 ENCOUNTER — LAB (OUTPATIENT)
Dept: LAB | Facility: HOSPITAL | Age: 62
End: 2022-02-07

## 2022-02-07 VITALS
BODY MASS INDEX: 33.74 KG/M2 | TEMPERATURE: 97.7 F | RESPIRATION RATE: 18 BRPM | WEIGHT: 215 LBS | HEART RATE: 72 BPM | HEIGHT: 67 IN | DIASTOLIC BLOOD PRESSURE: 69 MMHG | OXYGEN SATURATION: 97 % | SYSTOLIC BLOOD PRESSURE: 147 MMHG

## 2022-02-07 DIAGNOSIS — D45 POLYCYTHEMIA RUBRA VERA: ICD-10-CM

## 2022-02-07 DIAGNOSIS — Z79.899 ENCOUNTER FOR LONG-TERM (CURRENT) USE OF HIGH-RISK MEDICATION: ICD-10-CM

## 2022-02-07 DIAGNOSIS — D45 POLYCYTHEMIA RUBRA VERA: Primary | ICD-10-CM

## 2022-02-07 LAB
ALBUMIN SERPL-MCNC: 4.5 G/DL (ref 3.5–5.2)
ALBUMIN/GLOB SERPL: 1.4 G/DL
ALP SERPL-CCNC: 91 U/L (ref 39–117)
ALT SERPL W P-5'-P-CCNC: 35 U/L (ref 1–33)
ANION GAP SERPL CALCULATED.3IONS-SCNC: 8 MMOL/L (ref 5–15)
AST SERPL-CCNC: 39 U/L (ref 1–32)
BILIRUB SERPL-MCNC: 0.5 MG/DL (ref 0–1.2)
BUN SERPL-MCNC: 19 MG/DL (ref 8–23)
BUN/CREAT SERPL: 21.8 (ref 7–25)
CALCIUM SPEC-SCNC: 10.5 MG/DL (ref 8.6–10.5)
CHLORIDE SERPL-SCNC: 107 MMOL/L (ref 98–107)
CO2 SERPL-SCNC: 25 MMOL/L (ref 22–29)
CREAT SERPL-MCNC: 0.87 MG/DL (ref 0.57–1)
ERYTHROCYTE [DISTWIDTH] IN BLOOD BY AUTOMATED COUNT: 14.8 % (ref 12.3–15.4)
GFR SERPL CREATININE-BSD FRML MDRD: 66 ML/MIN/1.73
GLOBULIN UR ELPH-MCNC: 3.3 GM/DL
GLUCOSE SERPL-MCNC: 99 MG/DL (ref 65–99)
HCT VFR BLD AUTO: 41.3 % (ref 34–46.6)
HGB BLD-MCNC: 13.5 G/DL (ref 12–15.9)
LYMPHOCYTES # BLD AUTO: 1.1 10*3/MM3 (ref 0.7–3.1)
LYMPHOCYTES NFR BLD AUTO: 19.4 % (ref 19.6–45.3)
MCH RBC QN AUTO: 33.5 PG (ref 26.6–33)
MCHC RBC AUTO-ENTMCNC: 32.6 G/DL (ref 31.5–35.7)
MCV RBC AUTO: 102.7 FL (ref 79–97)
MONOCYTES # BLD AUTO: 0.2 10*3/MM3 (ref 0.1–0.9)
MONOCYTES NFR BLD AUTO: 3.7 % (ref 5–12)
NEUTROPHILS NFR BLD AUTO: 4.5 10*3/MM3 (ref 1.7–7)
NEUTROPHILS NFR BLD AUTO: 76.9 % (ref 42.7–76)
PLATELET # BLD AUTO: 588 10*3/MM3 (ref 140–450)
PMV BLD AUTO: 6 FL (ref 6–12)
POTASSIUM SERPL-SCNC: 4.6 MMOL/L (ref 3.5–5.2)
PROT SERPL-MCNC: 7.8 G/DL (ref 6–8.5)
RBC # BLD AUTO: 4.02 10*6/MM3 (ref 3.77–5.28)
SODIUM SERPL-SCNC: 140 MMOL/L (ref 136–145)
WBC NRBC COR # BLD: 5.8 10*3/MM3 (ref 3.4–10.8)

## 2022-02-07 PROCEDURE — 85025 COMPLETE CBC W/AUTO DIFF WBC: CPT

## 2022-02-07 PROCEDURE — 99215 OFFICE O/P EST HI 40 MIN: CPT | Performed by: INTERNAL MEDICINE

## 2022-02-07 PROCEDURE — 80053 COMPREHEN METABOLIC PANEL: CPT

## 2022-02-07 PROCEDURE — 36415 COLL VENOUS BLD VENIPUNCTURE: CPT

## 2022-02-07 NOTE — PROGRESS NOTES
CHIEF COMPLAINT: Follow-up PRV follow-up PRV    Problem List:  Oncology/Hematology History Overview Note   1.  Collin 2+ polycythemia rubra vera  2.  Recurrent DVT and PE due to problem #2 with low titer positive lupus anticoagulant per prior testing by Dr. Major apparently on 2 separate occasions confirmatory 3 months apart  3.  May Thurner  4.  Asending aortic aneurysm  5.  History of SOLOMON  6.  Obstructive sleep apnea  7.  Contrast-induced nephropathy  8.  Reflux with nausea      -8/2/2019 initial [facility] medical oncology consultation: May 2016 developed unprovoked left lower extremity deep vein thrombosis treated with left common iliac stent, thrombolyzes, temporary vena cava filter placement and retrieval.  Saw hematologist who told her she might have a low level lupus anticoagulant but she does not have that data and they were told that they did not think that was significant enough to warrant lifetime anticoagulation.  She stopped Xarelto after 8 months.  Starting the end of June she was developing pain in the left lower extremity again with progressive swelling and shortness of breath.  Went to [facility] ER on 4 July.  Was found to have left lower extremity extensive deep vein thrombosis and right lower lobe pulmonary embolus.  She was treated with thrombectomy and thrombolysis by Rigoberto and Pavel.  Previously she could not afford her Xarelto and they have switched her from heparin to Coumadin but before discharge switched her to Xarelto.  Her creatinine during her hospitalization increased to 1.38 from a baseline of 1.2.  She comes to hematology at [facility] on 8/2/2019 with labs showing creatinine of 1.23 on 7/11/2019, 1.69 on 7/17/2019, and she says that it was repeated again but she does not have that lab and was told that it was back down but not at normal.  Her CBC reveals platelets of 697,000 on 7/17/2019 and 589,000 on 7/10/2019.Labs from 7/10/2019 revealed platelet count of 490,000 with  hematocrit 54.7%.  Creatinine on 7/3/2019 was 0.92.  She understandably had a d-dimer elevation to 1.22 on 7/3/2019 with her clot.  Given the magnitude of her clot and the likelihood of may Thurner syndrome, she needs lifetime anticoagulation.  On patients with deep vein thrombosis and/or PE and indefinite anticoagulation, use of Xarelto in patients with GFR less than 30 is recommended to be avoided varied will need to watch that.  With her prior recent elevation of hematocrit and persistent elevation of platelets, we will continue to watch that and if they remain persistent suggesting this being more than just reactive to her PE and clots, then we need to look for myeloproliferative disorder contributing to her clotting.  She is going to get her records from her other hematologist she saw back in 2016 just for completeness sake but I would not repeat, nor would I do for the first time now, her hypercoagulable panel as any hypercoagulable disorder we stumbled upon would not alter her need for lifetime full dose anticoagulation.  The only question is which drug, and whether her creatinine will allow DOAC's.  I will check her BMP weekly for the next month to make sure her creatinine is doing adequate relative to her use of Xarelto and will get her blood count now and again prior to return in a month and if the platelets are remaining elevated we will do Collin 2 with reflex testing.  Since her discharge from the hospital she had significant nausea for which she had an ultrasound that showed fatty liver but normal gallbladder.  She had an EGD in the past few days with Dr. Heath that showed significant reflux and on Protonix her nausea has improved.  She will need close follow-up of her ascending aortic aneurysm as well with her thoracic surgeons.    -9/9/2019 medical oncology follow-up: Platelets persistently elevated 807,000 with hematocrit 52.7% and white count 8320 on 8/30/2019.  GFR staying over 59 on Xarelto.  We  will check Collin 2 with reflex testing and treat with Hydrea plus or minus phlebotomy if we confirm myeloproliferative disorder.  Will need bone marrow before start of that to assess for myelofibrosis if this is confirmed.  Records from Dr.Scott Major of 6/7/2016 mention that she had been anemic during her hospitalization.  Factor V Leiden, prothrombin mutation, Antithrombin III, protein C&S levels were all normal and the anticardiolipin and beta-2 microglobulin were all negative according to Dr. Major.  The lupus anticoagulant was borderline positive at 10.5 with him and he recommended repeat of that done 3 months apart to prove the diagnosis but this came back low positive according to his notes and he suggested switching to aspirin after a full 6 months of anticoagulation.  This was of course before the low-dose DOAC trials have been published she had had thrombolytics and stenting of her May Thurner according to his records.  Given her presence of May Thurner despite the stenting, I would be inclined towards keeping her on anticoagulants regardless.  She brings to me records that includes labs back to 2016 where her white count, hemoglobin and platelets were normal but as recently as June 2019 her hematocrit was 52% whereas back in May 2016 her hemoglobin was actually down in the eights with normochromic normocytic indices.  On Xarelto, there is increased risk with aspirin I will hold on that for present but we may have to make such a decision as Xarelto may not be sufficient for viscosity issues related to her elevated platelets and hematocrit if this is a myeloproliferative disorder.    -9/30/2019 [facility] hematology oncology follow-up visit: 9/9/2019 Collin 2 V617F mutation positive with erythropoietin level low at 1 commensurate with polycythemia rubra vera and a hematocrit of 53% with platelets of 807,000 and normal white count.  Normally would do bone marrow to look for myelofibrosis but would not risk taking  her off Xarelto at the moment.  We will start her on Hydrea 500 mg daily with CBC weekly and if we get her hematocrit below 45% platelets in the 400,000 range without significant leukopenia that would be my goal.  No phlebotomies for now until we see what Hydrea does.  After we see her back, if her platelets are down and tolerating Hydrea, I will consider stopping the Xarelto and switching her to full dose aspirin for life for the PRV.  This PRV did not cause her original venous thromboembolic phenomenon in fact was anemic when Dr. Major saw her in 2016.  We talked about the potential natural history of PRV including spent phase, leukemic transformation, myelofibrosis.  She will need AAA follow-up with surgeons.    -1/15/2021 [facility] hematology follow-up: 1/8/2021 white count 5790 with hemoglobin 12.6 and platelets 392,000 stable over the last 5 months but her last CBC prior to this was 4 months ago.  CMP unremarkable.  With chronic left lower extremity swelling and ultrasound showing left iliac venous stent obstruction/thrombosis with venous stent and angioplasty by vascular surgery 5/4/2020 as well as a grade 4 left greater saphenous vein reflux for which saphenous vein ablation and phlebectomy performed 6/26/2020, she will continue chronic Xarelto for this and baby aspirin for the JAK2 positive polycythemia rubra vera.  There is increased risk of bleeding but the DOAC does not necessarily mitigate the risk of thrombosis from the JAK2 positive myeloproliferative disorder and the aspirin does not necessarily obviate the need for Xarelto for her May Thurner syndrome venous obstructions occurring despite good control of her blood counts on Hydrea.  On dual DOAC and antiplatelet therapy, it is all the more important to watch her blood counts monthly and I will have her do a video visit with my nurse practitioner in about 3 months.  I would also ask her to be sure her vascular surgeons are following her history of  abdominal aortic aneurysm with serial imaging which I would not do routinely for her myeloproliferative disorder which I am managing.  As stated previously, I do not think myeloproliferative disorder caused her venous thromboembolic phenomena and in fact her original thromboembolic event occurred when she was anemic under the care of Dr. Major in .    -2021 [facility] hematology telemedicine clinic follow-up: Gisell continues to do well, tolerating Hydrea 1000 mg daily along with Xarelto and aspirin 81 mg.  She has had no bleeding events.  We will continue therapy unchanged.  CBC for the past 2 months looks good with no anemia, WBC normal, platelet count normal.  I reminded her to get her CBC monthly as we wanted to watch her more closely as she is on not only Hydrea but blood thinner with Xarelto along with aspirin putting her more at risk.  She states understanding and will do her CBC monthly, new order entered.     May-Thurner syndrome   2019 Initial Diagnosis    May-Thurner syndrome     Polycythemia rubra vera (HCC)   2019 Initial Diagnosis    Polycythemia rubra vera (CMS/HCC)         HISTORY OF PRESENT ILLNESS:  The patient is a 61 y.o. female, here for follow up on management of PRV.  No bleeding or clotting issues that she is aware of.  Overall feeling fit    Past Medical History:   Diagnosis Date   • Deep vein thrombophlebitis of calf, left ([facility])      Past Surgical History:   Procedure Laterality Date   • BILATERAL BREAST REDUCTION     • CAROTID STENT     •  SECTION     • ELBOW FUSION     • HYSTERECTOMY     • ILIAC ARTERY STENT Left 2020    Mesh   • OOPHORECTOMY     • OTHER SURGICAL HISTORY  2020    Iliac vein carterization   • REDUCTION MAMMAPLASTY Bilateral    • TONSILLECTOMY         Allergies   Allergen Reactions   • Clarithromycin Other (See Comments)     [place]       Family History and Social History reviewed and changed as necessary    REVIEW OF SYSTEM:   No new  "somatic complaints    PHYSICAL EXAM:  Lungs clear heart regular rate and rhythm.    Vitals:    02/07/22 1149   BP: 147/69   Pulse: 72   Resp: 18   Temp: 97.7 °F (36.5 °C)   SpO2: 97%   Weight: 97.5 kg (215 lb)   Height: 170.2 cm (67\")     Vitals:    02/07/22 1149   PainSc: 0-No pain          ECOG score: 0           Vitals reviewed.        Lab Results   Component Value Date    HGB 13.5 02/07/2022    HCT 41.3 02/07/2022    .7 (H) 02/07/2022     (H) 02/07/2022    WBC 5.80 02/07/2022    NEUTROABS 4.50 02/07/2022    LYMPHSABS 1.10 02/07/2022    MONOSABS 0.20 02/07/2022    EOSABS 0.18 11/03/2021    BASOSABS 0.06 11/03/2021       Lab Results   Component Value Date    GLUCOSE 106 (H) 07/22/2021    BUN 22 07/22/2021    CREATININE 0.82 07/22/2021     07/22/2021    K 4.4 07/22/2021     07/22/2021    CO2 23.0 07/22/2021    CALCIUM 9.9 07/22/2021    PROTEINTOT 7.6 07/22/2021    ALBUMIN 4.30 07/22/2021    BILITOT 0.5 07/22/2021    ALKPHOS 93 07/22/2021    AST 33 (H) 07/22/2021    ALT 31 07/22/2021             ASSESSMENT & PLAN:  1.  Collin 2+ polycythemia rubra vera  2.  Recurrent DVT and PE due to problem #2 with low titer positive lupus anticoagulant per prior testing by Dr. Major apparently on 2 separate occasions confirmatory 3 months apart  3.  May Thurner  4.  Asending aortic aneurysm  5.  History of SOLOMON  6.  Obstructive sleep apnea  7.  Contrast-induced nephropathy  8.  Reflux with nausea      -8/2/2019 initial [facility] medical oncology consultation: May 2016 developed unprovoked left lower extremity deep vein thrombosis treated with left common iliac stent, thrombolyzes, temporary vena cava filter placement and retrieval.  Saw hematologist who told her she might have a low level lupus anticoagulant but she does not have that data and they were told that they did not think that was significant enough to warrant lifetime anticoagulation.  She stopped Xarelto after 8 months.  Starting the end of June " she was developing pain in the left lower extremity again with progressive swelling and shortness of breath.  Went to [facility] ER on 4 July.  Was found to have left lower extremity extensive deep vein thrombosis and right lower lobe pulmonary embolus.  She was treated with thrombectomy and thrombolysis by Rigoberto and Pavel.  Previously she could not afford her Xarelto and they have switched her from heparin to Coumadin but before discharge switched her to Xarelto.  Her creatinine during her hospitalization increased to 1.38 from a baseline of 1.2.  She comes to hematology at [facility] on 8/2/2019 with labs showing creatinine of 1.23 on 7/11/2019, 1.69 on 7/17/2019, and she says that it was repeated again but she does not have that lab and was told that it was back down but not at normal.  Her CBC reveals platelets of 697,000 on 7/17/2019 and 589,000 on 7/10/2019.Labs from 7/10/2019 revealed platelet count of 490,000 with hematocrit 54.7%.  Creatinine on 7/3/2019 was 0.92.  She understandably had a d-dimer elevation to 1.22 on 7/3/2019 with her clot.  Given the magnitude of her clot and the likelihood of may Thurner syndrome, she needs lifetime anticoagulation.  On patients with deep vein thrombosis and/or PE and indefinite anticoagulation, use of Xarelto in patients with GFR less than 30 is recommended to be avoided varied will need to watch that.  With her prior recent elevation of hematocrit and persistent elevation of platelets, we will continue to watch that and if they remain persistent suggesting this being more than just reactive to her PE and clots, then we need to look for myeloproliferative disorder contributing to her clotting.  She is going to get her records from her other hematologist she saw back in 2016 just for completeness sake but I would not repeat, nor would I do for the first time now, her hypercoagulable panel as any hypercoagulable disorder we stumbled upon would not alter her need  for lifetime full dose anticoagulation.  The only question is which drug, and whether her creatinine will allow DOAC's.  I will check her BMP weekly for the next month to make sure her creatinine is doing adequate relative to her use of Xarelto and will get her blood count now and again prior to return in a month and if the platelets are remaining elevated we will do Collin 2 with reflex testing.  Since her discharge from the hospital she had significant nausea for which she had an ultrasound that showed fatty liver but normal gallbladder.  She had an EGD in the past few days with Dr. Heath that showed significant reflux and on Protonix her nausea has improved.  She will need close follow-up of her ascending aortic aneurysm as well with her thoracic surgeons.    -9/9/2019 medical oncology follow-up: Platelets persistently elevated 807,000 with hematocrit 52.7% and white count 8320 on 8/30/2019.  GFR staying over 59 on Xarelto.  We will check Collin 2 with reflex testing and treat with Hydrea plus or minus phlebotomy if we confirm myeloproliferative disorder.  Will need bone marrow before start of that to assess for myelofibrosis if this is confirmed.  Records from Dr.Scott Major of 6/7/2016 mention that she had been anemic during her hospitalization.  Factor V Leiden, prothrombin mutation, Antithrombin III, protein C&S levels were all normal and the anticardiolipin and beta-2 microglobulin were all negative according to Dr. Major.  The lupus anticoagulant was borderline positive at 10.5 with him and he recommended repeat of that done 3 months apart to prove the diagnosis but this came back low positive according to his notes and he suggested switching to aspirin after a full 6 months of anticoagulation.  This was of course before the low-dose DOAC trials have been published she had had thrombolytics and stenting of her May Thurner according to his records.  Given her presence of May Thurner despite the stenting, I  would be inclined towards keeping her on anticoagulants regardless.  She brings to me records that includes labs back to 2016 where her white count, hemoglobin and platelets were normal but as recently as June 2019 her hematocrit was 52% whereas back in May 2016 her hemoglobin was actually down in the eights with normochromic normocytic indices.  On Xarelto, there is increased risk with aspirin I will hold on that for present but we may have to make such a decision as Xarelto may not be sufficient for viscosity issues related to her elevated platelets and hematocrit if this is a myeloproliferative disorder.    -9/30/2019 [facility] [place] hematology oncology follow-up visit: 9/9/2019 Collin 2 V617F mutation positive with erythropoietin level low at 1 commensurate with polycythemia rubra vera and a hematocrit of 53% with platelets of 807,000 and normal white count.  Normally would do bone marrow to look for myelofibrosis but would not risk taking her off Xarelto at the moment.  We will start her on Hydrea 500 mg daily with CBC weekly and if we get her hematocrit below 45% platelets in the 400,000 range without significant leukopenia that would be my goal.  No phlebotomies for now until we see what Hydrea does.  After we see her back, if her platelets are down and tolerating Hydrea, I will consider stopping the Xarelto and switching her to full dose aspirin for life for the PRV.  This PRV did not cause her original venous thromboembolic phenomenon in fact was anemic when Dr. Major saw her in 2016.  We talked about the potential natural history of PRV including spent phase, leukemic transformation, myelofibrosis.  She will need AAA follow-up with surgeons.    -1/15/2021 [facility] [place] hematology follow-up: 1/8/2021 white count 5790 with hemoglobin 12.6 and platelets 392,000 stable over the last 5 months but her last CBC prior to this was 4 months ago.  CMP unremarkable.  With chronic left lower extremity swelling and ultrasound showing  left iliac venous stent obstruction/thrombosis with venous stent and angioplasty by vascular surgery 5/4/2020 as well as a grade 4 left greater saphenous vein reflux for which saphenous vein ablation and phlebectomy performed 6/26/2020, she will continue chronic Xarelto for this and baby aspirin for the JAK2 positive polycythemia rubra vera.  There is increased risk of bleeding but the DOAC does not necessarily mitigate the risk of thrombosis from the JAK2 positive myeloproliferative disorder and the aspirin does not necessarily obviate the need for Xarelto for her May Thurner syndrome venous obstructions occurring despite good control of her blood counts on Hydrea.  On dual DOAC and antiplatelet therapy, it is all the more important to watch her blood counts monthly and I will have her do a video visit with my nurse practitioner in about 3 months.  I would also ask her to be sure her vascular surgeons are following her history of abdominal aortic aneurysm with serial imaging which I would not do routinely for her myeloproliferative disorder which I am managing.  As stated previously, I do not think myeloproliferative disorder caused her venous thromboembolic phenomena and in fact her original thromboembolic event occurred when she was anemic under the care of Dr. Major in 2016.    -7/29/2021 [facility] hematology telemedicine clinic follow-up: Gisell continues to do well, tolerating Hydrea 1000 mg daily along with Xarelto and aspirin 81 mg.  She has had no bleeding events.  We will continue therapy unchanged.  CBC for the past 2 months looks good with no anemia, WBC normal, platelet count normal.  I reminded her to get her CBC monthly as we wanted to watch her more closely as she is on not only Hydrea but blood thinner with Xarelto along with aspirin putting her more at risk.  She states understanding and will do her CBC monthly, new order entered.    -11/3/2021 white count 5390 with hemoglobin 13.3 hematocrit  41.4%, .3, platelets 431,000    -2/7/2022 [facility] hematology oncology follow-up visit: Today's platelets are 588,000 with hemoglobin 13.5 hematocrit 41.3% with white count 5800.  CMP is pending.  She did not get her CBC monthly as ordered last time and as she is on Xarelto and aspirin that is important.  I will not increase her Hydrea from the 1000 mg dose despite her platelets running a little higher but if they continue to rise we will have to be cautious of acquired von Willebrand's disease which actually further increases her bleeding risk.    Total time of care today inclusive of time spent today prior to her arrival reviewing interval lab,, during visit explaining again the reason why it is important to get monthly labs both for the risk of clotting but also risk of bleeding and after visit setting up monthly CBCs and follow-up in 6 months with my nurse practitioner took 43 minutes of patient care time throughout the day today.  Yogesh Damon MD    02/07/2022

## 2022-02-11 ENCOUNTER — SPECIALTY PHARMACY (OUTPATIENT)
Dept: ONCOLOGY | Facility: HOSPITAL | Age: 62
End: 2022-02-11

## 2022-02-11 NOTE — PROGRESS NOTES
Specialty Pharmacy Refill Coordination Note     Gisell is a 61 y.o. female contacted today regarding refills of  Hydroxyurea 1000mg PO QD specialty medication(s).    Specialty medication(s) and dose(s) confirmed: yes    Refill Questions      Most Recent Value   Changes to allergies? No   Changes to medications? No   New conditions since last clinic visit No   Unplanned office visit, urgent care, ED, or hospital admission in the last 4 weeks  No   How does patient/caregiver feel medication is working? Good   Financial problems or insurance changes  No   If yes, describe changes in insurance or financial issues. n/a   How many doses of your specialty medications were missed in the last 4 weeks? 0   Why were doses missed? n/a   Does this patient require a clinical escalation to a pharmacist? No          Delivery Questions      Most Recent Value   Delivery method [employer]   Delivery address correct? Yes   Delivery phone number [phone]   Preferred delivery time? Anytime   Number of medications in delivery 1   Medication being filled and delivered hydroxyurea   Doses left of specialty medications 8 days left   Is there any medication that is due not being filled? No   Supplies needed? No supplies needed   Cooler needed? No   Do any medications need mixed or dated? No   Copay form of payment Credit card on file   Additional comments $30.42   Questions or concerns for the pharmacist? No   Explain any questions or concerns for the pharmacist n/a   Are any medications first time fills? No            Medication Adherence    Adherence tools used: watch          Follow-up: 28 day(s)     Anita Persaud, Pharmacy Technician  Specialty Pharmacy Technician

## 2022-03-03 ENCOUNTER — SPECIALTY PHARMACY (OUTPATIENT)
Dept: ONCOLOGY | Facility: HOSPITAL | Age: 62
End: 2022-03-03

## 2022-03-03 NOTE — PROGRESS NOTES
Specialty Pharmacy Patient Management Program  Oncology 6-Month Clinical Assessment       Gisell Wylie is a 61 y.o. female with PCV seen today to assess adherence and side effects.    Regimen: hydrea 1000mg PO daily    Reason for Outreach: Routine medication check-in .       Problem list reviewed by Rose Coffey, PharmD on 3/3/2022 at  3:14 PM  Medicines reviewed by Rose Coffey, PharmD on 3/3/2022 at  3:14 PM  Allergies reviewed by Rose Coffey PharmD on 3/3/2022 at  3:14 PM    Quality of Life Assessment  Quality of Life Improvement Scale: Moderately better    Goals     •  Specialty Pharmacy General Goal (pt-stated)       Patient-identified goal of therapy: Patient will adhere to medication regimen  Clinical goal/therapeutic target: Patient will adhere to medication regimen              Medication Adherence    Patient reported X missed doses in the last 7 days: 0  Any gaps in refill history greater than 2 weeks in the last 3 months: no  Demonstrates understanding of importance of adherence: yes  Informant: patient  Reliability of informant: reliable  Estimated medication adherence level: %  Reasons for non-adherence: no problems identified  Adherence tools used: watch         Assessments:  • Medication Assessment  o Medication Tolerability: Patient denies side effects. .  o Therapeutically Appropriate: Yes  o Administration: Patient is taking every day at the same time .   o Patient can self administer medications: yes  o Medication Follow-Up Plan: routine follow-up  • Drug-drug interactions  o Completed medication reconciliation today to assess for drug interactions. Patient denies starting or stopping any medications.    o Assessed medication list for interactions, no significant drug interactions noted.   o Advised patient to call the clinic if any new medications are started so we can assess for drug-drug interactions.  • Vaccines are coordinated by the patient's oncologist and primary care  provider.  • Quality of Life: 8/10  • Adherence:  o Missed doses: Patient reports missing 0 doses in the last 30 days..  o Reasons for missed doses: no barriers  • Medication availability/affordability: Patient has had no issues obtaining medication from pharmacy.  • Questions/concerns about medications: no further concerns    All questions addressed and patient had no additional concerns. Patient has pharmacy contact information      Rose Coffey, PharmD, [facility]  Oncology Clinical Pharmacist   [phone]

## 2022-03-08 ENCOUNTER — LAB (OUTPATIENT)
Dept: LAB | Facility: HOSPITAL | Age: 62
End: 2022-03-08

## 2022-03-08 DIAGNOSIS — D45 POLYCYTHEMIA RUBRA VERA: ICD-10-CM

## 2022-03-08 LAB
BASOPHILS # BLD AUTO: 0.07 10*3/MM3 (ref 0–0.2)
BASOPHILS NFR BLD AUTO: 1.1 % (ref 0–1.5)
DEPRECATED RDW RBC AUTO: 55.6 FL (ref 37–54)
EOSINOPHIL # BLD AUTO: 0.15 10*3/MM3 (ref 0–0.4)
EOSINOPHIL NFR BLD AUTO: 2.4 % (ref 0.3–6.2)
ERYTHROCYTE [DISTWIDTH] IN BLOOD BY AUTOMATED COUNT: 14.3 % (ref 12.3–15.4)
HCT VFR BLD AUTO: 41.3 % (ref 34–46.6)
HGB BLD-MCNC: 13.2 G/DL (ref 12–15.9)
IMM GRANULOCYTES # BLD AUTO: 0.08 10*3/MM3 (ref 0–0.05)
IMM GRANULOCYTES NFR BLD AUTO: 1.3 % (ref 0–0.5)
LYMPHOCYTES # BLD AUTO: 1.1 10*3/MM3 (ref 0.7–3.1)
LYMPHOCYTES NFR BLD AUTO: 17.3 % (ref 19.6–45.3)
MCH RBC QN AUTO: 34.1 PG (ref 26.6–33)
MCHC RBC AUTO-ENTMCNC: 32 G/DL (ref 31.5–35.7)
MCV RBC AUTO: 106.7 FL (ref 79–97)
MONOCYTES # BLD AUTO: 0.37 10*3/MM3 (ref 0.1–0.9)
MONOCYTES NFR BLD AUTO: 5.8 % (ref 5–12)
NEUTROPHILS NFR BLD AUTO: 4.6 10*3/MM3 (ref 1.7–7)
NEUTROPHILS NFR BLD AUTO: 72.1 % (ref 42.7–76)
NRBC BLD AUTO-RTO: 0 /100 WBC (ref 0–0.2)
PLATELET # BLD AUTO: 498 10*3/MM3 (ref 140–450)
PMV BLD AUTO: 9.1 FL (ref 6–12)
RBC # BLD AUTO: 3.87 10*6/MM3 (ref 3.77–5.28)
WBC NRBC COR # BLD: 6.37 10*3/MM3 (ref 3.4–10.8)

## 2022-03-08 PROCEDURE — 36415 COLL VENOUS BLD VENIPUNCTURE: CPT

## 2022-03-08 PROCEDURE — 85025 COMPLETE CBC W/AUTO DIFF WBC: CPT

## 2022-03-22 ENCOUNTER — SPECIALTY PHARMACY (OUTPATIENT)
Dept: ONCOLOGY | Facility: HOSPITAL | Age: 62
End: 2022-03-22

## 2022-03-22 NOTE — PROGRESS NOTES
Specialty Pharmacy Refill Coordination Note     Gisell is a 61 y.o. female contacted today regarding refills of hydroxyurea 1000mg PO QD specialty medication(s).      Specialty medication(s) and dose(s) confirmed: yes    Refill Questions    Flowsheet Row Most Recent Value   Changes to allergies? No   Changes to medications? No   New conditions since last clinic visit No   Unplanned office visit, urgent care, ED, or hospital admission in the last 4 weeks  No   How does patient/caregiver feel medication is working? Good   Financial problems or insurance changes  No   If yes, describe changes in insurance or financial issues. n/a   How many doses of your specialty medications were missed in the last 4 weeks? 0   Why were doses missed? n/a   Does this patient require a clinical escalation to a pharmacist? No          Delivery Questions    Flowsheet Row Most Recent Value   Delivery method [employer]   Delivery address correct? Yes   Delivery phone number [phone]   Preferred delivery time? Anytime   Number of medications in delivery 1   Medication being filled and delivered hydrea   Doses left of specialty medications 5 days left   Is there any medication that is due not being filled? No   Supplies needed? No supplies needed   Cooler needed? No   Do any medications need mixed or dated? No   Copay form of payment Credit card on file   Additional comments $30.42   Questions or concerns for the pharmacist? No   Explain any questions or concerns for the pharmacist n/a   Are any medications first time fills? No            Medication Adherence    Adherence tools used: watch          Follow-up: 28 day(s)     Anita Persaud, Pharmacy Technician  Specialty Pharmacy Technician

## 2022-04-05 ENCOUNTER — LAB (OUTPATIENT)
Dept: LAB | Facility: HOSPITAL | Age: 62
End: 2022-04-05

## 2022-04-05 DIAGNOSIS — D45 POLYCYTHEMIA RUBRA VERA: ICD-10-CM

## 2022-04-05 LAB
BASOPHILS # BLD AUTO: 0.05 10*3/MM3 (ref 0–0.2)
BASOPHILS NFR BLD AUTO: 0.9 % (ref 0–1.5)
DEPRECATED RDW RBC AUTO: 53.1 FL (ref 37–54)
EOSINOPHIL # BLD AUTO: 0.2 10*3/MM3 (ref 0–0.4)
EOSINOPHIL NFR BLD AUTO: 3.6 % (ref 0.3–6.2)
ERYTHROCYTE [DISTWIDTH] IN BLOOD BY AUTOMATED COUNT: 14.1 % (ref 12.3–15.4)
HCT VFR BLD AUTO: 41.2 % (ref 34–46.6)
HGB BLD-MCNC: 13.5 G/DL (ref 12–15.9)
IMM GRANULOCYTES # BLD AUTO: 0.04 10*3/MM3 (ref 0–0.05)
IMM GRANULOCYTES NFR BLD AUTO: 0.7 % (ref 0–0.5)
LYMPHOCYTES # BLD AUTO: 1.2 10*3/MM3 (ref 0.7–3.1)
LYMPHOCYTES NFR BLD AUTO: 21.7 % (ref 19.6–45.3)
MCH RBC QN AUTO: 33.8 PG (ref 26.6–33)
MCHC RBC AUTO-ENTMCNC: 32.8 G/DL (ref 31.5–35.7)
MCV RBC AUTO: 103.3 FL (ref 79–97)
MONOCYTES # BLD AUTO: 0.27 10*3/MM3 (ref 0.1–0.9)
MONOCYTES NFR BLD AUTO: 4.9 % (ref 5–12)
NEUTROPHILS NFR BLD AUTO: 3.77 10*3/MM3 (ref 1.7–7)
NEUTROPHILS NFR BLD AUTO: 68.2 % (ref 42.7–76)
NRBC BLD AUTO-RTO: 0 /100 WBC (ref 0–0.2)
PLATELET # BLD AUTO: 444 10*3/MM3 (ref 140–450)
PMV BLD AUTO: 9 FL (ref 6–12)
RBC # BLD AUTO: 3.99 10*6/MM3 (ref 3.77–5.28)
WBC NRBC COR # BLD: 5.53 10*3/MM3 (ref 3.4–10.8)

## 2022-04-05 PROCEDURE — 36415 COLL VENOUS BLD VENIPUNCTURE: CPT

## 2022-04-05 PROCEDURE — 85025 COMPLETE CBC W/AUTO DIFF WBC: CPT

## 2022-04-26 ENCOUNTER — SPECIALTY PHARMACY (OUTPATIENT)
Dept: ONCOLOGY | Facility: HOSPITAL | Age: 62
End: 2022-04-26

## 2022-04-26 NOTE — PROGRESS NOTES
Specialty Pharmacy Refill Coordination Note     Gisell is a 61 y.o. female contacted today regarding refills of  Hydrea 1000mg PO QD specialty medication(s).      Specialty medication(s) and dose(s) confirmed: yes    Refill Questions    Flowsheet Row Most Recent Value   Changes to allergies? No   Changes to medications? No   New conditions since last clinic visit No   Unplanned office visit, urgent care, ED, or hospital admission in the last 4 weeks  No   How does patient/caregiver feel medication is working? Very good   Financial problems or insurance changes  No   If yes, describe changes in insurance or financial issues. n/a   How many doses of your specialty medications were missed in the last 4 weeks? 0   Why were doses missed? n/a   Does this patient require a clinical escalation to a pharmacist? No          Delivery Questions    Flowsheet Row Most Recent Value   Delivery method [employer]   Delivery address correct? Yes   Delivery phone number [phone]   Preferred delivery time? Anytime   Number of medications in delivery 1   Medication being filled and delivered hydrea   Doses left of specialty medications 7 days left   Is there any medication that is due not being filled? No   Supplies needed? No supplies needed   Cooler needed? No   Do any medications need mixed or dated? No   Copay form of payment Credit card on file   Additional comments $30.42   Questions or concerns for the pharmacist? No   Explain any questions or concerns for the pharmacist n/a   Are any medications first time fills? No            Medication Adherence    Adherence tools used: watch          Follow-up: 28 day(s)     Anita Persaud, Pharmacy Technician  Specialty Pharmacy Technician

## 2022-05-17 ENCOUNTER — LAB (OUTPATIENT)
Dept: LAB | Facility: HOSPITAL | Age: 62
End: 2022-05-17

## 2022-05-17 DIAGNOSIS — D45 POLYCYTHEMIA RUBRA VERA: ICD-10-CM

## 2022-05-17 LAB
BASOPHILS # BLD AUTO: 0.06 10*3/MM3 (ref 0–0.2)
BASOPHILS NFR BLD AUTO: 1 % (ref 0–1.5)
DEPRECATED RDW RBC AUTO: 55.4 FL (ref 37–54)
EOSINOPHIL # BLD AUTO: 0.17 10*3/MM3 (ref 0–0.4)
EOSINOPHIL NFR BLD AUTO: 2.9 % (ref 0.3–6.2)
ERYTHROCYTE [DISTWIDTH] IN BLOOD BY AUTOMATED COUNT: 14.5 % (ref 12.3–15.4)
HCT VFR BLD AUTO: 42.5 % (ref 34–46.6)
HGB BLD-MCNC: 13.7 G/DL (ref 12–15.9)
IMM GRANULOCYTES # BLD AUTO: 0.05 10*3/MM3 (ref 0–0.05)
IMM GRANULOCYTES NFR BLD AUTO: 0.8 % (ref 0–0.5)
LYMPHOCYTES # BLD AUTO: 1.21 10*3/MM3 (ref 0.7–3.1)
LYMPHOCYTES NFR BLD AUTO: 20.3 % (ref 19.6–45.3)
MCH RBC QN AUTO: 33.6 PG (ref 26.6–33)
MCHC RBC AUTO-ENTMCNC: 32.2 G/DL (ref 31.5–35.7)
MCV RBC AUTO: 104.2 FL (ref 79–97)
MONOCYTES # BLD AUTO: 0.31 10*3/MM3 (ref 0.1–0.9)
MONOCYTES NFR BLD AUTO: 5.2 % (ref 5–12)
NEUTROPHILS NFR BLD AUTO: 4.16 10*3/MM3 (ref 1.7–7)
NEUTROPHILS NFR BLD AUTO: 69.8 % (ref 42.7–76)
NRBC BLD AUTO-RTO: 0 /100 WBC (ref 0–0.2)
PLATELET # BLD AUTO: 506 10*3/MM3 (ref 140–450)
PMV BLD AUTO: 9.1 FL (ref 6–12)
RBC # BLD AUTO: 4.08 10*6/MM3 (ref 3.77–5.28)
WBC NRBC COR # BLD: 5.96 10*3/MM3 (ref 3.4–10.8)

## 2022-05-17 PROCEDURE — 85025 COMPLETE CBC W/AUTO DIFF WBC: CPT

## 2022-05-17 PROCEDURE — 36415 COLL VENOUS BLD VENIPUNCTURE: CPT

## 2022-06-01 ENCOUNTER — SPECIALTY PHARMACY (OUTPATIENT)
Dept: ONCOLOGY | Facility: HOSPITAL | Age: 62
End: 2022-06-01

## 2022-06-01 NOTE — PROGRESS NOTES
Specialty Pharmacy Refill Coordination Note     Gisell is a 61 y.o. female contacted today regarding refills of  Hydroxyurea 1000mg PO QD specialty medication(s).      Specialty medication(s) and dose(s) confirmed: yes    Refill Questions    Flowsheet Row Most Recent Value   Changes to allergies? No   Changes to medications? No   New conditions since last clinic visit No   Unplanned office visit, urgent care, ED, or hospital admission in the last 4 weeks  No   How does patient/caregiver feel medication is working? Very good   Financial problems or insurance changes  No   Since the previous refill, were any specialty medication doses or scheduled injections missed or delayed?  No   Does this patient require a clinical escalation to a pharmacist? No          Delivery Questions    Flowsheet Row Most Recent Value   Delivery method [employer]   Delivery address correct? Yes   Delivery phone number [phone]   Preferred delivery time? Anytime   Number of medications in delivery 1   Medication being filled and delivered hydroxyurea   Doses left of specialty medications 3 days left   Is there any medication that is due not being filled? No   Supplies needed? No supplies needed   Cooler needed? No   Do any medications need mixed or dated? No   Copay form of payment Credit card on file   Additional comments $30.42   Questions or concerns for the pharmacist? No   Explain any questions or concerns for the pharmacist n/a   Are any medications first time fills? No            Medication Adherence    Adherence tools used: watch          Follow-up: 28 day(s)     Anita Persaud, Pharmacy Technician  Specialty Pharmacy Technician

## 2022-06-27 ENCOUNTER — LAB (OUTPATIENT)
Dept: LAB | Facility: HOSPITAL | Age: 62
End: 2022-06-27

## 2022-06-27 DIAGNOSIS — D45 POLYCYTHEMIA RUBRA VERA: ICD-10-CM

## 2022-06-27 LAB
BASOPHILS # BLD AUTO: 0.08 10*3/MM3 (ref 0–0.2)
BASOPHILS NFR BLD AUTO: 1.2 % (ref 0–1.5)
DEPRECATED RDW RBC AUTO: 53.6 FL (ref 37–54)
EOSINOPHIL # BLD AUTO: 0.11 10*3/MM3 (ref 0–0.4)
EOSINOPHIL NFR BLD AUTO: 1.7 % (ref 0.3–6.2)
ERYTHROCYTE [DISTWIDTH] IN BLOOD BY AUTOMATED COUNT: 14 % (ref 12.3–15.4)
HCT VFR BLD AUTO: 43.2 % (ref 34–46.6)
HGB BLD-MCNC: 14.3 G/DL (ref 12–15.9)
IMM GRANULOCYTES # BLD AUTO: 0.07 10*3/MM3 (ref 0–0.05)
IMM GRANULOCYTES NFR BLD AUTO: 1.1 % (ref 0–0.5)
LYMPHOCYTES # BLD AUTO: 1.19 10*3/MM3 (ref 0.7–3.1)
LYMPHOCYTES NFR BLD AUTO: 18.3 % (ref 19.6–45.3)
MCH RBC QN AUTO: 34.6 PG (ref 26.6–33)
MCHC RBC AUTO-ENTMCNC: 33.1 G/DL (ref 31.5–35.7)
MCV RBC AUTO: 104.6 FL (ref 79–97)
MONOCYTES # BLD AUTO: 0.37 10*3/MM3 (ref 0.1–0.9)
MONOCYTES NFR BLD AUTO: 5.7 % (ref 5–12)
NEUTROPHILS NFR BLD AUTO: 4.7 10*3/MM3 (ref 1.7–7)
NEUTROPHILS NFR BLD AUTO: 72 % (ref 42.7–76)
NRBC BLD AUTO-RTO: 0 /100 WBC (ref 0–0.2)
PLATELET # BLD AUTO: 530 10*3/MM3 (ref 140–450)
PMV BLD AUTO: 9 FL (ref 6–12)
RBC # BLD AUTO: 4.13 10*6/MM3 (ref 3.77–5.28)
WBC NRBC COR # BLD: 6.52 10*3/MM3 (ref 3.4–10.8)

## 2022-06-27 PROCEDURE — 36415 COLL VENOUS BLD VENIPUNCTURE: CPT

## 2022-06-27 PROCEDURE — 85025 COMPLETE CBC W/AUTO DIFF WBC: CPT | Performed by: INTERNAL MEDICINE

## 2022-07-05 ENCOUNTER — SPECIALTY PHARMACY (OUTPATIENT)
Dept: ONCOLOGY | Facility: HOSPITAL | Age: 62
End: 2022-07-05

## 2022-07-05 NOTE — PROGRESS NOTES
Specialty Pharmacy Refill Coordination Note     Gisell is a 61 y.o. female contacted today regarding refills of  Hydroxyurea 1000mg PO QD specialty medication(s).      Specialty medication(s) and dose(s) confirmed: yes    Refill Questions    Flowsheet Row Most Recent Value   Changes to allergies? No   Changes to medications? No   New conditions since last clinic visit No   Unplanned office visit, urgent care, ED, or hospital admission in the last 4 weeks  No   How does patient/caregiver feel medication is working? Fair   Financial problems or insurance changes  No   Since the previous refill, were any specialty medication doses or scheduled injections missed or delayed?  No   Does this patient require a clinical escalation to a pharmacist? No          Delivery Questions    Flowsheet Row Most Recent Value   Delivery method [employer]   Delivery address correct? Yes   Delivery phone number [phone]   Preferred delivery time? Anytime   Number of medications in delivery 1   Medication being filled and delivered hydroxyurea   Doses left of specialty medications 14 capsules   Is there any medication that is due not being filled? No   Supplies needed? No supplies needed   Cooler needed? No   Do any medications need mixed or dated? No   Copay form of payment Credit card on file   Additional comments $30.42   Questions or concerns for the pharmacist? No   Explain any questions or concerns for the pharmacist n/a   Are any medications first time fills? No            Medication Adherence    Adherence tools used: watch          Follow-up: 28 day(s)     Anita Persaud, Pharmacy Technician  Specialty Pharmacy Technician

## 2022-07-12 ENCOUNTER — TRANSCRIBE ORDERS (OUTPATIENT)
Dept: ADMINISTRATIVE | Facility: HOSPITAL | Age: 62
End: 2022-07-12

## 2022-07-12 DIAGNOSIS — Z12.31 VISIT FOR SCREENING MAMMOGRAM: Primary | ICD-10-CM

## 2022-08-02 ENCOUNTER — SPECIALTY PHARMACY (OUTPATIENT)
Dept: ONCOLOGY | Facility: HOSPITAL | Age: 62
End: 2022-08-02

## 2022-08-02 ENCOUNTER — LAB (OUTPATIENT)
Dept: LAB | Facility: HOSPITAL | Age: 62
End: 2022-08-02

## 2022-08-02 DIAGNOSIS — D45 POLYCYTHEMIA RUBRA VERA: ICD-10-CM

## 2022-08-02 LAB
BASOPHILS # BLD AUTO: 0.08 10*3/MM3 (ref 0–0.2)
BASOPHILS NFR BLD AUTO: 1.3 % (ref 0–1.5)
DEPRECATED RDW RBC AUTO: 55 FL (ref 37–54)
EOSINOPHIL # BLD AUTO: 0.18 10*3/MM3 (ref 0–0.4)
EOSINOPHIL NFR BLD AUTO: 2.8 % (ref 0.3–6.2)
ERYTHROCYTE [DISTWIDTH] IN BLOOD BY AUTOMATED COUNT: 14 % (ref 12.3–15.4)
HCT VFR BLD AUTO: 41 % (ref 34–46.6)
HGB BLD-MCNC: 13.3 G/DL (ref 12–15.9)
IMM GRANULOCYTES # BLD AUTO: 0.07 10*3/MM3 (ref 0–0.05)
IMM GRANULOCYTES NFR BLD AUTO: 1.1 % (ref 0–0.5)
LYMPHOCYTES # BLD AUTO: 1.2 10*3/MM3 (ref 0.7–3.1)
LYMPHOCYTES NFR BLD AUTO: 18.9 % (ref 19.6–45.3)
MCH RBC QN AUTO: 34.9 PG (ref 26.6–33)
MCHC RBC AUTO-ENTMCNC: 32.4 G/DL (ref 31.5–35.7)
MCV RBC AUTO: 107.6 FL (ref 79–97)
MONOCYTES # BLD AUTO: 0.34 10*3/MM3 (ref 0.1–0.9)
MONOCYTES NFR BLD AUTO: 5.4 % (ref 5–12)
NEUTROPHILS NFR BLD AUTO: 4.48 10*3/MM3 (ref 1.7–7)
NEUTROPHILS NFR BLD AUTO: 70.5 % (ref 42.7–76)
NRBC BLD AUTO-RTO: 0 /100 WBC (ref 0–0.2)
PLATELET # BLD AUTO: 455 10*3/MM3 (ref 140–450)
PMV BLD AUTO: 9.1 FL (ref 6–12)
RBC # BLD AUTO: 3.81 10*6/MM3 (ref 3.77–5.28)
WBC NRBC COR # BLD: 6.35 10*3/MM3 (ref 3.4–10.8)

## 2022-08-02 PROCEDURE — 85025 COMPLETE CBC W/AUTO DIFF WBC: CPT

## 2022-08-02 PROCEDURE — 36415 COLL VENOUS BLD VENIPUNCTURE: CPT

## 2022-08-02 RX ORDER — HYDROXYUREA 500 MG/1
1000 CAPSULE ORAL DAILY
Qty: 60 CAPSULE | Refills: 5 | Status: SHIPPED | OUTPATIENT
Start: 2022-08-02 | End: 2022-10-12 | Stop reason: SDUPTHER

## 2022-08-02 NOTE — PROGRESS NOTES
Specialty Pharmacy Refill Coordination Note     Gisell is a 62 y.o. female contacted today regarding refills of  Hydroxyurea 1000mg PO QD specialty medication(s).    Specialty medication(s) and dose(s) confirmed: yes    Refill Questions    Flowsheet Row Most Recent Value   Changes to allergies? No   Changes to medications? No   New conditions since last clinic visit No   Unplanned office visit, urgent care, ED, or hospital admission in the last 4 weeks  No   How does patient/caregiver feel medication is working? Good   Financial problems or insurance changes  No   Since the previous refill, were any specialty medication doses or scheduled injections missed or delayed?  No   Does this patient require a clinical escalation to a pharmacist? No          Delivery Questions    Flowsheet Row Most Recent Value   Delivery method [employer]   Delivery address correct? Yes   Delivery phone number [phone]   Preferred delivery time? Anytime   Number of medications in delivery 1   Medication being filled and delivered hydroxyurea   Doses left of specialty medications 7 days left   Is there any medication that is due not being filled? No   Supplies needed? No supplies needed   Cooler needed? No   Do any medications need mixed or dated? No   Copay form of payment Credit card on file   Additional comments $30.42   Questions or concerns for the pharmacist? No   Explain any questions or concerns for the pharmacist n/a   Are any medications first time fills? No            Medication Adherence    Adherence tools used: watch          Follow-up: 28 day(s)     Anita Persaud, Pharmacy Technician  Specialty Pharmacy Technician

## 2022-08-15 ENCOUNTER — OFFICE VISIT (OUTPATIENT)
Dept: ONCOLOGY | Facility: CLINIC | Age: 62
End: 2022-08-15

## 2022-08-15 VITALS
TEMPERATURE: 98.4 F | HEART RATE: 71 BPM | RESPIRATION RATE: 16 BRPM | HEIGHT: 67 IN | WEIGHT: 214 LBS | DIASTOLIC BLOOD PRESSURE: 64 MMHG | BODY MASS INDEX: 33.59 KG/M2 | SYSTOLIC BLOOD PRESSURE: 117 MMHG

## 2022-08-15 DIAGNOSIS — Z79.899 ENCOUNTER FOR LONG-TERM (CURRENT) USE OF HIGH-RISK MEDICATION: ICD-10-CM

## 2022-08-15 DIAGNOSIS — D45 POLYCYTHEMIA RUBRA VERA: Primary | Chronic | ICD-10-CM

## 2022-08-15 PROCEDURE — 99214 OFFICE O/P EST MOD 30 MIN: CPT | Performed by: NURSE PRACTITIONER

## 2022-08-15 RX ORDER — GABAPENTIN 300 MG/1
CAPSULE ORAL
COMMUNITY
Start: 2022-07-19 | End: 2023-02-20

## 2022-08-15 NOTE — PROGRESS NOTES
CHIEF COMPLAINT: Follow-up PRV     Problem List:  Oncology/Hematology History Overview Note   1.  Collin 2+ polycythemia rubra vera  2.  Recurrent DVT and PE due to problem #2 with low titer positive lupus anticoagulant per prior testing by Dr. Major apparently on 2 separate occasions confirmatory 3 months apart  3.  May Thurner  4.  Asending aortic aneurysm  5.  History of SOLOMON  6.  Obstructive sleep apnea  7.  Contrast-induced nephropathy  8.  Reflux with nausea      -8/2/2019 initial [facility], [place], operated by [facility] medical oncology consultation: May 2016 developed unprovoked left lower extremity deep vein thrombosis treated with left common iliac stent, thrombolyzes, temporary vena cava filter placement and retrieval.  Saw hematologist who told her she might have a low level lupus anticoagulant but she does not have that data and they were told that they did not think that was significant enough to warrant lifetime anticoagulation.  She stopped Xarelto after 8 months.  Starting the end of June she was developing pain in the left lower extremity again with progressive swelling and shortness of breath.  Went to [facility] ER on 4 July.  Was found to have left lower extremity extensive deep vein thrombosis and right lower lobe pulmonary embolus.  She was treated with thrombectomy and thrombolysis by Rigoberto and Pavel.  Previously she could not afford her Xarelto and they have switched her from heparin to Coumadin but before discharge switched her to Xarelto.  Her creatinine during her hospitalization increased to 1.38 from a baseline of 1.2.  She comes to hematology at [facility], [place], operated by [facility] on 8/2/2019 with labs showing creatinine of 1.23 on 7/11/2019, 1.69 on 7/17/2019, and she says that it was repeated again but she does not have that lab and was told that it was back down but not at normal.  Her CBC reveals platelets of 697,000 on 7/17/2019 and 589,000 on 7/10/2019.Labs from 7/10/2019 revealed platelet count of 490,000 with hematocrit 54.7%.   Creatinine on 7/3/2019 was 0.92.  She understandably had a d-dimer elevation to 1.22 on 7/3/2019 with her clot.  Given the magnitude of her clot and the likelihood of may Thurner syndrome, she needs lifetime anticoagulation.  On patients with deep vein thrombosis and/or PE and indefinite anticoagulation, use of Xarelto in patients with GFR less than 30 is recommended to be avoided varied will need to watch that.  With her prior recent elevation of hematocrit and persistent elevation of platelets, we will continue to watch that and if they remain persistent suggesting this being more than just reactive to her PE and clots, then we need to look for myeloproliferative disorder contributing to her clotting.  She is going to get her records from her other hematologist she saw back in 2016 just for completeness sake but I would not repeat, nor would I do for the first time now, her hypercoagulable panel as any hypercoagulable disorder we stumbled upon would not alter her need for lifetime full dose anticoagulation.  The only question is which drug, and whether her creatinine will allow DOAC's.  I will check her BMP weekly for the next month to make sure her creatinine is doing adequate relative to her use of Xarelto and will get her blood count now and again prior to return in a month and if the platelets are remaining elevated we will do Collin 2 with reflex testing.  Since her discharge from the hospital she had significant nausea for which she had an ultrasound that showed fatty liver but normal gallbladder.  She had an EGD in the past few days with Dr. Heath that showed significant reflux and on Protonix her nausea has improved.  She will need close follow-up of her ascending aortic aneurysm as well with her thoracic surgeons.    -9/9/2019 medical oncology follow-up: Platelets persistently elevated 807,000 with hematocrit 52.7% and white count 8320 on 8/30/2019.  GFR staying over 59 on Xarelto.  We will check Collin 2 with  reflex testing and treat with Hydrea plus or minus phlebotomy if we confirm myeloproliferative disorder.  Will need bone marrow before start of that to assess for myelofibrosis if this is confirmed.  Records from Dr.Scott Major of 6/7/2016 mention that she had been anemic during her hospitalization.  Factor V Leiden, prothrombin mutation, Antithrombin III, protein C&S levels were all normal and the anticardiolipin and beta-2 microglobulin were all negative according to Dr. Major.  The lupus anticoagulant was borderline positive at 10.5 with him and he recommended repeat of that done 3 months apart to prove the diagnosis but this came back low positive according to his notes and he suggested switching to aspirin after a full 6 months of anticoagulation.  This was of course before the low-dose DOAC trials have been published she had had thrombolytics and stenting of her May Thurner according to his records.  Given her presence of May Thurner despite the stenting, I would be inclined towards keeping her on anticoagulants regardless.  She brings to me records that includes labs back to 2016 where her white count, hemoglobin and platelets were normal but as recently as June 2019 her hematocrit was 52% whereas back in May 2016 her hemoglobin was actually down in the eights with normochromic normocytic indices.  On Xarelto, there is increased risk with aspirin I will hold on that for present but we may have to make such a decision as Xarelto may not be sufficient for viscosity issues related to her elevated platelets and hematocrit if this is a myeloproliferative disorder.    -9/30/2019 [facility], [place], operated by [facility] hematology oncology follow-up visit: 9/9/2019 Collin 2 V617F mutation positive with erythropoietin level low at 1 commensurate with polycythemia rubra vera and a hematocrit of 53% with platelets of 807,000 and normal white count.  Normally would do bone marrow to look for myelofibrosis but would not risk taking her off Xarelto at the  moment.  We will start her on Hydrea 500 mg daily with CBC weekly and if we get her hematocrit below 45% platelets in the 400,000 range without significant leukopenia that would be my goal.  No phlebotomies for now until we see what Hydrea does.  After we see her back, if her platelets are down and tolerating Hydrea, I will consider stopping the Xarelto and switching her to full dose aspirin for life for the PRV.  This PRV did not cause her original venous thromboembolic phenomenon in fact was anemic when Dr. Major saw her in 2016.  We talked about the potential natural history of PRV including spent phase, leukemic transformation, myelofibrosis.  She will need AAA follow-up with surgeons.    -1/15/2021 [facility] hematology follow-up: 1/8/2021 white count 5790 with hemoglobin 12.6 and platelets 392,000 stable over the last 5 months but her last CBC prior to this was 4 months ago.  CMP unremarkable.  With chronic left lower extremity swelling and ultrasound showing left iliac venous stent obstruction/thrombosis with venous stent and angioplasty by vascular surgery 5/4/2020 as well as a grade 4 left greater saphenous vein reflux for which saphenous vein ablation and phlebectomy performed 6/26/2020, she will continue chronic Xarelto for this and baby aspirin for the JAK2 positive polycythemia rubra vera.  There is increased risk of bleeding but the DOAC does not necessarily mitigate the risk of thrombosis from the JAK2 positive myeloproliferative disorder and the aspirin does not necessarily obviate the need for Xarelto for her May Thurner syndrome venous obstructions occurring despite good control of her blood counts on Hydrea.  On dual DOAC and antiplatelet therapy, it is all the more important to watch her blood counts monthly and I will have her do a video visit with my nurse practitioner in about 3 months.  I would also ask her to be sure her vascular surgeons are following her history of abdominal aortic aneurysm  with serial imaging which I would not do routinely for her myeloproliferative disorder which I am managing.  As stated previously, I do not think myeloproliferative disorder caused her venous thromboembolic phenomena and in fact her original thromboembolic event occurred when she was anemic under the care of Dr. Major in 2016.    -7/29/2021 [facility], [place] hematology telemedicine clinic follow-up: Gisell continues to do well, tolerating Hydrea 1000 mg daily along with Xarelto and aspirin 81 mg.  She has had no bleeding events.  We will continue therapy unchanged.  CBC for the past 2 months looks good with no anemia, WBC normal, platelet count normal.  I reminded her to get her CBC monthly as we wanted to watch her more closely as she is on not only Hydrea but blood thinner with Xarelto along with aspirin putting her more at risk.  She states understanding and will do her CBC monthly, new order entered.    -2/7/2022 [facility], [place] hematology oncology follow-up visit: Today's platelets are 588,000 with hemoglobin 13.5 hematocrit 41.3% with white count 5800.  CMP is pending.  She did not get her CBC monthly as ordered last time and as she is on Xarelto and aspirin that is important.  I will not increase her Hydrea from the 1000 mg dose despite her platelets running a little higher but if they continue to rise we will have to be cautious of acquired von Willebrand's disease which actually further increases her bleeding risk.     May-Thurner syndrome   8/2/2019 Initial Diagnosis    May-Thurner syndrome     Polycythemia rubra vera (HCC)   9/30/2019 Initial Diagnosis    Polycythemia rubra vera (CMS/HCC)         HISTORY OF PRESENT ILLNESS:  The patient is a 62 y.o. female, here for follow up on management of PRV.  Gisell has been feeling well since we saw her last.  She is tolerating Hydrea with no unusual side effects.  Also remains on Xarelto and aspirin 81 mg daily.  She has had no bleeding issues, no further clotting events.   "    Past Medical History:   Diagnosis Date   • Deep vein thrombophlebitis of calf, left (HCC)      Past Surgical History:   Procedure Laterality Date   • BILATERAL BREAST REDUCTION     • CAROTID STENT     •  SECTION     • ELBOW FUSION     • HYSTERECTOMY     • ILIAC ARTERY STENT Left 2020    Mesh   • OOPHORECTOMY     • OTHER SURGICAL HISTORY  2020    Iliac vein carterization   • REDUCTION MAMMAPLASTY Bilateral    • TONSILLECTOMY         Allergies   Allergen Reactions   • Clarithromycin Other (See Comments)     [place]       Family History and Social History reviewed and changed as necessary    REVIEW OF SYSTEM:   No new somatic complaints    PHYSICAL EXAM:  Well developed, well nourished healthy appearing female in no distress  Lungs clear heart regular rate and rhythm.  No lower extremity edema    Vitals:    08/15/22 0849   BP: 117/64   Pulse: 71   Resp: 16   Temp: 98.4 °F (36.9 °C)   TempSrc: Temporal   Weight: 97.1 kg (214 lb)   Height: 170.2 cm (67\")     Vitals:    08/15/22 0849   PainSc: 0-No pain          ECOG score: 0           Vitals reviewed.  Labs reviewed.      Lab Results   Component Value Date    HGB 13.3 2022    HCT 41.0 2022    .6 (H) 2022     (H) 2022    WBC 6.35 2022    NEUTROABS 4.48 2022    LYMPHSABS 1.20 2022    MONOSABS 0.34 2022    EOSABS 0.18 2022    BASOSABS 0.08 2022       Lab Results   Component Value Date    GLUCOSE 99 2022    BUN 19 2022    CREATININE 0.87 2022     2022    K 4.6 2022     2022    CO2 25.0 2022    CALCIUM 10.5 2022    PROTEINTOT 7.8 2022    ALBUMIN 4.50 2022    BILITOT 0.5 2022    ALKPHOS 91 2022    AST 39 (H) 2022    ALT 35 (H) 2022             ASSESSMENT & PLAN:  1.  Collin 2+ polycythemia rubra vera  2.  Recurrent DVT and PE due to problem #2 with low titer positive lupus anticoagulant per " prior testing by Dr. Major apparently on 2 separate occasions confirmatory 3 months apart  3.  May Thurner  4.  Asending aortic aneurysm  5.  History of SOLOMON  6.  Obstructive sleep apnea  7.  Contrast-induced nephropathy  8.  Reflux       -8/2/2019 initial [facility] medical oncology consultation: May 2016 developed unprovoked left lower extremity deep vein thrombosis treated with left common iliac stent, thrombolyzes, temporary vena cava filter placement and retrieval.  Saw hematologist who told her she might have a low level lupus anticoagulant but she does not have that data and they were told that they did not think that was significant enough to warrant lifetime anticoagulation.  She stopped Xarelto after 8 months.  Starting the end of June she was developing pain in the left lower extremity again with progressive swelling and shortness of breath.  Went to [facility] ER on 4 July.  Was found to have left lower extremity extensive deep vein thrombosis and right lower lobe pulmonary embolus.  She was treated with thrombectomy and thrombolysis by Rigoberto and Pavel.  Previously she could not afford her Xarelto and they have switched her from heparin to Coumadin but before discharge switched her to Xarelto.  Her creatinine during her hospitalization increased to 1.38 from a baseline of 1.2.  She comes to hematology at [facility] on 8/2/2019 with labs showing creatinine of 1.23 on 7/11/2019, 1.69 on 7/17/2019, and she says that it was repeated again but she does not have that lab and was told that it was back down but not at normal.  Her CBC reveals platelets of 697,000 on 7/17/2019 and 589,000 on 7/10/2019.Labs from 7/10/2019 revealed platelet count of 490,000 with hematocrit 54.7%.  Creatinine on 7/3/2019 was 0.92.  She understandably had a d-dimer elevation to 1.22 on 7/3/2019 with her clot.  Given the magnitude of her clot and the likelihood of may Thurner syndrome, she needs lifetime anticoagulation.  On  patients with deep vein thrombosis and/or PE and indefinite anticoagulation, use of Xarelto in patients with GFR less than 30 is recommended to be avoided varied will need to watch that.  With her prior recent elevation of hematocrit and persistent elevation of platelets, we will continue to watch that and if they remain persistent suggesting this being more than just reactive to her PE and clots, then we need to look for myeloproliferative disorder contributing to her clotting.  She is going to get her records from her other hematologist she saw back in 2016 just for completeness sake but I would not repeat, nor would I do for the first time now, her hypercoagulable panel as any hypercoagulable disorder we stumbled upon would not alter her need for lifetime full dose anticoagulation.  The only question is which drug, and whether her creatinine will allow DOAC's.  I will check her BMP weekly for the next month to make sure her creatinine is doing adequate relative to her use of Xarelto and will get her blood count now and again prior to return in a month and if the platelets are remaining elevated we will do Collin 2 with reflex testing.  Since her discharge from the hospital she had significant nausea for which she had an ultrasound that showed fatty liver but normal gallbladder.  She had an EGD in the past few days with Dr. Heath that showed significant reflux and on Protonix her nausea has improved.  She will need close follow-up of her ascending aortic aneurysm as well with her thoracic surgeons.    -9/9/2019 medical oncology follow-up: Platelets persistently elevated 807,000 with hematocrit 52.7% and white count 8320 on 8/30/2019.  GFR staying over 59 on Xarelto.  We will check Collin 2 with reflex testing and treat with Hydrea plus or minus phlebotomy if we confirm myeloproliferative disorder.  Will need bone marrow before start of that to assess for myelofibrosis if this is confirmed.  Records from Dr.Scott Major  of 6/7/2016 mention that she had been anemic during her hospitalization.  Factor V Leiden, prothrombin mutation, Antithrombin III, protein C&S levels were all normal and the anticardiolipin and beta-2 microglobulin were all negative according to Dr. Major.  The lupus anticoagulant was borderline positive at 10.5 with him and he recommended repeat of that done 3 months apart to prove the diagnosis but this came back low positive according to his notes and he suggested switching to aspirin after a full 6 months of anticoagulation.  This was of course before the low-dose DOAC trials have been published she had had thrombolytics and stenting of her May Thurner according to his records.  Given her presence of May Thurner despite the stenting, I would be inclined towards keeping her on anticoagulants regardless.  She brings to me records that includes labs back to 2016 where her white count, hemoglobin and platelets were normal but as recently as June 2019 her hematocrit was 52% whereas back in May 2016 her hemoglobin was actually down in the eights with normochromic normocytic indices.  On Xarelto, there is increased risk with aspirin I will hold on that for present but we may have to make such a decision as Xarelto may not be sufficient for viscosity issues related to her elevated platelets and hematocrit if this is a myeloproliferative disorder.    -9/30/2019 [facility] hematology oncology follow-up visit: 9/9/2019 Collin 2 V617F mutation positive with erythropoietin level low at 1 commensurate with polycythemia rubra vera and a hematocrit of 53% with platelets of 807,000 and normal white count.  Normally would do bone marrow to look for myelofibrosis but would not risk taking her off Xarelto at the moment.  We will start her on Hydrea 500 mg daily with CBC weekly and if we get her hematocrit below 45% platelets in the 400,000 range without significant leukopenia that would be my goal.  No phlebotomies for now until we see  what Hydrea does.  After we see her back, if her platelets are down and tolerating Hydrea, I will consider stopping the Xarelto and switching her to full dose aspirin for life for the PRV.  This PRV did not cause her original venous thromboembolic phenomenon in fact was anemic when Dr. Major saw her in 2016.  We talked about the potential natural history of PRV including spent phase, leukemic transformation, myelofibrosis.  She will need AAA follow-up with surgeons.    -1/15/2021 [facility], [place] hematology follow-up: 1/8/2021 white count 5790 with hemoglobin 12.6 and platelets 392,000 stable over the last 5 months but her last CBC prior to this was 4 months ago.  CMP unremarkable.  With chronic left lower extremity swelling and ultrasound showing left iliac venous stent obstruction/thrombosis with venous stent and angioplasty by vascular surgery 5/4/2020 as well as a grade 4 left greater saphenous vein reflux for which saphenous vein ablation and phlebectomy performed 6/26/2020, she will continue chronic Xarelto for this and baby aspirin for the JAK2 positive polycythemia rubra vera.  There is increased risk of bleeding but the DOAC does not necessarily mitigate the risk of thrombosis from the JAK2 positive myeloproliferative disorder and the aspirin does not necessarily obviate the need for Xarelto for her May Thurner syndrome venous obstructions occurring despite good control of her blood counts on Hydrea.  On dual DOAC and antiplatelet therapy, it is all the more important to watch her blood counts monthly and I will have her do a video visit with my nurse practitioner in about 3 months.  I would also ask her to be sure her vascular surgeons are following her history of abdominal aortic aneurysm with serial imaging which I would not do routinely for her myeloproliferative disorder which I am managing.  As stated previously, I do not think myeloproliferative disorder caused her venous thromboembolic phenomena and in fact  her original thromboembolic event occurred when she was anemic under the care of Dr. Major in 2016.    -7/29/2021 [facility] hematology telemedicine clinic follow-up: Gisell continues to do well, tolerating Hydrea 1000 mg daily along with Xarelto and aspirin 81 mg.  She has had no bleeding events.  We will continue therapy unchanged.  CBC for the past 2 months looks good with no anemia, WBC normal, platelet count normal.  I reminded her to get her CBC monthly as we wanted to watch her more closely as she is on not only Hydrea but blood thinner with Xarelto along with aspirin putting her more at risk.  She states understanding and will do her CBC monthly, new order entered.    -11/3/2021 white count 5390 with hemoglobin 13.3 hematocrit 41.4%, .3, platelets 431,000    -2/7/2022 [facility] hematology oncology follow-up visit: Today's platelets are 588,000 with hemoglobin 13.5 hematocrit 41.3% with white count 5800.  CMP is pending.  She did not get her CBC monthly as ordered last time and as she is on Xarelto and aspirin that is important.  I will not increase her Hydrea from the 1000 mg dose despite her platelets running a little higher but if they continue to rise we will have to be cautious of acquired von Willebrand's disease which actually further increases her bleeding risk.    -8/15/2022 [facility] Hematology clinic follow-up: Gisell continues to do well on Hydrea 1000 mg daily, I have reviewed her CBCs for the past 6 months and her platelet count overall looks good, most recent platelet count on 8/2/2022 was 455,000, in April her platelet count was normal at 444,000, it has never been higher than 530,000 since February.  She has been taking turmeric and whether or not this is helping it is hard to say but there is no contraindication to her taking turmeric.  She will continue Hydrea 1000 mg daily unchanged along with aspirin 81 mg daily, she also continues on Xarelto 20 mg daily.  She has had no unusual  bleeding, no further clotting events.  We will continue to monitor CBC monthly.  We will see her back in 6 months for follow-up.    Return to clinic in 6 months.    This was a level 4, moderate MDM visit with 2 stable chronic illnesses, management of prescription drug therapy requiring intensive monitoring for toxicity and review of labs.    Dottie Nova, APRN    08/15/2022

## 2022-08-23 ENCOUNTER — SPECIALTY PHARMACY (OUTPATIENT)
Dept: ONCOLOGY | Facility: HOSPITAL | Age: 62
End: 2022-08-23

## 2022-08-23 NOTE — PROGRESS NOTES
Specialty Pharmacy Patient Management Program  Oncology 6-Month Clinical Assessment       Gisell Wylie is a 62 y.o. female with PCV seen today to assess adherence and side effects.    Regimen: Hydroxyurea 1000mg PO daily    Reason for Outreach: Routine medication check-in .       Problem list reviewed by Rose Coffey, PharmD on 8/23/2022 at  9:43 AM  Medicines reviewed by Rose Coffey, PharmD on 8/23/2022 at  9:43 AM  Allergies reviewed by Rose Coffey, PharmD on 8/23/2022 at  9:43 AM    Quality of Life Assessment  Quality of Life Improvement Scale: Somewhat better     Goals     •  Specialty Pharmacy General Goal (pt-stated)       Patient-identified goal of therapy: Patient will adhere to medication regimen by %  Clinical goal/therapeutic target: Patient will adhere to medication regimen by %                Adherence Questions  Medication(s) assessed: hydroxyurea  On average, how many doses/injections does the patient miss per month?: 0 (Patient states in the last month she has not missed any doses.  However, in previous months, she missed 2-3 doses per month.  I reviewed adherence tools with the patient and discussed the importance of remaining adherent.)  What are the identified reasons for non-adherence or missed doses? : patient forgets  What is the estimated medication adherence level?: 80-89%  Based on the patient/caregiver response and refill history, does this patient require an MTP to track adherence improvements?: yes    Assessments:  • Medication Assessment  o Medication Tolerability: Patient denies side effects. .  o Therapeutically Appropriate: Yes  o Administration: Patient is taking every day at the same time .   o Patient can self administer medications: yes  o Medication Follow-Up Plan: Patient states in the last month she has not missed any doses.  However, in previous months, she missed 2-3 doses per month.  I reviewed adherence tools with the patient and discussed the  importance of remaining adherent. I will follow-up with patient in 2 months to evaluate adherence.  • Drug-drug interactions  o Completed medication reconciliation today to assess for drug interactions. Patient has had the following changes to medication list: gabapentin; patient's chart has been updated to reflect changes.   o Assessed medication list for interactions, no significant drug interactions noted.   o Advised patient to call the clinic if any new medications are started so we can assess for drug-drug interactions.  • Vaccines are coordinated by the patient's oncologist and primary care provider.  • Quality of Life: 7/10  • Adherence:  o Missed doses: Patient reports missing 0 doses in the last 30 days..  o Reasons for missed doses: patient forgets to take medication. See MTP note below.  • Medication availability/affordability: Patient has had no issues obtaining medication from pharmacy.  • Questions/concerns about medications: no further concerns    08/23/22  Problem: non-adherence  Communication: patient:  Patient states in the last month she has not missed any doses.  However, in previous months, she missed 2-3 doses per month.  I reviewed adherence tools with the patient and discussed the importance of remaining adherent. I will follow-up with patient in 2 months to evaluate adherence.   Recommendation: patient: Reviewed adherence tools with pill box, calendar or alarm.  Discussed the importance of remaining adherent.   Follow-up: 2 month follow-up regarding adherence  Rose Coffey PharmD    All questions addressed and patient had no additional concerns. Patient has pharmacy contact information      Rose Coffey PharmD, [facility]  Oncology Clinical Pharmacist   [phone]

## 2022-08-31 ENCOUNTER — SPECIALTY PHARMACY (OUTPATIENT)
Dept: ONCOLOGY | Facility: HOSPITAL | Age: 62
End: 2022-08-31

## 2022-08-31 NOTE — PROGRESS NOTES
Specialty Pharmacy Refill Coordination Note     Gisell is a 62 y.o. female contacted today regarding refills of Hydroxyurea 1000mg PO QD  specialty medication(s).      Specialty medication(s) and dose(s) confirmed: yes    Refill Questions    Flowsheet Row Most Recent Value   Changes to allergies? No   Changes to medications? No   New conditions since last clinic visit No   Unplanned office visit, urgent care, ED, or hospital admission in the last 4 weeks  No   How does patient/caregiver feel medication is working? Very good   Financial problems or insurance changes  No   Since the previous refill, were any specialty medication doses or scheduled injections missed or delayed?  No   Does this patient require a clinical escalation to a pharmacist? No          Delivery Questions    Flowsheet Row Most Recent Value   Delivery method [employer]   Delivery address correct? Yes   Delivery phone number [phone]   Preferred delivery time? Anytime   Number of medications in delivery 1   Medication being filled and delivered hydroxyurea   Doses left of specialty medications 9 days left   Is there any medication that is due not being filled? No   Supplies needed? No supplies needed   Cooler needed? No   Do any medications need mixed or dated? No   Copay form of payment Credit card on file   Additional comments $30.42   Questions or concerns for the pharmacist? No   Explain any questions or concerns for the pharmacist n/a   Are any medications first time fills? No            Medication Adherence    Adherence tools used: watch          Follow-up: 28 day(s)     Anita Persaud, Pharmacy Technician  Specialty Pharmacy Technician

## 2022-09-06 ENCOUNTER — LAB (OUTPATIENT)
Dept: LAB | Facility: HOSPITAL | Age: 62
End: 2022-09-06

## 2022-09-06 DIAGNOSIS — D45 POLYCYTHEMIA RUBRA VERA: ICD-10-CM

## 2022-09-06 DIAGNOSIS — Z79.899 ENCOUNTER FOR LONG-TERM (CURRENT) USE OF HIGH-RISK MEDICATION: ICD-10-CM

## 2022-09-06 LAB
ALBUMIN SERPL-MCNC: 4.3 G/DL (ref 3.5–5.2)
ALBUMIN/GLOB SERPL: 1.4 G/DL
ALP SERPL-CCNC: 88 U/L (ref 39–117)
ALT SERPL W P-5'-P-CCNC: 31 U/L (ref 1–33)
ANION GAP SERPL CALCULATED.3IONS-SCNC: 9 MMOL/L (ref 5–15)
AST SERPL-CCNC: 36 U/L (ref 1–32)
BASOPHILS # BLD AUTO: 0.06 10*3/MM3 (ref 0–0.2)
BASOPHILS NFR BLD AUTO: 1.1 % (ref 0–1.5)
BILIRUB SERPL-MCNC: 0.6 MG/DL (ref 0–1.2)
BUN SERPL-MCNC: 16 MG/DL (ref 8–23)
BUN/CREAT SERPL: 17.4 (ref 7–25)
CALCIUM SPEC-SCNC: 10 MG/DL (ref 8.6–10.5)
CHLORIDE SERPL-SCNC: 104 MMOL/L (ref 98–107)
CO2 SERPL-SCNC: 26 MMOL/L (ref 22–29)
CREAT SERPL-MCNC: 0.92 MG/DL (ref 0.57–1)
DEPRECATED RDW RBC AUTO: 52.5 FL (ref 37–54)
EGFRCR SERPLBLD CKD-EPI 2021: 69.7 ML/MIN/1.73
EOSINOPHIL # BLD AUTO: 0.13 10*3/MM3 (ref 0–0.4)
EOSINOPHIL NFR BLD AUTO: 2.5 % (ref 0.3–6.2)
ERYTHROCYTE [DISTWIDTH] IN BLOOD BY AUTOMATED COUNT: 13.8 % (ref 12.3–15.4)
GLOBULIN UR ELPH-MCNC: 3 GM/DL
GLUCOSE SERPL-MCNC: 122 MG/DL (ref 65–99)
HCT VFR BLD AUTO: 40.8 % (ref 34–46.6)
HGB BLD-MCNC: 13.5 G/DL (ref 12–15.9)
IMM GRANULOCYTES # BLD AUTO: 0.06 10*3/MM3 (ref 0–0.05)
IMM GRANULOCYTES NFR BLD AUTO: 1.1 % (ref 0–0.5)
LYMPHOCYTES # BLD AUTO: 1.07 10*3/MM3 (ref 0.7–3.1)
LYMPHOCYTES NFR BLD AUTO: 20.5 % (ref 19.6–45.3)
MCH RBC QN AUTO: 34.8 PG (ref 26.6–33)
MCHC RBC AUTO-ENTMCNC: 33.1 G/DL (ref 31.5–35.7)
MCV RBC AUTO: 105.2 FL (ref 79–97)
MONOCYTES # BLD AUTO: 0.24 10*3/MM3 (ref 0.1–0.9)
MONOCYTES NFR BLD AUTO: 4.6 % (ref 5–12)
NEUTROPHILS NFR BLD AUTO: 3.67 10*3/MM3 (ref 1.7–7)
NEUTROPHILS NFR BLD AUTO: 70.2 % (ref 42.7–76)
NRBC BLD AUTO-RTO: 0 /100 WBC (ref 0–0.2)
PLATELET # BLD AUTO: 450 10*3/MM3 (ref 140–450)
PMV BLD AUTO: 9 FL (ref 6–12)
POTASSIUM SERPL-SCNC: 4.5 MMOL/L (ref 3.5–5.2)
PROT SERPL-MCNC: 7.3 G/DL (ref 6–8.5)
RBC # BLD AUTO: 3.88 10*6/MM3 (ref 3.77–5.28)
SODIUM SERPL-SCNC: 139 MMOL/L (ref 136–145)
WBC NRBC COR # BLD: 5.23 10*3/MM3 (ref 3.4–10.8)

## 2022-09-06 PROCEDURE — 36415 COLL VENOUS BLD VENIPUNCTURE: CPT

## 2022-09-06 PROCEDURE — 80053 COMPREHEN METABOLIC PANEL: CPT

## 2022-09-06 PROCEDURE — 85025 COMPLETE CBC W/AUTO DIFF WBC: CPT

## 2022-09-19 ENCOUNTER — HOSPITAL ENCOUNTER (OUTPATIENT)
Dept: MAMMOGRAPHY | Facility: HOSPITAL | Age: 62
Discharge: HOME | End: 2022-09-19
Admitting: INTERNAL MEDICINE

## 2022-09-19 DIAGNOSIS — Z12.31 VISIT FOR SCREENING MAMMOGRAM: ICD-10-CM

## 2022-09-19 PROCEDURE — 77067 SCR MAMMO BI INCL CAD: CPT | Performed by: RADIOLOGY

## 2022-09-19 PROCEDURE — 77063 BREAST TOMOSYNTHESIS BI: CPT

## 2022-09-19 PROCEDURE — 77067 SCR MAMMO BI INCL CAD: CPT

## 2022-09-19 PROCEDURE — 77063 BREAST TOMOSYNTHESIS BI: CPT | Performed by: RADIOLOGY

## 2022-09-26 ENCOUNTER — LAB (OUTPATIENT)
Dept: LAB | Facility: HOSPITAL | Age: 62
End: 2022-09-26

## 2022-09-26 ENCOUNTER — TRANSCRIBE ORDERS (OUTPATIENT)
Dept: LAB | Facility: HOSPITAL | Age: 62
End: 2022-09-26

## 2022-09-26 DIAGNOSIS — M54.50 LOW BACK PAIN, UNSPECIFIED BACK PAIN LATERALITY, UNSPECIFIED CHRONICITY, UNSPECIFIED WHETHER SCIATICA PRESENT: ICD-10-CM

## 2022-09-26 DIAGNOSIS — M54.50 LOW BACK PAIN, UNSPECIFIED BACK PAIN LATERALITY, UNSPECIFIED CHRONICITY, UNSPECIFIED WHETHER SCIATICA PRESENT: Primary | ICD-10-CM

## 2022-09-26 LAB
ANION GAP SERPL CALCULATED.3IONS-SCNC: 9 MMOL/L (ref 5–15)
BUN SERPL-MCNC: 15 MG/DL (ref 8–23)
BUN/CREAT SERPL: 18.1 (ref 7–25)
CALCIUM SPEC-SCNC: 9.8 MG/DL (ref 8.6–10.5)
CHLORIDE SERPL-SCNC: 104 MMOL/L (ref 98–107)
CO2 SERPL-SCNC: 24 MMOL/L (ref 22–29)
CREAT SERPL-MCNC: 0.83 MG/DL (ref 0.57–1)
EGFRCR SERPLBLD CKD-EPI 2021: 78.8 ML/MIN/1.73
GLUCOSE SERPL-MCNC: 104 MG/DL (ref 65–99)
POTASSIUM SERPL-SCNC: 4.6 MMOL/L (ref 3.5–5.2)
SODIUM SERPL-SCNC: 137 MMOL/L (ref 136–145)

## 2022-09-26 PROCEDURE — 80048 BASIC METABOLIC PNL TOTAL CA: CPT

## 2022-09-26 PROCEDURE — 36415 COLL VENOUS BLD VENIPUNCTURE: CPT

## 2022-09-28 ENCOUNTER — SPECIALTY PHARMACY (OUTPATIENT)
Dept: ONCOLOGY | Facility: HOSPITAL | Age: 62
End: 2022-09-28

## 2022-09-28 NOTE — PROGRESS NOTES
Specialty Pharmacy Refill Coordination Note     Gisell is a 62 y.o. female contacted today regarding refills of  Hydroxyurea 1000mg PO QD specialty medication(s).    Patient request medication be mailed out on 10/10/22. She has 12 day supply remaining. I have scheduled the fill in [facility].     Specialty medication(s) and dose(s) confirmed: yes    Refill Questions    Flowsheet Row Most Recent Value   Changes to allergies? No   Changes to medications? No   New conditions since last clinic visit No   Unplanned office visit, urgent care, ED, or hospital admission in the last 4 weeks  No   How does patient/caregiver feel medication is working? Very good   Financial problems or insurance changes  No   Since the previous refill, were any specialty medication doses or scheduled injections missed or delayed?  No   Does this patient require a clinical escalation to a pharmacist? No          Delivery Questions    Flowsheet Row Most Recent Value   Delivery method [employer]   Delivery address correct? Yes   Delivery phone number [phone]   Preferred delivery time? Anytime   Number of medications in delivery 1   Medication being filled and delivered hydroxyurea   Doses left of specialty medications 12 day supply   Is there any medication that is due not being filled? No   Supplies needed? No supplies needed   Cooler needed? No   Do any medications need mixed or dated? No   Copay form of payment Credit card on file   Additional comments $30.42   Questions or concerns for the pharmacist? No   Explain any questions or concerns for the pharmacist n/a   Are any medications first time fills? No            Medication Adherence    Adherence tools used: watch          Follow-up: 28 day(s)     Anita Persaud, Pharmacy Technician  Specialty Pharmacy Technician

## 2022-10-11 ENCOUNTER — TELEPHONE (OUTPATIENT)
Dept: ONCOLOGY | Facility: CLINIC | Age: 62
End: 2022-10-11

## 2022-10-11 NOTE — TELEPHONE ENCOUNTER
Patient left a voicemail she has been getting the Hydroxyurea at [facility] but it has gone up. She can get a 90 day supply cheaper at [facility] can this be sent there? Please call.

## 2022-10-12 ENCOUNTER — SPECIALTY PHARMACY (OUTPATIENT)
Dept: ONCOLOGY | Facility: HOSPITAL | Age: 62
End: 2022-10-12

## 2022-10-12 ENCOUNTER — TELEPHONE (OUTPATIENT)
Dept: ONCOLOGY | Facility: CLINIC | Age: 62
End: 2022-10-12

## 2022-10-12 DIAGNOSIS — D45 POLYCYTHEMIA RUBRA VERA: ICD-10-CM

## 2022-10-12 RX ORDER — HYDROXYUREA 500 MG/1
1000 CAPSULE ORAL DAILY
Qty: 60 CAPSULE | Refills: 11 | Status: SHIPPED | OUTPATIENT
Start: 2022-10-12 | End: 2023-01-09 | Stop reason: SDUPTHER

## 2022-10-12 NOTE — TELEPHONE ENCOUNTER
Caller: Gisell Wylie    Relationship: Self    Best call back number: [phone]    Requested Prescriptions:   HYDROXYUREA 90 DAY SUPPLY  PILLS    Pharmacy where request should be sent: [facility] - [address] [phone] [facility] [phone]      Additional details provided by patient: PATIENT IS WANTING TO CHANGE THIS RX TO [facility] TO CUT HER OUT OF POCKET COST.    Does the patient have less than a 3 day supply:  [] Yes  [x] No    Manuel Dominguez Rep   10/12/22 09:48 EDT

## 2022-10-12 NOTE — PROGRESS NOTES
Re: Refills of Oral Specialty Medication - Hydrea (hydroxyurea)    • Drug-Drug Interactions: The current medication list was reviewed and there are no relevant drug-drug interactions.  • Medication Allergies: The patient has no relevant allergies as it relates to their oral specialty medication  • Review of Labs/Dose Adjustments: NO DOSE CHANGE - I reviewed the most recent note and labs and the patient will continue without any dose changes.  I sent refills as described below.   • The patient had been filling this at [facility] and paying a cash price.  The price recently increased and the patient requested the Rx be transferred to [facility], so I sent the prescription as described below.    Drug: Hydrea (hydroxyurea)  Strength: 500 mg  Directions: Take 2 capsules by mouth daily  Quantity: 60  Refills: 11  Pharmacy prescription sent to: [facility]    Vane Wyman, PharmD, [facility]  Oncology Clinical Pharmacist  10/12/2022  09:25 EDT

## 2022-10-12 NOTE — TELEPHONE ENCOUNTER
I just sent the e-prescription to [facility].      I have asked Erica Jones, Financial Navigator to call the patient and give her [facility] phone number as well as explaining that Anita Persaud will no longer be calling the patient each month for refills.  Now the patient will have to call and request refills like she would do with any other prescriptions she takes.

## 2022-10-13 ENCOUNTER — LAB (OUTPATIENT)
Dept: LAB | Facility: HOSPITAL | Age: 62
End: 2022-10-13

## 2022-10-13 DIAGNOSIS — D45 POLYCYTHEMIA RUBRA VERA: ICD-10-CM

## 2022-10-13 LAB
BASOPHILS # BLD AUTO: 0.05 10*3/MM3 (ref 0–0.2)
BASOPHILS NFR BLD AUTO: 1 % (ref 0–1.5)
DEPRECATED RDW RBC AUTO: 52.5 FL (ref 37–54)
EOSINOPHIL # BLD AUTO: 0.2 10*3/MM3 (ref 0–0.4)
EOSINOPHIL NFR BLD AUTO: 3.9 % (ref 0.3–6.2)
ERYTHROCYTE [DISTWIDTH] IN BLOOD BY AUTOMATED COUNT: 13.9 % (ref 12.3–15.4)
HCT VFR BLD AUTO: 38.8 % (ref 34–46.6)
HGB BLD-MCNC: 13 G/DL (ref 12–15.9)
IMM GRANULOCYTES # BLD AUTO: 0.02 10*3/MM3 (ref 0–0.05)
IMM GRANULOCYTES NFR BLD AUTO: 0.4 % (ref 0–0.5)
LYMPHOCYTES # BLD AUTO: 1.35 10*3/MM3 (ref 0.7–3.1)
LYMPHOCYTES NFR BLD AUTO: 26.6 % (ref 19.6–45.3)
MCH RBC QN AUTO: 34.6 PG (ref 26.6–33)
MCHC RBC AUTO-ENTMCNC: 33.5 G/DL (ref 31.5–35.7)
MCV RBC AUTO: 103.2 FL (ref 79–97)
MONOCYTES # BLD AUTO: 0.33 10*3/MM3 (ref 0.1–0.9)
MONOCYTES NFR BLD AUTO: 6.5 % (ref 5–12)
NEUTROPHILS NFR BLD AUTO: 3.13 10*3/MM3 (ref 1.7–7)
NEUTROPHILS NFR BLD AUTO: 61.6 % (ref 42.7–76)
NRBC BLD AUTO-RTO: 0 /100 WBC (ref 0–0.2)
PLATELET # BLD AUTO: 416 10*3/MM3 (ref 140–450)
PMV BLD AUTO: 9 FL (ref 6–12)
RBC # BLD AUTO: 3.76 10*6/MM3 (ref 3.77–5.28)
WBC NRBC COR # BLD: 5.08 10*3/MM3 (ref 3.4–10.8)

## 2022-10-13 PROCEDURE — 85025 COMPLETE CBC W/AUTO DIFF WBC: CPT

## 2022-10-13 PROCEDURE — 36415 COLL VENOUS BLD VENIPUNCTURE: CPT

## 2023-01-09 ENCOUNTER — SPECIALTY PHARMACY (OUTPATIENT)
Dept: ONCOLOGY | Facility: HOSPITAL | Age: 63
End: 2023-01-09
Payer: COMMERCIAL

## 2023-01-09 DIAGNOSIS — D45 POLYCYTHEMIA RUBRA VERA: ICD-10-CM

## 2023-01-09 RX ORDER — HYDROXYUREA 500 MG/1
1000 CAPSULE ORAL DAILY
Qty: 28 CAPSULE | Refills: 0 | Status: SHIPPED | OUTPATIENT
Start: 2023-01-09

## 2023-01-09 RX ORDER — HYDROXYUREA 500 MG/1
1000 CAPSULE ORAL DAILY
Qty: 60 CAPSULE | Refills: 11 | Status: CANCELLED | OUTPATIENT
Start: 2023-01-09

## 2023-01-09 NOTE — TELEPHONE ENCOUNTER
[facility] is filling a 2 week supply for her. She will continue to fill with [facility] after this fill.

## 2023-01-09 NOTE — PROGRESS NOTES
Specialty Pharmacy Patient Management Program  Oncology 6-Month Clinical Assessment       Gisell Wylie is a 62 y.o. female with PCV seen today to assess adherence and side effects.    Regimen: hydroxyurea 1000mg PO daily    Reason for Outreach: Routine medication check-in .       Problem list reviewed by Rose Coffey, PharmD on 1/9/2023 at 11:15 AM  Medicines reviewed by Rose Coffey, PharmD on 1/9/2023 at 11:15 AM  Allergies reviewed by Rose Coffey PharmD on 1/9/2023 at 11:15 AM     Goals     •  Specialty Pharmacy General Goal (pt-stated)       Clinical goal/therapeutic target: disease control                Medication Assessment:  • Medication Assessment  o Follow Up Clinical Assessment  o Medication(s) assessed: hydroxyurea  o Therapeutic appropriateness: Appropriate  o Medication tolerability: Tolerating with no to minimal ADRs  o Medication plan: Continue therapy with normal follow-up  o Quality of Life Improvement Scale: Somewhat better  o Administration: Patient is taking every day at the same time .   o Patient can self administer medications: yes  o Medication Follow-Up Plan: routine follow-up  • Drug-drug interactions  o Completed medication reconciliation today to assess for drug interactions. Patient denies starting or stopping any medications.    o Assessed medication list for interactions, no significant drug interactions noted.   o Advised patient to call the clinic if any new medications are started so we can assess for drug-drug interactions.  • Discussed drug-food interactions, as relevant  • Vaccines are coordinated by the patient's oncologist and primary care provider.    Adherence Assessment:  Adherence Questions  Medication(s) assessed: hydroxyurea  On average, how many doses/injections does the patient miss per month?: 0  What are the identified reasons for non-adherence or missed doses? : no problems identfied  What is the estimated medication adherence level?: %  Based on  the patient/caregiver response and refill history, does this patient require an MTP to track adherence improvements?: no    Quality of Life Assessment:  Quality of Life Assessment  Quality of Life Improvement Scale: Somewhat better  -- Quality of Life: 7/10    Financial Assessment:  Medication availability/affordability: Patient has had no issues obtaining medication from pharmacy, has had issues obtaining medication from pharmacy: Patient is going to fill a 2 week supply at [facility] due to shipping issues from [facility]. After this two week fill, she will continue to fill with [facility]..      All questions addressed and patient had no additional concerns. Patient has pharmacy contact information.    Angle MillardD, [facility]  Oncology Clinical Pharmacist   [phone]

## 2023-01-09 NOTE — TELEPHONE ENCOUNTER
Caller: Inessa Gisell Leanna    Relationship: Self    Best call back number: [phone]    Requested Prescriptions:   Requested Prescriptions     Pending Prescriptions Disp Refills   • hydroxyurea (HYDREA) 500 MG capsule 60 capsule 11     Sig: Take 2 capsules by mouth Daily.        Pharmacy where request should be sent: [facility]     Additional details provided by patient: PATIENT IS OUT OF HER HYDREA AS THE MAIL IN ORDER WILL NOT BE SENT TO HER FOR TWO WEEKS & THEY SUGGESTED SHE GET TWO WEEKS WORTH FROM US UNTIL THE MAIL ORDER GETS TO HER, PATIENT FEELS MAYBE IT IS BACKORDERED WITH SUPPLIER.    Does the patient have less than a 3 day supply:  [x] Yes  [] No    Would you like a call back once the refill request has been completed: [x] Yes [] No    If the office needs to give you a call back, can they leave a voicemail: [x] Yes [] No    Manuel Regan Rep   01/09/23 09:31 EST

## 2023-01-30 ENCOUNTER — LAB (OUTPATIENT)
Dept: LAB | Facility: HOSPITAL | Age: 63
End: 2023-01-30
Payer: COMMERCIAL

## 2023-01-30 DIAGNOSIS — D45 POLYCYTHEMIA RUBRA VERA: ICD-10-CM

## 2023-01-30 LAB
BASOPHILS # BLD AUTO: 0.06 10*3/MM3 (ref 0–0.2)
BASOPHILS NFR BLD AUTO: 1.1 % (ref 0–1.5)
DEPRECATED RDW RBC AUTO: 50.7 FL (ref 37–54)
EOSINOPHIL # BLD AUTO: 0.1 10*3/MM3 (ref 0–0.4)
EOSINOPHIL NFR BLD AUTO: 1.9 % (ref 0.3–6.2)
ERYTHROCYTE [DISTWIDTH] IN BLOOD BY AUTOMATED COUNT: 13.2 % (ref 12.3–15.4)
HCT VFR BLD AUTO: 39.6 % (ref 34–46.6)
HGB BLD-MCNC: 13 G/DL (ref 12–15.9)
IMM GRANULOCYTES # BLD AUTO: 0.03 10*3/MM3 (ref 0–0.05)
IMM GRANULOCYTES NFR BLD AUTO: 0.6 % (ref 0–0.5)
LYMPHOCYTES # BLD AUTO: 1.25 10*3/MM3 (ref 0.7–3.1)
LYMPHOCYTES NFR BLD AUTO: 23.9 % (ref 19.6–45.3)
MCH RBC QN AUTO: 34 PG (ref 26.6–33)
MCHC RBC AUTO-ENTMCNC: 32.8 G/DL (ref 31.5–35.7)
MCV RBC AUTO: 103.7 FL (ref 79–97)
MONOCYTES # BLD AUTO: 0.27 10*3/MM3 (ref 0.1–0.9)
MONOCYTES NFR BLD AUTO: 5.2 % (ref 5–12)
NEUTROPHILS NFR BLD AUTO: 3.53 10*3/MM3 (ref 1.7–7)
NEUTROPHILS NFR BLD AUTO: 67.3 % (ref 42.7–76)
NRBC BLD AUTO-RTO: 0 /100 WBC (ref 0–0.2)
PLATELET # BLD AUTO: 436 10*3/MM3 (ref 140–450)
PMV BLD AUTO: 9 FL (ref 6–12)
RBC # BLD AUTO: 3.82 10*6/MM3 (ref 3.77–5.28)
WBC NRBC COR # BLD: 5.24 10*3/MM3 (ref 3.4–10.8)

## 2023-01-30 PROCEDURE — 36415 COLL VENOUS BLD VENIPUNCTURE: CPT

## 2023-01-30 PROCEDURE — 85025 COMPLETE CBC W/AUTO DIFF WBC: CPT

## 2023-02-16 DIAGNOSIS — D45 POLYCYTHEMIA RUBRA VERA: Primary | Chronic | ICD-10-CM

## 2023-02-20 ENCOUNTER — OFFICE VISIT (OUTPATIENT)
Dept: ONCOLOGY | Facility: CLINIC | Age: 63
End: 2023-02-20
Payer: COMMERCIAL

## 2023-02-20 ENCOUNTER — SPECIALTY PHARMACY (OUTPATIENT)
Dept: ONCOLOGY | Facility: HOSPITAL | Age: 63
End: 2023-02-20
Payer: COMMERCIAL

## 2023-02-20 VITALS
RESPIRATION RATE: 18 BRPM | WEIGHT: 215 LBS | TEMPERATURE: 97.9 F | BODY MASS INDEX: 33.74 KG/M2 | HEIGHT: 67 IN | DIASTOLIC BLOOD PRESSURE: 68 MMHG | SYSTOLIC BLOOD PRESSURE: 143 MMHG | OXYGEN SATURATION: 96 % | HEART RATE: 66 BPM

## 2023-02-20 DIAGNOSIS — D45 POLYCYTHEMIA RUBRA VERA: Primary | Chronic | ICD-10-CM

## 2023-02-20 PROCEDURE — 99214 OFFICE O/P EST MOD 30 MIN: CPT | Performed by: INTERNAL MEDICINE

## 2023-02-20 RX ORDER — MELOXICAM 7.5 MG/1
1 TABLET ORAL DAILY
COMMUNITY
Start: 2023-02-06

## 2023-02-20 NOTE — PROGRESS NOTES
CHIEF COMPLAINT: No new somatic complaints    Problem List:  Oncology/Hematology History Overview Note   1.  Collin 2+ polycythemia rubra vera  2.  Recurrent DVT and PE due to problem #2 with low titer positive lupus anticoagulant per prior testing by Dr. Major apparently on 2 separate occasions confirmatory 3 months apart  3.  May Thurner  4.  Asending aortic aneurysm  5.  History of SOLOMON  6.  Obstructive sleep apnea  7.  Contrast-induced nephropathy  8.  Reflux with nausea      -8/2/2019 initial [facility] medical oncology consultation: May 2016 developed unprovoked left lower extremity deep vein thrombosis treated with left common iliac stent, thrombolyzes, temporary vena cava filter placement and retrieval.  Saw hematologist who told her she might have a low level lupus anticoagulant but she does not have that data and they were told that they did not think that was significant enough to warrant lifetime anticoagulation.  She stopped Xarelto after 8 months.  Starting the end of June she was developing pain in the left lower extremity again with progressive swelling and shortness of breath.  Went to [facility] ER on 4 July.  Was found to have left lower extremity extensive deep vein thrombosis and right lower lobe pulmonary embolus.  She was treated with thrombectomy and thrombolysis by Rigoberto and Pavel.  Previously she could not afford her Xarelto and they have switched her from heparin to Coumadin but before discharge switched her to Xarelto.  Her creatinine during her hospitalization increased to 1.38 from a baseline of 1.2.  She comes to hematology at [facility] on 8/2/2019 with labs showing creatinine of 1.23 on 7/11/2019, 1.69 on 7/17/2019, and she says that it was repeated again but she does not have that lab and was told that it was back down but not at normal.  Her CBC reveals platelets of 697,000 on 7/17/2019 and 589,000 on 7/10/2019.Labs from 7/10/2019 revealed platelet count of 490,000 with  hematocrit 54.7%.  Creatinine on 7/3/2019 was 0.92.  She understandably had a d-dimer elevation to 1.22 on 7/3/2019 with her clot.  Given the magnitude of her clot and the likelihood of may Thurner syndrome, she needs lifetime anticoagulation.  On patients with deep vein thrombosis and/or PE and indefinite anticoagulation, use of Xarelto in patients with GFR less than 30 is recommended to be avoided varied will need to watch that.  With her prior recent elevation of hematocrit and persistent elevation of platelets, we will continue to watch that and if they remain persistent suggesting this being more than just reactive to her PE and clots, then we need to look for myeloproliferative disorder contributing to her clotting.  She is going to get her records from her other hematologist she saw back in 2016 just for completeness sake but I would not repeat, nor would I do for the first time now, her hypercoagulable panel as any hypercoagulable disorder we stumbled upon would not alter her need for lifetime full dose anticoagulation.  The only question is which drug, and whether her creatinine will allow DOAC's.  I will check her BMP weekly for the next month to make sure her creatinine is doing adequate relative to her use of Xarelto and will get her blood count now and again prior to return in a month and if the platelets are remaining elevated we will do Collin 2 with reflex testing.  Since her discharge from the hospital she had significant nausea for which she had an ultrasound that showed fatty liver but normal gallbladder.  She had an EGD in the past few days with Dr. Heath that showed significant reflux and on Protonix her nausea has improved.  She will need close follow-up of her ascending aortic aneurysm as well with her thoracic surgeons.    -9/9/2019 medical oncology follow-up: Platelets persistently elevated 807,000 with hematocrit 52.7% and white count 8320 on 8/30/2019.  GFR staying over 59 on Xarelto.  We  will check Collin 2 with reflex testing and treat with Hydrea plus or minus phlebotomy if we confirm myeloproliferative disorder.  Will need bone marrow before start of that to assess for myelofibrosis if this is confirmed.  Records from Dr.Scott Major of 6/7/2016 mention that she had been anemic during her hospitalization.  Factor V Leiden, prothrombin mutation, Antithrombin III, protein C&S levels were all normal and the anticardiolipin and beta-2 microglobulin were all negative according to Dr. Major.  The lupus anticoagulant was borderline positive at 10.5 with him and he recommended repeat of that done 3 months apart to prove the diagnosis but this came back low positive according to his notes and he suggested switching to aspirin after a full 6 months of anticoagulation.  This was of course before the low-dose DOAC trials have been published she had had thrombolytics and stenting of her May Thurner according to his records.  Given her presence of May Thurner despite the stenting, I would be inclined towards keeping her on anticoagulants regardless.  She brings to me records that includes labs back to 2016 where her white count, hemoglobin and platelets were normal but as recently as June 2019 her hematocrit was 52% whereas back in May 2016 her hemoglobin was actually down in the eights with normochromic normocytic indices.  On Xarelto, there is increased risk with aspirin I will hold on that for present but we may have to make such a decision as Xarelto may not be sufficient for viscosity issues related to her elevated platelets and hematocrit if this is a myeloproliferative disorder.    -9/30/2019 [facility] hematology oncology follow-up visit: 9/9/2019 Collin 2 V617F mutation positive with erythropoietin level low at 1 commensurate with polycythemia rubra vera and a hematocrit of 53% with platelets of 807,000 and normal white count.  Normally would do bone marrow to look for myelofibrosis but would not risk taking  her off Xarelto at the moment.  We will start her on Hydrea 500 mg daily with CBC weekly and if we get her hematocrit below 45% platelets in the 400,000 range without significant leukopenia that would be my goal.  No phlebotomies for now until we see what Hydrea does.  After we see her back, if her platelets are down and tolerating Hydrea, I will consider stopping the Xarelto and switching her to full dose aspirin for life for the PRV.  This PRV did not cause her original venous thromboembolic phenomenon in fact was anemic when Dr. Major saw her in 2016.  We talked about the potential natural history of PRV including spent phase, leukemic transformation, myelofibrosis.  She will need AAA follow-up with surgeons.    -1/15/2021 [facility] hematology follow-up: 1/8/2021 white count 5790 with hemoglobin 12.6 and platelets 392,000 stable over the last 5 months but her last CBC prior to this was 4 months ago.  CMP unremarkable.  With chronic left lower extremity swelling and ultrasound showing left iliac venous stent obstruction/thrombosis with venous stent and angioplasty by vascular surgery 5/4/2020 as well as a grade 4 left greater saphenous vein reflux for which saphenous vein ablation and phlebectomy performed 6/26/2020, she will continue chronic Xarelto for this and baby aspirin for the JAK2 positive polycythemia rubra vera.  There is increased risk of bleeding but the DOAC does not necessarily mitigate the risk of thrombosis from the JAK2 positive myeloproliferative disorder and the aspirin does not necessarily obviate the need for Xarelto for her May Thurner syndrome venous obstructions occurring despite good control of her blood counts on Hydrea.  On dual DOAC and antiplatelet therapy, it is all the more important to watch her blood counts monthly and I will have her do a video visit with my nurse practitioner in about 3 months.  I would also ask her to be sure her vascular surgeons are following her history of  abdominal aortic aneurysm with serial imaging which I would not do routinely for her myeloproliferative disorder which I am managing.  As stated previously, I do not think myeloproliferative disorder caused her venous thromboembolic phenomena and in fact her original thromboembolic event occurred when she was anemic under the care of Dr. Major in 2016.    -7/29/2021 [facility] hematology telemedicine clinic follow-up: Gisell continues to do well, tolerating Hydrea 1000 mg daily along with Xarelto and aspirin 81 mg.  She has had no bleeding events.  We will continue therapy unchanged.  CBC for the past 2 months looks good with no anemia, WBC normal, platelet count normal.  I reminded her to get her CBC monthly as we wanted to watch her more closely as she is on not only Hydrea but blood thinner with Xarelto along with aspirin putting her more at risk.  She states understanding and will do her CBC monthly, new order entered.    -2/7/2022 [facility] [place] hematology oncology follow-up visit: Today's platelets are 588,000 with hemoglobin 13.5 hematocrit 41.3% with white count 5800.  CMP is pending.  She did not get her CBC monthly as ordered last time and as she is on Xarelto and aspirin that is important.  I will not increase her Hydrea from the 1000 mg dose despite her platelets running a little higher but if they continue to rise we will have to be cautious of acquired von Willebrand's disease which actually further increases her bleeding risk.    -8/15/2022 [facility] [place] Hematology clinic follow-up: Gisell continues to do well on Hydrea 1000 mg daily, I have reviewed her CBCs for the past 6 months and her platelet count overall looks good, most recent platelet count on 8/2/2022 was 455,000, in April her platelet count was normal at 444,000, it has never been higher than 530,000 since February.  She has been taking turmeric and whether or not this is helping it is hard to say but there is no contraindication to her taking turmeric.  " She will continue Hydrea 1000 mg daily unchanged along with aspirin 81 mg daily, she also continues on Xarelto 20 mg daily.  She has had no unusual bleeding, no further clotting events.  We will continue to monitor CBC monthly.  We will see her back in 6 months for follow-up.     May-Thurner syndrome   2019 Initial Diagnosis    May-Thurner syndrome     Polycythemia rubra vera (HCC)   2019 Initial Diagnosis    Polycythemia rubra vera (CMS/HCC)         HISTORY OF PRESENT ILLNESS:  The patient is a 62 y.o. female, here for follow up on management of PRV with no new somatic complaints    Past Medical History:   Diagnosis Date   • Deep vein thrombophlebitis of calf, left ([facility])      Past Surgical History:   Procedure Laterality Date   • BILATERAL BREAST REDUCTION     • CAROTID STENT     •  SECTION     • ELBOW FUSION     • HYSTERECTOMY     • ILIAC ARTERY STENT Left 2020    Mesh   • OOPHORECTOMY     • OTHER SURGICAL HISTORY  2020    Iliac vein carterization   • REDUCTION MAMMAPLASTY Bilateral    • TONSILLECTOMY         Allergies   Allergen Reactions   • Clarithromycin Other (See Comments)     [place]       Family History and Social History reviewed and changed as necessary    REVIEW OF SYSTEM:   No new somatic complaints    PHYSICAL EXAM:  Lower extremity swelling with complications visible from her May Thurner    Vitals:    23 0850   BP: 143/68   Pulse: 66   Resp: 18   Temp: 97.9 °F (36.6 °C)   SpO2: 96%   Weight: 97.5 kg (215 lb)   Height: 170.2 cm (67\")     Vitals:    23 0850   PainSc: 0-No pain          ECOG score: 0           Vitals reviewed.    ECOG: (0) Fully Active - Able to Carry On All Pre-disease Performance Without Restriction    Lab Results   Component Value Date    HGB 13.0 2023    HCT 39.6 2023    .7 (H) 2023     2023    WBC 5.24 2023    NEUTROABS 3.53 2023    LYMPHSABS 1.25 2023    MONOSABS 0.27 2023    " EOSABS 0.10 01/30/2023    BASOSABS 0.06 01/30/2023       Lab Results   Component Value Date    GLUCOSE 104 (H) 09/26/2022    BUN 15 09/26/2022    CREATININE 0.83 09/26/2022     09/26/2022    K 4.6 09/26/2022     09/26/2022    CO2 24.0 09/26/2022    CALCIUM 9.8 09/26/2022    PROTEINTOT 7.3 09/06/2022    ALBUMIN 4.30 09/06/2022    BILITOT 0.6 09/06/2022    ALKPHOS 88 09/06/2022    AST 36 (H) 09/06/2022    ALT 31 09/06/2022             ASSESSMENT & PLAN:  1.  Collin 2+ polycythemia rubra vera  2.  Recurrent DVT and PE due to problem #2 with low titer positive lupus anticoagulant per prior testing by Dr. Major apparently on 2 separate occasions confirmatory 3 months apart  3.  May Thurner  4.  Asending aortic aneurysm  5.  History of SOLOMON  6.  Obstructive sleep apnea  7.  Contrast-induced nephropathy  8.  Reflux with nausea      -8/2/2019 initial [facility] [place], operated by [facility] medical oncology consultation: May 2016 developed unprovoked left lower extremity deep vein thrombosis treated with left common iliac stent, thrombolyzes, temporary vena cava filter placement and retrieval.  Saw hematologist who told her she might have a low level lupus anticoagulant but she does not have that data and they were told that they did not think that was significant enough to warrant lifetime anticoagulation.  She stopped Xarelto after 8 months.  Starting the end of June she was developing pain in the left lower extremity again with progressive swelling and shortness of breath.  Went to [facility] ER on 4 July.  Was found to have left lower extremity extensive deep vein thrombosis and right lower lobe pulmonary embolus.  She was treated with thrombectomy and thrombolysis by Rigoberto and Pavel.  Previously she could not afford her Xarelto and they have switched her from heparin to Coumadin but before discharge switched her to Xarelto.  Her creatinine during her hospitalization increased to 1.38 from a baseline of 1.2.  She comes to  hematology at [facility] on 8/2/2019 with labs showing creatinine of 1.23 on 7/11/2019, 1.69 on 7/17/2019, and she says that it was repeated again but she does not have that lab and was told that it was back down but not at normal.  Her CBC reveals platelets of 697,000 on 7/17/2019 and 589,000 on 7/10/2019.Labs from 7/10/2019 revealed platelet count of 490,000 with hematocrit 54.7%.  Creatinine on 7/3/2019 was 0.92.  She understandably had a d-dimer elevation to 1.22 on 7/3/2019 with her clot.  Given the magnitude of her clot and the likelihood of may Thurner syndrome, she needs lifetime anticoagulation.  On patients with deep vein thrombosis and/or PE and indefinite anticoagulation, use of Xarelto in patients with GFR less than 30 is recommended to be avoided varied will need to watch that.  With her prior recent elevation of hematocrit and persistent elevation of platelets, we will continue to watch that and if they remain persistent suggesting this being more than just reactive to her PE and clots, then we need to look for myeloproliferative disorder contributing to her clotting.  She is going to get her records from her other hematologist she saw back in 2016 just for completeness sake but I would not repeat, nor would I do for the first time now, her hypercoagulable panel as any hypercoagulable disorder we stumbled upon would not alter her need for lifetime full dose anticoagulation.  The only question is which drug, and whether her creatinine will allow DOAC's.  I will check her BMP weekly for the next month to make sure her creatinine is doing adequate relative to her use of Xarelto and will get her blood count now and again prior to return in a month and if the platelets are remaining elevated we will do Collin 2 with reflex testing.  Since her discharge from the hospital she had significant nausea for which she had an ultrasound that showed fatty liver but normal gallbladder.  She had an EGD in the past few days  with Dr. Heath that showed significant reflux and on Protonix her nausea has improved.  She will need close follow-up of her ascending aortic aneurysm as well with her thoracic surgeons.    -9/9/2019 medical oncology follow-up: Platelets persistently elevated 807,000 with hematocrit 52.7% and white count 8320 on 8/30/2019.  GFR staying over 59 on Xarelto.  We will check Collin 2 with reflex testing and treat with Hydrea plus or minus phlebotomy if we confirm myeloproliferative disorder.  Will need bone marrow before start of that to assess for myelofibrosis if this is confirmed.  Records from Dr.Scott Major of 6/7/2016 mention that she had been anemic during her hospitalization.  Factor V Leiden, prothrombin mutation, Antithrombin III, protein C&S levels were all normal and the anticardiolipin and beta-2 microglobulin were all negative according to Dr. Major.  The lupus anticoagulant was borderline positive at 10.5 with him and he recommended repeat of that done 3 months apart to prove the diagnosis but this came back low positive according to his notes and he suggested switching to aspirin after a full 6 months of anticoagulation.  This was of course before the low-dose DOAC trials have been published she had had thrombolytics and stenting of her May Thurner according to his records.  Given her presence of May Thurner despite the stenting, I would be inclined towards keeping her on anticoagulants regardless.  She brings to me records that includes labs back to 2016 where her white count, hemoglobin and platelets were normal but as recently as June 2019 her hematocrit was 52% whereas back in May 2016 her hemoglobin was actually down in the eights with normochromic normocytic indices.  On Xarelto, there is increased risk with aspirin I will hold on that for present but we may have to make such a decision as Xarelto may not be sufficient for viscosity issues related to her elevated platelets and hematocrit if this is  a myeloproliferative disorder.    -9/30/2019 [facility] hematology oncology follow-up visit: 9/9/2019 Collin 2 V617F mutation positive with erythropoietin level low at 1 commensurate with polycythemia rubra vera and a hematocrit of 53% with platelets of 807,000 and normal white count.  Normally would do bone marrow to look for myelofibrosis but would not risk taking her off Xarelto at the moment.  We will start her on Hydrea 500 mg daily with CBC weekly and if we get her hematocrit below 45% platelets in the 400,000 range without significant leukopenia that would be my goal.  No phlebotomies for now until we see what Hydrea does.  After we see her back, if her platelets are down and tolerating Hydrea, I will consider stopping the Xarelto and switching her to full dose aspirin for life for the PRV.  This PRV did not cause her original venous thromboembolic phenomenon in fact was anemic when Dr. Major saw her in 2016.  We talked about the potential natural history of PRV including spent phase, leukemic transformation, myelofibrosis.  She will need AAA follow-up with surgeons.    -1/15/2021 [facility] hematology follow-up: 1/8/2021 white count 5790 with hemoglobin 12.6 and platelets 392,000 stable over the last 5 months but her last CBC prior to this was 4 months ago.  CMP unremarkable.  With chronic left lower extremity swelling and ultrasound showing left iliac venous stent obstruction/thrombosis with venous stent and angioplasty by vascular surgery 5/4/2020 as well as a grade 4 left greater saphenous vein reflux for which saphenous vein ablation and phlebectomy performed 6/26/2020, she will continue chronic Xarelto for this and baby aspirin for the JAK2 positive polycythemia rubra vera.  There is increased risk of bleeding but the DOAC does not necessarily mitigate the risk of thrombosis from the JAK2 positive myeloproliferative disorder and the aspirin does not necessarily obviate the need for Xarelto for her May Thurner  syndrome venous obstructions occurring despite good control of her blood counts on Hydrea.  On dual DOAC and antiplatelet therapy, it is all the more important to watch her blood counts monthly and I will have her do a video visit with my nurse practitioner in about 3 months.  I would also ask her to be sure her vascular surgeons are following her history of abdominal aortic aneurysm with serial imaging which I would not do routinely for her myeloproliferative disorder which I am managing.  As stated previously, I do not think myeloproliferative disorder caused her venous thromboembolic phenomena and in fact her original thromboembolic event occurred when she was anemic under the care of Dr. Major in 2016.    -7/29/2021 [facility] hematology telemedicine clinic follow-up: Gisell continues to do well, tolerating Hydrea 1000 mg daily along with Xarelto and aspirin 81 mg.  She has had no bleeding events.  We will continue therapy unchanged.  CBC for the past 2 months looks good with no anemia, WBC normal, platelet count normal.  I reminded her to get her CBC monthly as we wanted to watch her more closely as she is on not only Hydrea but blood thinner with Xarelto along with aspirin putting her more at risk.  She states understanding and will do her CBC monthly, new order entered.    -2/7/2022 [facility] hematology oncology follow-up visit: Today's platelets are 588,000 with hemoglobin 13.5 hematocrit 41.3% with white count 5800.  CMP is pending.  She did not get her CBC monthly as ordered last time and as she is on Xarelto and aspirin that is important.  I will not increase her Hydrea from the 1000 mg dose despite her platelets running a little higher but if they continue to rise we will have to be cautious of acquired von Willebrand's disease which actually further increases her bleeding risk.    -8/15/2022 [facility] Hematology clinic follow-up: Gisell continues to do well on Hydrea 1000 mg daily, I have reviewed her CBCs  for the past 6 months and her platelet count overall looks good, most recent platelet count on 8/2/2022 was 455,000, in April her platelet count was normal at 444,000, it has never been higher than 530,000 since February.  She has been taking turmeric and whether or not this is helping it is hard to say but there is no contraindication to her taking turmeric.  She will continue Hydrea 1000 mg daily unchanged along with aspirin 81 mg daily, she also continues on Xarelto 20 mg daily.  She has had no unusual bleeding, no further clotting events.  We will continue to monitor CBC monthly.  We will see her back in 6 months for follow-up.    -2/20/2023 [facility] hematology oncology follow-up: 1/30/2023 hemoglobin 13 white count 5240 with platelets 436,000 with .7.  We will get her CBC today but her counts have been stable 1000 mg Hydrea, aspirin, and Xarelto for the better part of a year now and we will see her twice a year with CBC once a quarter.      Total time of care today inclusive of time spent today prior to her arrival reviewing interval labs and during visit interviewing her as to signs or symptoms of her disease and treatment thereof and after visit instituting the plan as outlined above took 30 minutes of patient care time throughout the day today.  Yogesh Damon MD    02/20/2023

## 2023-05-14 ENCOUNTER — HOSPITAL ENCOUNTER (INPATIENT)
Facility: HOSPITAL | Age: 63
LOS: 1 days | Discharge: HOME | DRG: 440 | End: 2023-05-15
Attending: EMERGENCY MEDICINE | Admitting: PEDIATRICS
Payer: COMMERCIAL

## 2023-05-14 ENCOUNTER — APPOINTMENT (OUTPATIENT)
Dept: CT IMAGING | Facility: HOSPITAL | Age: 63
DRG: 440 | End: 2023-05-14
Payer: COMMERCIAL

## 2023-05-14 DIAGNOSIS — K85.90 ACUTE PANCREATITIS, UNSPECIFIED COMPLICATION STATUS, UNSPECIFIED PANCREATITIS TYPE: Primary | ICD-10-CM

## 2023-05-14 PROBLEM — Z86.718 HISTORY OF DVT OF LOWER EXTREMITY: Status: ACTIVE | Noted: 2023-05-14

## 2023-05-14 PROBLEM — E78.49 OTHER HYPERLIPIDEMIA: Status: ACTIVE | Noted: 2023-05-14

## 2023-05-14 LAB
ALBUMIN SERPL-MCNC: 4.3 G/DL (ref 3.5–5.2)
ALBUMIN/GLOB SERPL: 1.3 G/DL
ALP SERPL-CCNC: 91 U/L (ref 39–117)
ALT SERPL W P-5'-P-CCNC: 20 U/L (ref 1–33)
ANION GAP SERPL CALCULATED.3IONS-SCNC: 9 MMOL/L (ref 5–15)
AST SERPL-CCNC: 21 U/L (ref 1–32)
BACTERIA UR QL AUTO: NORMAL /HPF
BASOPHILS # BLD AUTO: 0.09 10*3/MM3 (ref 0–0.2)
BASOPHILS NFR BLD AUTO: 0.9 % (ref 0–1.5)
BILIRUB SERPL-MCNC: 0.6 MG/DL (ref 0–1.2)
BILIRUB UR QL STRIP: NEGATIVE
BUN SERPL-MCNC: 18 MG/DL (ref 8–23)
BUN/CREAT SERPL: 22.8 (ref 7–25)
CALCIUM SPEC-SCNC: 9.9 MG/DL (ref 8.6–10.5)
CHLORIDE SERPL-SCNC: 106 MMOL/L (ref 98–107)
CHOLEST SERPL-MCNC: 141 MG/DL (ref 0–200)
CLARITY UR: ABNORMAL
CO2 SERPL-SCNC: 24 MMOL/L (ref 22–29)
COLOR UR: YELLOW
CREAT SERPL-MCNC: 0.79 MG/DL (ref 0.57–1)
D-LACTATE SERPL-SCNC: 1.3 MMOL/L (ref 0.5–2)
DEPRECATED RDW RBC AUTO: 49.9 FL (ref 37–54)
EGFRCR SERPLBLD CKD-EPI 2021: 83.6 ML/MIN/1.73
EOSINOPHIL # BLD AUTO: 0.13 10*3/MM3 (ref 0–0.4)
EOSINOPHIL NFR BLD AUTO: 1.2 % (ref 0.3–6.2)
ERYTHROCYTE [DISTWIDTH] IN BLOOD BY AUTOMATED COUNT: 13.7 % (ref 12.3–15.4)
GLOBULIN UR ELPH-MCNC: 3.3 GM/DL
GLUCOSE SERPL-MCNC: 164 MG/DL (ref 65–99)
GLUCOSE UR STRIP-MCNC: NEGATIVE MG/DL
HCT VFR BLD AUTO: 43.7 % (ref 34–46.6)
HDLC SERPL-MCNC: 52 MG/DL (ref 40–60)
HGB BLD-MCNC: 14.9 G/DL (ref 12–15.9)
HGB UR QL STRIP.AUTO: ABNORMAL
HOLD SPECIMEN: NORMAL
HYALINE CASTS UR QL AUTO: NORMAL /LPF
IMM GRANULOCYTES # BLD AUTO: 0.16 10*3/MM3 (ref 0–0.05)
IMM GRANULOCYTES NFR BLD AUTO: 1.5 % (ref 0–0.5)
KETONES UR QL STRIP: NEGATIVE
LDLC SERPL CALC-MCNC: 73 MG/DL (ref 0–100)
LDLC/HDLC SERPL: 1.4 {RATIO}
LEUKOCYTE ESTERASE UR QL STRIP.AUTO: NEGATIVE
LIPASE SERPL-CCNC: 2814 U/L (ref 13–60)
LYMPHOCYTES # BLD AUTO: 0.95 10*3/MM3 (ref 0.7–3.1)
LYMPHOCYTES NFR BLD AUTO: 9.1 % (ref 19.6–45.3)
MCH RBC QN AUTO: 34.4 PG (ref 26.6–33)
MCHC RBC AUTO-ENTMCNC: 34.1 G/DL (ref 31.5–35.7)
MCV RBC AUTO: 100.9 FL (ref 79–97)
MONOCYTES # BLD AUTO: 0.72 10*3/MM3 (ref 0.1–0.9)
MONOCYTES NFR BLD AUTO: 6.9 % (ref 5–12)
NEUTROPHILS NFR BLD AUTO: 8.44 10*3/MM3 (ref 1.7–7)
NEUTROPHILS NFR BLD AUTO: 80.4 % (ref 42.7–76)
NITRITE UR QL STRIP: NEGATIVE
NRBC BLD AUTO-RTO: 0 /100 WBC (ref 0–0.2)
PH UR STRIP.AUTO: 6 [PH] (ref 5–8)
PLATELET # BLD AUTO: 538 10*3/MM3 (ref 140–450)
PMV BLD AUTO: 8.4 FL (ref 6–12)
POTASSIUM SERPL-SCNC: 4.9 MMOL/L (ref 3.5–5.2)
PROT SERPL-MCNC: 7.6 G/DL (ref 6–8.5)
PROT UR QL STRIP: ABNORMAL
QT INTERVAL: 396 MS
QTC INTERVAL: 424 MS
RBC # BLD AUTO: 4.33 10*6/MM3 (ref 3.77–5.28)
RBC # UR STRIP: NORMAL /HPF
REF LAB TEST METHOD: NORMAL
SODIUM SERPL-SCNC: 139 MMOL/L (ref 136–145)
SP GR UR STRIP: 1.02 (ref 1–1.03)
SQUAMOUS #/AREA URNS HPF: NORMAL /HPF
TRIGL SERPL-MCNC: 80 MG/DL (ref 0–150)
TROPONIN T SERPL HS-MCNC: 7 NG/L
UROBILINOGEN UR QL STRIP: ABNORMAL
VLDLC SERPL-MCNC: 16 MG/DL (ref 5–40)
WBC # UR STRIP: NORMAL /HPF
WBC NRBC COR # BLD: 10.49 10*3/MM3 (ref 3.4–10.8)
WHOLE BLOOD HOLD SPECIMEN: NORMAL

## 2023-05-14 PROCEDURE — 80061 LIPID PANEL: CPT | Performed by: PEDIATRICS

## 2023-05-14 PROCEDURE — 99284 EMERGENCY DEPT VISIT MOD MDM: CPT

## 2023-05-14 PROCEDURE — 84484 ASSAY OF TROPONIN QUANT: CPT | Performed by: NURSE PRACTITIONER

## 2023-05-14 PROCEDURE — 25010000002 HYDROMORPHONE PER 4 MG: Performed by: EMERGENCY MEDICINE

## 2023-05-14 PROCEDURE — 83605 ASSAY OF LACTIC ACID: CPT | Performed by: EMERGENCY MEDICINE

## 2023-05-14 PROCEDURE — 25510000001 IOPAMIDOL 61 % SOLUTION: Performed by: EMERGENCY MEDICINE

## 2023-05-14 PROCEDURE — 80053 COMPREHEN METABOLIC PANEL: CPT | Performed by: EMERGENCY MEDICINE

## 2023-05-14 PROCEDURE — 81001 URINALYSIS AUTO W/SCOPE: CPT | Performed by: EMERGENCY MEDICINE

## 2023-05-14 PROCEDURE — G0378 HOSPITAL OBSERVATION PER HR: HCPCS

## 2023-05-14 PROCEDURE — 25010000002 ONDANSETRON PER 1 MG: Performed by: EMERGENCY MEDICINE

## 2023-05-14 PROCEDURE — 83690 ASSAY OF LIPASE: CPT | Performed by: EMERGENCY MEDICINE

## 2023-05-14 PROCEDURE — 93005 ELECTROCARDIOGRAM TRACING: CPT | Performed by: NURSE PRACTITIONER

## 2023-05-14 PROCEDURE — 85025 COMPLETE CBC W/AUTO DIFF WBC: CPT | Performed by: EMERGENCY MEDICINE

## 2023-05-14 PROCEDURE — 99223 1ST HOSP IP/OBS HIGH 75: CPT | Performed by: PEDIATRICS

## 2023-05-14 PROCEDURE — 74177 CT ABD & PELVIS W/CONTRAST: CPT

## 2023-05-14 RX ORDER — SODIUM CHLORIDE 0.9 % (FLUSH) 0.9 %
10 SYRINGE (ML) INJECTION AS NEEDED
Status: DISCONTINUED | OUTPATIENT
Start: 2023-05-14 | End: 2023-05-15 | Stop reason: HOSPADM

## 2023-05-14 RX ORDER — HYDROCODONE BITARTRATE AND ACETAMINOPHEN 5; 325 MG/1; MG/1
1 TABLET ORAL EVERY 4 HOURS PRN
Status: DISCONTINUED | OUTPATIENT
Start: 2023-05-14 | End: 2023-05-15 | Stop reason: HOSPADM

## 2023-05-14 RX ORDER — BISACODYL 5 MG/1
5 TABLET, DELAYED RELEASE ORAL DAILY PRN
Status: DISCONTINUED | OUTPATIENT
Start: 2023-05-14 | End: 2023-05-15 | Stop reason: HOSPADM

## 2023-05-14 RX ORDER — ACETAMINOPHEN 325 MG/1
650 TABLET ORAL EVERY 4 HOURS PRN
Status: DISCONTINUED | OUTPATIENT
Start: 2023-05-14 | End: 2023-05-15 | Stop reason: HOSPADM

## 2023-05-14 RX ORDER — AMOXICILLIN 250 MG
2 CAPSULE ORAL 2 TIMES DAILY
Status: DISCONTINUED | OUTPATIENT
Start: 2023-05-14 | End: 2023-05-15 | Stop reason: HOSPADM

## 2023-05-14 RX ORDER — ONDANSETRON 4 MG/1
4 TABLET, FILM COATED ORAL EVERY 6 HOURS PRN
Status: DISCONTINUED | OUTPATIENT
Start: 2023-05-14 | End: 2023-05-15 | Stop reason: HOSPADM

## 2023-05-14 RX ORDER — POLYETHYLENE GLYCOL 3350 17 G/17G
17 POWDER, FOR SOLUTION ORAL DAILY PRN
Status: DISCONTINUED | OUTPATIENT
Start: 2023-05-14 | End: 2023-05-15 | Stop reason: HOSPADM

## 2023-05-14 RX ORDER — NALOXONE HCL 0.4 MG/ML
0.4 VIAL (ML) INJECTION
Status: DISCONTINUED | OUTPATIENT
Start: 2023-05-14 | End: 2023-05-15 | Stop reason: HOSPADM

## 2023-05-14 RX ORDER — ONDANSETRON 2 MG/ML
4 INJECTION INTRAMUSCULAR; INTRAVENOUS EVERY 6 HOURS PRN
Status: DISCONTINUED | OUTPATIENT
Start: 2023-05-14 | End: 2023-05-15 | Stop reason: HOSPADM

## 2023-05-14 RX ORDER — BISACODYL 10 MG
10 SUPPOSITORY, RECTAL RECTAL DAILY PRN
Status: DISCONTINUED | OUTPATIENT
Start: 2023-05-14 | End: 2023-05-15 | Stop reason: HOSPADM

## 2023-05-14 RX ORDER — HYDROMORPHONE HYDROCHLORIDE 1 MG/ML
0.5 INJECTION, SOLUTION INTRAMUSCULAR; INTRAVENOUS; SUBCUTANEOUS
Status: DISCONTINUED | OUTPATIENT
Start: 2023-05-14 | End: 2023-05-15 | Stop reason: HOSPADM

## 2023-05-14 RX ORDER — SODIUM CHLORIDE, SODIUM LACTATE, POTASSIUM CHLORIDE, CALCIUM CHLORIDE 600; 310; 30; 20 MG/100ML; MG/100ML; MG/100ML; MG/100ML
100 INJECTION, SOLUTION INTRAVENOUS CONTINUOUS
Status: DISCONTINUED | OUTPATIENT
Start: 2023-05-14 | End: 2023-05-15

## 2023-05-14 RX ORDER — SODIUM CHLORIDE 9 MG/ML
10 INJECTION INTRAVENOUS AS NEEDED
Status: DISCONTINUED | OUTPATIENT
Start: 2023-05-14 | End: 2023-05-15 | Stop reason: HOSPADM

## 2023-05-14 RX ORDER — HYDROMORPHONE HYDROCHLORIDE 1 MG/ML
0.5 INJECTION, SOLUTION INTRAMUSCULAR; INTRAVENOUS; SUBCUTANEOUS ONCE
Status: COMPLETED | OUTPATIENT
Start: 2023-05-14 | End: 2023-05-14

## 2023-05-14 RX ORDER — HYDROXYUREA 500 MG/1
1000 CAPSULE ORAL DAILY
Status: DISCONTINUED | OUTPATIENT
Start: 2023-05-14 | End: 2023-05-15 | Stop reason: HOSPADM

## 2023-05-14 RX ORDER — SODIUM CHLORIDE 0.9 % (FLUSH) 0.9 %
10 SYRINGE (ML) INJECTION EVERY 12 HOURS SCHEDULED
Status: DISCONTINUED | OUTPATIENT
Start: 2023-05-14 | End: 2023-05-15 | Stop reason: HOSPADM

## 2023-05-14 RX ORDER — ONDANSETRON 2 MG/ML
4 INJECTION INTRAMUSCULAR; INTRAVENOUS ONCE
Status: COMPLETED | OUTPATIENT
Start: 2023-05-14 | End: 2023-05-14

## 2023-05-14 RX ORDER — SODIUM CHLORIDE 9 MG/ML
40 INJECTION, SOLUTION INTRAVENOUS AS NEEDED
Status: DISCONTINUED | OUTPATIENT
Start: 2023-05-14 | End: 2023-05-15 | Stop reason: HOSPADM

## 2023-05-14 RX ORDER — PRAVASTATIN SODIUM 40 MG
40 TABLET ORAL DAILY
Status: DISCONTINUED | OUTPATIENT
Start: 2023-05-14 | End: 2023-05-15 | Stop reason: HOSPADM

## 2023-05-14 RX ADMIN — Medication 10 ML: at 13:25

## 2023-05-14 RX ADMIN — SODIUM CHLORIDE, POTASSIUM CHLORIDE, SODIUM LACTATE AND CALCIUM CHLORIDE 100 ML/HR: 600; 310; 30; 20 INJECTION, SOLUTION INTRAVENOUS at 22:20

## 2023-05-14 RX ADMIN — SODIUM CHLORIDE, POTASSIUM CHLORIDE, SODIUM LACTATE AND CALCIUM CHLORIDE 100 ML/HR: 600; 310; 30; 20 INJECTION, SOLUTION INTRAVENOUS at 12:45

## 2023-05-14 RX ADMIN — HYDROMORPHONE HYDROCHLORIDE 0.5 MG: 1 INJECTION, SOLUTION INTRAMUSCULAR; INTRAVENOUS; SUBCUTANEOUS at 10:53

## 2023-05-14 RX ADMIN — SODIUM CHLORIDE 1000 ML: 9 INJECTION, SOLUTION INTRAVENOUS at 08:25

## 2023-05-14 RX ADMIN — Medication 81 MG: at 13:21

## 2023-05-14 RX ADMIN — HYDROXYUREA 1000 MG: 500 CAPSULE ORAL at 13:20

## 2023-05-14 RX ADMIN — HYDROMORPHONE HYDROCHLORIDE 0.5 MG: 1 INJECTION, SOLUTION INTRAMUSCULAR; INTRAVENOUS; SUBCUTANEOUS at 09:46

## 2023-05-14 RX ADMIN — IOPAMIDOL 90 ML: 612 INJECTION, SOLUTION INTRAVENOUS at 09:31

## 2023-05-14 RX ADMIN — SERTRALINE 25 MG: 50 TABLET, FILM COATED ORAL at 13:21

## 2023-05-14 RX ADMIN — RIVAROXABAN 20 MG: 20 TABLET, FILM COATED ORAL at 16:38

## 2023-05-14 RX ADMIN — ONDANSETRON 4 MG: 2 INJECTION INTRAMUSCULAR; INTRAVENOUS at 09:46

## 2023-05-14 RX ADMIN — SENNOSIDES AND DOCUSATE SODIUM 2 TABLET: 50; 8.6 TABLET ORAL at 13:21

## 2023-05-14 RX ADMIN — PRAVASTATIN SODIUM 40 MG: 40 TABLET ORAL at 13:21

## 2023-05-14 RX ADMIN — Medication 10 ML: at 20:13

## 2023-05-14 RX ADMIN — HYDROCODONE BITARTRATE AND ACETAMINOPHEN 1 TABLET: 5; 325 TABLET ORAL at 14:23

## 2023-05-14 RX ADMIN — HYDROCODONE BITARTRATE AND ACETAMINOPHEN 1 TABLET: 5; 325 TABLET ORAL at 20:16

## 2023-05-14 NOTE — PLAN OF CARE
Goal Outcome Evaluation:  Plan of Care Reviewed With: patient        Progress: improving  Outcome Evaluation: Pt admit to floor from ED. VSS. RA. NSR on the monitor. Pt complaints of pain. PRN's given for pain control. No complaints of nausea. Family at bedside. Fluids infusing. Pt resting comfortably. Tolerating clear liquid diet. No further complaints at this time.

## 2023-05-14 NOTE — H&P
[facility] Medicine Services  HISTORY AND PHYSICAL    Patient Name: Gisell Wylie  : 1960  MRN: [id number]  Primary Care Physician: Zainab Childs MD  Date of admission: 2023      Subjective   Subjective     Chief Complaint:  abd pain    HPI:  Gisell Wylie is a 62 y.o. female with a hx of polycythema vera, HLD, hx DVT 2/ May Thurner syndrome presents with 2-3 day hx of LUQ, epigastric pain.  At times feels like a band across the abd, and int back pain as well.  No N/V/D.  No constipation.  No dysurea. No CP, SOB.  No fevers.  No history of extremely elevated triglycerides.  Denies any alcohol use.  No recent viral infection.  No postprandial abdominal pain that would indicate any gallbladder issues.      Review of Systems   Constitutional: Negative for activity change and fever.   HENT: Negative.    Respiratory: Negative.    Cardiovascular: Negative.    Gastrointestinal: Positive for abdominal pain.   Genitourinary: Negative.    Musculoskeletal: Negative.    Skin: Negative.    Neurological: Negative.    Psychiatric/Behavioral: Negative.    All other systems reviewed and are negative.       Personal History     Past Medical History:   Diagnosis Date   • Deep vein thrombophlebitis of calf, left              Past Surgical History:   Procedure Laterality Date   • BILATERAL BREAST REDUCTION     • CAROTID STENT     •  SECTION     • ELBOW FUSION     • HYSTERECTOMY     • ILIAC ARTERY STENT Left 2020    Mesh   • OOPHORECTOMY     • OTHER SURGICAL HISTORY  2020    Iliac vein carterization   • REDUCTION MAMMAPLASTY Bilateral    • TONSILLECTOMY         Family History: family history includes Atrial fibrillation in her mother; Breast cancer in her cousin and paternal grandmother; Colon cancer in her cousin and maternal aunt; Dementia in her mother; Diabetes in her mother; Heart disease in her mother; Leukemia in her paternal grandmother; Lung cancer in  her cousin.     Social History:  reports that she has never smoked. She has never used smokeless tobacco. She reports that she does not drink alcohol and does not use drugs.  Social History     Social History Narrative   • Not on file       Medications:  Available home medication information reviewed.  (Not in a hospital admission)      Allergies   Allergen Reactions   • Clarithromycin Other (See Comments)     [place]       Objective   Objective     Vital Signs:   Temp:  [98.3 °F (36.8 °C)] 98.3 °F (36.8 °C)  Heart Rate:  [68-74] 68  Resp:  [16] 16  BP: (123-137)/(74-76) 123/76       Physical Exam   Constitutional: Awake, alert  Eyes: PERRLA, sclerae anicteric, no conjunctival injection  HENT: NCAT, mucous membranes moist  Neck: Supple, no thyromegaly, no lymphadenopathy, trachea midline  Respiratory: Clear to auscultation bilaterally, nonlabored respirations   Cardiovascular: RRR, no murmurs, rubs, or gallops, palpable pedal pulses bilaterally  Gastrointestinal: Positive bowel sounds, soft, tender to palp in LUQ and epigastric area, nondistended  Musculoskeletal: No bilateral ankle edema, no clubbing or cyanosis to extremities  Psychiatric: Appropriate affect, cooperative  Neurologic: Oriented x 3, strength symmetric in all extremities, Cranial Nerves grossly intact to confrontation, speech clear  Skin: No rashes      Result Review:  I have personally reviewed the results from the time of this admission to 5/14/2023 12:20 EDT and agree with these findings:  [x]  Laboratory list / accordion  []  Microbiology  []  Radiology  [x]  EKG/Telemetry -self interpretation- NSR.  []  Cardiology/Vascular   []  Pathology  [x]  Old records  []  Other:  Most notable findings include: Elevated lipase with stranding around tail of pancreas on CT A/P        LAB RESULTS:      Lab 05/14/23  0813   WBC 10.49   HEMOGLOBIN 14.9   HEMATOCRIT 43.7   PLATELETS 538*   NEUTROS ABS 8.44*   IMMATURE GRANS (ABS) 0.16*   LYMPHS ABS 0.95   MONOS  ABS 0.72   EOS ABS 0.13   .9*   LACTATE 1.3         Lab 05/14/23  0813   SODIUM 139   POTASSIUM 4.9   CHLORIDE 106   CO2 24.0   ANION GAP 9.0   BUN 18   CREATININE 0.79   EGFR 83.6   GLUCOSE 164*   CALCIUM 9.9         Lab 05/14/23  0813   TOTAL PROTEIN 7.6   ALBUMIN 4.3   GLOBULIN 3.3   ALT (SGPT) 20   AST (SGOT) 21   BILIRUBIN 0.6   ALK PHOS 91   LIPASE 2,814*         Lab 05/14/23  0813   HSTROP T 7                 UA        5/14/2023    08:40   Urinalysis   Squamous Epithelial Cells, UA 0-2     Specific Gravity, UA 1.016     Ketones, UA Negative     Blood, UA Trace     Leukocytes, UA Negative     Nitrite, UA Negative     RBC, UA 0-2     WBC, UA 0-2     Bacteria, UA None Seen         Microbiology Results (last 10 days)     ** No results found for the last 240 hours. **          CT Abdomen Pelvis With Contrast    Result Date: 5/14/2023  CT ABDOMEN PELVIS W CONTRAST Date of Exam: 5/14/2023 9:21 AM EDT Indication: LLQ abd pain. Comparison: None available. Technique: Axial CT images were obtained of the abdomen and pelvis following the uneventful intravenous administration of 95 mL Isovue-300. Reconstructed coronal and sagittal images were also obtained. Automated exposure control and iterative construction methods were used. Findings: Lower thorax:Lung bases are clear. No coronary artery calcifications seen in the visualized heart. No pericardial effusion.  No evidence of pulmonary embolus in the visualized pulmonary arteries. Liver: No focal hepatic lesions seen.  Normal hepatic size. Hypodensity of the liver suggestive of steatosis. Gallbladder and bile ducts: Normal, nondistended appearance of the gallbladder.  No intra- or extra- hepatic biliary ductal dilatation. Spleen: Normal appearance of the spleen. Pancreas: Normal appearance of the pancreas. Main pancreatic duct is nondilated. Small amount of edema around the tail the pancreas and splenic flexure of the colon. Adrenals: Normal appearance of the  adrenal glands. Kidneys: Normal appearance of the kidneys. Symmetric enhancement and excretion of contrast. No nephrolithiasis.  Normal caliber of the ureters. Bowel: Normal caliber of the bowels. Normal appearance of the appendix. Small amount of stranding around the splenic flecture with a single diverticulum seen in this area though it does not appear inflamed or thick-walled. Pelvis: Limited evaluation of partially distended bladder. Hysterectomy. Ovaries are not visualized. Peritoneum: No free air. No free fluid. Mild mesenteric edema may reflect panniculitis. Vessels: Normal aortic caliber.  No atherosclerotic calcification of the aorta. Celiac artery, splenic artery, superior mesenteric artery, inferior mesenteric artery, renal arteries and iliac arteries appear patent. Iliac veins, inferior vena cava, superior mesenteric vein, renal veins, portal vein and splenic vein are patent. Left common iliac vein stent is seen. Lymph nodes: Few mildly prominent mesenteric lymph nodes. Bones: No acute osseous abnormality. Degenerative changes of the spine. Soft tissues: Unremarkable appearance of the soft tissues.     Impression: Impression: Fat stranding predominantly centered around the tail of the pancreas suggestive of developing pancreatitis. There is a single colonic diverticulum at the splenic flecture in this area as well though it does not appear inflamed or thick-walled and is therefore unlikely to represent diverticulitis. Mild mesenteric fat stranding may represent mild mesenteric panniculitis. Electronically Signed: Antione Carpenter  5/14/2023 9:58 AM EDT  Workstation ID: [id number]      Results for orders placed during the hospital encounter of 06/16/20    Adult Stress Echo W/ Cont or Stress Agent if Necessary Per Protocol    Interpretation Summary  · The rest EKG is normal. IRBBB is noted.  · The rest transthoracic echocardiogram reveals no diagnostic abnormalities, noting that the right heart was not  assessed.  · The patient exercised for 6 minutes, completing stage II of a standard Matt protocol and attaining her 89% p.m. HR of 141 bpm before stopping because of fatigue and shortness of air.  · The patient had no exercise-induced angina or arrhythmias and EKG changes were not seen. The blood pressure response to exercise was normal.  · Transthoracic echocardiography done before and following exercise reveals an increase in rest EF from 63% to greater than 80% (estimated).  · There is exercised-induced augmentation of shortening in all LV segments.  · This is a NORMAL stress EKG/echocardiogram.      Assessment & Plan   Assessment & Plan     Active Hospital Problems    Diagnosis  POA   • **Acute pancreatitis, unspecified complication status, unspecified pancreatitis type [K85.90]  Yes   • Other hyperlipidemia [E78.49]  Yes   • History of DVT of lower extremity [Z86.718]  Not Applicable   • Polycythemia rubra vera [D45]  Yes       62-year-old female here with a history of polycythemia vera, and history of DVT on Xarelto being admitted with acute abdominal pain secondary to pancreatitis.    Pancreatitis: Symptomatic treatment with pain medications.  On hydromorphone every 2 hours as needed for pain.  Continue IV fluids for hydration.  Patient so far has been tolerating her diet, but will place on clear liquid diet for now.  As needed Zofran as well.  LFTs are normal and no evidence of gallbladder disease on CT scan.  Will check a lipid panel.    Polycythemia vera: Continue patient's hydroxyurea.  Plt slightly elevated.    Hx DVT: cont xarelto    HLD: cont pravastatin    ELLIS:  On cpap,  to bring in machine.    Level 3 note: 2/3 of the below    1) Problem  Acute or chronic illnesses or injuries that may pose a threat to life or bodily function: pancreatitis    2) Data    3 of the following:  Prior external notes reviewed:  Test results reviewed:  Tests ordered:      Test reviewed:  Tests independently  interpreted, see above read:     Outside group discussion: Discussed with ED provider    3) Risk  Parenteral controlled substances: dilaudid prn        DVT prophylaxis:  On AC      CODE STATUS:    Code Status and Medical Interventions:   Ordered at: 05/14/23 1203     Level Of Support Discussed With:    Patient     Code Status (Patient has no pulse and is not breathing):    CPR (Attempt to Resuscitate)     Medical Interventions (Patient has pulse or is breathing):    Full Support       Expected Discharge (Expected Discharge Date: 5/15/2023; Expected Discharge Time:      Michelle Kaye MD  05/14/23

## 2023-05-14 NOTE — ED PROVIDER NOTES
Subjective   History of Present Illness  Pleasant patient presents the ER for left lower abdominal pain that started last night.  She denies any vomiting diarrhea.  She denies any fevers chills chest pain difficulty breathing.  She tells me she does have a history of peripheral artery disease thought to be related to May Thurner syndrome.  She does take Xarelto for this.  She denies any leg pain weakness numbness or tingling.  She denies any history of constipation.  She denies any surgeries in her abdomen.        Review of Systems    Past Medical History:   Diagnosis Date   • Deep vein thrombophlebitis of calf, left        Allergies   Allergen Reactions   • Clarithromycin Other (See Comments)     [place]       Past Surgical History:   Procedure Laterality Date   • BILATERAL BREAST REDUCTION     • CAROTID STENT     •  SECTION     • ELBOW FUSION     • HYSTERECTOMY     • ILIAC ARTERY STENT Left 2020    Mesh   • OOPHORECTOMY     • OTHER SURGICAL HISTORY  2020    Iliac vein carterization   • REDUCTION MAMMAPLASTY Bilateral    • TONSILLECTOMY         Family History   Problem Relation Age of Onset   • Atrial fibrillation Mother    • Dementia Mother    • Diabetes Mother    • Heart disease Mother    • Colon cancer Maternal Aunt    • Leukemia Paternal Grandmother    • Breast cancer Paternal Grandmother    • Colon cancer Cousin    • Lung cancer Cousin    • Breast cancer Cousin        Social History     Socioeconomic History   • Marital status:    Tobacco Use   • Smoking status: Never   • Smokeless tobacco: Never   Substance and Sexual Activity   • Alcohol use: No   • Drug use: No   • Sexual activity: Defer           Objective   Physical Exam  Constitutional:       Appearance: She is well-developed.   HENT:      Head: Normocephalic and atraumatic.      Right Ear: External ear normal.      Left Ear: External ear normal.      Nose: Nose normal.   Eyes:      Conjunctiva/sclera: Conjunctivae normal.       Pupils: Pupils are equal, round, and reactive to light.   Cardiovascular:      Rate and Rhythm: Normal rate and regular rhythm.      Heart sounds: Normal heart sounds.   Pulmonary:      Effort: Pulmonary effort is normal.      Breath sounds: Normal breath sounds.   Abdominal:      General: Bowel sounds are normal.      Palpations: Abdomen is soft.      Tenderness: There is abdominal tenderness in the left lower quadrant.   Musculoskeletal:         General: Normal range of motion.      Cervical back: Normal range of motion and neck supple.   Skin:     General: Skin is warm and dry.   Neurological:      Mental Status: She is alert and oriented to person, place, and time.   Psychiatric:         Behavior: Behavior normal.         Thought Content: Thought content normal.         Judgment: Judgment normal.         Procedures           ED Course  ED Course as of 05/14/23 1028   Sun May 14, 2023   0913 Differential includes bowel obstructions pancreatitis cancer.  Diverticulitis.  Need to get CAT scans and blood work pain medicine. [JM]      ED Course User Index  [JM] Vishal Dawkins APRN                                  CT Abdomen Pelvis With Contrast   Final Result   Impression:   Fat stranding predominantly centered around the tail of the pancreas suggestive of developing pancreatitis. There is a single colonic diverticulum at the splenic flecture in this area as well though it does not appear inflamed or thick-walled and is    therefore unlikely to represent diverticulitis.      Mild mesenteric fat stranding may represent mild mesenteric panniculitis.            Electronically Signed: Antione Carpenter     5/14/2023 9:58 AM EDT     Workstation ID: [id number]                    Medical Decision Making  Acute pancreatitis, unspecified complication status, unspecified pancreatitis type: complicated acute illness or injury  Amount and/or Complexity of Data Reviewed  External Data Reviewed: labs, radiology, ECG and notes.  Labs:  ordered. Decision-making details documented in ED Course.  Radiology: ordered. Decision-making details documented in ED Course.  ECG/medicine tests: ordered. Decision-making details documented in ED Course.      Risk  Prescription drug management.  Decision regarding hospitalization.          Final diagnoses:   Acute pancreatitis, unspecified complication status, unspecified pancreatitis type       ED Disposition  ED Disposition     ED Disposition   Decision to Admit    Condition   --    Comment   --             No follow-up provider specified.       Medication List      No changes were made to your prescriptions during this visit.          Vishal Dawkins, APRN  05/14/23 1028

## 2023-05-15 VITALS
HEIGHT: 67 IN | DIASTOLIC BLOOD PRESSURE: 71 MMHG | OXYGEN SATURATION: 95 % | WEIGHT: 213 LBS | HEART RATE: 63 BPM | TEMPERATURE: 98.5 F | SYSTOLIC BLOOD PRESSURE: 116 MMHG | BODY MASS INDEX: 33.43 KG/M2 | RESPIRATION RATE: 16 BRPM

## 2023-05-15 PROBLEM — K85.90 ACUTE PANCREATITIS, UNSPECIFIED COMPLICATION STATUS, UNSPECIFIED PANCREATITIS TYPE: Status: RESOLVED | Noted: 2023-05-14 | Resolved: 2023-05-15

## 2023-05-15 PROCEDURE — 99238 HOSP IP/OBS DSCHRG MGMT 30/<: CPT | Performed by: PEDIATRICS

## 2023-05-15 RX ADMIN — SODIUM CHLORIDE, POTASSIUM CHLORIDE, SODIUM LACTATE AND CALCIUM CHLORIDE 100 ML/HR: 600; 310; 30; 20 INJECTION, SOLUTION INTRAVENOUS at 08:29

## 2023-05-15 RX ADMIN — HYDROXYUREA 1000 MG: 500 CAPSULE ORAL at 08:28

## 2023-05-15 RX ADMIN — SENNOSIDES AND DOCUSATE SODIUM 2 TABLET: 50; 8.6 TABLET ORAL at 08:29

## 2023-05-15 RX ADMIN — PRAVASTATIN SODIUM 40 MG: 40 TABLET ORAL at 08:29

## 2023-05-15 RX ADMIN — Medication 81 MG: at 08:29

## 2023-05-15 RX ADMIN — Medication 10 ML: at 08:30

## 2023-05-15 RX ADMIN — SERTRALINE 25 MG: 50 TABLET, FILM COATED ORAL at 08:29

## 2023-05-15 NOTE — DISCHARGE SUMMARY
[facility] Medicine Services  DISCHARGE SUMMARY    Patient Name: Gisell Wylie  : 1960  MRN: [id number]    Date of Admission: 2023  7:56 AM  Date of Discharge:  5/15/2023  Primary Care Physician: Zainab Childs MD    Consults     No orders found for last 30 day(s).          Hospital Course     Presenting Problem:   Acute pancreatitis, unspecified complication status, unspecified pancreatitis type [K85.90]    Active Hospital Problems    Diagnosis  POA   • Other hyperlipidemia [E78.49]  Yes   • History of DVT of lower extremity [Z86.718]  Not Applicable   • Polycythemia rubra vera [D45]  Yes      Resolved Hospital Problems    Diagnosis Date Resolved POA   • **Acute pancreatitis, unspecified complication status, unspecified pancreatitis type [K85.90] 05/15/2023 Yes          Hospital Course:  Gisell Wylie is a 62 y.o. female with a history of polycythemia vera, and history of DVT on Xarelto being admitted with acute abdominal pain secondary to pancreatitis.  Pt was treated with pain medications and IVF.  Symptoms resolved and tolerated a diet prior to d/c.  Recommend low fat and low residue diet for few days.  No clear underlying etiology was evident.   Recommend f/u with PCP.      Discharge Follow Up Recommendations for outpatient labs/diagnostics:   f/u with PCP    Day of Discharge     HPI:   Doing well. Not using pain meds.  Tolerating po.    Review of Systems  Gen- No fevers, chills  CV- No chest pain, palpitations  Resp- No cough, dyspnea  GI- No N/V/D, abd pain      Vital Signs:   Temp:  [97.9 °F (36.6 °C)-98.5 °F (36.9 °C)] 98.5 °F (36.9 °C)  Heart Rate:  [58-73] 63  Resp:  [16-18] 16  BP: (115-144)/(68-80) 116/71      Physical Exam:  Constitutional: No acute distress, awake, alert  HENT: NCAT, mucous membranes moist  Respiratory: Clear to auscultation bilaterally, respiratory effort normal   Cardiovascular: RRR, no murmurs, rubs, or  gallops  Gastrointestinal: Positive bowel sounds, soft, nontender, nondistended  Musculoskeletal: No bilateral ankle edema  Psychiatric: Appropriate affect, cooperative  Neurologic: Oriented x 3, strength symmetric in all extremities, Cranial Nerves grossly intact to confrontation, speech clear  Skin: No rashes      Pertinent  and/or Most Recent Results     LAB RESULTS:      Lab 05/14/23  0813   WBC 10.49   HEMOGLOBIN 14.9   HEMATOCRIT 43.7   PLATELETS 538*   NEUTROS ABS 8.44*   IMMATURE GRANS (ABS) 0.16*   LYMPHS ABS 0.95   MONOS ABS 0.72   EOS ABS 0.13   .9*   LACTATE 1.3         Lab 05/14/23  0813   SODIUM 139   POTASSIUM 4.9   CHLORIDE 106   CO2 24.0   ANION GAP 9.0   BUN 18   CREATININE 0.79   EGFR 83.6   GLUCOSE 164*   CALCIUM 9.9         Lab 05/14/23  0813   TOTAL PROTEIN 7.6   ALBUMIN 4.3   GLOBULIN 3.3   ALT (SGPT) 20   AST (SGOT) 21   BILIRUBIN 0.6   ALK PHOS 91   LIPASE 2,814*         Lab 05/14/23  0813   HSTROP T 7         Lab 05/14/23  0813   CHOLESTEROL 141   LDL CHOL 73   HDL CHOL 52   TRIGLYCERIDES 80             Brief Urine Lab Results  (Last result in the past 365 days)      Color   Clarity   Blood   Leuk Est   Nitrite   Protein   CREAT   Urine HCG        05/14/23 0840 Yellow   Cloudy   Trace   Negative   Negative   Trace               Microbiology Results (last 10 days)     ** No results found for the last 240 hours. **          CT Abdomen Pelvis With Contrast    Result Date: 5/14/2023  CT ABDOMEN PELVIS W CONTRAST Date of Exam: 5/14/2023 9:21 AM EDT Indication: LLQ abd pain. Comparison: None available. Technique: Axial CT images were obtained of the abdomen and pelvis following the uneventful intravenous administration of 95 mL Isovue-300. Reconstructed coronal and sagittal images were also obtained. Automated exposure control and iterative construction methods were used. Findings: Lower thorax:Lung bases are clear. No coronary artery calcifications seen in the visualized heart. No  pericardial effusion.  No evidence of pulmonary embolus in the visualized pulmonary arteries. Liver: No focal hepatic lesions seen.  Normal hepatic size. Hypodensity of the liver suggestive of steatosis. Gallbladder and bile ducts: Normal, nondistended appearance of the gallbladder.  No intra- or extra- hepatic biliary ductal dilatation. Spleen: Normal appearance of the spleen. Pancreas: Normal appearance of the pancreas. Main pancreatic duct is nondilated. Small amount of edema around the tail the pancreas and splenic flexure of the colon. Adrenals: Normal appearance of the adrenal glands. Kidneys: Normal appearance of the kidneys. Symmetric enhancement and excretion of contrast. No nephrolithiasis.  Normal caliber of the ureters. Bowel: Normal caliber of the bowels. Normal appearance of the appendix. Small amount of stranding around the splenic flecture with a single diverticulum seen in this area though it does not appear inflamed or thick-walled. Pelvis: Limited evaluation of partially distended bladder. Hysterectomy. Ovaries are not visualized. Peritoneum: No free air. No free fluid. Mild mesenteric edema may reflect panniculitis. Vessels: Normal aortic caliber.  No atherosclerotic calcification of the aorta. Celiac artery, splenic artery, superior mesenteric artery, inferior mesenteric artery, renal arteries and iliac arteries appear patent. Iliac veins, inferior vena cava, superior mesenteric vein, renal veins, portal vein and splenic vein are patent. Left common iliac vein stent is seen. Lymph nodes: Few mildly prominent mesenteric lymph nodes. Bones: No acute osseous abnormality. Degenerative changes of the spine. Soft tissues: Unremarkable appearance of the soft tissues.     Impression: Fat stranding predominantly centered around the tail of the pancreas suggestive of developing pancreatitis. There is a single colonic diverticulum at the splenic flecture in this area as well though it does not appear  inflamed or thick-walled and is therefore unlikely to represent diverticulitis. Mild mesenteric fat stranding may represent mild mesenteric panniculitis. Electronically Signed: Antione Carpenter  5/14/2023 9:58 AM EDT  Workstation ID: [id number]              Results for orders placed during the hospital encounter of 06/16/20    Adult Stress Echo W/ Cont or Stress Agent if Necessary Per Protocol    Interpretation Summary  · The rest EKG is normal. IRBBB is noted.  · The rest transthoracic echocardiogram reveals no diagnostic abnormalities, noting that the right heart was not assessed.  · The patient exercised for 6 minutes, completing stage II of a standard Matt protocol and attaining her 89% p.m. HR of 141 bpm before stopping because of fatigue and shortness of air.  · The patient had no exercise-induced angina or arrhythmias and EKG changes were not seen. The blood pressure response to exercise was normal.  · Transthoracic echocardiography done before and following exercise reveals an increase in rest EF from 63% to greater than 80% (estimated).  · There is exercised-induced augmentation of shortening in all LV segments.  · This is a NORMAL stress EKG/echocardiogram.      Plan for Follow-up of Pending Labs/Results:     Discharge Details        Discharge Medications      Continue These Medications      Instructions Start Date   aspirin 81 MG EC tablet   81 mg, Oral, Daily      hydroxyurea 500 MG capsule  Commonly known as: HYDREA   1,000 mg, Oral, Daily      pravastatin 40 MG tablet  Commonly known as: PRAVACHOL   40 mg, Oral, Daily      sertraline 25 MG tablet  Commonly known as: ZOLOFT   25 mg, Oral, Daily      Xarelto 20 MG tablet  Generic drug: rivaroxaban   20 mg, Oral, Every Evening             Allergies   Allergen Reactions   • Clarithromycin Other (See Comments)     [place]         Discharge Disposition:  Home or Self Care    Diet:  Hospital:  Diet Order   Procedures   • Diet: Gastrointestinal Diets;  Fiber-Restricted, Fat-Restricted; Texture: Regular Texture (IDDSI 7); Fluid Consistency: Thin (IDDSI 0)       Activity:      Restrictions or Other Recommendations:  As tolerated       CODE STATUS:    Code Status and Medical Interventions:   Ordered at: 05/14/23 1203     Level Of Support Discussed With:    Patient     Code Status (Patient has no pulse and is not breathing):    CPR (Attempt to Resuscitate)     Medical Interventions (Patient has pulse or is breathing):    Full Support       Future Appointments   Date Time Provider Department [place]   8/28/2023  9:00 AM Dottie Nova APRN [facility]                 Michelle Kaye MD  05/15/23      Time Spent on Discharge:  I spent  20  minutes on this discharge activity which included: face-to-face encounter with the patient, reviewing the data in the system, coordination of the care with the nursing staff as well as consultants, documentation, and entering orders.

## 2023-05-15 NOTE — PLAN OF CARE
Goal Outcome Evaluation:  Plan of Care Reviewed With: patient        Progress: improving  Outcome Evaluation: VSS. RA. NSR on the montior. Alert and oriented x4. No complaints of pain and nausea. Fluids discontinued this AM. Family at bedside. pt resting comfortably. No further complaints at this time.

## 2023-05-15 NOTE — DISCHARGE INSTRUCTIONS
Take all medications as prescribed.  Keep all follow up appointments as scheduled.  Seek medical care if any symptoms return or worsen.

## 2023-05-15 NOTE — PLAN OF CARE
Goal Outcome Evaluation:           Progress: improving         VSS at RA. Alert and oriented x4. Pt complaint of pain x1 overnight, with relief after PRN pain med administered. No complaints of nausea. Maintenance LR infusing.

## 2023-05-18 NOTE — PAYOR COMM NOTE
"Gisell Wylie (62 y.o. Female)     Date of Birth   1960    Social Security Number       Address   [address] [place] [address]    Home Phone   [phone]    MRN   [id number]       Scientology   None    Marital Status                               Admission Date   23    Admission Type   Emergency    Admitting Provider   Michelle Kaye MD    Attending Provider       Department, Room/Bed   [address], S212/1       Discharge Date   5/15/2023    Discharge Disposition   Home or Self Care    Discharge Destination                               Attending Provider: (none)   Allergies: Clarithromycin    Isolation: None   Infection: None   Code Status: Prior    Ht: 170.2 cm (67\")   Wt: 96.6 kg (213 lb)    Admission Cmt: None   Principal Problem: Acute pancreatitis, unspecified complication status, unspecified pancreatitis type [K85.90]                 Active Insurance as of 2023     Primary Coverage     Payor Plan Insurance Group Employer/Plan Group    ANTH BLUE CROSS ANTH BLUE CROSS BLUE SHIELD PPO [id number]     Payor Plan Address Payor Plan Phone Number Payor Plan Fax Number Effective Dates    [address] [phone]  2021 - None Entered    [place] [address]       Subscriber Name Subscriber Birth Date Member ID       TIARA WYLIE 1962 [id number]                 Emergency Contacts      (Rel.) Home Phone Work Phone Mobile Phone    TIARA WYLIE (Spouse) [phone] -- [phone]               History & Physical      Michelle Kaye MD at 23 [facility]              [facility] Medicine Services  HISTORY AND PHYSICAL    Patient Name: Gisell Wylie  : 1960  MRN: [id number]  Primary Care Physician: Zainab Childs MD  Date of admission: 2023      Subjective    Subjective     Chief Complaint:  abd pain    HPI:  Gisell Wylie is a 62 y.o. female with a hx of " polycythema vera, HLD, hx DVT 2/2 May Thurner syndrome presents with 2-3 day hx of LUQ, epigastric pain.  At times feels like a band across the abd, and int back pain as well.  No N/V/D.  No constipation.  No dysurea. No CP, SOB.  No fevers.  No history of extremely elevated triglycerides.  Denies any alcohol use.  No recent viral infection.  No postprandial abdominal pain that would indicate any gallbladder issues.      Review of Systems   Constitutional: Negative for activity change and fever.   HENT: Negative.    Respiratory: Negative.    Cardiovascular: Negative.    Gastrointestinal: Positive for abdominal pain.   Genitourinary: Negative.    Musculoskeletal: Negative.    Skin: Negative.    Neurological: Negative.    Psychiatric/Behavioral: Negative.    All other systems reviewed and are negative.       Personal History     Past Medical History:   Diagnosis Date   • Deep vein thrombophlebitis of calf, left              Past Surgical History:   Procedure Laterality Date   • BILATERAL BREAST REDUCTION     • CAROTID STENT     •  SECTION     • ELBOW FUSION     • HYSTERECTOMY     • ILIAC ARTERY STENT Left 2020    Mesh   • OOPHORECTOMY     • OTHER SURGICAL HISTORY  2020    Iliac vein carterization   • REDUCTION MAMMAPLASTY Bilateral    • TONSILLECTOMY         Family History: family history includes Atrial fibrillation in her mother; Breast cancer in her cousin and paternal grandmother; Colon cancer in her cousin and maternal aunt; Dementia in her mother; Diabetes in her mother; Heart disease in her mother; Leukemia in her paternal grandmother; Lung cancer in her cousin.     Social History:  reports that she has never smoked. She has never used smokeless tobacco. She reports that she does not drink alcohol and does not use drugs.  Social History     Social History Narrative   • Not on file       Medications:  Available home medication information reviewed.  (Not in a hospital admission)      Allergies    Allergen Reactions   • Clarithromycin Other (See Comments)     [place]mown       Objective    Objective     Vital Signs:   Temp:  [98.3 °F (36.8 °C)] 98.3 °F (36.8 °C)  Heart Rate:  [68-74] 68  Resp:  [16] 16  BP: (123-137)/(74-76) 123/76       Physical Exam   Constitutional: Awake, alert  Eyes: PERRLA, sclerae anicteric, no conjunctival injection  HENT: NCAT, mucous membranes moist  Neck: Supple, no thyromegaly, no lymphadenopathy, trachea midline  Respiratory: Clear to auscultation bilaterally, nonlabored respirations   Cardiovascular: RRR, no murmurs, rubs, or gallops, palpable pedal pulses bilaterally  Gastrointestinal: Positive bowel sounds, soft, tender to palp in LUQ and epigastric area, nondistended  Musculoskeletal: No bilateral ankle edema, no clubbing or cyanosis to extremities  Psychiatric: Appropriate affect, cooperative  Neurologic: Oriented x 3, strength symmetric in all extremities, Cranial Nerves grossly intact to confrontation, speech clear  Skin: No rashes      Result Review:  I have personally reviewed the results from the time of this admission to 5/14/2023 12:20 EDT and agree with these findings:  [x]  Laboratory list / accordion  []  Microbiology  []  Radiology  [x]  EKG/Telemetry -self interpretation- NSR.  []  Cardiology/Vascular   []  Pathology  [x]  Old records  []  Other:  Most notable findings include: Elevated lipase with stranding around tail of pancreas on CT A/P        LAB RESULTS:      Lab 05/14/23  0813   WBC 10.49   HEMOGLOBIN 14.9   HEMATOCRIT 43.7   PLATELETS 538*   NEUTROS ABS 8.44*   IMMATURE GRANS (ABS) 0.16*   LYMPHS ABS 0.95   MONOS ABS 0.72   EOS ABS 0.13   .9*   LACTATE 1.3         Lab 05/14/23  0813   SODIUM 139   POTASSIUM 4.9   CHLORIDE 106   CO2 24.0   ANION GAP 9.0   BUN 18   CREATININE 0.79   EGFR 83.6   GLUCOSE 164*   CALCIUM 9.9         Lab 05/14/23  0813   TOTAL PROTEIN 7.6   ALBUMIN 4.3   GLOBULIN 3.3   ALT (SGPT) 20   AST (SGOT) 21   BILIRUBIN 0.6   ALK  PHOS 91   LIPASE 2,814*         Lab 05/14/23  0813   HSTROP T 7                 UA        5/14/2023    08:40   Urinalysis   Squamous Epithelial Cells, UA 0-2     Specific Gravity, UA 1.016     Ketones, UA Negative     Blood, UA Trace     Leukocytes, UA Negative     Nitrite, UA Negative     RBC, UA 0-2     WBC, UA 0-2     Bacteria, UA None Seen         Microbiology Results (last 10 days)     ** No results found for the last 240 hours. **          CT Abdomen Pelvis With Contrast    Result Date: 5/14/2023  CT ABDOMEN PELVIS W CONTRAST Date of Exam: 5/14/2023 9:21 AM EDT Indication: LLQ abd pain. Comparison: None available. Technique: Axial CT images were obtained of the abdomen and pelvis following the uneventful intravenous administration of 95 mL Isovue-300. Reconstructed coronal and sagittal images were also obtained. Automated exposure control and iterative construction methods were used. Findings: Lower thorax:Lung bases are clear. No coronary artery calcifications seen in the visualized heart. No pericardial effusion.  No evidence of pulmonary embolus in the visualized pulmonary arteries. Liver: No focal hepatic lesions seen.  Normal hepatic size. Hypodensity of the liver suggestive of steatosis. Gallbladder and bile ducts: Normal, nondistended appearance of the gallbladder.  No intra- or extra- hepatic biliary ductal dilatation. Spleen: Normal appearance of the spleen. Pancreas: Normal appearance of the pancreas. Main pancreatic duct is nondilated. Small amount of edema around the tail the pancreas and splenic flexure of the colon. Adrenals: Normal appearance of the adrenal glands. Kidneys: Normal appearance of the kidneys. Symmetric enhancement and excretion of contrast. No nephrolithiasis.  Normal caliber of the ureters. Bowel: Normal caliber of the bowels. Normal appearance of the appendix. Small amount of stranding around the splenic flecture with a single diverticulum seen in this area though it does not  appear inflamed or thick-walled. Pelvis: Limited evaluation of partially distended bladder. Hysterectomy. Ovaries are not visualized. Peritoneum: No free air. No free fluid. Mild mesenteric edema may reflect panniculitis. Vessels: Normal aortic caliber.  No atherosclerotic calcification of the aorta. Celiac artery, splenic artery, superior mesenteric artery, inferior mesenteric artery, renal arteries and iliac arteries appear patent. Iliac veins, inferior vena cava, superior mesenteric vein, renal veins, portal vein and splenic vein are patent. Left common iliac vein stent is seen. Lymph nodes: Few mildly prominent mesenteric lymph nodes. Bones: No acute osseous abnormality. Degenerative changes of the spine. Soft tissues: Unremarkable appearance of the soft tissues.     Impression: Impression: Fat stranding predominantly centered around the tail of the pancreas suggestive of developing pancreatitis. There is a single colonic diverticulum at the splenic flecture in this area as well though it does not appear inflamed or thick-walled and is therefore unlikely to represent diverticulitis. Mild mesenteric fat stranding may represent mild mesenteric panniculitis. Electronically Signed: Antione Carpenter  5/14/2023 9:58 AM EDT  Workstation ID: [id number]      Results for orders placed during the hospital encounter of 06/16/20    Adult Stress Echo W/ Cont or Stress Agent if Necessary Per Protocol    Interpretation Summary  · The rest EKG is normal. IRBBB is noted.  · The rest transthoracic echocardiogram reveals no diagnostic abnormalities, noting that the right heart was not assessed.  · The patient exercised for 6 minutes, completing stage II of a standard Matt protocol and attaining her 89% p.m. HR of 141 bpm before stopping because of fatigue and shortness of air.  · The patient had no exercise-induced angina or arrhythmias and EKG changes were not seen. The blood pressure response to exercise was normal.  ·  Transthoracic echocardiography done before and following exercise reveals an increase in rest EF from 63% to greater than 80% (estimated).  · There is exercised-induced augmentation of shortening in all LV segments.  · This is a NORMAL stress EKG/echocardiogram.      Assessment & Plan   Assessment & Plan     Active Hospital Problems    Diagnosis  POA   • **Acute pancreatitis, unspecified complication status, unspecified pancreatitis type [K85.90]  Yes   • Other hyperlipidemia [E78.49]  Yes   • History of DVT of lower extremity [Z86.718]  Not Applicable   • Polycythemia rubra vera [D45]  Yes       62-year-old female here with a history of polycythemia vera, and history of DVT on Xarelto being admitted with acute abdominal pain secondary to pancreatitis.    Pancreatitis: Symptomatic treatment with pain medications.  On hydromorphone every 2 hours as needed for pain.  Continue IV fluids for hydration.  Patient so far has been tolerating her diet, but will place on clear liquid diet for now.  As needed Zofran as well.  LFTs are normal and no evidence of gallbladder disease on CT scan.  Will check a lipid panel.    Polycythemia vera: Continue patient's hydroxyurea.  Plt slightly elevated.    Hx DVT: cont xarelto    HLD: cont pravastatin    ELLIS:  On cpap,  to bring in machine.    Level 3 note: 2/3 of the below    1) Problem  Acute or chronic illnesses or injuries that may pose a threat to life or bodily function: pancreatitis    2) Data    3 of the following:  Prior external notes reviewed:  Test results reviewed:  Tests ordered:      Test reviewed:  Tests independently interpreted, see above read:     Outside group discussion: Discussed with ED provider    3) Risk  Parenteral controlled substances: dilaudid prn        DVT prophylaxis:  On AC      CODE STATUS:    Code Status and Medical Interventions:   Ordered at: 05/14/23 1203     Level Of Support Discussed With:    Patient     Code Status (Patient has no pulse  and is not breathing):    CPR (Attempt to Resuscitate)     Medical Interventions (Patient has pulse or is breathing):    Full Support       Expected Discharge (Expected Discharge Date: 5/15/2023; Expected Discharge Time:      Michelle Kaye MD  23      Electronically signed by Michelle Kaye MD at 23 1221       Physician Progress Notes (all)    No notes of this type exist for this encounter.            Discharge Summary      Michelle Kaye MD at 05/15/23 1237              [facility] Medicine Services  DISCHARGE SUMMARY    Patient Name: Gisell Wylie  : 1960  MRN: [id number]    Date of Admission: 2023  7:56 AM  Date of Discharge:  5/15/2023  Primary Care Physician: Zainab Childs MD    Consults     No orders found for last 30 day(s).          Hospital Course     Presenting Problem:   Acute pancreatitis, unspecified complication status, unspecified pancreatitis type [K85.90]    Active Hospital Problems    Diagnosis  POA   • Other hyperlipidemia [E78.49]  Yes   • History of DVT of lower extremity [Z86.718]  Not Applicable   • Polycythemia rubra vera [D45]  Yes      Resolved Hospital Problems    Diagnosis Date Resolved POA   • **Acute pancreatitis, unspecified complication status, unspecified pancreatitis type [K85.90] 05/15/2023 Yes          Hospital Course:  Gisell Wylie is a 62 y.o. female with a history of polycythemia vera, and history of DVT on Xarelto being admitted with acute abdominal pain secondary to pancreatitis.  Pt was treated with pain medications and IVF.  Symptoms resolved and tolerated a diet prior to d/c.  Recommend low fat and low residue diet for few days.  No clear underlying etiology was evident.   Recommend f/u with PCP.      Discharge Follow Up Recommendations for outpatient labs/diagnostics:   f/u with PCP    Day of Discharge     HPI:   Doing well. Not using pain meds.  Tolerating po.    Review of  Systems  Gen- No fevers, chills  CV- No chest pain, palpitations  Resp- No cough, dyspnea  GI- No N/V/D, abd pain      Vital Signs:   Temp:  [97.9 °F (36.6 °C)-98.5 °F (36.9 °C)] 98.5 °F (36.9 °C)  Heart Rate:  [58-73] 63  Resp:  [16-18] 16  BP: (115-144)/(68-80) 116/71      Physical Exam:  Constitutional: No acute distress, awake, alert  HENT: NCAT, mucous membranes moist  Respiratory: Clear to auscultation bilaterally, respiratory effort normal   Cardiovascular: RRR, no murmurs, rubs, or gallops  Gastrointestinal: Positive bowel sounds, soft, nontender, nondistended  Musculoskeletal: No bilateral ankle edema  Psychiatric: Appropriate affect, cooperative  Neurologic: Oriented x 3, strength symmetric in all extremities, Cranial Nerves grossly intact to confrontation, speech clear  Skin: No rashes      Pertinent  and/or Most Recent Results     LAB RESULTS:      Lab 05/14/23  0813   WBC 10.49   HEMOGLOBIN 14.9   HEMATOCRIT 43.7   PLATELETS 538*   NEUTROS ABS 8.44*   IMMATURE GRANS (ABS) 0.16*   LYMPHS ABS 0.95   MONOS ABS 0.72   EOS ABS 0.13   .9*   LACTATE 1.3         Lab 05/14/23  0813   SODIUM 139   POTASSIUM 4.9   CHLORIDE 106   CO2 24.0   ANION GAP 9.0   BUN 18   CREATININE 0.79   EGFR 83.6   GLUCOSE 164*   CALCIUM 9.9         Lab 05/14/23  0813   TOTAL PROTEIN 7.6   ALBUMIN 4.3   GLOBULIN 3.3   ALT (SGPT) 20   AST (SGOT) 21   BILIRUBIN 0.6   ALK PHOS 91   LIPASE 2,814*         Lab 05/14/23  0813   HSTROP T 7         Lab 05/14/23  0813   CHOLESTEROL 141   LDL CHOL 73   HDL CHOL 52   TRIGLYCERIDES 80             Brief Urine Lab Results  (Last result in the past 365 days)      Color   Clarity   Blood   Leuk Est   Nitrite   Protein   CREAT   Urine HCG        05/14/23 0840 Yellow   Cloudy   Trace   Negative   Negative   Trace               Microbiology Results (last 10 days)     ** No results found for the last 240 hours. **          CT Abdomen Pelvis With Contrast    Result Date: 5/14/2023  CT ABDOMEN PELVIS  W CONTRAST Date of Exam: 5/14/2023 9:21 AM EDT Indication: LLQ abd pain. Comparison: None available. Technique: Axial CT images were obtained of the abdomen and pelvis following the uneventful intravenous administration of 95 mL Isovue-300. Reconstructed coronal and sagittal images were also obtained. Automated exposure control and iterative construction methods were used. Findings: Lower thorax:Lung bases are clear. No coronary artery calcifications seen in the visualized heart. No pericardial effusion.  No evidence of pulmonary embolus in the visualized pulmonary arteries. Liver: No focal hepatic lesions seen.  Normal hepatic size. Hypodensity of the liver suggestive of steatosis. Gallbladder and bile ducts: Normal, nondistended appearance of the gallbladder.  No intra- or extra- hepatic biliary ductal dilatation. Spleen: Normal appearance of the spleen. Pancreas: Normal appearance of the pancreas. Main pancreatic duct is nondilated. Small amount of edema around the tail the pancreas and splenic flexure of the colon. Adrenals: Normal appearance of the adrenal glands. Kidneys: Normal appearance of the kidneys. Symmetric enhancement and excretion of contrast. No nephrolithiasis.  Normal caliber of the ureters. Bowel: Normal caliber of the bowels. Normal appearance of the appendix. Small amount of stranding around the splenic flecture with a single diverticulum seen in this area though it does not appear inflamed or thick-walled. Pelvis: Limited evaluation of partially distended bladder. Hysterectomy. Ovaries are not visualized. Peritoneum: No free air. No free fluid. Mild mesenteric edema may reflect panniculitis. Vessels: Normal aortic caliber.  No atherosclerotic calcification of the aorta. Celiac artery, splenic artery, superior mesenteric artery, inferior mesenteric artery, renal arteries and iliac arteries appear patent. Iliac veins, inferior vena cava, superior mesenteric vein, renal veins, portal vein and  splenic vein are patent. Left common iliac vein stent is seen. Lymph nodes: Few mildly prominent mesenteric lymph nodes. Bones: No acute osseous abnormality. Degenerative changes of the spine. Soft tissues: Unremarkable appearance of the soft tissues.     Impression: Fat stranding predominantly centered around the tail of the pancreas suggestive of developing pancreatitis. There is a single colonic diverticulum at the splenic flecture in this area as well though it does not appear inflamed or thick-walled and is therefore unlikely to represent diverticulitis. Mild mesenteric fat stranding may represent mild mesenteric panniculitis. Electronically Signed: Antione Carpenter  5/14/2023 9:58 AM EDT  Workstation ID: [id number]              Results for orders placed during the hospital encounter of 06/16/20    Adult Stress Echo W/ Cont or Stress Agent if Necessary Per Protocol    Interpretation Summary  · The rest EKG is normal. IRBBB is noted.  · The rest transthoracic echocardiogram reveals no diagnostic abnormalities, noting that the right heart was not assessed.  · The patient exercised for 6 minutes, completing stage II of a standard Matt protocol and attaining her 89% p.m. HR of 141 bpm before stopping because of fatigue and shortness of air.  · The patient had no exercise-induced angina or arrhythmias and EKG changes were not seen. The blood pressure response to exercise was normal.  · Transthoracic echocardiography done before and following exercise reveals an increase in rest EF from 63% to greater than 80% (estimated).  · There is exercised-induced augmentation of shortening in all LV segments.  · This is a NORMAL stress EKG/echocardiogram.      Plan for Follow-up of Pending Labs/Results:     Discharge Details        Discharge Medications      Continue These Medications      Instructions Start Date   aspirin 81 MG EC tablet   81 mg, Oral, Daily      hydroxyurea 500 MG capsule  Commonly known as: HYDREA   1,000 mg,  Oral, Daily      pravastatin 40 MG tablet  Commonly known as: PRAVACHOL   40 mg, Oral, Daily      sertraline 25 MG tablet  Commonly known as: ZOLOFT   25 mg, Oral, Daily      Xarelto 20 MG tablet  Generic drug: rivaroxaban   20 mg, Oral, Every Evening             Allergies   Allergen Reactions   • Clarithromycin Other (See Comments)     [place]mown         Discharge Disposition:  Home or Self Care    Diet:  Hospital:  Diet Order   Procedures   • Diet: Gastrointestinal Diets; Fiber-Restricted, Fat-Restricted; Texture: Regular Texture (IDDSI 7); Fluid Consistency: Thin (IDDSI 0)       Activity:      Restrictions or Other Recommendations:  As tolerated       CODE STATUS:    Code Status and Medical Interventions:   Ordered at: 05/14/23 1205     Level Of Support Discussed With:    Patient     Code Status (Patient has no pulse and is not breathing):    CPR (Attempt to Resuscitate)     Medical Interventions (Patient has pulse or is breathing):    Full Support       Future Appointments   Date Time Provider Department Center   8/28/2023  9:00 AM Dottie Nova APRN [facility]                 Michelle Kaye MD  05/15/23      Time Spent on Discharge:  I spent  20  minutes on this discharge activity which included: face-to-face encounter with the patient, reviewing the data in the system, coordination of the care with the nursing staff as well as consultants, documentation, and entering orders.            Electronically signed by Michelle Kaye MD at 05/15/23 3824

## 2023-08-21 ENCOUNTER — LAB (OUTPATIENT)
Dept: LAB | Facility: HOSPITAL | Age: 63
End: 2023-08-21
Payer: MEDICARE

## 2023-08-21 DIAGNOSIS — D45 POLYCYTHEMIA RUBRA VERA: ICD-10-CM

## 2023-08-21 LAB
BASOPHILS # BLD AUTO: 0.06 10*3/MM3 (ref 0–0.2)
BASOPHILS NFR BLD AUTO: 1 % (ref 0–1.5)
DEPRECATED RDW RBC AUTO: 56 FL (ref 37–54)
EOSINOPHIL # BLD AUTO: 0.16 10*3/MM3 (ref 0–0.4)
EOSINOPHIL NFR BLD AUTO: 2.7 % (ref 0.3–6.2)
ERYTHROCYTE [DISTWIDTH] IN BLOOD BY AUTOMATED COUNT: 14.6 % (ref 12.3–15.4)
HCT VFR BLD AUTO: 43.2 % (ref 34–46.6)
HGB BLD-MCNC: 14 G/DL (ref 12–15.9)
IMM GRANULOCYTES # BLD AUTO: 0.02 10*3/MM3 (ref 0–0.05)
IMM GRANULOCYTES NFR BLD AUTO: 0.3 % (ref 0–0.5)
LYMPHOCYTES # BLD AUTO: 0.71 10*3/MM3 (ref 0.7–3.1)
LYMPHOCYTES NFR BLD AUTO: 12 % (ref 19.6–45.3)
MCH RBC QN AUTO: 34.1 PG (ref 26.6–33)
MCHC RBC AUTO-ENTMCNC: 32.4 G/DL (ref 31.5–35.7)
MCV RBC AUTO: 105.1 FL (ref 79–97)
MONOCYTES # BLD AUTO: 0.23 10*3/MM3 (ref 0.1–0.9)
MONOCYTES NFR BLD AUTO: 3.9 % (ref 5–12)
NEUTROPHILS NFR BLD AUTO: 4.73 10*3/MM3 (ref 1.7–7)
NEUTROPHILS NFR BLD AUTO: 80.1 % (ref 42.7–76)
NRBC BLD AUTO-RTO: 0 /100 WBC (ref 0–0.2)
PLATELET # BLD AUTO: 532 10*3/MM3 (ref 140–450)
PMV BLD AUTO: 9.1 FL (ref 6–12)
RBC # BLD AUTO: 4.11 10*6/MM3 (ref 3.77–5.28)
WBC NRBC COR # BLD: 5.91 10*3/MM3 (ref 3.4–10.8)

## 2023-08-21 PROCEDURE — 36415 COLL VENOUS BLD VENIPUNCTURE: CPT

## 2023-08-21 PROCEDURE — 85025 COMPLETE CBC W/AUTO DIFF WBC: CPT

## 2023-08-21 NOTE — TELEPHONE ENCOUNTER
Caller: InessaGisell    Relationship: Self    Best call back number: [phone]    Requested Prescriptions:   Requested Prescriptions     Pending Prescriptions Disp Refills    hydroxyurea (HYDREA) 500 MG capsule 28 capsule 0     Sig: Take 2 capsules by mouth Daily.        Pharmacy where request should be sent: [facility]     Last office visit with prescribing clinician: 2/20/2023   Last telemedicine visit with prescribing clinician: Visit date not found   Next office visit with prescribing clinician: Visit date not found     Additional details provided by patient: NA    Does the patient have less than a 3 day supply:  [x] Yes  [] No    Would you like a call back once the refill request has been completed: [x] Yes [] No    If the office needs to give you a call back, can they leave a voicemail: [x] Yes [] No    Manuel Domingo Rep   08/21/23 13:33 EDT

## 2023-08-23 ENCOUNTER — SPECIALTY PHARMACY (OUTPATIENT)
Dept: ONCOLOGY | Facility: HOSPITAL | Age: 63
End: 2023-08-23
Payer: MEDICARE

## 2023-08-23 ENCOUNTER — HOSPITAL ENCOUNTER (OUTPATIENT)
Dept: ONCOLOGY | Facility: HOSPITAL | Age: 63
Discharge: HOME | End: 2023-08-23
Payer: MEDICARE

## 2023-08-23 DIAGNOSIS — D45 POLYCYTHEMIA RUBRA VERA: ICD-10-CM

## 2023-08-23 RX ORDER — HYDROXYUREA 500 MG/1
1000 CAPSULE ORAL DAILY
Qty: 28 CAPSULE | Refills: 0 | OUTPATIENT
Start: 2023-08-23

## 2023-08-23 RX ORDER — HYDROXYUREA 500 MG/1
1000 CAPSULE ORAL DAILY
Qty: 60 CAPSULE | Refills: 11 | Status: SHIPPED | OUTPATIENT
Start: 2023-08-23

## 2023-08-23 NOTE — PROGRESS NOTES
Specialty Pharmacy Patient Management Program  Oncology Initial Assessment       Gisell Wylie is a 63 y.o. female with PCV seen by an Oncology provider and enrolled in the Oncology Patient Management program offered by [facility].  An initial outreach was conducted, including assessment of therapy appropriateness and specialty medication education for hydroxyurea. The patient was introduced to services offered by [facility], including: regular assessments, refill coordination, curbside pick-up or mail order delivery options, prior authorization maintenance, and financial assistance programs as applicable. The patient was also provided with contact information for the pharmacy team.     Regimen: Hydroxyurea 1000mg PO daily + aspirin 81mg daily + Xarelto     Start date of oral specialty medication:  Patient has been on treatment.  We are converting patient to [employer] with a $0 copay.    Relevant Past Medical History, Comorbidities, and Vaccines  Relevant medical history and concomitant health conditions were discussed with the patient. The patient's chart has been reviewed for relevant past medical history and comorbid health conditions and updated as necessary.  Vaccines are coordinated by the patient's oncologist and primary care provider.  Past Medical History:   Diagnosis Date    Deep vein thrombophlebitis of calf, left      Social History     Socioeconomic History    Marital status:    Tobacco Use    Smoking status: Never    Smokeless tobacco: Never   Vaping Use    Vaping Use: Unknown   Substance and Sexual Activity    Alcohol use: No    Drug use: No    Sexual activity: Defer       Allergies  Known allergies and reactions were discussed with the patient. The patient's chart has been reviewed for allergy information and updated as necessary.   Allergies   Allergen Reactions    Clarithromycin Other (See Comments)     [place]        Current  Medication List  This medication list has been reviewed with the patient and evaluated for any interactions or necessary modifications/recommendations, and updated to include all prescription medications, OTC medications, and supplements the patient is currently taking.  This list reflects what is contained in the patient's profile, which has also been marked as reviewed to communicate to other providers it is the most up to date version of the patient's current medication therapy.   Prior to Admission medications    Medication Sig Start Date End Date Taking? Authorizing Provider   aspirin 81 MG EC tablet Take 1 tablet by mouth Daily.    Neelima Witt MD   hydroxyurea (HYDREA) 500 MG capsule Take 2 capsules by mouth Daily. 1/9/23   Yogesh Damon MD   pravastatin (PRAVACHOL) 40 MG tablet Take 1 tablet by mouth Daily. 6/4/19   Neelima Witt MD   sertraline (ZOLOFT) 25 MG tablet Take 1 tablet by mouth Daily. 8/21/20   Neelima Witt MD   XARELTO 20 MG tablet Take 1 tablet by mouth Every Evening. 8/12/19   Neelima Witt MD       Drug Interactions  Reviewed concomitant medications, allergies, labs, comorbidities/medical history, quality of life, and immunization history.   Drug-drug interactions noted and discussed during education: no significant drug interactions noted. . Reminded the patient to let us know before making any changes or starting any new prescription or OTC medications so we can first assess drug interactions.  Drug-food interactions noted and discussed during education.     Recommended Medications Assessment  VTE prophylaxis - Currently Taking The patient is currently on Aspirin 81mg mg PO daily and DOAC: Xarelto      Relevant Laboratory Values  Lab Results   Component Value Date    GLUCOSE 164 (H) 05/14/2023    CALCIUM 9.9 05/14/2023     05/14/2023    K 4.9 05/14/2023    CO2 24.0 05/14/2023     05/14/2023    BUN 18 05/14/2023    CREATININE 0.79 05/14/2023     EGFRIFAFRI 79 09/16/2020    EGFRIFNONA 66 02/07/2022    BCR 22.8 05/14/2023    ANIONGAP 9.0 05/14/2023     Lab Results   Component Value Date    WBC 5.91 08/21/2023    RBC 4.11 08/21/2023    HGB 14.0 08/21/2023    HCT 43.2 08/21/2023    .1 (H) 08/21/2023    MCH 34.1 (H) 08/21/2023    MCHC 32.4 08/21/2023    RDW 14.6 08/21/2023    RDWSD 56.0 (H) 08/21/2023    MPV 9.1 08/21/2023     (H) 08/21/2023    NEUTRORELPCT 80.1 (H) 08/21/2023    LYMPHORELPCT 12.0 (L) 08/21/2023    MONORELPCT 3.9 (L) 08/21/2023    EOSRELPCT 2.7 08/21/2023    BASORELPCT 1.0 08/21/2023    AUTOIGPER 0.3 08/21/2023    NEUTROABS 4.73 08/21/2023    LYMPHSABS 0.71 08/21/2023    MONOSABS 0.23 08/21/2023    EOSABS 0.16 08/21/2023    BASOSABS 0.06 08/21/2023    AUTOIGNUM 0.02 08/21/2023    NRBC 0.0 08/21/2023       The above labs have been reviewed. No dose adjustments are needed for the oral specialty medication(s) based on the labs.    Initial Education Provided for Specialty Medication  The patient has been provided with the following education. All questions and concerns have been addressed prior to the patient receiving the medication, and the patient has verbalized understanding of the education and any materials provided.  Additional patient education shall be provided and documented upon request by the patient, provider or payer.      Provided patient with:   Chemo calendar to help improve adherence., Education sheets about the medication, 24-hour clinic phone number and my contact information and instructions to call should additional questions arise.     Medication Education Sheets Provided: (select all that apply)  Oral Specialty Medication: Hydrea (hydroxyurea)  IV:  none  Steroid: None    Other Education Sheets Provided: (select all that apply)  Adherence and Symptom Tracker Sheet and AYDEE Information    TOPICS COMMENTS   Storage and Handling of Oral Specialty Medication Store in the original container, in a dry location out of  direct sunlight, and out of reach of children or pets. and Store at room temperature.  Discussed safe handling and what to do with any unused medication.   Administration of Oral Specialty Medication Take with or without food at the same time(s) each day., Take with a full glass of water., and Do not open or dissolve capsules, unless otherwise instructed by the pharmacist.   Adherence to Oral Specialty Regimen and Handling Missed Doses Patient is likely to have good treatment adherence; reinforced the importance of adherence. Reviewed how to address missed doses and encouraged the patient to let us know of any missed doses.   Anemia: role of RBC, cause, s/s, ways to manage, role of transfusion Reviewed the role of RBC and the use of transfusions if hemoglobin decreases too much.  Patient to notify us if she experiences shortness of breath, dizziness, or palpitations.  Also let patient know that she could feel more tired than usual and to try to stay active, but rest if she needs to.    Thrombocytopenia: role of platelet, cause, s/s, ways to prevent bleeding, things to avoid, when to seek help Reviewed the role of platelets in blood clotting and when to call clinic (bloody nose that bleeds for 5 minutes despite pressure, a cut that won't stop bleeding despite pressure, gums that bleed excessively with brushing or flossing). Recommended using an electric razor, soft bristle toothbrush, and blowing your nose gently.    Neutropenia: role of WBC, cause, infection precautions, s/s of infection, when to call MD Reviewed the role of WBC, good infection prevention practices, and when to call the clinic (temperature 100.4F, sore throat, burning urination, etc).     Diarrhea: causes, s/s of dehydration, ways to manage, dietary changes, when to call MD Chemotherapy : Discussed the risk of diarrhea. Instructed patient on use OTC loperamide with diarrhea onset, but emphasized the importance of calling the clinic if 4-6 episodes  in 24 hours not relieved by OTC loperamide.   Constipation: causes, ways to manage, dietary changes, when to call MD Provided supplementary handout with instructions for use of docusate and other OTC therapies to manage constipation.  Patient was instructed to call us if medications aren't working.   Nausea/Vomiting: cause, use of antiemetics, dietary changes, when to call MD Emetic risk: Minimal     Survivorship: distress, distress assessment, secondary malignancies, early/late effects, follow-up, social issues, social support Discussed the rare, but possible risk of secondary malignancies months to years after treatment, most commonly non-melanoma skin cancer.   Miscellaneous Financial Issues: $0 copay  Lab Draws: On or before day 1 of each cycle, no sooner than 3 days early.     Infertility and Sexuality:  causes, fertility preservation options, sexuality changes, ways to manage, importance of birth control Oral Oncology Therapy: Reviewed safe sex practices and the importance of minimizing exposure to body fluids while on oral oncology therapy., The patient is not of childbearing potential.   Home Care: how to manage bodily fluids Counseled on management of soiled linens and proper flush technique.  Discussed how to manage all the side effects at home and advised when to contact the MD office     Adherence and Self-Administration  Barriers to Patient Adherence and/or Self-Administration: no barriers identified  Methods for Supporting Patient Adherence and/or Self-Administration: dosing calendar  Expected duration of therapy: Until disease progression or intolerable toxicity    Goals of Therapy  Patient Goals of Therapy:   Consistently take medications as prescribed  Patient will adhere to medication regimen  Patient will report any medication side effects to healthcare provider  Clinical Goals:    Goals         Specialty Pharmacy General Goal (pt-stated)       Clinical goal/therapeutic target: disease control,  per the recent oncology clinic notes and labs.                   Support patient understanding of medication regimen  Ensure patient knows the pharmacy contact information  Schedule regular follow-up to monitor the treatment serious adverse events  Schedule regular follow-up to confirm medication adherence  Schedule regular follow-up to monitor disease progression or stability    Quality of Life Assessment   The patient's current (pre-therapy) quality of life was discussed with the patient. The QOL segment of this outreach has been reviewed and updated.   Quality of Life Score: 10/10    Reassessment Plan & Follow-Up  Pharmacist to perform regular reassessments no more than (6) months from the previous assessment.  Welcome information and patient satisfaction survey to be sent by retail team with patient's initial fill.  Care Coordinator to set up future refill outreaches, coordinate prescription delivery, and escalate clinical questions to pharmacist.     Additional Plans, Therapy Recommendations or Therapy Problems to Be Addressed: no further concerns     Attestation  I attest the patient was actively involved in and has agreed to the above plan of care. If the prescribed therapy is at any point deemed not appropriate based on the current or future assessments, a consultation will be initiated with the patient's specialty care provider to determine the best course of action. The revised plan of therapy will be documented along with any required assessments and/or additional patient education provided.     Rose Coffey PharmD, [facility]  Clinical Oncology Pharmacy Specialist  Phone: [phone]      Date and Time: 8/23/2023  14:49 EDT

## 2023-08-28 ENCOUNTER — OFFICE VISIT (OUTPATIENT)
Dept: ONCOLOGY | Facility: CLINIC | Age: 63
End: 2023-08-28
Payer: MEDICARE

## 2023-08-28 ENCOUNTER — SPECIALTY PHARMACY (OUTPATIENT)
Dept: ONCOLOGY | Facility: HOSPITAL | Age: 63
End: 2023-08-28
Payer: MEDICARE

## 2023-08-28 VITALS
RESPIRATION RATE: 18 BRPM | WEIGHT: 218 LBS | BODY MASS INDEX: 34.21 KG/M2 | OXYGEN SATURATION: 95 % | DIASTOLIC BLOOD PRESSURE: 67 MMHG | SYSTOLIC BLOOD PRESSURE: 127 MMHG | HEART RATE: 66 BPM | HEIGHT: 67 IN | TEMPERATURE: 97.6 F

## 2023-08-28 DIAGNOSIS — D45 POLYCYTHEMIA RUBRA VERA: Primary | Chronic | ICD-10-CM

## 2023-08-28 DIAGNOSIS — Z79.899 ENCOUNTER FOR LONG-TERM (CURRENT) USE OF HIGH-RISK MEDICATION: ICD-10-CM

## 2023-08-28 DIAGNOSIS — Z86.718 HISTORY OF DVT OF LOWER EXTREMITY: ICD-10-CM

## 2023-08-28 PROCEDURE — 1159F MED LIST DOCD IN RCRD: CPT | Performed by: NURSE PRACTITIONER

## 2023-08-28 PROCEDURE — 1160F RVW MEDS BY RX/DR IN RCRD: CPT | Performed by: NURSE PRACTITIONER

## 2023-08-28 PROCEDURE — 99214 OFFICE O/P EST MOD 30 MIN: CPT | Performed by: NURSE PRACTITIONER

## 2023-08-28 PROCEDURE — 1126F AMNT PAIN NOTED NONE PRSNT: CPT | Performed by: NURSE PRACTITIONER

## 2023-08-28 NOTE — PROGRESS NOTES
CHIEF COMPLAINT: Chronic back pain    Problem List:  Oncology/Hematology History Overview Note   1.  Collin 2+ polycythemia rubra vera  2.  Recurrent DVT and PE due to problem #2 with low titer positive lupus anticoagulant per prior testing by Dr. Major apparently on 2 separate occasions confirmatory 3 months apart  3.  May Thurner  4.  Asending aortic aneurysm  5.  History of SOLOMON  6.  Obstructive sleep apnea  7.  Contrast-induced nephropathy  8.  Reflux with nausea  9.  Pancreatitis, hospitalized May 2023, treated with pain medications and IV fluids.    -8/2/2019 initial [facility] medical oncology consultation: May 2016 developed unprovoked left lower extremity deep vein thrombosis treated with left common iliac stent, thrombolyzes, temporary vena cava filter placement and retrieval.  Saw hematologist who told her she might have a low level lupus anticoagulant but she does not have that data and they were told that they did not think that was significant enough to warrant lifetime anticoagulation.  She stopped Xarelto after 8 months.  Starting the end of June she was developing pain in the left lower extremity again with progressive swelling and shortness of breath.  Went to [facility] ER on 4 July.  Was found to have left lower extremity extensive deep vein thrombosis and right lower lobe pulmonary embolus.  She was treated with thrombectomy and thrombolysis by Rigoberto and Pavel.  Previously she could not afford her Xarelto and they have switched her from heparin to Coumadin but before discharge switched her to Xarelto.  Her creatinine during her hospitalization increased to 1.38 from a baseline of 1.2.  She comes to hematology at [facility] on 8/2/2019 with labs showing creatinine of 1.23 on 7/11/2019, 1.69 on 7/17/2019, and she says that it was repeated again but she does not have that lab and was told that it was back down but not at normal.  Her CBC reveals platelets of 697,000 on 7/17/2019 and 589,000 on  7/10/2019.Labs from 7/10/2019 revealed platelet count of 490,000 with hematocrit 54.7%.  Creatinine on 7/3/2019 was 0.92.  She understandably had a d-dimer elevation to 1.22 on 7/3/2019 with her clot.  Given the magnitude of her clot and the likelihood of may Thurner syndrome, she needs lifetime anticoagulation.  On patients with deep vein thrombosis and/or PE and indefinite anticoagulation, use of Xarelto in patients with GFR less than 30 is recommended to be avoided varied will need to watch that.  With her prior recent elevation of hematocrit and persistent elevation of platelets, we will continue to watch that and if they remain persistent suggesting this being more than just reactive to her PE and clots, then we need to look for myeloproliferative disorder contributing to her clotting.  She is going to get her records from her other hematologist she saw back in 2016 just for completeness sake but I would not repeat, nor would I do for the first time now, her hypercoagulable panel as any hypercoagulable disorder we stumbled upon would not alter her need for lifetime full dose anticoagulation.  The only question is which drug, and whether her creatinine will allow DOAC's.  I will check her BMP weekly for the next month to make sure her creatinine is doing adequate relative to her use of Xarelto and will get her blood count now and again prior to return in a month and if the platelets are remaining elevated we will do Collin 2 with reflex testing.  Since her discharge from the hospital she had significant nausea for which she had an ultrasound that showed fatty liver but normal gallbladder.  She had an EGD in the past few days with Dr. Heath that showed significant reflux and on Protonix her nausea has improved.  She will need close follow-up of her ascending aortic aneurysm as well with her thoracic surgeons.    -9/9/2019 medical oncology follow-up: Platelets persistently elevated 807,000 with hematocrit 52.7% and  white count 8320 on 8/30/2019.  GFR staying over 59 on Xarelto.  We will check Collin 2 with reflex testing and treat with Hydrea plus or minus phlebotomy if we confirm myeloproliferative disorder.  Will need bone marrow before start of that to assess for myelofibrosis if this is confirmed.  Records from Dr.Scott Major of 6/7/2016 mention that she had been anemic during her hospitalization.  Factor V Leiden, prothrombin mutation, Antithrombin III, protein C&S levels were all normal and the anticardiolipin and beta-2 microglobulin were all negative according to Dr. Major.  The lupus anticoagulant was borderline positive at 10.5 with him and he recommended repeat of that done 3 months apart to prove the diagnosis but this came back low positive according to his notes and he suggested switching to aspirin after a full 6 months of anticoagulation.  This was of course before the low-dose DOAC trials have been published she had had thrombolytics and stenting of her May Thurner according to his records.  Given her presence of May Thurner despite the stenting, I would be inclined towards keeping her on anticoagulants regardless.  She brings to me records that includes labs back to 2016 where her white count, hemoglobin and platelets were normal but as recently as June 2019 her hematocrit was 52% whereas back in May 2016 her hemoglobin was actually down in the eights with normochromic normocytic indices.  On Xarelto, there is increased risk with aspirin I will hold on that for present but we may have to make such a decision as Xarelto may not be sufficient for viscosity issues related to her elevated platelets and hematocrit if this is a myeloproliferative disorder.    -9/30/2019 [facility] hematology oncology follow-up visit: 9/9/2019 Collin 2 [id number] mutation positive with erythropoietin level low at 1 commensurate with polycythemia rubra vera and a hematocrit of 53% with platelets of 807,000 and normal white count.  Normally would  do bone marrow to look for myelofibrosis but would not risk taking her off Xarelto at the moment.  We will start her on Hydrea 500 mg daily with CBC weekly and if we get her hematocrit below 45% platelets in the 400,000 range without significant leukopenia that would be my goal.  No phlebotomies for now until we see what Hydrea does.  After we see her back, if her platelets are down and tolerating Hydrea, I will consider stopping the Xarelto and switching her to full dose aspirin for life for the PRV.  This PRV did not cause her original venous thromboembolic phenomenon in fact was anemic when Dr. Major saw her in 2016.  We talked about the potential natural history of PRV including spent phase, leukemic transformation, myelofibrosis.  She will need AAA follow-up with surgeons.    -1/15/2021 [facility] hematology follow-up: 1/8/2021 white count 5790 with hemoglobin 12.6 and platelets 392,000 stable over the last 5 months but her last CBC prior to this was 4 months ago.  CMP unremarkable.  With chronic left lower extremity swelling and ultrasound showing left iliac venous stent obstruction/thrombosis with venous stent and angioplasty by vascular surgery 5/4/2020 as well as a grade 4 left greater saphenous vein reflux for which saphenous vein ablation and phlebectomy performed 6/26/2020, she will continue chronic Xarelto for this and baby aspirin for the JAK2 positive polycythemia rubra vera.  There is increased risk of bleeding but the DOAC does not necessarily mitigate the risk of thrombosis from the JAK2 positive myeloproliferative disorder and the aspirin does not necessarily obviate the need for Xarelto for her May Thurner syndrome venous obstructions occurring despite good control of her blood counts on Hydrea.  On dual DOAC and antiplatelet therapy, it is all the more important to watch her blood counts monthly and I will have her do a video visit with my nurse practitioner in about 3 months.  I would also ask  her to be sure her vascular surgeons are following her history of abdominal aortic aneurysm with serial imaging which I would not do routinely for her myeloproliferative disorder which I am managing.  As stated previously, I do not think myeloproliferative disorder caused her venous thromboembolic phenomena and in fact her original thromboembolic event occurred when she was anemic under the care of Dr. Major in 2016.    -7/29/2021 [facility] hematology telemedicine clinic follow-up: Gisell continues to do well, tolerating Hydrea 1000 mg daily along with Xarelto and aspirin 81 mg.  She has had no bleeding events.  We will continue therapy unchanged.  CBC for the past 2 months looks good with no anemia, WBC normal, platelet count normal.  I reminded her to get her CBC monthly as we wanted to watch her more closely as she is on not only Hydrea but blood thinner with Xarelto along with aspirin putting her more at risk.  She states understanding and will do her CBC monthly, new order entered.    -2/7/2022 [facility] hematology oncology follow-up visit: Today's platelets are 588,000 with hemoglobin 13.5 hematocrit 41.3% with white count 5800.  CMP is pending.  She did not get her CBC monthly as ordered last time and as she is on Xarelto and aspirin that is important.  I will not increase her Hydrea from the 1000 mg dose despite her platelets running a little higher but if they continue to rise we will have to be cautious of acquired von Willebrand's disease which actually further increases her bleeding risk.    -8/15/2022 [facility] Hematology clinic follow-up: Gisell continues to do well on Hydrea 1000 mg daily, I have reviewed her CBCs for the past 6 months and her platelet count overall looks good, most recent platelet count on 8/2/2022 was 455,000, in April her platelet count was normal at 444,000, it has never been higher than 530,000 since February.  She has been taking turmeric and whether or not this is helping it is  hard to say but there is no contraindication to her taking turmeric.  She will continue Hydrea 1000 mg daily unchanged along with aspirin 81 mg daily, she also continues on Xarelto 20 mg daily.  She has had no unusual bleeding, no further clotting events.  We will continue to monitor CBC monthly.  We will see her back in 6 months for follow-up.    -2023 [facility] hematology oncology follow-up: 2023 hemoglobin 13 white count 5240 with platelets 436,000 with .7.  We will get her CBC today but her counts have been stable 1000 mg Hydrea, aspirin, and Xarelto for the better part of a year now and we will see her twice a year with CBC once a quarter.     May-Thurner syndrome   2019 Initial Diagnosis    May-Thurner syndrome     Polycythemia rubra vera   2019 Initial Diagnosis    Polycythemia rubra vera (CMS/Formerly [facility])         HISTORY OF PRESENT ILLNESS:  The patient is a 63 y.o. female, here for follow up on management of PRV on therapy with aspirin 81 mg daily and Hydrea 1000 mg daily.  Gisell reports she was hospitalized in May with pancreatitis, she was treated with pain medications and IV fluids and no further episodes.  Currently with no new somatic complaints.  She has chronic back pain for which she takes Mobic as needed.  She is asking about this since she is on Xarelto.  She states that she does not take Mobic daily, it does help her back pain when she takes it.  She has had no further clotting events, no abnormal bleeding.  She takes over-the-counter acid reflux medication.    Past Medical History:   Diagnosis Date    Deep vein thrombophlebitis of calf, left      Past Surgical History:   Procedure Laterality Date    BILATERAL BREAST REDUCTION      CAROTID STENT       SECTION      ELBOW FUSION      HYSTERECTOMY      ILIAC ARTERY STENT Left 2020    Mesh    OOPHORECTOMY      OTHER SURGICAL HISTORY  2020    Iliac vein carterization    REDUCTION MAMMAPLASTY Bilateral      "TONSILLECTOMY         Allergies   Allergen Reactions    Clarithromycin Other (See Comments)     [place]       Family History and Social History reviewed and changed as necessary    REVIEW OF SYSTEM:   Chronic back pain otherwise no new somatic complaints    PHYSICAL EXAM:  Well-developed, well-nourished female in no distress  Respirations regular nonlabored, lungs clear  No lower extremity swelling or current complications visible from her May Thurner    Vitals:    08/28/23 0850   BP: 127/67   Pulse: 66   Resp: 18   Temp: 97.6 øF (36.4 øC)   SpO2: 95%   Weight: 98.9 kg (218 lb)   Height: 170.2 cm (67\")     Vitals:    08/28/23 0850   PainSc: 0-No pain          ECOG score: 0           Vitals reviewed.  Labs reviewed, also reviewed records from hospitalizations May 2023 discharge summary related to admission for pancreatitis    ECOG: (0) Fully Active - Able to Carry On All Pre-disease Performance Without Restriction    Lab Results   Component Value Date    HGB 14.0 08/21/2023    HCT 43.2 08/21/2023    .1 (H) 08/21/2023     (H) 08/21/2023    WBC 5.91 08/21/2023    NEUTROABS 4.73 08/21/2023    LYMPHSABS 0.71 08/21/2023    MONOSABS 0.23 08/21/2023    EOSABS 0.16 08/21/2023    BASOSABS 0.06 08/21/2023       Lab Results   Component Value Date    GLUCOSE 164 (H) 05/14/2023    BUN 18 05/14/2023    CREATININE 0.79 05/14/2023     05/14/2023    K 4.9 05/14/2023     05/14/2023    CO2 24.0 05/14/2023    CALCIUM 9.9 05/14/2023    PROTEINTOT 7.6 05/14/2023    ALBUMIN 4.3 05/14/2023    BILITOT 0.6 05/14/2023    ALKPHOS 91 05/14/2023    AST 21 05/14/2023    ALT 20 05/14/2023             ASSESSMENT & PLAN:  1.  Collin 2+ polycythemia rubra vera  2.  Recurrent DVT and PE due to problem #2 with low titer positive lupus anticoagulant per prior testing by Dr. Major apparently on 2 separate occasions confirmatory 3 months apart  3.  May Mehnazer  4.  Asending aortic aneurysm  5.  History of SOLOMON  6.  Obstructive sleep " apnea  7.  Contrast-induced nephropathy  8.  Reflux with nausea  9.  Pancreatitis, hospitalized May 2023, treated with pain medications and IV fluids    -8/2/2019 initial [facility] medical oncology consultation: May 2016 developed unprovoked left lower extremity deep vein thrombosis treated with left common iliac stent, thrombolyzes, temporary vena cava filter placement and retrieval.  Saw hematologist who told her she might have a low level lupus anticoagulant but she does not have that data and they were told that they did not think that was significant enough to warrant lifetime anticoagulation.  She stopped Xarelto after 8 months.  Starting the end of June she was developing pain in the left lower extremity again with progressive swelling and shortness of breath.  Went to [facility] ER on 4 July.  Was found to have left lower extremity extensive deep vein thrombosis and right lower lobe pulmonary embolus.  She was treated with thrombectomy and thrombolysis by Rigoberto and Pavel.  Previously she could not afford her Xarelto and they have switched her from heparin to Coumadin but before discharge switched her to Xarelto.  Her creatinine during her hospitalization increased to 1.38 from a baseline of 1.2.  She comes to hematology at [facility] on 8/2/2019 with labs showing creatinine of 1.23 on 7/11/2019, 1.69 on 7/17/2019, and she says that it was repeated again but she does not have that lab and was told that it was back down but not at normal.  Her CBC reveals platelets of 697,000 on 7/17/2019 and 589,000 on 7/10/2019.Labs from 7/10/2019 revealed platelet count of 490,000 with hematocrit 54.7%.  Creatinine on 7/3/2019 was 0.92.  She understandably had a d-dimer elevation to 1.22 on 7/3/2019 with her clot.  Given the magnitude of her clot and the likelihood of may Thurner syndrome, she needs lifetime anticoagulation.  On patients with deep vein thrombosis and/or PE and indefinite anticoagulation, use of  Xarelto in patients with GFR less than 30 is recommended to be avoided varied will need to watch that.  With her prior recent elevation of hematocrit and persistent elevation of platelets, we will continue to watch that and if they remain persistent suggesting this being more than just reactive to her PE and clots, then we need to look for myeloproliferative disorder contributing to her clotting.  She is going to get her records from her other hematologist she saw back in 2016 just for completeness sake but I would not repeat, nor would I do for the first time now, her hypercoagulable panel as any hypercoagulable disorder we stumbled upon would not alter her need for lifetime full dose anticoagulation.  The only question is which drug, and whether her creatinine will allow DOAC's.  I will check her BMP weekly for the next month to make sure her creatinine is doing adequate relative to her use of Xarelto and will get her blood count now and again prior to return in a month and if the platelets are remaining elevated we will do Collin 2 with reflex testing.  Since her discharge from the hospital she had significant nausea for which she had an ultrasound that showed fatty liver but normal gallbladder.  She had an EGD in the past few days with Dr. Heath that showed significant reflux and on Protonix her nausea has improved.  She will need close follow-up of her ascending aortic aneurysm as well with her thoracic surgeons.    -9/9/2019 medical oncology follow-up: Platelets persistently elevated 807,000 with hematocrit 52.7% and white count 8320 on 8/30/2019.  GFR staying over 59 on Xarelto.  We will check Collin 2 with reflex testing and treat with Hydrea plus or minus phlebotomy if we confirm myeloproliferative disorder.  Will need bone marrow before start of that to assess for myelofibrosis if this is confirmed.  Records from Dr.Scott Major of 6/7/2016 mention that she had been anemic during her hospitalization.  Factor V  Leiden, prothrombin mutation, Antithrombin III, protein C&S levels were all normal and the anticardiolipin and beta-2 microglobulin were all negative according to Dr. Major.  The lupus anticoagulant was borderline positive at 10.5 with him and he recommended repeat of that done 3 months apart to prove the diagnosis but this came back low positive according to his notes and he suggested switching to aspirin after a full 6 months of anticoagulation.  This was of course before the low-dose DOAC trials have been published she had had thrombolytics and stenting of her May Thurner according to his records.  Given her presence of May Thurner despite the stenting, I would be inclined towards keeping her on anticoagulants regardless.  She brings to me records that includes labs back to 2016 where her white count, hemoglobin and platelets were normal but as recently as June 2019 her hematocrit was 52% whereas back in May 2016 her hemoglobin was actually down in the eights with normochromic normocytic indices.  On Xarelto, there is increased risk with aspirin I will hold on that for present but we may have to make such a decision as Xarelto may not be sufficient for viscosity issues related to her elevated platelets and hematocrit if this is a myeloproliferative disorder.    -9/30/2019 [facility] hematology oncology follow-up visit: 9/9/2019 Collin 2 V617F mutation positive with erythropoietin level low at 1 commensurate with polycythemia rubra vera and a hematocrit of 53% with platelets of 807,000 and normal white count.  Normally would do bone marrow to look for myelofibrosis but would not risk taking her off Xarelto at the moment.  We will start her on Hydrea 500 mg daily with CBC weekly and if we get her hematocrit below 45% platelets in the 400,000 range without significant leukopenia that would be my goal.  No phlebotomies for now until we see what Hydrea does.  After we see her back, if her platelets are down and tolerating  Hydrea, I will consider stopping the Xarelto and switching her to full dose aspirin for life for the PRV.  This PRV did not cause her original venous thromboembolic phenomenon in fact was anemic when Dr. Major saw her in 2016.  We talked about the potential natural history of PRV including spent phase, leukemic transformation, myelofibrosis.  She will need AAA follow-up with surgeons.    -1/15/2021 [facility] [place] hematology follow-up: 1/8/2021 white count 5790 with hemoglobin 12.6 and platelets 392,000 stable over the last 5 months but her last CBC prior to this was 4 months ago.  CMP unremarkable.  With chronic left lower extremity swelling and ultrasound showing left iliac venous stent obstruction/thrombosis with venous stent and angioplasty by vascular surgery 5/4/2020 as well as a grade 4 left greater saphenous vein reflux for which saphenous vein ablation and phlebectomy performed 6/26/2020, she will continue chronic Xarelto for this and baby aspirin for the JAK2 positive polycythemia rubra vera.  There is increased risk of bleeding but the DOAC does not necessarily mitigate the risk of thrombosis from the JAK2 positive myeloproliferative disorder and the aspirin does not necessarily obviate the need for Xarelto for her May Thurner syndrome venous obstructions occurring despite good control of her blood counts on Hydrea.  On dual DOAC and antiplatelet therapy, it is all the more important to watch her blood counts monthly and I will have her do a video visit with my nurse practitioner in about 3 months.  I would also ask her to be sure her vascular surgeons are following her history of abdominal aortic aneurysm with serial imaging which I would not do routinely for her myeloproliferative disorder which I am managing.  As stated previously, I do not think myeloproliferative disorder caused her venous thromboembolic phenomena and in fact her original thromboembolic event occurred when she was anemic under the care of  Dr. Major in 2016.    -7/29/2021 [facility] hematology telemedicine clinic follow-up: Gisell continues to do well, tolerating Hydrea 1000 mg daily along with Xarelto and aspirin 81 mg.  She has had no bleeding events.  We will continue therapy unchanged.  CBC for the past 2 months looks good with no anemia, WBC normal, platelet count normal.  I reminded her to get her CBC monthly as we wanted to watch her more closely as she is on not only Hydrea but blood thinner with Xarelto along with aspirin putting her more at risk.  She states understanding and will do her CBC monthly, new order entered.    -2/7/2022 [facility] hematology oncology follow-up visit: Today's platelets are 588,000 with hemoglobin 13.5 hematocrit 41.3% with white count 5800.  CMP is pending.  She did not get her CBC monthly as ordered last time and as she is on Xarelto and aspirin that is important.  I will not increase her Hydrea from the 1000 mg dose despite her platelets running a little higher but if they continue to rise we will have to be cautious of acquired von Willebrand's disease which actually further increases her bleeding risk.    -8/15/2022 [facility] Hematology clinic follow-up: Gisell continues to do well on Hydrea 1000 mg daily, I have reviewed her CBCs for the past 6 months and her platelet count overall looks good, most recent platelet count on 8/2/2022 was 455,000, in April her platelet count was normal at 444,000, it has never been higher than 530,000 since February.  She has been taking turmeric and whether or not this is helping it is hard to say but there is no contraindication to her taking turmeric.  She will continue Hydrea 1000 mg daily unchanged along with aspirin 81 mg daily, she also continues on Xarelto 20 mg daily.  She has had no unusual bleeding, no further clotting events.  We will continue to monitor CBC monthly.  We will see her back in 6 months for follow-up.    -2/20/2023 [facility] hematology oncology follow-up:  1/30/2023 hemoglobin 13 white count 5240 with platelets 436,000 with .7.  We will get her CBC today but her counts have been stable 1000 mg Hydrea, aspirin, and Xarelto for the better part of a year now and we will see her twice a year with CBC once a quarter.    -8/28/2023 [facility] Hematology/Oncology clinic follow-up: Gisell is doing well on Hydrea 1000 mg daily along with aspirin 81 mg daily.  CBC from 8/21/2023 with normal WBC 5.91, hemoglobin 14, hematocrit 43.2%, platelet count 532,000.  Would not recommend any adjustments in her Hydrea at this time.  We will continue to monitor CBC quarterly and I have ordered that today.  She remains on Xarelto for lifetime, she has had no further clotting events, no abnormal bleeding.  We did discuss the pros and cons of Mobic today, she takes this occasionally for her chronic back pain.  She understands to avoid any other NSAID, she can take Tylenol as needed.  She takes over-the-counter reflux medication.  She understands there is slight increased risk of bleeding with Mobic since she is on Xarelto but currently she is just taking it as needed and benefit with relief of her pain is worth it.    Return to clinic in 6 months for follow-up    This was a level 4, moderate MDM with 2 stable chronic illnesses and prescription drug management.    Dottie Nova, APRN    08/28/2023

## 2023-09-06 ENCOUNTER — TRANSCRIBE ORDERS (OUTPATIENT)
Dept: ADMINISTRATIVE | Facility: HOSPITAL | Age: 63
End: 2023-09-06
Payer: MEDICARE

## 2023-09-06 DIAGNOSIS — Z12.31 VISIT FOR SCREENING MAMMOGRAM: Primary | ICD-10-CM

## 2023-09-18 ENCOUNTER — SPECIALTY PHARMACY (OUTPATIENT)
Dept: ONCOLOGY | Facility: HOSPITAL | Age: 63
End: 2023-09-18
Payer: MEDICARE

## 2023-09-18 NOTE — PROGRESS NOTES
Specialty Pharmacy Refill Coordination Note     Gisell is a 63 y.o. female contacted today regarding refills of  hydroxyurea 1000mg PO QD specialty medication(s).      Specialty medication(s) and dose(s) confirmed: yes    Refill Questions      Flowsheet Row Most Recent Value   Changes to allergies? No   Changes to medications? No   New conditions since last clinic visit No   Unplanned office visit, urgent care, ED, or hospital admission in the last 4 weeks  No   How does patient/caregiver feel medication is working? Good   Financial problems or insurance changes  No   Since the previous refill, were any specialty medication doses or scheduled injections missed or delayed?  No   Does this patient require a clinical escalation to a pharmacist? No            Delivery Questions      Flowsheet Row Most Recent Value   Delivery method [employer]Ex   Delivery address correct? Yes   Delivery phone number [phone]   Preferred delivery time? Anytime   Number of medications in delivery 1   Medication being filled and delivered hydroxyurea   Doses left of specialty medications 4 days left   Is there any medication that is due not being filled? No   Supplies needed? No supplies needed   Cooler needed? No   Do any medications need mixed or dated? No   Additional comments no copay   Questions or concerns for the pharmacist? No   Explain any questions or concerns for the pharmacist n/a   Are any medications first time fills? No              Medication Adherence    Adherence tools used: watch          Follow-up: 28 day(s)     Anita Persaud, Pharmacy Technician  Specialty Pharmacy Technician

## 2023-10-13 ENCOUNTER — SPECIALTY PHARMACY (OUTPATIENT)
Dept: ONCOLOGY | Facility: HOSPITAL | Age: 63
End: 2023-10-13
Payer: MEDICARE

## 2023-10-13 NOTE — PROGRESS NOTES
Specialty Pharmacy Refill Coordination Note     Gisell is a 63 y.o. female contacted today regarding refills of  Hydrea 1000mg PO QD specialty medication(s).    Medication will be shipped out on 10/16/23 because of the weekend.     Specialty medication(s) and dose(s) confirmed: yes    Refill Questions      Flowsheet Row Most Recent Value   Changes to allergies? No   Changes to medications? No   New conditions since last clinic visit No   Unplanned office visit, urgent care, ED, or hospital admission in the last 4 weeks  No   How does patient/caregiver feel medication is working? Good   Financial problems or insurance changes  No   Since the previous refill, were any specialty medication doses or scheduled injections missed or delayed?  No   Does this patient require a clinical escalation to a pharmacist? No            Delivery Questions      Flowsheet Row Most Recent Value   Delivery method FedEx   Delivery address correct? Yes   Delivery phone number [phone]   Preferred delivery time? Anytime   Number of medications in delivery 1   Medication(s) being filled and delivered Hydroxyurea (Antineoplastics Misc.)   Doses left of specialty medications 6 days   Is there any medication that is due not being filled? No   Supplies needed? No supplies needed   Cooler needed? No   Do any medications need mixed or dated? No   Additional comments no copay   Questions or concerns for the pharmacist? No   Explain any questions or concerns for the pharmacist n/a   Are any medications first time fills? No              Medication Adherence          Adherence tools used: watch                      Follow-up: 28 day(s)     Anita Persaud, Pharmacy Technician  Specialty Pharmacy Technician

## 2023-10-26 ENCOUNTER — SPECIALTY PHARMACY (OUTPATIENT)
Dept: ONCOLOGY | Facility: HOSPITAL | Age: 63
End: 2023-10-26
Payer: MEDICARE

## 2023-11-03 ENCOUNTER — SPECIALTY PHARMACY (OUTPATIENT)
Dept: ONCOLOGY | Facility: HOSPITAL | Age: 63
End: 2023-11-03
Payer: MEDICARE

## 2023-11-03 NOTE — PROGRESS NOTES
HEAD AND NECK SURGICAL ONCOLOGY CLINIC    Subjective:       Patient ID: Hiren Rosa is a 71 y.o. male.    Chief Complaint: Establish Care (Discuss Surgery)    HPI    Hiren Rosa is a 71 y.o. male who was previously referred by Dr. Correa for a left neck mass that was consistent with a high grade neuroendocrine carcinoma. We had planned surgery last month, but he cancelled because he needed to help his friend move. He had not wanted to reschedule until he came to clinic, which was today. He thinks that the neck mass has gotten smaller or gone away. He has been putting mometasone on the nasal lesion and thinks it is smaller as well. Otherwise, he is unchanged. He shares that he has had a nasal septal perforation since he was 11.     He had been referred to Dr. Correa for consideration of radiation therapy for a BCC of his nasal dorsum that was biopsied by Dr. Perdomo. He has a long history of sun exposure, with some bad sunburns as a kid - he used to work as a . He has a history of a prior SCC of the right temple that was managed with local therapy.     He denies dysphagia, odynophagia, throat pain, and otalgia. His voice has been hoarse for about a year - it has gotten worse since his CAB in December. There is no hemoptysis or hematemesis. He is breathing well.    He is a retired  from [employer], and he was in law enforcement.     Past Medical History:   Diagnosis Date    Disorder of kidney and ureter     GERD (gastroesophageal reflux disease)     Hypertension     Skin cancer     Thyroid disease        Past Surgical History:   Procedure Laterality Date    AORTIC VALVE REPLACEMENT      CORONARY ARTERY BYPASS GRAFT         Current Outpatient Medications:     apixaban (ELIQUIS) 2.5 mg Tab, Take by mouth 2 (two) times daily., Disp: , Rfl:     atorvastatin (LIPITOR) 40 MG tablet, Take 40 mg by mouth once daily., Disp: , Rfl:     carvediloL (COREG) 25 MG tablet, Take 25 mg by mouth 2 (two)  Reached out to patient for refill assessment/toxicity check. Pt confirms compliance to hydroxyurea.Reports tolerating treatment with no new concerns reported during encounter. Patient had labs completed yesterday and look appropriate for continuing treatment. Requesting refill of hydroxyurea to be processed at [facility] retail. Patient would like this mailed - confirmed address in [facility]. Will alert [facility] to prep for dispense.     times daily with meals., Disp: , Rfl:     cetirizine (ZYRTEC) 10 MG tablet, Take 10 mg by mouth 2 (two) times a day., Disp: , Rfl:     clindamycin (CLEOCIN T) 1 % lotion, SMARTSIG:gel Topical Twice Daily, Disp: , Rfl:     clobetasoL (TEMOVATE) 0.05 % cream, Apply topically 2 (two) times daily., Disp: , Rfl:     clopidogreL (PLAVIX) 75 mg tablet, Take 75 mg by mouth once daily., Disp: , Rfl:     fenofibrate (TRICOR) 145 MG tablet, Take 145 mg by mouth., Disp: , Rfl:     iron, carbonyl, (ICAR) 15 mg/1.25 mL Susp, Take by mouth., Disp: , Rfl:     levothyroxine (SYNTHROID) 75 MCG tablet, Take 75 mcg by mouth before breakfast., Disp: , Rfl:     LORATADINE ORAL, Take 10 mg by mouth once daily., Disp: , Rfl:     midodrine (PROAMATINE) 2.5 MG Tab, Take 5 mg by mouth 3 (three) times daily., Disp: , Rfl:     nitroGLYCERIN (NITROSTAT) 0.4 MG SL tablet, Place 0.4 mg under the tongue., Disp: , Rfl:     pantoprazole (PROTONIX) 40 MG tablet, Take 40 mg by mouth once daily., Disp: , Rfl:     sodium bicarbonate 650 MG tablet, Take 650 mg by mouth 4 (four) times daily., Disp: , Rfl:     TUMERIC-GING-OLIVE-OREG-CAPRYL ORAL, Take by mouth., Disp: , Rfl:     digoxin (LANOXIN) 125 mcg tablet, Take 125 mcg by mouth once daily., Disp: , Rfl:     fenofibrate 160 MG Tab, Take 160 mg by mouth once daily., Disp: , Rfl:     sucralfate (CARAFATE) 1 gram tablet, Take 1 g by mouth once daily., Disp: , Rfl:     triamcinolone acetonide 0.1% (KENALOG) 0.1 % cream, SMARTSI Application Topical 2-3 Times Daily, Disp: , Rfl:     Review of patient's allergies indicates:  No Known Allergies    Social History     Socioeconomic History    Marital status: Significant Other   Tobacco Use    Smoking status: Former     Types: Cigarettes    Smokeless tobacco: Never   Substance and Sexual Activity    Alcohol use: Not Currently     Alcohol/week: 1.0 standard drink of alcohol     Types: 1 Drinks containing 0.5 oz of alcohol per week    Drug use: Never        Family History   Problem Relation Age of Onset    Cancer Father     Cancer Brother        Review of Systems   Constitutional:  Negative for fatigue, fever and unexpected weight change.   HENT:  Negative for ear discharge, facial swelling, hearing loss, mouth sores, rhinorrhea, sore throat, tinnitus, trouble swallowing and voice change.    Eyes:  Negative for pain and visual disturbance.   Respiratory:  Negative for cough and shortness of breath.    Cardiovascular:  Negative for chest pain and palpitations.   Gastrointestinal:  Negative for abdominal pain, constipation and diarrhea.   Genitourinary:  Negative for difficulty urinating and dysuria.   Musculoskeletal:  Positive for arthralgias. Negative for back pain and neck pain.   Skin:  Positive for wound. Negative for color change and rash.        Skin lesion on dorsum as above   Neurological:  Negative for dizziness, seizures and headaches.   Hematological:  Positive for adenopathy. Does not bruise/bleed easily.   Psychiatric/Behavioral:  Negative for agitation. The patient is not nervous/anxious.        Objective:     Physical Exam  Vitals reviewed.   Constitutional:       Appearance: Normal appearance.   HENT:      Head: Normocephalic and atraumatic.      Comments:        Right Ear: Tympanic membrane, ear canal and external ear normal. No decreased hearing noted.      Left Ear: Tympanic membrane, ear canal and external ear normal. No decreased hearing noted.      Nose: Nose normal. No rhinorrhea.        Mouth/Throat:      Palate: No mass and lesions.   Eyes:      Extraocular Movements: Extraocular movements intact.      Conjunctiva/sclera: Conjunctivae normal.   Neck:        Comments: Salivary glands - there are no lesions or asymmetric findings in the submandibular or parotid glands  Pulmonary:      Effort: Pulmonary effort is normal. No respiratory distress.      Breath sounds: Normal breath sounds. No stridor.   Musculoskeletal:      Right shoulder: No  deformity. Normal range of motion.      Left shoulder: No deformity. Normal range of motion.      Cervical back: Normal range of motion and neck supple.   Lymphadenopathy:      Cervical: No cervical adenopathy.      Left cervical: No deep cervical adenopathy.   Skin:     General: Skin is warm and dry.      Findings: No lesion.   Neurological:      Mental Status: He is alert and oriented to person, place, and time.      Cranial Nerves: No cranial nerve deficit or facial asymmetry.      Motor: No weakness.      Gait: Gait normal.                  Pathology Review   -[id number] - both biopsies of nasal dorsum are BCC    LYMPH NODE, LEFT SUBMANDIBULAR, NEEDLE CORE BIOPSY:   --POSITIVE FOR HIGH-GRADE NEUROENDOCRINE CARCINOMA (SEE COMMENT #1).     Assessment & Plan:       Problem List Items Addressed This Visit       Basal cell carcinoma (BCC) of dorsum of nose     This is a large lesion. We discussed wide excision with rapid margin assessment and staged reconstruction concurrent with the neck dissection. Even if the neck mass has resolved, we will need to schedule this in concert with Dr. Negron, Dr. Blackman, or Dr. Crawford.          Malignant neoplasm submandibular gland - Primary     He thinks that it has resolved and still wants to defer surgery if possible. I honestly cannot appreciate a distinct mass on exam. Given his reticence and the clinical change, I will repeat a CT scan. SInce he has poor kidney function, a noncontrast MRI may actually be better. He is willing to get that done. Once this is done, we will reschedule his surgery around his schedule and his reticence. His partner inquired if we could wait until after the first of the year - I am not comfortable with waiting, but we will work with him.         Relevant Orders    MRI Soft Tissue Neck Without Contrast

## 2023-11-03 NOTE — PROGRESS NOTES
11/03/23  Problem: non-adherence concerns  Communication: patient: Patient states she has not missed any pills lately. She has been working really hard on remembering to take the hydroxyurea.  Patient has a PDC of 60%; however, this is due to a gap in filling at [facility] between 5/23 and 8/23.  We converted the patient back to [facility] in August.    Recommendation: patient: Reminded patient the importance of taking her medication scheduled.    Follow-up: Provided patient with my direct phone number and advised her to call with any questions/concerns.  Rose Coffey, PharmD   - patient with history of LA mass seen on TTE  - DDx includes LA thrombus vs malignancy  - Per Cards note from last admission, ordered  Cardiac MRI to further evaluate, but pt refused

## 2023-11-06 ENCOUNTER — SPECIALTY PHARMACY (OUTPATIENT)
Dept: ONCOLOGY | Facility: HOSPITAL | Age: 63
End: 2023-11-06
Payer: MEDICARE

## 2023-11-06 NOTE — PROGRESS NOTES
Specialty Pharmacy Refill Coordination Note     Gisell is a 63 y.o. female contacted today regarding refills of  hydroxyurea 1,000 mg PO daily specialty medication(s).    Patient is okay with delivery to home address on 11/8.    Specialty medication(s) and dose(s) confirmed: yes    Refill Questions      Flowsheet Row Most Recent Value   Changes to allergies? No   New conditions since last clinic visit No   Unplanned office visit, urgent care, ED, or hospital admission in the last 4 weeks  No   How does patient/caregiver feel medication is working? Very good   Financial problems or insurance changes  No   Since the previous refill, were any specialty medication doses or scheduled injections missed or delayed?  No   Does this patient require a clinical escalation to a pharmacist? No            Delivery Questions      Flowsheet Row Most Recent Value   Delivery method FedEx   Delivery address correct? Yes   Delivery phone number [phone]   Preferred delivery time? Anytime   Number of medications in delivery 1   Medication(s) being filled and delivered Hydroxyurea (Antineoplastics Misc.)   Doses left of specialty medications about 7 days   Is there any medication that is due not being filled? No   Supplies needed? No supplies needed   Cooler needed? No   Do any medications need mixed or dated? No   Additional comments verified $0 copay with patient via phone   Questions or concerns for the pharmacist? No   Explain any questions or concerns for the pharmacist n/a   Are any medications first time fills? No              Medication Adherence          Adherence tools used: watch                      Follow-up: 30 day(s)     Larissa Vasquez, Pharmacy Technician  Specialty Pharmacy Technician

## 2023-11-21 ENCOUNTER — HOSPITAL ENCOUNTER (OUTPATIENT)
Dept: MAMMOGRAPHY | Facility: HOSPITAL | Age: 63
Discharge: HOME | End: 2023-11-21
Admitting: INTERNAL MEDICINE
Payer: MEDICARE

## 2023-11-21 DIAGNOSIS — Z12.31 VISIT FOR SCREENING MAMMOGRAM: ICD-10-CM

## 2023-11-21 PROCEDURE — 77067 SCR MAMMO BI INCL CAD: CPT

## 2023-11-21 PROCEDURE — 77063 BREAST TOMOSYNTHESIS BI: CPT

## 2023-11-22 ENCOUNTER — TELEPHONE (OUTPATIENT)
Dept: ONCOLOGY | Facility: CLINIC | Age: 63
End: 2023-11-22
Payer: MEDICARE

## 2023-11-22 NOTE — TELEPHONE ENCOUNTER
Called patient back and informed her that US report was received and reviewed by ASHISH Sinclair and we will have Dr. Damon review Monday. Instructed her to proceed with referral/labs/doppler ordered by her PCP. She verbalized understanding.

## 2023-11-22 NOTE — TELEPHONE ENCOUNTER
Called patient back she states she had an ultrasound that showed the enlargement in her liver/spleen done at [facility] ordered by her PCP. She states they are going to draw some additional labs on Monday, have referred her to GI (Saroj) and are ordering a doppler as well.

## 2023-12-01 ENCOUNTER — SPECIALTY PHARMACY (OUTPATIENT)
Dept: ONCOLOGY | Facility: HOSPITAL | Age: 63
End: 2023-12-01
Payer: MEDICARE

## 2023-12-01 NOTE — PROGRESS NOTES
Specialty Pharmacy Refill Coordination Note     Gisell is a 63 y.o. female contacted today regarding refills of  hydroxyurea 1000mg PO QD of specialty medication(s).      Specialty medication(s) and dose(s) confirmed: yes    Refill Questions      Flowsheet Row Most Recent Value   Changes to allergies? No   Changes to medications? No   New conditions since last clinic visit No   Unplanned office visit, urgent care, ED, or hospital admission in the last 4 weeks  No   How does patient/caregiver feel medication is working? Good   Financial problems or insurance changes  No   Since the previous refill, were any specialty medication doses or scheduled injections missed or delayed?  No   Does this patient require a clinical escalation to a pharmacist? No            Delivery Questions      Flowsheet Row Most Recent Value   Delivery method FedEx   Delivery address correct? Yes   Delivery phone number [phone]   Preferred delivery time? Anytime   Number of medications in delivery 1   Medication(s) being filled and delivered Hydroxyurea (Antineoplastics Misc.)   Doses left of specialty medications 10 days left   Is there any medication that is due not being filled? No   Supplies needed? No supplies needed   Cooler needed? No   Do any medications need mixed or dated? No   Additional comments no copay   Questions or concerns for the pharmacist? No   Explain any questions or concerns for the pharmacist n/a   Are any medications first time fills? No              Medication Adherence          Adherence tools used: watch                      Follow-up: 30 day(s)     Anita Persaud, Pharmacy Technician  Specialty Pharmacy Technician

## 2023-12-20 ENCOUNTER — OFFICE VISIT (OUTPATIENT)
Dept: GASTROENTEROLOGY | Facility: CLINIC | Age: 63
End: 2023-12-20
Payer: MEDICARE

## 2023-12-20 VITALS
SYSTOLIC BLOOD PRESSURE: 100 MMHG | DIASTOLIC BLOOD PRESSURE: 60 MMHG | WEIGHT: 213 LBS | HEIGHT: 67 IN | BODY MASS INDEX: 33.43 KG/M2

## 2023-12-20 DIAGNOSIS — K76.0 FATTY INFILTRATION OF LIVER: ICD-10-CM

## 2023-12-20 DIAGNOSIS — R10.9 RIGHT SIDED ABDOMINAL PAIN: ICD-10-CM

## 2023-12-20 DIAGNOSIS — R89.9 ABNORMAL LABORATORY TEST: ICD-10-CM

## 2023-12-20 DIAGNOSIS — Z13.6 ENCOUNTER FOR SCREENING FOR CARDIOVASCULAR DISORDERS: ICD-10-CM

## 2023-12-20 DIAGNOSIS — Z98.890 HISTORY OF COLONOSCOPY: ICD-10-CM

## 2023-12-20 DIAGNOSIS — Z87.19 HISTORY OF PANCREATITIS: ICD-10-CM

## 2023-12-20 DIAGNOSIS — R74.01 ELEVATED AST (SGOT): ICD-10-CM

## 2023-12-20 DIAGNOSIS — E66.9 CLASS 1 OBESITY WITH SERIOUS COMORBIDITY AND BODY MASS INDEX (BMI) OF 33.0 TO 33.9 IN ADULT, UNSPECIFIED OBESITY TYPE: ICD-10-CM

## 2023-12-20 DIAGNOSIS — R16.2 HEPATOSPLENOMEGALY: Primary | ICD-10-CM

## 2023-12-20 RX ORDER — TURMERIC 400 MG
3000 CAPSULE ORAL DAILY
COMMUNITY

## 2023-12-20 RX ORDER — MELOXICAM 7.5 MG/1
7.5 TABLET ORAL DAILY
COMMUNITY

## 2023-12-20 NOTE — PATIENT INSTRUCTIONS
If you stop turmeric, have comprehensive metabolic panel checked 2 weeks after stopping turmeric    Have CLARKE fibrosure checked 1 week prior to next office visit when fasting for 8 hours.

## 2023-12-20 NOTE — PROGRESS NOTES
GASTROENTEROLOGY OFFICE NOTE    Gisell Wylie  [id number]  1960    CARE TEAM  Patient Care Team:  Zainab Childs MD as PCP - General (Internal Medicine & Pediatrics)  Jaxson Alcantara MD as Surgeon (General Surgery)    Referring Provider: Zainab Childs MD    Chief Complaint   Patient presents with    Hepatomegaly     New patient in referred by PCP.         HISTORY OF PRESENT ILLNESS:   Gisell Wylie is a 63 y.o. female who presents to the clinic today as a referral from Dr. Childs due to elevated AST and hepatosplenomegaly on imaging.  She has history of polycythemia vera for which she follows with Dr. Damon.  She is prescribed Xarelto.  Additional history as below.    US GB in 2019 revealed contracted gallbladder without stones, increased echogenicity throughout liver suggestive of hepatic steatosis.     7/22/2021 comprehensive metabolic panel revealed elevated AST of 33.    CT scan 5/2023 with findings suggestive of pancreatitis, mild mesenteric fat stranding that may represent mild mesenteric panniculitis. No clear etiology of pancreatitis was established. Lipase was elevated. liver enzymes were normal. Trigylcerides were 80.       10/30/2023 hepatitis C antibody nonreactive, CMP with elevated glucose 157, elevated calcium 10.7, normal alkaline phosphatase 75, normal ALT 45 (lab range of 7-52), AST elevated at 57 (lab range of 13-39), bilirubin normal 0.7.  Lipase normal 24.  OMAR negative.  RF latex less than 10.  CBC with normal white blood cell count 5.8, normal hemoglobin 14.1, normal hematocrit 42.5 and elevated platelet count 514.    11/14/2023 ultrasound of the abdomen revealed liver measures 22.5 cm, gallbladder is present with normal wall thickness, spleen measures 15.2 cm and is enlarged, overall impression changes of hepatosplenomegaly with hepatosteatosis.    11/27/2023 CMP revealed elevated chloride 108, elevated glucose 105, alkaline phosphatase normal at 57, ALT normal per  above lab reference range at 41, AST elevated but improved at 43, bilirubin normal 0.8.  INR 1.4.  Hepatitis A IgM nonreactive, hepatitis B surface antigen nonreactive, hepatitis B core antibody nonreactive, hepatitis C antibody nonreactive.  Ceruloplasmin 25.  Smooth muscle antibody negative.  Ferritin 125.7.    She reports she was notified by her primary care provider's office that mitochondrial antibody was mildly elevated around 21.    12/12/2023 abdominal duplex ultrasound at [facility] due to hepatosplenomegaly with prior ultrasound from 11/14/2023 that revealed changes of hepatosteatosis, mild splenomegaly    She rarely drinks alcohol, reports approximately 5 drinks over the year.  Most recent drink was approximately 5 months ago.    Prior colonoscopy in 2021 at [facility].  She has history of repeating colonoscopy every 5 years but reports after most recent colonoscopy she was informed she could wait 10 years.      Past Medical History:   Diagnosis Date    AAA (abdominal aortic aneurysm)     Abnormal chest CT 12/22/2023    enlarged Aortic Aneurysm    Anxiety 12/22/2023    Arthritis 12/22/2023    Cellulitis of lower limb 05/03/2016    From Automated Load;Provider: Radha Lopez;Status: Active    Chronic bilateral low back pain without sciatica 12/22/2023    Deep vein thrombophlebitis of calf, left     Encounter for long-term (current) use of high-risk medication 08/15/2022    Fatty liver     GERD (gastroesophageal reflux disease)     Hepatosplenomegaly 12/22/2023    Hyperlipidemia     Insomnia 12/22/2023    Lumbar spondylosis 02/02/2023    May-Thurner syndrome     Neuropathy 12/22/2023    Obstructive sleep apnea     Pancreatitis     Polycythemia rubra vera     Postsurgical menopause 12/22/2023    Pulmonary embolism     Recurrent major depression 05/17/2016    From Automated Load;Provider: Ange Alex;Status: Active    Small bowel obstruction 12/22/2023    Stage 3b chronic kidney disease  2023    Thoracic aortic aneurysm without rupture 2023    4.1 cm. 10/6/2023: Pt. has checked yearly at . Last checked 2022. Pt. to schedule f/u.        Past Surgical History:   Procedure Laterality Date    BILATERAL BREAST REDUCTION      CAROTID STENT       SECTION      COLONOSCOPY      ELBOW FUSION      HYSTERECTOMY      ILIAC ARTERY STENT Left 2020    Mesh    OOPHORECTOMY      OTHER SURGICAL HISTORY  2020    Iliac vein carterization    REDUCTION MAMMAPLASTY Bilateral 1999    TONSILLECTOMY          Current Outpatient Medications on File Prior to Visit   Medication Sig    aspirin 81 MG EC tablet Take 1 tablet by mouth Daily.    Cholecalciferol 50 MCG (2000) tablet Take 1 tablet by mouth Daily.    hydroxyurea (HYDREA) 500 MG capsule Take 2 capsules by mouth Daily.    meloxicam (MOBIC) 7.5 MG tablet Take 1 tablet by mouth Daily.    pravastatin (PRAVACHOL) 40 MG tablet Take 1 tablet by mouth Daily.    sertraline (ZOLOFT) 25 MG tablet Take 1 tablet by mouth Daily.    Turmeric 400 MG capsule Take 3,000 mg by mouth Daily.    XARELTO 20 MG tablet Take 1 tablet by mouth Every Evening.     No current facility-administered medications on file prior to visit.       Allergies   Allergen Reactions    Clarithromycin Other (See Comments)     [place]       Family History   Problem Relation Age of Onset    Atrial fibrillation Mother     Dementia Mother     Diabetes Mother     Heart disease Mother     Leukemia Paternal Grandmother     Breast cancer Paternal Grandmother     Colon cancer Maternal Aunt     Colon cancer Cousin     Lung cancer Cousin     Breast cancer Cousin     Ovarian cancer Neg Hx        Social History     Socioeconomic History    Marital status:    Tobacco Use    Smoking status: Never    Smokeless tobacco: Never   Vaping Use    Vaping Use: Never used   Substance and Sexual Activity    Alcohol use: No    Drug use: No    Sexual activity: Defer       PHYSICAL EXAM   /60 (BP  "Location: Left arm, Patient Position: Sitting, Cuff Size: Adult)   Ht 170.2 cm (67\")   Wt 96.6 kg (213 lb)   BMI 33.36 kg/m²   Physical Exam  Constitutional:       General: She is not in acute distress.     Appearance: She is obese. She is not toxic-appearing.   HENT:      Head: Normocephalic and atraumatic. No contusion.      Right Ear: External ear normal.      Left Ear: External ear normal.   Eyes:      General: Lids are normal. No scleral icterus.        Right eye: No discharge.         Left eye: No discharge.      Extraocular Movements: Extraocular movements intact.   Neck:      Trachea: Trachea normal.      Comments: No visible mass  No visible adenopathy  Pulmonary:      Effort: No respiratory distress.      Comments: Symmetrical expansion    Abdominal:      Palpations: Abdomen is soft. There is no mass.      Tenderness: There is no abdominal tenderness.   Musculoskeletal:      Comments: Symmetrical movement of upper extremities  Symmetrical movement of lower extremities  No visible deformities   Skin:     General: Skin is warm and dry.      Coloration: Skin is not jaundiced.   Neurological:      General: No focal deficit present.      Mental Status: She is alert and oriented to person, place, and time.   Psychiatric:         Mood and Affect: Mood normal.         Behavior: Behavior normal.         Thought Content: Thought content normal.     Results Review:  7/22/2021 comprehensive metabolic panel revealed elevated AST of 33.    10/30/2023 hepatitis C antibody nonreactive, CMP with elevated glucose 157, elevated calcium 10.7, normal alkaline phosphatase 75, normal ALT 45 (lab range of 7-52), AST elevated at 57 (lab range of 13-39), bilirubin normal 0.7.  Lipase normal 24.  OMAR negative.  RF latex less than 10.  CBC with normal white blood cell count 5.8, normal hemoglobin 14.1, normal hematocrit 42.5 and elevated platelet count 514.    11/14/2023 ultrasound of the abdomen revealed liver measures 22.5 cm, " gallbladder is present with normal wall thickness, spleen measures 15.2 cm and is enlarged, overall impression changes of hepatosplenomegaly with hepatosteatosis.    11/27/2023 CMP revealed elevated chloride 108, elevated glucose 105, alkaline phosphatase normal at 57, ALT normal per above lab reference range at 41, AST elevated but improved at 43, bilirubin normal 0.8.  INR 1.4.  Hepatitis A IgM nonreactive, hepatitis B surface antigen nonreactive, hepatitis B core antibody nonreactive, hepatitis C antibody nonreactive.  Ceruloplasmin 25.  Smooth muscle antibody negative.  Ferritin 125.7.    She reports she was notified by her primary care provider's office that mitochondrial antibody was mildly elevated around 21.    12/12/2023 abdominal duplex ultrasound at [facility] due to hepatosplenomegaly with prior ultrasound from 11/14/2023 that revealed changes of hepatosteatosis, mild splenomegaly    Prior colonoscopy in 2021 at [facility].  Repeat colonoscopy recommended in 10 years.    She reports occasional right upper side pain, right mid to upper back pain.   CMP          5/14/2023    08:13   CMP   Glucose 164    BUN 18    Creatinine 0.79    EGFR 83.6    Sodium 139    Potassium 4.9    Chloride 106    Calcium 9.9    Total Protein 7.6    Albumin 4.3    Globulin 3.3    Total Bilirubin 0.6    Alkaline Phosphatase 91    AST (SGOT) 21    ALT (SGPT) 20    Albumin/Globulin Ratio 1.3    BUN/Creatinine Ratio 22.8    Anion Gap 9.0      CBC          1/30/2023    10:25 5/14/2023    08:13 8/21/2023    09:31   CBC   WBC 5.24  10.49  5.91    RBC 3.82  4.33  4.11    Hemoglobin 13.0  14.9  14.0    Hematocrit 39.6  43.7  43.2    .7  100.9  105.1    MCH 34.0  34.4  34.1    MCHC 32.8  34.1  32.4    RDW 13.2  13.7  14.6    Platelets 436  538  532        ASSESSMENT / PLAN  1. Hepatosplenomegaly  2. Elevated AST (SGOT)  3. Fatty infiltration of liver  4. Class 1 obesity with serious comorbidity and body mass index (BMI)  of 33.0 to 33.9 in adult, unspecified obesity type  5. Encounter for screening for cardiovascular disorders (included for CLARKE fibrosure as prompted by epic)  - based off prior lab evaluation at her PCP office that revealed mildly elevated mitochondrial ab, suspect likely CLARKE as reason for elevated AST and hepatosplenomegaly on imaging. We also discussed turmeric could contribute to elevated liver enzymes. She can consider trial off turmeric to determine if AST improves off turmeric. If she stops turmeric, check CMP 2 weeks later. Recommend she work on weight loss and check CLARKE fibrosure at least 1 week prior to next office visit when fasting for 8 hours.   - limit/avoid alcohol.   - CLARKE Fibrosure Plus; Future    6. History of colonoscopy  - likely due for repeat colonoscopy  3/2031. Prior colonoscopy at [facility]  7. History of pancreatitis  - admitted 5/2023 with diagnosis of pancreatitis (CT abnormal, lipase elevated) of uncertain etiology. She rarely drinks alcohol. Triglycerides normal. No gallstones on prior imaging. Another etiology to consider is medication induced pancreatitis. I will attempt to review medication list on discharge summary at time of hospital admission to determine if drug induced pancreatitis should be considered    Return in about 3 months (around 3/20/2024).    Lilo Townsend, APRN  12/20/2023    ADDENDUM VASQUEZ Townsend APRN 12/22/2023: Pravastatin is a class Ia medication that can cause drug induced pancreatitis.Sertraline is a class IV medications that can cause medication induced pancreatitis.  Consider patient discussing different medications if she has recurrent pancreatitis.

## 2023-12-22 PROBLEM — N28.9 ABNORMAL KIDNEY FUNCTION: Status: ACTIVE | Noted: 2023-12-22

## 2023-12-22 PROBLEM — R93.89 ABNORMAL CHEST CT: Status: ACTIVE | Noted: 2023-12-22

## 2023-12-22 PROBLEM — R16.2 HEPATOSPLENOMEGALY: Status: ACTIVE | Noted: 2023-12-22

## 2023-12-22 PROBLEM — K56.609 SMALL BOWEL OBSTRUCTION: Status: ACTIVE | Noted: 2023-12-22

## 2023-12-22 PROBLEM — G89.29 CHRONIC BILATERAL LOW BACK PAIN WITHOUT SCIATICA: Status: ACTIVE | Noted: 2023-12-22

## 2023-12-22 PROBLEM — G47.00 INSOMNIA: Status: ACTIVE | Noted: 2023-12-22

## 2023-12-22 PROBLEM — R79.89 ELEVATED LIVER FUNCTION TESTS: Status: ACTIVE | Noted: 2023-12-22

## 2023-12-22 PROBLEM — N18.32 STAGE 3B CHRONIC KIDNEY DISEASE: Status: ACTIVE | Noted: 2023-12-22

## 2023-12-22 PROBLEM — G62.9 NEUROPATHY: Status: ACTIVE | Noted: 2023-12-22

## 2023-12-22 PROBLEM — M54.50 CHRONIC BILATERAL LOW BACK PAIN WITHOUT SCIATICA: Status: ACTIVE | Noted: 2023-12-22

## 2023-12-22 PROBLEM — M19.90 ARTHRITIS: Status: ACTIVE | Noted: 2023-12-22

## 2023-12-22 PROBLEM — I71.20 THORACIC AORTIC ANEURYSM WITHOUT RUPTURE: Status: ACTIVE | Noted: 2023-11-06

## 2023-12-22 PROBLEM — E89.40 POSTSURGICAL MENOPAUSE: Status: ACTIVE | Noted: 2023-12-22

## 2023-12-22 PROBLEM — M47.816 LUMBAR SPONDYLOSIS: Status: ACTIVE | Noted: 2023-02-02

## 2023-12-22 PROBLEM — F41.9 ANXIETY: Status: ACTIVE | Noted: 2023-12-22

## 2023-12-27 ENCOUNTER — SPECIALTY PHARMACY (OUTPATIENT)
Dept: ONCOLOGY | Facility: HOSPITAL | Age: 63
End: 2023-12-27
Payer: MEDICARE

## 2023-12-27 NOTE — PROGRESS NOTES
Specialty Pharmacy Refill Coordination Note     Gisell is a 63 y.o. female contacted today regarding refills of  hdyroxyurea 1000mg PO QD of   specialty medication(s).      Specialty medication(s) and dose(s) confirmed: yes    Refill Questions      Flowsheet Row Most Recent Value   Changes to allergies? No   Changes to medications? No   New conditions or infections since last clinic visit No   Unplanned office visit, urgent care, ED, or hospital admission in the last 4 weeks  No   How does patient/caregiver feel medication is working? Good   Financial problems or insurance changes  No   Since the previous refill, were any specialty medication doses or scheduled injections missed or delayed?  No   Does this patient require a clinical escalation to a pharmacist? No            Delivery Questions      Flowsheet Row Most Recent Value   Delivery method [employer]   Delivery address verified with patient/caregiver? Yes   Delivery address Home   Number of medications in delivery 1   Medication(s) being filled and delivered Hydroxyurea (Antineoplastics Misc.)   Doses left of specialty medications 7 days left   Copay verified? Yes   Copay amount $0   Copay form of payment No copayment ($0)              Medication Adherence          Adherence tools used: watch                      Follow-up: 30 day(s)     Anita Persaud, Pharmacy Technician  Specialty Pharmacy Technician

## 2024-01-22 ENCOUNTER — SPECIALTY PHARMACY (OUTPATIENT)
Dept: ONCOLOGY | Facility: HOSPITAL | Age: 64
End: 2024-01-22
Payer: MEDICARE

## 2024-01-22 NOTE — PROGRESS NOTES
Specialty Pharmacy Refill Coordination Note     Gisell is a 63 y.o. female contacted today regarding refills of Hydrea 1,000 mg PO daily specialty medication(s).    Patient states she has around 7 days left of medication and is ready for refill to be shipped to home address on file. Should receive tomorrow, ok to use credit card on file.    Specialty medication(s) and dose(s) confirmed: yes    Refill Questions      Flowsheet Row Most Recent Value   Changes to allergies? No   Changes to medications? No   New conditions or infections since last clinic visit No   Unplanned office visit, urgent care, ED, or hospital admission in the last 4 weeks  No   How does patient/caregiver feel medication is working? Very good   Financial problems or insurance changes  No   Since the previous refill, were any specialty medication doses or scheduled injections missed or delayed?  No   Does this patient require a clinical escalation to a pharmacist? No            Delivery Questions      Flowsheet Row Most Recent Value   Delivery method [employer]   Delivery address verified with patient/caregiver? Yes   Delivery address Home   Number of medications in delivery 1   Medication(s) being filled and delivered Hydroxyurea (Antineoplastics Misc.)   Doses left of specialty medications around 7 days left   Copay verified? Yes   Copay amount $29.04 copay verified with patient   Copay form of payment Credit/debit on file              Medication Adherence          Adherence tools used: watch                      Follow-up: 30 day(s)     Larissa Vasquez, Pharmacy Technician  Specialty Pharmacy Technician

## 2024-02-13 ENCOUNTER — SPECIALTY PHARMACY (OUTPATIENT)
Dept: ONCOLOGY | Facility: HOSPITAL | Age: 64
End: 2024-02-13
Payer: MEDICARE

## 2024-02-16 ENCOUNTER — SPECIALTY PHARMACY (OUTPATIENT)
Dept: ONCOLOGY | Facility: HOSPITAL | Age: 64
End: 2024-02-16
Payer: MEDICARE

## 2024-02-20 ENCOUNTER — LAB (OUTPATIENT)
Dept: LAB | Facility: HOSPITAL | Age: 64
End: 2024-02-20
Payer: MEDICARE

## 2024-02-20 DIAGNOSIS — Z86.718 HISTORY OF DVT OF LOWER EXTREMITY: ICD-10-CM

## 2024-02-20 DIAGNOSIS — K76.0 FATTY INFILTRATION OF LIVER: ICD-10-CM

## 2024-02-20 DIAGNOSIS — Z79.899 ENCOUNTER FOR LONG-TERM (CURRENT) USE OF HIGH-RISK MEDICATION: ICD-10-CM

## 2024-02-20 DIAGNOSIS — Z13.6 ENCOUNTER FOR SCREENING FOR CARDIOVASCULAR DISORDERS: ICD-10-CM

## 2024-02-20 DIAGNOSIS — R74.01 ELEVATED AST (SGOT): ICD-10-CM

## 2024-02-20 DIAGNOSIS — D45 POLYCYTHEMIA RUBRA VERA: ICD-10-CM

## 2024-02-20 LAB
ALBUMIN SERPL-MCNC: 4.4 G/DL (ref 3.5–5.2)
ALBUMIN/GLOB SERPL: 1.4 G/DL
ALP SERPL-CCNC: 75 U/L (ref 39–117)
ALT SERPL W P-5'-P-CCNC: 49 U/L (ref 1–33)
ANION GAP SERPL CALCULATED.3IONS-SCNC: 10 MMOL/L (ref 5–15)
AST SERPL-CCNC: 63 U/L (ref 1–32)
BASOPHILS # BLD AUTO: 0.08 10*3/MM3 (ref 0–0.2)
BASOPHILS NFR BLD AUTO: 1.3 % (ref 0–1.5)
BILIRUB SERPL-MCNC: 0.8 MG/DL (ref 0–1.2)
BUN SERPL-MCNC: 17 MG/DL (ref 8–23)
BUN/CREAT SERPL: 22.1 (ref 7–25)
CALCIUM SPEC-SCNC: 9.9 MG/DL (ref 8.6–10.5)
CHLORIDE SERPL-SCNC: 108 MMOL/L (ref 98–107)
CO2 SERPL-SCNC: 25 MMOL/L (ref 22–29)
CREAT SERPL-MCNC: 0.77 MG/DL (ref 0.57–1)
DEPRECATED RDW RBC AUTO: 52.5 FL (ref 37–54)
EGFRCR SERPLBLD CKD-EPI 2021: 85.7 ML/MIN/1.73
EOSINOPHIL # BLD AUTO: 0.16 10*3/MM3 (ref 0–0.4)
EOSINOPHIL NFR BLD AUTO: 2.7 % (ref 0.3–6.2)
ERYTHROCYTE [DISTWIDTH] IN BLOOD BY AUTOMATED COUNT: 13.8 % (ref 12.3–15.4)
GLOBULIN UR ELPH-MCNC: 3.1 GM/DL
GLUCOSE SERPL-MCNC: 105 MG/DL (ref 65–99)
HCT VFR BLD AUTO: 44.1 % (ref 34–46.6)
HGB BLD-MCNC: 14.3 G/DL (ref 12–15.9)
IMM GRANULOCYTES # BLD AUTO: 0.05 10*3/MM3 (ref 0–0.05)
IMM GRANULOCYTES NFR BLD AUTO: 0.8 % (ref 0–0.5)
LYMPHOCYTES # BLD AUTO: 0.92 10*3/MM3 (ref 0.7–3.1)
LYMPHOCYTES NFR BLD AUTO: 15.3 % (ref 19.6–45.3)
MCH RBC QN AUTO: 33.8 PG (ref 26.6–33)
MCHC RBC AUTO-ENTMCNC: 32.4 G/DL (ref 31.5–35.7)
MCV RBC AUTO: 104.3 FL (ref 79–97)
MONOCYTES # BLD AUTO: 0.28 10*3/MM3 (ref 0.1–0.9)
MONOCYTES NFR BLD AUTO: 4.6 % (ref 5–12)
NEUTROPHILS NFR BLD AUTO: 4.54 10*3/MM3 (ref 1.7–7)
NEUTROPHILS NFR BLD AUTO: 75.3 % (ref 42.7–76)
NRBC BLD AUTO-RTO: 0 /100 WBC (ref 0–0.2)
PLATELET # BLD AUTO: 577 10*3/MM3 (ref 140–450)
PMV BLD AUTO: 8.8 FL (ref 6–12)
POTASSIUM SERPL-SCNC: 4.6 MMOL/L (ref 3.5–5.2)
PROT SERPL-MCNC: 7.5 G/DL (ref 6–8.5)
RBC # BLD AUTO: 4.23 10*6/MM3 (ref 3.77–5.28)
SODIUM SERPL-SCNC: 143 MMOL/L (ref 136–145)
WBC NRBC COR # BLD AUTO: 6.03 10*3/MM3 (ref 3.4–10.8)

## 2024-02-20 PROCEDURE — 36415 COLL VENOUS BLD VENIPUNCTURE: CPT

## 2024-02-20 PROCEDURE — 80053 COMPREHEN METABOLIC PANEL: CPT

## 2024-02-20 PROCEDURE — 85025 COMPLETE CBC W/AUTO DIFF WBC: CPT

## 2024-02-22 LAB
A2 MACROGLOB SERPL-MCNC: 181 MG/DL (ref 110–276)
ALBUMIN SERPL-MCNC: 4.6 G/DL (ref 3.5–5.2)
ALBUMIN/GLOB SERPL: 1.6 G/DL
ALP SERPL-CCNC: 73 U/L (ref 39–117)
ALT SERPL W P-5'-P-CCNC: 59 IU/L (ref 0–40)
ALT SERPL-CCNC: 50 U/L (ref 1–33)
APO A-I SERPL-MCNC: 115 MG/DL (ref 116–209)
AST SERPL W P-5'-P-CCNC: 64 IU/L (ref 0–40)
AST SERPL-CCNC: 67 U/L (ref 1–32)
BILIRUB SERPL-MCNC: 0.5 MG/DL (ref 0–1.2)
BILIRUB SERPL-MCNC: 0.7 MG/DL (ref 0–1.2)
BUN SERPL-MCNC: 16 MG/DL (ref 8–23)
BUN/CREAT SERPL: 18 (ref 7–25)
CALCIUM SERPL-MCNC: 9.5 MG/DL (ref 8.6–10.5)
CHLORIDE SERPL-SCNC: 107 MMOL/L (ref 98–107)
CHOLEST SERPL-MCNC: 104 MG/DL (ref 100–199)
CO2 SERPL-SCNC: 23.7 MMOL/L (ref 22–29)
CREAT SERPL-MCNC: 0.89 MG/DL (ref 0.57–1)
EGFRCR SERPLBLD CKD-EPI 2021: 72.1 ML/MIN/1.73
FIBROSIS SCORING:: ABNORMAL
FIBROSIS STAGE SERPL QL: ABNORMAL
GGT SERPL-CCNC: 22 IU/L (ref 0–60)
GLOBULIN SER CALC-MCNC: 2.9 GM/DL
GLUCOSE SERPL-MCNC: 100 MG/DL (ref 65–99)
GLUCOSE SERPL-MCNC: 110 MG/DL (ref 70–99)
HAPTOGLOB SERPL-MCNC: 121 MG/DL (ref 37–355)
LABORATORY COMMENT REPORT: ABNORMAL
LIVER FIBR SCORE SERPL CALC.FIBROSURE: 0.26 (ref 0–0.21)
LIVER STEATOSIS GRADE SERPL QL: ABNORMAL
LIVER STEATOSIS SCORE SERPL: 0.63 (ref 0–0.4)
NASH GRADE SERPL QL: ABNORMAL
NASH INTERPRETATION SERPL-IMP: ABNORMAL
NASH SCORE SERPL: 0.84 (ref 0–0.25)
NASH SCORING: ABNORMAL
POTASSIUM SERPL-SCNC: 4.5 MMOL/L (ref 3.5–5.2)
PROT SERPL-MCNC: 7.5 G/DL (ref 6–8.5)
SODIUM SERPL-SCNC: 141 MMOL/L (ref 136–145)
STEATOSIS SCORING: ABNORMAL
TEST PERFORMANCE INFO SPEC: ABNORMAL
TEST PERFORMANCE INFO SPEC: ABNORMAL
TRIGL SERPL-MCNC: 100 MG/DL (ref 0–149)

## 2024-02-26 ENCOUNTER — LAB (OUTPATIENT)
Dept: LAB | Facility: HOSPITAL | Age: 64
End: 2024-02-26
Payer: MEDICARE

## 2024-02-26 ENCOUNTER — SPECIALTY PHARMACY (OUTPATIENT)
Dept: ONCOLOGY | Facility: HOSPITAL | Age: 64
End: 2024-02-26
Payer: MEDICARE

## 2024-02-26 ENCOUNTER — OFFICE VISIT (OUTPATIENT)
Dept: ONCOLOGY | Facility: CLINIC | Age: 64
End: 2024-02-26
Payer: MEDICARE

## 2024-02-26 VITALS
DIASTOLIC BLOOD PRESSURE: 58 MMHG | BODY MASS INDEX: 31.23 KG/M2 | RESPIRATION RATE: 16 BRPM | HEIGHT: 67 IN | SYSTOLIC BLOOD PRESSURE: 110 MMHG | TEMPERATURE: 97.5 F | WEIGHT: 199 LBS | HEART RATE: 64 BPM | OXYGEN SATURATION: 96 %

## 2024-02-26 DIAGNOSIS — I87.1 MAY-THURNER SYNDROME: Primary | Chronic | ICD-10-CM

## 2024-02-26 DIAGNOSIS — D45 POLYCYTHEMIA RUBRA VERA: Chronic | ICD-10-CM

## 2024-02-26 DIAGNOSIS — I87.1 MAY-THURNER SYNDROME: ICD-10-CM

## 2024-02-26 DIAGNOSIS — D45 POLYCYTHEMIA RUBRA VERA: ICD-10-CM

## 2024-02-26 LAB
BASOPHILS # BLD AUTO: 0.08 10*3/MM3 (ref 0–0.2)
BASOPHILS NFR BLD AUTO: 1.2 % (ref 0–1.5)
CRP SERPL-MCNC: 0.31 MG/DL (ref 0–0.5)
DEPRECATED RDW RBC AUTO: 52.4 FL (ref 37–54)
EOSINOPHIL # BLD AUTO: 0.15 10*3/MM3 (ref 0–0.4)
EOSINOPHIL NFR BLD AUTO: 2.2 % (ref 0.3–6.2)
ERYTHROCYTE [DISTWIDTH] IN BLOOD BY AUTOMATED COUNT: 13.9 % (ref 12.3–15.4)
ERYTHROCYTE [SEDIMENTATION RATE] IN BLOOD: 15 MM/HR (ref 0–30)
HCT VFR BLD AUTO: 42 % (ref 34–46.6)
HGB BLD-MCNC: 13.9 G/DL (ref 12–15.9)
IMM GRANULOCYTES # BLD AUTO: 0.03 10*3/MM3 (ref 0–0.05)
IMM GRANULOCYTES NFR BLD AUTO: 0.4 % (ref 0–0.5)
LYMPHOCYTES # BLD AUTO: 0.89 10*3/MM3 (ref 0.7–3.1)
LYMPHOCYTES NFR BLD AUTO: 13.3 % (ref 19.6–45.3)
MCH RBC QN AUTO: 34 PG (ref 26.6–33)
MCHC RBC AUTO-ENTMCNC: 33.1 G/DL (ref 31.5–35.7)
MCV RBC AUTO: 102.7 FL (ref 79–97)
MONOCYTES # BLD AUTO: 0.3 10*3/MM3 (ref 0.1–0.9)
MONOCYTES NFR BLD AUTO: 4.5 % (ref 5–12)
NEUTROPHILS NFR BLD AUTO: 5.25 10*3/MM3 (ref 1.7–7)
NEUTROPHILS NFR BLD AUTO: 78.4 % (ref 42.7–76)
PLATELET # BLD AUTO: 551 10*3/MM3 (ref 140–450)
PMV BLD AUTO: 8.5 FL (ref 6–12)
RBC # BLD AUTO: 4.09 10*6/MM3 (ref 3.77–5.28)
WBC NRBC COR # BLD AUTO: 6.7 10*3/MM3 (ref 3.4–10.8)

## 2024-02-26 PROCEDURE — 85652 RBC SED RATE AUTOMATED: CPT

## 2024-02-26 PROCEDURE — 85025 COMPLETE CBC W/AUTO DIFF WBC: CPT

## 2024-02-26 PROCEDURE — 36415 COLL VENOUS BLD VENIPUNCTURE: CPT

## 2024-02-26 PROCEDURE — 86140 C-REACTIVE PROTEIN: CPT

## 2024-02-26 RX ORDER — TRIAMCINOLONE ACETONIDE 1 MG/G
1 CREAM TOPICAL 2 TIMES DAILY
COMMUNITY
Start: 2024-02-22

## 2024-02-26 RX ORDER — GABAPENTIN 400 MG/1
400 CAPSULE ORAL 2 TIMES DAILY
COMMUNITY
Start: 2024-01-02

## 2024-02-26 NOTE — PROGRESS NOTES
CHIEF COMPLAINT: No new somatic complaints    Problem List:  Oncology/Hematology History Overview Note   1.  Collin 2+ polycythemia rubra vera on Hydrea and aspirin  2.  Recurrent DVT and PE due to problem #2 with low titer positive lupus anticoagulant per prior testing by Dr. Major apparently on 2 separate occasions confirmatory 3 months apart  3.  May Thurner  4.  Ascending aortic aneurysm  5.  History of SOLOMON  6.  Obstructive sleep apnea  7.  Contrast-induced nephropathy  8.  Reflux with nausea  9.  Pancreatitis, hospitalized May 2023, treated with pain medications and IV fluids.    -8/2/2019 initial [facility] medical oncology consultation: May 2016 developed unprovoked left lower extremity deep vein thrombosis treated with left common iliac stent, thrombolyzes, temporary vena cava filter placement and retrieval.  Saw hematologist who told her she might have a low level lupus anticoagulant but she does not have that data and they were told that they did not think that was significant enough to warrant lifetime anticoagulation.  She stopped Xarelto after 8 months.  Starting the end of June she was developing pain in the left lower extremity again with progressive swelling and shortness of breath.  Went to [facility] ER on 4 July.  Was found to have left lower extremity extensive deep vein thrombosis and right lower lobe pulmonary embolus.  She was treated with thrombectomy and thrombolysis by Rigoberto and Pavel.  Previously she could not afford her Xarelto and they have switched her from heparin to Coumadin but before discharge switched her to Xarelto.  Her creatinine during her hospitalization increased to 1.38 from a baseline of 1.2.  She comes to hematology at [facility] on 8/2/2019 with labs showing creatinine of 1.23 on 7/11/2019, 1.69 on 7/17/2019, and she says that it was repeated again but she does not have that lab and was told that it was back down but not at normal.  Her CBC reveals platelets of  697,000 on 7/17/2019 and 589,000 on 7/10/2019.Labs from 7/10/2019 revealed platelet count of 490,000 with hematocrit 54.7%.  Creatinine on 7/3/2019 was 0.92.  She understandably had a d-dimer elevation to 1.22 on 7/3/2019 with her clot.  Given the magnitude of her clot and the likelihood of may Thurner syndrome, she needs lifetime anticoagulation.  On patients with deep vein thrombosis and/or PE and indefinite anticoagulation, use of Xarelto in patients with GFR less than 30 is recommended to be avoided varied will need to watch that.  With her prior recent elevation of hematocrit and persistent elevation of platelets, we will continue to watch that and if they remain persistent suggesting this being more than just reactive to her PE and clots, then we need to look for myeloproliferative disorder contributing to her clotting.  She is going to get her records from her other hematologist she saw back in 2016 just for completeness sake but I would not repeat, nor would I do for the first time now, her hypercoagulable panel as any hypercoagulable disorder we stumbled upon would not alter her need for lifetime full dose anticoagulation.  The only question is which drug, and whether her creatinine will allow DOAC's.  I will check her BMP weekly for the next month to make sure her creatinine is doing adequate relative to her use of Xarelto and will get her blood count now and again prior to return in a month and if the platelets are remaining elevated we will do Collin 2 with reflex testing.  Since her discharge from the hospital she had significant nausea for which she had an ultrasound that showed fatty liver but normal gallbladder.  She had an EGD in the past few days with Dr. Heath that showed significant reflux and on Protonix her nausea has improved.  She will need close follow-up of her ascending aortic aneurysm as well with her thoracic surgeons.    -9/9/2019 medical oncology follow-up: Platelets persistently  elevated 807,000 with hematocrit 52.7% and white count 8320 on 8/30/2019.  GFR staying over 59 on Xarelto.  We will check Collin 2 with reflex testing and treat with Hydrea plus or minus phlebotomy if we confirm myeloproliferative disorder.  Will need bone marrow before start of that to assess for myelofibrosis if this is confirmed.  Records from Dr.Scott Major of 6/7/2016 mention that she had been anemic during her hospitalization.  Factor V Leiden, prothrombin mutation, Antithrombin III, protein C&S levels were all normal and the anticardiolipin and beta-2 microglobulin were all negative according to Dr. Major.  The lupus anticoagulant was borderline positive at 10.5 with him and he recommended repeat of that done 3 months apart to prove the diagnosis but this came back low positive according to his notes and he suggested switching to aspirin after a full 6 months of anticoagulation.  This was of course before the low-dose DOAC trials have been published she had had thrombolytics and stenting of her May Thurner according to his records.  Given her presence of May Thurner despite the stenting, I would be inclined towards keeping her on anticoagulants regardless.  She brings to me records that includes labs back to 2016 where her white count, hemoglobin and platelets were normal but as recently as June 2019 her hematocrit was 52% whereas back in May 2016 her hemoglobin was actually down in the eights with normochromic normocytic indices.  On Xarelto, there is increased risk with aspirin I will hold on that for present but we may have to make such a decision as Xarelto may not be sufficient for viscosity issues related to her elevated platelets and hematocrit if this is a myeloproliferative disorder.    -9/30/2019 [facility] hematology oncology follow-up visit: 9/9/2019 Collin 2 V617F mutation positive with erythropoietin level low at 1 commensurate with polycythemia rubra vera and a hematocrit of 53% with platelets of  807,000 and normal white count.  Normally would do bone marrow to look for myelofibrosis but would not risk taking her off Xarelto at the moment.  We will start her on Hydrea 500 mg daily with CBC weekly and if we get her hematocrit below 45% platelets in the 400,000 range without significant leukopenia that would be my goal.  No phlebotomies for now until we see what Hydrea does.  After we see her back, if her platelets are down and tolerating Hydrea, I will consider stopping the Xarelto and switching her to full dose aspirin for life for the PRV.  This PRV did not cause her original venous thromboembolic phenomenon in fact was anemic when Dr. Major saw her in 2016.  We talked about the potential natural history of PRV including spent phase, leukemic transformation, myelofibrosis.  She will need AAA follow-up with surgeons.    -1/15/2021 [facility] hematology follow-up: 1/8/2021 white count 5790 with hemoglobin 12.6 and platelets 392,000 stable over the last 5 months but her last CBC prior to this was 4 months ago.  CMP unremarkable.  With chronic left lower extremity swelling and ultrasound showing left iliac venous stent obstruction/thrombosis with venous stent and angioplasty by vascular surgery 5/4/2020 as well as a grade 4 left greater saphenous vein reflux for which saphenous vein ablation and phlebectomy performed 6/26/2020, she will continue chronic Xarelto for this and baby aspirin for the JAK2 positive polycythemia rubra vera.  There is increased risk of bleeding but the DOAC does not necessarily mitigate the risk of thrombosis from the JAK2 positive myeloproliferative disorder and the aspirin does not necessarily obviate the need for Xarelto for her May Thurner syndrome venous obstructions occurring despite good control of her blood counts on Hydrea.  On dual DOAC and antiplatelet therapy, it is all the more important to watch her blood counts monthly and I will have her do a video visit with my nurse  practitioner in about 3 months.  I would also ask her to be sure her vascular surgeons are following her history of abdominal aortic aneurysm with serial imaging which I would not do routinely for her myeloproliferative disorder which I am managing.  As stated previously, I do not think myeloproliferative disorder caused her venous thromboembolic phenomena and in fact her original thromboembolic event occurred when she was anemic under the care of Dr. Major in 2016.    -7/29/2021 [facility] hematology telemedicine clinic follow-up: Gisell continues to do well, tolerating Hydrea 1000 mg daily along with Xarelto and aspirin 81 mg.  She has had no bleeding events.  We will continue therapy unchanged.  CBC for the past 2 months looks good with no anemia, WBC normal, platelet count normal.  I reminded her to get her CBC monthly as we wanted to watch her more closely as she is on not only Hydrea but blood thinner with Xarelto along with aspirin putting her more at risk.  She states understanding and will do her CBC monthly, new order entered.    -2/7/2022 [facility] hematology oncology follow-up visit: Today's platelets are 588,000 with hemoglobin 13.5 hematocrit 41.3% with white count 5800.  CMP is pending.  She did not get her CBC monthly as ordered last time and as she is on Xarelto and aspirin that is important.  I will not increase her Hydrea from the 1000 mg dose despite her platelets running a little higher but if they continue to rise we will have to be cautious of acquired von Willebrand's disease which actually further increases her bleeding risk.    -8/15/2022 [facility] Hematology clinic follow-up: Gisell continues to do well on Hydrea 1000 mg daily, I have reviewed her CBCs for the past 6 months and her platelet count overall looks good, most recent platelet count on 8/2/2022 was 455,000, in April her platelet count was normal at 444,000, it has never been higher than 530,000 since February.  She has been taking  turmeric and whether or not this is helping it is hard to say but there is no contraindication to her taking turmeric.  She will continue Hydrea 1000 mg daily unchanged along with aspirin 81 mg daily, she also continues on Xarelto 20 mg daily.  She has had no unusual bleeding, no further clotting events.  We will continue to monitor CBC monthly.  We will see her back in 6 months for follow-up.    -2/20/2023 [facility] hematology oncology follow-up: 1/30/2023 hemoglobin 13 white count 5240 with platelets 436,000 with .7.  We will get her CBC today but her counts have been stable 1000 mg Hydrea, aspirin, and Xarelto for the better part of a year now and we will see her twice a year with CBC once a quarter.    -8/28/2023 [facility] Hematology/Oncology clinic follow-up: Gisell is doing well on Hydrea 1000 mg daily along with aspirin 81 mg daily.  CBC from 8/21/2023 with normal WBC 5.91, hemoglobin 14, hematocrit 43.2%, platelet count 532,000.  Would not recommend any adjustments in her Hydrea at this time.  We will continue to monitor CBC quarterly and I have ordered that today.  She remains on Xarelto for lifetime, she has had no further clotting events, no abnormal bleeding.  We did discuss the pros and cons of Mobic today, she takes this occasionally for her chronic back pain.  She understands to avoid any other NSAID, she can take Tylenol as needed.  She takes over-the-counter reflux medication.  She understands there is slight increased risk of bleeding with Mobic since she is on Xarelto but currently she is just taking it as needed and benefit with relief of her pain is worth it.    - 12/20/2023 saw gastroenterology for elevated AST with hepatosplenomegaly with ultrasound showing hepatic steatosis with history of pancreatitis.  Asked her to stop her turmeric that can increase liver enzymes.  Mildly elevated antimitochondrial antibody.  They suspect CLARKE.  Follow-up planned end of March 2024.    -2/20/2024  fibrosis score 0.26 fibrosis stage 0-F1.  Steatosis score 0.63 steatosis grade S2-S3 moderate to severe.  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  ALT 59 AST 64.  Hemoglobin 14.3 hematocrit 44.1 platelets 577,000    -2/26/2024 [facility] hematology oncology follow-up: She did not get quarterly labs as previously ordered.  We will need to push harder on the Hydrea with a goal to get her platelets consistently in the 400,000's.  In August the platelets were 532,000 and in February they were 577,000.  Will get labs today and monthly and have her see my nurse practitioner back in 3 months to make sure that the platelets are trending downward..  If with monthly CBCs being 1000 mg dose that she has been taking for quite some time is not throwing her platelets consistently below 500,000 into the 400,000's or lower, then I would increase her to 1000 mg alternating with 1500 mg.  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  She will need to follow with gastroenterology regarding her hepatic steatosis.  Certainly a fair portion of her blood a megaly may be related to her myeloproliferative disorder and not just the hepatic steatosis and it is hard to sort out the proportions of each contribution.  November CT chest without contrast done at [facility] showed 40 mm ascending aorta unchanged from August 2022 done by Dr. Muniz..  That needs to continue to be monitored as well by by Dr. Muniz.  I will add a sedimentation rate to her labs today as well.  If that is significantly elevated then some of her platelet elevation could be an acute phase reactant but I suspect it is mostly for myeloproliferative JAK2 positive disease.I do not palpate any hepatosplenomegaly on exam today and her CAT scan in May did not  corroborate any significant hepatosplenomegaly.  Continues lifetime Xarelto for May Thurner.     May-Thurner syndrome   2019 Initial Diagnosis    May-Thurner syndrome     Polycythemia rubra vera   2019 Initial Diagnosis    Polycythemia rubra vera (CMS/HCC)         HISTORY OF PRESENT ILLNESS:  The patient is a 63 y.o. female, here for follow up on management of JAK2 positive myeloproliferative disorder on Hydrea 1000 mg daily with aspirin    Past Medical History:   Diagnosis Date    AAA (abdominal aortic aneurysm)     Abnormal chest CT 2023    enlarged Aortic Aneurysm    Anxiety 2023    Arthritis 2023    Cellulitis of lower limb 2016    From Automated Load;Provider: Radha Lopez;Status: Active    Chronic bilateral low back pain without sciatica 2023    Deep vein thrombophlebitis of calf, left     Encounter for long-term (current) use of high-risk medication 08/15/2022    Fatty liver     GERD (gastroesophageal reflux disease)     Hepatosplenomegaly 2023    Hyperlipidemia     Insomnia 2023    Lumbar spondylosis 2023    May-Thurner syndrome     Neuropathy 2023    Obstructive sleep apnea     Pancreatitis     Polycythemia rubra vera     Postsurgical menopause 2023    Pulmonary embolism     Recurrent major depression 2016    From Automated Load;Provider: Ange Alex;Status: Active    Small bowel obstruction 2023    Stage 3b chronic kidney disease 2023    Thoracic aortic aneurysm without rupture 2023    4.1 cm. 10/6/2023: Pt. has checked yearly at . Last checked 2022. Pt. to schedule f/u.     Past Surgical History:   Procedure Laterality Date    BILATERAL BREAST REDUCTION      CAROTID STENT       SECTION      COLONOSCOPY      ELBOW FUSION      HYSTERECTOMY      ILIAC ARTERY STENT Left 2020    Mesh    OOPHORECTOMY      OTHER SURGICAL HISTORY  2020    Iliac vein carterization    REDUCTION MAMMAPLASTY Bilateral  "1999    TONSILLECTOMY         Allergies   Allergen Reactions    Clarithromycin Other (See Comments)     [place]       Family History and Social History reviewed and changed as necessary    REVIEW OF SYSTEM:   No new somatic complaints    PHYSICAL EXAM:  No jaundice icterus or pallor.  No shifting dullness.  No palpable hepatosplenomegaly.    Vitals:    02/26/24 0846   BP: 110/58   Pulse: 64   Resp: 16   Temp: 97.5 °F (36.4 °C)   SpO2: 96%   Weight: 90.3 kg (199 lb)   Height: 170.2 cm (67\")     Vitals:    02/26/24 0846   PainSc: 0-No pain          ECOG score: 0           Vitals reviewed.    ECOG: (0) Fully Active - Able to Carry On All Pre-disease Performance Without Restriction    Lab Results   Component Value Date    HGB 14.3 02/20/2024    HCT 44.1 02/20/2024    .3 (H) 02/20/2024     (H) 02/20/2024    WBC 6.03 02/20/2024    NEUTROABS 4.54 02/20/2024    LYMPHSABS 0.92 02/20/2024    MONOSABS 0.28 02/20/2024    EOSABS 0.16 02/20/2024    BASOSABS 0.08 02/20/2024       Lab Results   Component Value Date    GLUCOSE 100 (H) 02/20/2024    GLUCOSE 105 (H) 02/20/2024    BUN 16 02/20/2024    BUN 17 02/20/2024    CREATININE 0.89 02/20/2024    CREATININE 0.77 02/20/2024     02/20/2024     02/20/2024    K 4.5 02/20/2024    K 4.6 02/20/2024     02/20/2024     (H) 02/20/2024    CO2 23.7 02/20/2024    CO2 25.0 02/20/2024    CALCIUM 9.5 02/20/2024    CALCIUM 9.9 02/20/2024    PROTEINTOT 7.5 02/20/2024    ALBUMIN 4.6 02/20/2024    ALBUMIN 4.4 02/20/2024    BILITOT 0.7 02/20/2024    BILITOT 0.8 02/20/2024    ALKPHOS 73 02/20/2024    ALKPHOS 75 02/20/2024    AST 67 (H) 02/20/2024    AST 63 (H) 02/20/2024    ALT 50 (H) 02/20/2024    ALT 49 (H) 02/20/2024             ASSESSMENT & PLAN:  1.  Collin 2+ polycythemia rubra vera on Hydrea and aspirin  2.  Recurrent DVT and PE due to problem #2 with low titer positive lupus anticoagulant per prior testing by Dr. Major apparently on 2 separate occasions " confirmatory 3 months apart  3.  May Thurner  4.  Ascending aortic aneurysm  5.  History of SOLOMON  6.  Obstructive sleep apnea  7.  Contrast-induced nephropathy  8.  Reflux with nausea  9.  Pancreatitis, hospitalized May 2023, treated with pain medications and IV fluids.    -8/2/2019 initial [facility] medical oncology consultation: May 2016 developed unprovoked left lower extremity deep vein thrombosis treated with left common iliac stent, thrombolyzes, temporary vena cava filter placement and retrieval.  Saw hematologist who told her she might have a low level lupus anticoagulant but she does not have that data and they were told that they did not think that was significant enough to warrant lifetime anticoagulation.  She stopped Xarelto after 8 months.  Starting the end of June she was developing pain in the left lower extremity again with progressive swelling and shortness of breath.  Went to [facility] ER on 4 July.  Was found to have left lower extremity extensive deep vein thrombosis and right lower lobe pulmonary embolus.  She was treated with thrombectomy and thrombolysis by Rigoberto and Pavel.  Previously she could not afford her Xarelto and they have switched her from heparin to Coumadin but before discharge switched her to Xarelto.  Her creatinine during her hospitalization increased to 1.38 from a baseline of 1.2.  She comes to hematology at [facility] on 8/2/2019 with labs showing creatinine of 1.23 on 7/11/2019, 1.69 on 7/17/2019, and she says that it was repeated again but she does not have that lab and was told that it was back down but not at normal.  Her CBC reveals platelets of 697,000 on 7/17/2019 and 589,000 on 7/10/2019.Labs from 7/10/2019 revealed platelet count of 490,000 with hematocrit 54.7%.  Creatinine on 7/3/2019 was 0.92.  She understandably had a d-dimer elevation to 1.22 on 7/3/2019 with her clot.  Given the magnitude of her clot and the likelihood of may Thurner syndrome, she  needs lifetime anticoagulation.  On patients with deep vein thrombosis and/or PE and indefinite anticoagulation, use of Xarelto in patients with GFR less than 30 is recommended to be avoided varied will need to watch that.  With her prior recent elevation of hematocrit and persistent elevation of platelets, we will continue to watch that and if they remain persistent suggesting this being more than just reactive to her PE and clots, then we need to look for myeloproliferative disorder contributing to her clotting.  She is going to get her records from her other hematologist she saw back in 2016 just for completeness sake but I would not repeat, nor would I do for the first time now, her hypercoagulable panel as any hypercoagulable disorder we stumbled upon would not alter her need for lifetime full dose anticoagulation.  The only question is which drug, and whether her creatinine will allow DOAC's.  I will check her BMP weekly for the next month to make sure her creatinine is doing adequate relative to her use of Xarelto and will get her blood count now and again prior to return in a month and if the platelets are remaining elevated we will do Collin 2 with reflex testing.  Since her discharge from the hospital she had significant nausea for which she had an ultrasound that showed fatty liver but normal gallbladder.  She had an EGD in the past few days with Dr. Heath that showed significant reflux and on Protonix her nausea has improved.  She will need close follow-up of her ascending aortic aneurysm as well with her thoracic surgeons.    -9/9/2019 medical oncology follow-up: Platelets persistently elevated 807,000 with hematocrit 52.7% and white count 8320 on 8/30/2019.  GFR staying over 59 on Xarelto.  We will check Collin 2 with reflex testing and treat with Hydrea plus or minus phlebotomy if we confirm myeloproliferative disorder.  Will need bone marrow before start of that to assess for myelofibrosis if this is  confirmed.  Records from Dr.Scott Major of 6/7/2016 mention that she had been anemic during her hospitalization.  Factor V Leiden, prothrombin mutation, Antithrombin III, protein C&S levels were all normal and the anticardiolipin and beta-2 microglobulin were all negative according to Dr. Major.  The lupus anticoagulant was borderline positive at 10.5 with him and he recommended repeat of that done 3 months apart to prove the diagnosis but this came back low positive according to his notes and he suggested switching to aspirin after a full 6 months of anticoagulation.  This was of course before the low-dose DOAC trials have been published she had had thrombolytics and stenting of her May Thurner according to his records.  Given her presence of May Thurner despite the stenting, I would be inclined towards keeping her on anticoagulants regardless.  She brings to me records that includes labs back to 2016 where her white count, hemoglobin and platelets were normal but as recently as June 2019 her hematocrit was 52% whereas back in May 2016 her hemoglobin was actually down in the eights with normochromic normocytic indices.  On Xarelto, there is increased risk with aspirin I will hold on that for present but we may have to make such a decision as Xarelto may not be sufficient for viscosity issues related to her elevated platelets and hematocrit if this is a myeloproliferative disorder.    -9/30/2019 [facility] hematology oncology follow-up visit: 9/9/2019 Collin 2 V617F mutation positive with erythropoietin level low at 1 commensurate with polycythemia rubra vera and a hematocrit of 53% with platelets of 807,000 and normal white count.  Normally would do bone marrow to look for myelofibrosis but would not risk taking her off Xarelto at the moment.  We will start her on Hydrea 500 mg daily with CBC weekly and if we get her hematocrit below 45% platelets in the 400,000 range without significant leukopenia that would be my  goal.  No phlebotomies for now until we see what Hydrea does.  After we see her back, if her platelets are down and tolerating Hydrea, I will consider stopping the Xarelto and switching her to full dose aspirin for life for the PRV.  This PRV did not cause her original venous thromboembolic phenomenon in fact was anemic when Dr. Major saw her in 2016.  We talked about the potential natural history of PRV including spent phase, leukemic transformation, myelofibrosis.  She will need AAA follow-up with surgeons.    -1/15/2021 [facility] hematology follow-up: 1/8/2021 white count 5790 with hemoglobin 12.6 and platelets 392,000 stable over the last 5 months but her last CBC prior to this was 4 months ago.  CMP unremarkable.  With chronic left lower extremity swelling and ultrasound showing left iliac venous stent obstruction/thrombosis with venous stent and angioplasty by vascular surgery 5/4/2020 as well as a grade 4 left greater saphenous vein reflux for which saphenous vein ablation and phlebectomy performed 6/26/2020, she will continue chronic Xarelto for this and baby aspirin for the JAK2 positive polycythemia rubra vera.  There is increased risk of bleeding but the DOAC does not necessarily mitigate the risk of thrombosis from the JAK2 positive myeloproliferative disorder and the aspirin does not necessarily obviate the need for Xarelto for her May Thurner syndrome venous obstructions occurring despite good control of her blood counts on Hydrea.  On dual DOAC and antiplatelet therapy, it is all the more important to watch her blood counts monthly and I will have her do a video visit with my nurse practitioner in about 3 months.  I would also ask her to be sure her vascular surgeons are following her history of abdominal aortic aneurysm with serial imaging which I would not do routinely for her myeloproliferative disorder which I am managing.  As stated previously, I do not think myeloproliferative disorder caused her  venous thromboembolic phenomena and in fact her original thromboembolic event occurred when she was anemic under the care of Dr. Major in 2016.    -7/29/2021 [facility] hematology telemedicine clinic follow-up: Gisell continues to do well, tolerating Hydrea 1000 mg daily along with Xarelto and aspirin 81 mg.  She has had no bleeding events.  We will continue therapy unchanged.  CBC for the past 2 months looks good with no anemia, WBC normal, platelet count normal.  I reminded her to get her CBC monthly as we wanted to watch her more closely as she is on not only Hydrea but blood thinner with Xarelto along with aspirin putting her more at risk.  She states understanding and will do her CBC monthly, new order entered.    -2/7/2022 [facility] hematology oncology follow-up visit: Today's platelets are 588,000 with hemoglobin 13.5 hematocrit 41.3% with white count 5800.  CMP is pending.  She did not get her CBC monthly as ordered last time and as she is on Xarelto and aspirin that is important.  I will not increase her Hydrea from the 1000 mg dose despite her platelets running a little higher but if they continue to rise we will have to be cautious of acquired von Willebrand's disease which actually further increases her bleeding risk.    -8/15/2022 [facility] Hematology clinic follow-up: Gisell continues to do well on Hydrea 1000 mg daily, I have reviewed her CBCs for the past 6 months and her platelet count overall looks good, most recent platelet count on 8/2/2022 was 455,000, in April her platelet count was normal at 444,000, it has never been higher than 530,000 since February.  She has been taking turmeric and whether or not this is helping it is hard to say but there is no contraindication to her taking turmeric.  She will continue Hydrea 1000 mg daily unchanged along with aspirin 81 mg daily, she also continues on Xarelto 20 mg daily.  She has had no unusual bleeding, no further clotting events.  We will continue to  monitor CBC monthly.  We will see her back in 6 months for follow-up.    -2/20/2023 [facility] hematology oncology follow-up: 1/30/2023 hemoglobin 13 white count 5240 with platelets 436,000 with .7.  We will get her CBC today but her counts have been stable 1000 mg Hydrea, aspirin, and Xarelto for the better part of a year now and we will see her twice a year with CBC once a quarter.    -8/28/2023 [facility] Hematology/Oncology clinic follow-up: Gisell is doing well on Hydrea 1000 mg daily along with aspirin 81 mg daily.  CBC from 8/21/2023 with normal WBC 5.91, hemoglobin 14, hematocrit 43.2%, platelet count 532,000.  Would not recommend any adjustments in her Hydrea at this time.  We will continue to monitor CBC quarterly and I have ordered that today.  She remains on Xarelto for lifetime, she has had no further clotting events, no abnormal bleeding.  We did discuss the pros and cons of Mobic today, she takes this occasionally for her chronic back pain.  She understands to avoid any other NSAID, she can take Tylenol as needed.  She takes over-the-counter reflux medication.  She understands there is slight increased risk of bleeding with Mobic since she is on Xarelto but currently she is just taking it as needed and benefit with relief of her pain is worth it.    - 12/20/2023 saw gastroenterology for elevated AST with hepatosplenomegaly with ultrasound showing hepatic steatosis with history of pancreatitis.  Asked her to stop her turmeric that can increase liver enzymes.  Mildly elevated antimitochondrial antibody.  They suspect CLARKE.  Follow-up planned end of March 2024.    -2/20/2024 fibrosis score 0.26 fibrosis stage 0-F1.  Steatosis score 0.63 steatosis grade S2-S3 moderate to severe.  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  ALT 59 AST 64.  Hemoglobin 14.3 hematocrit 44.1 platelets 577,000    -2/26/2024 [facility] hematology oncology follow-up: She did not get quarterly labs as previously ordered.  We will need to  push harder on the Hydrea with a goal to get her platelets consistently in the 400,000's.  In August the platelets were 532,000 and in February they were 577,000.  Will get labs today and monthly and have her see my nurse practitioner back in 3 months to make sure that the platelets are trending downward..  If with monthly CBCs being 1000 mg dose that she has been taking for quite some time is not throwing her platelets consistently below 500,000 into the 400,000's or lower, then I would increase her to 1000 mg alternating with 1500 mg.  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  She will need to follow with gastroenterology regarding her hepatic steatosis.  Certainly a fair portion of her hepatosplenomegaly  may be related to her myeloproliferative disorder and not just the hepatic steatosis and it is hard to sort out the proportions of each contribution.  November CT chest without contrast done at [facility] showed 40 mm ascending aorta unchanged from August 2022 done by Dr. Muniz..  That needs to continue to be monitored as well by by Dr. Muniz.  I will add a sedimentation rate to her labs today as well.  If that is significantly elevated then some of her platelet elevation could be an acute phase reactant but I suspect it is mostly for myeloproliferative JAK2 positive disease. I do not palpate any hepatosplenomegaly on exam today and her CAT scan in May did not corroborate any significant hepatosplenomegaly.  Continues lifetime Xarelto for May Thurner.    Total time of care today inclusive of time spent prior to arrival reviewing interval notes from gastroenterology and interval labs and during visit interviewing her as to signs or symptoms of her disease and management thereof and  after visit instituting complex set of plans as above took 50 minutes of patient care time.  Yogesh Damon MD    02/26/2024

## 2024-02-26 NOTE — LETTER
February 26, 2024       No Recipients    Patient: Gisell Wylie   YOB: 1960   Date of Visit: 2/26/2024     Dear Zainab Childs MD:       Thank you for referring Gisell Wylie to me for evaluation. Below are the relevant portions of my assessment and plan of care.    If you have questions, please do not hesitate to call me. I look forward to following Gisell along with you.         Sincerely,        Yogesh Damon MD        CC:   No Recipients    Yogesh Damon MD  02/26/24 0924  Sign when Signing Visit  CHIEF COMPLAINT: No new somatic complaints    Problem List:  Oncology/Hematology History Overview Note   1.  Collin 2+ polycythemia rubra vera on Hydrea and aspirin  2.  Recurrent DVT and PE due to problem #2 with low titer positive lupus anticoagulant per prior testing by Dr. Pedrito castillo on 2 separate occasions confirmatory 3 months apart  3.  May Thurner  4.  Ascending aortic aneurysm  5.  History of SOLOMON  6.  Obstructive sleep apnea  7.  Contrast-induced nephropathy  8.  Reflux with nausea  9.  Pancreatitis, hospitalized May 2023, treated with pain medications and IV fluids.    -8/2/2019 initial [facility] medical oncology consultation: May 2016 developed unprovoked left lower extremity deep vein thrombosis treated with left common iliac stent, thrombolyzes, temporary vena cava filter placement and retrieval.  Saw hematologist who told her she might have a low level lupus anticoagulant but she does not have that data and they were told that they did not think that was significant enough to warrant lifetime anticoagulation.  She stopped Xarelto after 8 months.  Starting the end of June she was developing pain in the left lower extremity again with progressive swelling and shortness of breath.  Went to [facility] ER on 4 July.  Was found to have left lower extremity extensive deep vein thrombosis and right lower lobe pulmonary embolus.  She was treated with thrombectomy and thrombolysis by  Rigoberto and Pavel.  Previously she could not afford her Xarelto and they have switched her from heparin to Coumadin but before discharge switched her to Xarelto.  Her creatinine during her hospitalization increased to 1.38 from a baseline of 1.2.  She comes to hematology at [facility] on 8/2/2019 with labs showing creatinine of 1.23 on 7/11/2019, 1.69 on 7/17/2019, and she says that it was repeated again but she does not have that lab and was told that it was back down but not at normal.  Her CBC reveals platelets of 697,000 on 7/17/2019 and 589,000 on 7/10/2019.Labs from 7/10/2019 revealed platelet count of 490,000 with hematocrit 54.7%.  Creatinine on 7/3/2019 was 0.92.  She understandably had a d-dimer elevation to 1.22 on 7/3/2019 with her clot.  Given the magnitude of her clot and the likelihood of may Thurner syndrome, she needs lifetime anticoagulation.  On patients with deep vein thrombosis and/or PE and indefinite anticoagulation, use of Xarelto in patients with GFR less than 30 is recommended to be avoided varied will need to watch that.  With her prior recent elevation of hematocrit and persistent elevation of platelets, we will continue to watch that and if they remain persistent suggesting this being more than just reactive to her PE and clots, then we need to look for myeloproliferative disorder contributing to her clotting.  She is going to get her records from her other hematologist she saw back in 2016 just for completeness sake but I would not repeat, nor would I do for the first time now, her hypercoagulable panel as any hypercoagulable disorder we stumbled upon would not alter her need for lifetime full dose anticoagulation.  The only question is which drug, and whether her creatinine will allow DOAC's.  I will check her BMP weekly for the next month to make sure her creatinine is doing adequate relative to her use of Xarelto and will get her blood count now and again prior to return in a  month and if the platelets are remaining elevated we will do Collin 2 with reflex testing.  Since her discharge from the hospital she had significant nausea for which she had an ultrasound that showed fatty liver but normal gallbladder.  She had an EGD in the past few days with Dr. Heath that showed significant reflux and on Protonix her nausea has improved.  She will need close follow-up of her ascending aortic aneurysm as well with her thoracic surgeons.    -9/9/2019 medical oncology follow-up: Platelets persistently elevated 807,000 with hematocrit 52.7% and white count 8320 on 8/30/2019.  GFR staying over 59 on Xarelto.  We will check Collin 2 with reflex testing and treat with Hydrea plus or minus phlebotomy if we confirm myeloproliferative disorder.  Will need bone marrow before start of that to assess for myelofibrosis if this is confirmed.  Records from Dr.Scott Major of 6/7/2016 mention that she had been anemic during her hospitalization.  Factor V Leiden, prothrombin mutation, Antithrombin III, protein C&S levels were all normal and the anticardiolipin and beta-2 microglobulin were all negative according to Dr. Major.  The lupus anticoagulant was borderline positive at 10.5 with him and he recommended repeat of that done 3 months apart to prove the diagnosis but this came back low positive according to his notes and he suggested switching to aspirin after a full 6 months of anticoagulation.  This was of course before the low-dose DOAC trials have been published she had had thrombolytics and stenting of her May Thurner according to his records.  Given her presence of May Thurner despite the stenting, I would be inclined towards keeping her on anticoagulants regardless.  She brings to me records that includes labs back to 2016 where her white count, hemoglobin and platelets were normal but as recently as June 2019 her hematocrit was 52% whereas back in May 2016 her hemoglobin was actually down in the eights  with normochromic normocytic indices.  On Xarelto, there is increased risk with aspirin I will hold on that for present but we may have to make such a decision as Xarelto may not be sufficient for viscosity issues related to her elevated platelets and hematocrit if this is a myeloproliferative disorder.    -9/30/2019 [facility] hematology oncology follow-up visit: 9/9/2019 Collin 2 [id number] mutation positive with erythropoietin level low at 1 commensurate with polycythemia rubra vera and a hematocrit of 53% with platelets of 807,000 and normal white count.  Normally would do bone marrow to look for myelofibrosis but would not risk taking her off Xarelto at the moment.  We will start her on Hydrea 500 mg daily with CBC weekly and if we get her hematocrit below 45% platelets in the 400,000 range without significant leukopenia that would be my goal.  No phlebotomies for now until we see what Hydrea does.  After we see her back, if her platelets are down and tolerating Hydrea, I will consider stopping the Xarelto and switching her to full dose aspirin for life for the PRV.  This PRV did not cause her original venous thromboembolic phenomenon in fact was anemic when Dr. Major saw her in 2016.  We talked about the potential natural history of PRV including spent phase, leukemic transformation, myelofibrosis.  She will need AAA follow-up with surgeons.    -1/15/2021 [facility] hematology follow-up: 1/8/2021 white count 5790 with hemoglobin 12.6 and platelets 392,000 stable over the last 5 months but her last CBC prior to this was 4 months ago.  CMP unremarkable.  With chronic left lower extremity swelling and ultrasound showing left iliac venous stent obstruction/thrombosis with venous stent and angioplasty by vascular surgery 5/4/2020 as well as a grade 4 left greater saphenous vein reflux for which saphenous vein ablation and phlebectomy performed 6/26/2020, she will continue chronic Xarelto for this and baby aspirin for the JAK2  positive polycythemia rubra vera.  There is increased risk of bleeding but the DOAC does not necessarily mitigate the risk of thrombosis from the JAK2 positive myeloproliferative disorder and the aspirin does not necessarily obviate the need for Xarelto for her May Thurner syndrome venous obstructions occurring despite good control of her blood counts on Hydrea.  On dual DOAC and antiplatelet therapy, it is all the more important to watch her blood counts monthly and I will have her do a video visit with my nurse practitioner in about 3 months.  I would also ask her to be sure her vascular surgeons are following her history of abdominal aortic aneurysm with serial imaging which I would not do routinely for her myeloproliferative disorder which I am managing.  As stated previously, I do not think myeloproliferative disorder caused her venous thromboembolic phenomena and in fact her original thromboembolic event occurred when she was anemic under the care of Dr. Major in 2016.    -7/29/2021 [facility] hematology telemedicine clinic follow-up: Gisell continues to do well, tolerating Hydrea 1000 mg daily along with Xarelto and aspirin 81 mg.  She has had no bleeding events.  We will continue therapy unchanged.  CBC for the past 2 months looks good with no anemia, WBC normal, platelet count normal.  I reminded her to get her CBC monthly as we wanted to watch her more closely as she is on not only Hydrea but blood thinner with Xarelto along with aspirin putting her more at risk.  She states understanding and will do her CBC monthly, new order entered.    -2/7/2022 [facility] hematology oncology follow-up visit: Today's platelets are 588,000 with hemoglobin 13.5 hematocrit 41.3% with white count 5800.  CMP is pending.  She did not get her CBC monthly as ordered last time and as she is on Xarelto and aspirin that is important.  I will not increase her Hydrea from the 1000 mg dose despite her platelets running a little higher  but if they continue to rise we will have to be cautious of acquired von Willebrand's disease which actually further increases her bleeding risk.    -8/15/2022 [facility] Hematology clinic follow-up: Gisell continues to do well on Hydrea 1000 mg daily, I have reviewed her CBCs for the past 6 months and her platelet count overall looks good, most recent platelet count on 8/2/2022 was 455,000, in April her platelet count was normal at 444,000, it has never been higher than 530,000 since February.  She has been taking turmeric and whether or not this is helping it is hard to say but there is no contraindication to her taking turmeric.  She will continue Hydrea 1000 mg daily unchanged along with aspirin 81 mg daily, she also continues on Xarelto 20 mg daily.  She has had no unusual bleeding, no further clotting events.  We will continue to monitor CBC monthly.  We will see her back in 6 months for follow-up.    -2/20/2023 [facility] hematology oncology follow-up: 1/30/2023 hemoglobin 13 white count 5240 with platelets 436,000 with .7.  We will get her CBC today but her counts have been stable 1000 mg Hydrea, aspirin, and Xarelto for the better part of a year now and we will see her twice a year with CBC once a quarter.    -8/28/2023 [facility] Hematology/Oncology clinic follow-up: Gisell is doing well on Hydrea 1000 mg daily along with aspirin 81 mg daily.  CBC from 8/21/2023 with normal WBC 5.91, hemoglobin 14, hematocrit 43.2%, platelet count 532,000.  Would not recommend any adjustments in her Hydrea at this time.  We will continue to monitor CBC quarterly and I have ordered that today.  She remains on Xarelto for lifetime, she has had no further clotting events, no abnormal bleeding.  We did discuss the pros and cons of Mobic today, she takes this occasionally for her chronic back pain.  She understands to avoid any other NSAID, she can take Tylenol as needed.  She takes over-the-counter reflux medication.  She  understands there is slight increased risk of bleeding with Mobic since she is on Xarelto but currently she is just taking it as needed and benefit with relief of her pain is worth it.    - 12/20/2023 saw gastroenterology for elevated AST with hepatosplenomegaly with ultrasound showing hepatic steatosis with history of pancreatitis.  Asked her to stop her turmeric that can increase liver enzymes.  Mildly elevated antimitochondrial antibody.  They suspect CLARKE.  Follow-up planned end of March 2024.    -2/20/2024 fibrosis score 0.26 fibrosis stage 0-F1.  Steatosis score 0.63 steatosis grade S2-S3 moderate to severe.  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  ALT 59 AST 64.  Hemoglobin 14.3 hematocrit 44.1 platelets 577,000    -2/26/2024 [facility] hematology oncology follow-up: She did not get quarterly labs as previously ordered.  We will need to push harder on the Hydrea with a goal to get her platelets consistently in the 400,000's.  In August the platelets were 532,000 and in February they were 577,000.  Will get labs today and monthly and have her see my nurse practitioner back in 3 months to make sure that the platelets are trending downward..  If with monthly CBCs being 1000 mg dose that she has been taking for quite some time is not throwing her platelets consistently below 500,000 into the 400,000's or lower, then I would increase her to 1000 mg alternating with 1500 mg.  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  She will need to follow with gastroenterology regarding her hepatic steatosis.  Certainly a fair portion of her blood a megaly may be related to her myeloproliferative disorder and not just the hepatic steatosis and it is hard to sort out the proportions of each contribution.  November CT chest  without contrast done at [facility] showed 40 mm ascending aorta unchanged from August 2022 done by Dr. Muniz..  That needs to continue to be monitored as well by by Dr. Muniz.  I will add a sedimentation rate to her labs today as well.  If that is significantly elevated then some of her platelet elevation could be an acute phase reactant but I suspect it is mostly for myeloproliferative JAK2 positive disease.I do not palpate any hepatosplenomegaly on exam today and her CAT scan in May did not corroborate any significant hepatosplenomegaly.  Continues lifetime Xarelto for May Thurner.     May-Thurner syndrome   8/2/2019 Initial Diagnosis    May-Thurner syndrome     Polycythemia rubra vera   9/30/2019 Initial Diagnosis    Polycythemia rubra vera (CMS/HCC)         HISTORY OF PRESENT ILLNESS:  The patient is a 63 y.o. female, here for follow up on management of JAK2 positive myeloproliferative disorder on Hydrea 1000 mg daily with aspirin    Past Medical History:   Diagnosis Date   • AAA (abdominal aortic aneurysm)    • Abnormal chest CT 12/22/2023    enlarged Aortic Aneurysm   • Anxiety 12/22/2023   • Arthritis 12/22/2023   • Cellulitis of lower limb 05/03/2016    From Automated Load;Provider: Radha Lopez;Status: Active   • Chronic bilateral low back pain without sciatica 12/22/2023   • Deep vein thrombophlebitis of calf, left    • Encounter for long-term (current) use of high-risk medication 08/15/2022   • Fatty liver    • GERD (gastroesophageal reflux disease)    • Hepatosplenomegaly 12/22/2023   • Hyperlipidemia    • Insomnia 12/22/2023   • Lumbar spondylosis 02/02/2023   • May-Thurner syndrome    • Neuropathy 12/22/2023   • Obstructive sleep apnea    • Pancreatitis    • Polycythemia rubra vera    • Postsurgical menopause 12/22/2023   • Pulmonary embolism    • Recurrent major depression 05/17/2016    From Automated Load;Provider: Ange Alex;Status: Active   • Small bowel obstruction  "2023   • Stage 3b chronic kidney disease 2023   • Thoracic aortic aneurysm without rupture 2023    4.1 cm. 10/6/2023: Pt. has checked yearly at . Last checked 2022. Pt. to schedule f/u.     Past Surgical History:   Procedure Laterality Date   • BILATERAL BREAST REDUCTION     • CAROTID STENT     •  SECTION     • COLONOSCOPY     • ELBOW FUSION     • HYSTERECTOMY     • ILIAC ARTERY STENT Left 2020    Mesh   • OOPHORECTOMY     • OTHER SURGICAL HISTORY  2020    Iliac vein carterization   • REDUCTION MAMMAPLASTY Bilateral    • TONSILLECTOMY         Allergies   Allergen Reactions   • Clarithromycin Other (See Comments)     [place]       Family History and Social History reviewed and changed as necessary    REVIEW OF SYSTEM:   No new somatic complaints    PHYSICAL EXAM:  No jaundice icterus or pallor.  No shifting dullness.  No palpable hepatosplenomegaly.    Vitals:    24 0846   BP: 110/58   Pulse: 64   Resp: 16   Temp: 97.5 °F (36.4 °C)   SpO2: 96%   Weight: 90.3 kg (199 lb)   Height: 170.2 cm (67\")     Vitals:    24 0846   PainSc: 0-No pain          ECOG score: 0           Vitals reviewed.    ECOG: (0) Fully Active - Able to Carry On All Pre-disease Performance Without Restriction    Lab Results   Component Value Date    HGB 14.3 2024    HCT 44.1 2024    .3 (H) 2024     (H) 2024    WBC 6.03 2024    NEUTROABS 4.54 2024    LYMPHSABS 0.92 2024    MONOSABS 0.28 2024    EOSABS 0.16 2024    BASOSABS 0.08 2024       Lab Results   Component Value Date    GLUCOSE 100 (H) 2024    GLUCOSE 105 (H) 2024    BUN 16 2024    BUN 17 2024    CREATININE 0.89 2024    CREATININE 0.77 2024     2024     2024    K 4.5 2024    K 4.6 2024     2024     (H) 2024    CO2 23.7 2024    CO2 25.0 2024    CALCIUM 9.5 " 02/20/2024    CALCIUM 9.9 02/20/2024    PROTEINTOT 7.5 02/20/2024    ALBUMIN 4.6 02/20/2024    ALBUMIN 4.4 02/20/2024    BILITOT 0.7 02/20/2024    BILITOT 0.8 02/20/2024    ALKPHOS 73 02/20/2024    ALKPHOS 75 02/20/2024    AST 67 (H) 02/20/2024    AST 63 (H) 02/20/2024    ALT 50 (H) 02/20/2024    ALT 49 (H) 02/20/2024             ASSESSMENT & PLAN:  1.  Collin 2+ polycythemia rubra vera on Hydrea and aspirin  2.  Recurrent DVT and PE due to problem #2 with low titer positive lupus anticoagulant per prior testing by Dr. Major apparently on 2 separate occasions confirmatory 3 months apart  3.  May Thurner  4.  Ascending aortic aneurysm  5.  History of SOLOMON  6.  Obstructive sleep apnea  7.  Contrast-induced nephropathy  8.  Reflux with nausea  9.  Pancreatitis, hospitalized May 2023, treated with pain medications and IV fluids.    -8/2/2019 initial [facility] medical oncology consultation: May 2016 developed unprovoked left lower extremity deep vein thrombosis treated with left common iliac stent, thrombolyzes, temporary vena cava filter placement and retrieval.  Saw hematologist who told her she might have a low level lupus anticoagulant but she does not have that data and they were told that they did not think that was significant enough to warrant lifetime anticoagulation.  She stopped Xarelto after 8 months.  Starting the end of June she was developing pain in the left lower extremity again with progressive swelling and shortness of breath.  Went to [facility] ER on 4 July.  Was found to have left lower extremity extensive deep vein thrombosis and right lower lobe pulmonary embolus.  She was treated with thrombectomy and thrombolysis by Rigoberto and Pavel.  Previously she could not afford her Xarelto and they have switched her from heparin to Coumadin but before discharge switched her to Xarelto.  Her creatinine during her hospitalization increased to 1.38 from a baseline of 1.2.  She comes to hematology at  [facility] on 8/2/2019 with labs showing creatinine of 1.23 on 7/11/2019, 1.69 on 7/17/2019, and she says that it was repeated again but she does not have that lab and was told that it was back down but not at normal.  Her CBC reveals platelets of 697,000 on 7/17/2019 and 589,000 on 7/10/2019.Labs from 7/10/2019 revealed platelet count of 490,000 with hematocrit 54.7%.  Creatinine on 7/3/2019 was 0.92.  She understandably had a d-dimer elevation to 1.22 on 7/3/2019 with her clot.  Given the magnitude of her clot and the likelihood of may Thurner syndrome, she needs lifetime anticoagulation.  On patients with deep vein thrombosis and/or PE and indefinite anticoagulation, use of Xarelto in patients with GFR less than 30 is recommended to be avoided varied will need to watch that.  With her prior recent elevation of hematocrit and persistent elevation of platelets, we will continue to watch that and if they remain persistent suggesting this being more than just reactive to her PE and clots, then we need to look for myeloproliferative disorder contributing to her clotting.  She is going to get her records from her other hematologist she saw back in 2016 just for completeness sake but I would not repeat, nor would I do for the first time now, her hypercoagulable panel as any hypercoagulable disorder we stumbled upon would not alter her need for lifetime full dose anticoagulation.  The only question is which drug, and whether her creatinine will allow DOAC's.  I will check her BMP weekly for the next month to make sure her creatinine is doing adequate relative to her use of Xarelto and will get her blood count now and again prior to return in a month and if the platelets are remaining elevated we will do Collin 2 with reflex testing.  Since her discharge from the hospital she had significant nausea for which she had an ultrasound that showed fatty liver but normal gallbladder.  She had an EGD in the past few days with   Kumar that showed significant reflux and on Protonix her nausea has improved.  She will need close follow-up of her ascending aortic aneurysm as well with her thoracic surgeons.    -9/9/2019 medical oncology follow-up: Platelets persistently elevated 807,000 with hematocrit 52.7% and white count 8320 on 8/30/2019.  GFR staying over 59 on Xarelto.  We will check Collin 2 with reflex testing and treat with Hydrea plus or minus phlebotomy if we confirm myeloproliferative disorder.  Will need bone marrow before start of that to assess for myelofibrosis if this is confirmed.  Records from Dr.Scott Major of 6/7/2016 mention that she had been anemic during her hospitalization.  Factor V Leiden, prothrombin mutation, Antithrombin III, protein C&S levels were all normal and the anticardiolipin and beta-2 microglobulin were all negative according to Dr. Major.  The lupus anticoagulant was borderline positive at 10.5 with him and he recommended repeat of that done 3 months apart to prove the diagnosis but this came back low positive according to his notes and he suggested switching to aspirin after a full 6 months of anticoagulation.  This was of course before the low-dose DOAC trials have been published she had had thrombolytics and stenting of her May Thurner according to his records.  Given her presence of May Thurner despite the stenting, I would be inclined towards keeping her on anticoagulants regardless.  She brings to me records that includes labs back to 2016 where her white count, hemoglobin and platelets were normal but as recently as June 2019 her hematocrit was 52% whereas back in May 2016 her hemoglobin was actually down in the eights with normochromic normocytic indices.  On Xarelto, there is increased risk with aspirin I will hold on that for present but we may have to make such a decision as Xarelto may not be sufficient for viscosity issues related to her elevated platelets and hematocrit if this is a  myeloproliferative disorder.    -9/30/2019 [facility] hematology oncology follow-up visit: 9/9/2019 Collin 2 [id number] mutation positive with erythropoietin level low at 1 commensurate with polycythemia rubra vera and a hematocrit of 53% with platelets of 807,000 and normal white count.  Normally would do bone marrow to look for myelofibrosis but would not risk taking her off Xarelto at the moment.  We will start her on Hydrea 500 mg daily with CBC weekly and if we get her hematocrit below 45% platelets in the 400,000 range without significant leukopenia that would be my goal.  No phlebotomies for now until we see what Hydrea does.  After we see her back, if her platelets are down and tolerating Hydrea, I will consider stopping the Xarelto and switching her to full dose aspirin for life for the PRV.  This PRV did not cause her original venous thromboembolic phenomenon in fact was anemic when Dr. Major saw her in 2016.  We talked about the potential natural history of PRV including spent phase, leukemic transformation, myelofibrosis.  She will need AAA follow-up with surgeons.    -1/15/2021 [facility] hematology follow-up: 1/8/2021 white count 5790 with hemoglobin 12.6 and platelets 392,000 stable over the last 5 months but her last CBC prior to this was 4 months ago.  CMP unremarkable.  With chronic left lower extremity swelling and ultrasound showing left iliac venous stent obstruction/thrombosis with venous stent and angioplasty by vascular surgery 5/4/2020 as well as a grade 4 left greater saphenous vein reflux for which saphenous vein ablation and phlebectomy performed 6/26/2020, she will continue chronic Xarelto for this and baby aspirin for the JAK2 positive polycythemia rubra vera.  There is increased risk of bleeding but the DOAC does not necessarily mitigate the risk of thrombosis from the JAK2 positive myeloproliferative disorder and the aspirin does not necessarily obviate the need for Xarelto for her May Thurner  syndrome venous obstructions occurring despite good control of her blood counts on Hydrea.  On dual DOAC and antiplatelet therapy, it is all the more important to watch her blood counts monthly and I will have her do a video visit with my nurse practitioner in about 3 months.  I would also ask her to be sure her vascular surgeons are following her history of abdominal aortic aneurysm with serial imaging which I would not do routinely for her myeloproliferative disorder which I am managing.  As stated previously, I do not think myeloproliferative disorder caused her venous thromboembolic phenomena and in fact her original thromboembolic event occurred when she was anemic under the care of Dr. Major in 2016.    -7/29/2021 [facility] hematology telemedicine clinic follow-up: Gisell continues to do well, tolerating Hydrea 1000 mg daily along with Xarelto and aspirin 81 mg.  She has had no bleeding events.  We will continue therapy unchanged.  CBC for the past 2 months looks good with no anemia, WBC normal, platelet count normal.  I reminded her to get her CBC monthly as we wanted to watch her more closely as she is on not only Hydrea but blood thinner with Xarelto along with aspirin putting her more at risk.  She states understanding and will do her CBC monthly, new order entered.    -2/7/2022 [facility] hematology oncology follow-up visit: Today's platelets are 588,000 with hemoglobin 13.5 hematocrit 41.3% with white count 5800.  CMP is pending.  She did not get her CBC monthly as ordered last time and as she is on Xarelto and aspirin that is important.  I will not increase her Hydrea from the 1000 mg dose despite her platelets running a little higher but if they continue to rise we will have to be cautious of acquired von Willebrand's disease which actually further increases her bleeding risk.    -8/15/2022 [facility] Hematology clinic follow-up: Gisell continues to do well on Hydrea 1000 mg daily, I have reviewed her CBCs  for the past 6 months and her platelet count overall looks good, most recent platelet count on 8/2/2022 was 455,000, in April her platelet count was normal at 444,000, it has never been higher than 530,000 since February.  She has been taking turmeric and whether or not this is helping it is hard to say but there is no contraindication to her taking turmeric.  She will continue Hydrea 1000 mg daily unchanged along with aspirin 81 mg daily, she also continues on Xarelto 20 mg daily.  She has had no unusual bleeding, no further clotting events.  We will continue to monitor CBC monthly.  We will see her back in 6 months for follow-up.    -2/20/2023 [facility] hematology oncology follow-up: 1/30/2023 hemoglobin 13 white count 5240 with platelets 436,000 with .7.  We will get her CBC today but her counts have been stable 1000 mg Hydrea, aspirin, and Xarelto for the better part of a year now and we will see her twice a year with CBC once a quarter.    -8/28/2023 [facility] Hematology/Oncology clinic follow-up: Gisell is doing well on Hydrea 1000 mg daily along with aspirin 81 mg daily.  CBC from 8/21/2023 with normal WBC 5.91, hemoglobin 14, hematocrit 43.2%, platelet count 532,000.  Would not recommend any adjustments in her Hydrea at this time.  We will continue to monitor CBC quarterly and I have ordered that today.  She remains on Xarelto for lifetime, she has had no further clotting events, no abnormal bleeding.  We did discuss the pros and cons of Mobic today, she takes this occasionally for her chronic back pain.  She understands to avoid any other NSAID, she can take Tylenol as needed.  She takes over-the-counter reflux medication.  She understands there is slight increased risk of bleeding with Mobic since she is on Xarelto but currently she is just taking it as needed and benefit with relief of her pain is worth it.    - 12/20/2023 saw gastroenterology for elevated AST with hepatosplenomegaly with ultrasound  showing hepatic steatosis with history of pancreatitis.  Asked her to stop her turmeric that can increase liver enzymes.  Mildly elevated antimitochondrial antibody.  They suspect CLARKE.  Follow-up planned end of March 2024.    -2/20/2024 fibrosis score 0.26 fibrosis stage 0-F1.  Steatosis score 0.63 steatosis grade S2-S3 moderate to severe.  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  ALT 59 AST 64.  Hemoglobin 14.3 hematocrit 44.1 platelets 577,000    -2/26/2024 [facility] hematology oncology follow-up: She did not get quarterly labs as previously ordered.  We will need to push harder on the Hydrea with a goal to get her platelets consistently in the 400,000's.  In August the platelets were 532,000 and in February they were 577,000.  Will get labs today and monthly and have her see my nurse practitioner back in 3 months to make sure that the platelets are trending downward..  If with monthly CBCs being 1000 mg dose that she has been taking for quite some time is not throwing her platelets consistently below 500,000 into the 400,000's or lower, then I would increase her to 1000 mg alternating with 1500 mg.  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  She will need to follow with gastroenterology regarding her hepatic steatosis.  Certainly a fair portion of her hepatosplenomegaly  may be related to her myeloproliferative disorder and not just the hepatic steatosis and it is hard to sort out the proportions of each contribution.  November CT chest without contrast done at [facility] showed 40 mm ascending aorta unchanged from August 2022 done by Dr. Muniz..  That needs to continue to be monitored as well by by Dr. Muniz.  I will add a sedimentation rate to her labs today as well.  If that is  significantly elevated then some of her platelet elevation could be an acute phase reactant but I suspect it is mostly for myeloproliferative JAK2 positive disease. I do not palpate any hepatosplenomegaly on exam today and her CAT scan in May did not corroborate any significant hepatosplenomegaly.  Continues lifetime Xarelto for May Thurner.    Total time of care today inclusive of time spent prior to arrival reviewing interval notes from gastroenterology and interval labs and during visit interviewing her as to signs or symptoms of her disease and management thereof and after visit instituting complex set of plans as above took 50 minutes of patient care time.  Yogesh Damon MD    02/26/2024

## 2024-03-05 ENCOUNTER — OFFICE VISIT (OUTPATIENT)
Dept: GASTROENTEROLOGY | Facility: CLINIC | Age: 64
End: 2024-03-05
Payer: MEDICARE

## 2024-03-05 VITALS
HEIGHT: 67 IN | SYSTOLIC BLOOD PRESSURE: 110 MMHG | DIASTOLIC BLOOD PRESSURE: 60 MMHG | HEART RATE: 65 BPM | WEIGHT: 200 LBS | BODY MASS INDEX: 31.39 KG/M2 | OXYGEN SATURATION: 96 %

## 2024-03-05 DIAGNOSIS — R74.01 ELEVATED AST (SGOT): ICD-10-CM

## 2024-03-05 DIAGNOSIS — Z98.890 HISTORY OF COLONOSCOPY: ICD-10-CM

## 2024-03-05 DIAGNOSIS — R89.9 ABNORMAL LABORATORY TEST: ICD-10-CM

## 2024-03-05 DIAGNOSIS — R10.9 ABDOMINAL DISCOMFORT: ICD-10-CM

## 2024-03-05 DIAGNOSIS — E66.9 CLASS 1 OBESITY WITH BODY MASS INDEX (BMI) OF 31.0 TO 31.9 IN ADULT, UNSPECIFIED OBESITY TYPE, UNSPECIFIED WHETHER SERIOUS COMORBIDITY PRESENT: ICD-10-CM

## 2024-03-05 DIAGNOSIS — R16.2 HEPATOSPLENOMEGALY: Primary | ICD-10-CM

## 2024-03-05 DIAGNOSIS — Z87.19 HISTORY OF PANCREATITIS: ICD-10-CM

## 2024-03-05 DIAGNOSIS — R79.89 ELEVATED LIVER FUNCTION TESTS: ICD-10-CM

## 2024-03-05 DIAGNOSIS — R93.5 ABNORMAL ABDOMINAL CT SCAN: ICD-10-CM

## 2024-03-05 DIAGNOSIS — K76.0 FATTY INFILTRATION OF LIVER: ICD-10-CM

## 2024-03-05 RX ORDER — FLUTICASONE PROPIONATE 50 MCG
1 SPRAY, SUSPENSION (ML) NASAL DAILY
COMMUNITY

## 2024-03-05 NOTE — PATIENT INSTRUCTIONS
Please have cbc monthly ordered by Dr. Damon with CMP ordered by me. The next time you have CBC and CMP please include tissue transglutaminase IgA with IgA level.

## 2024-03-05 NOTE — PROGRESS NOTES
GASTROENTEROLOGY OFFICE NOTE    Gisell Wylie  [id number]  1960    CARE TEAM  Patient Care Team:  Zainab Childs MD as PCP - General (Internal Medicine & Pediatrics)  Jaxson Alcantara MD as Surgeon (General Surgery)    Referring Provider: No ref. provider found    Chief Complaint   Patient presents with    Hepatic Disease     Hepatosplenomegaly- 3mth follow up        HISTORY OF PRESENT ILLNESS:   Gisell Wylie is a 63 y.o. female Who returns for 2 to 3-month follow-up regarding elevated AST and hepatosplenomegaly on imaging; history of polycythemia vera, prescribed Xarelto, established with Dr. Damon, prior ultrasound gallbladder in 2019 that revealed contracted gallbladder without stones, increased echogenicity throughout the liver suggestive hepatic steatosis, CT scan 5/2023 with findings suggestive of pancreatitis, mild mesenteric fat stranding that may represent mild mesenteric panniculitis, no clear etiology of pancreatitis established, lipase elevated, liver enzymes normal, triglycerides 80.  11/14/2023 ultrasound of abdomen revealed hepatosplenomegaly with hepatosteatosis, history of mildly elevated mitochondrial antibody around 21. 12/12/2023 abdominal duplex ultrasound at [facility] due to hepatosplenomegaly with prior ultrasound from 11/14/2023 that revealed changes of hepatosteatosis, mild splenomegaly     At last office visit on 12/20/2023 CLARKE FibroSure ordered, it was recommended she consider stopping turmeric, work on weight loss.    2/20/2024 CLARKE FibroSure revealed fibrosis stage F0 to F1, S2-S3 moderate to severe steatosis and N3 severe CLARKE    Weight at office visit on 12/20/2023 was 213 pounds.  Today's weight 200 pounds.  She has been following weight watchers program to assist with weight loss.  Liver enzymes recently checked and elevated as below, off turmeric and with weight loss.     Review of record reveals normal liver enzymes May 2023.  She feels as  though the only new or different medication since May 2023 has been meloxicam which she takes 2-3 doses as needed per week due to back pain radiating to right buttocks and leg.     She reports intermittent left mid abdominal discomfort.  She reports having a bowel movement every 3 days since following weight watchers for weight loss.  She reports abdominal discomfort was present prior to starting weight watchers and skipping a few days without a bowel movement.    She has history of taking pantoprazole which potentially aggravated GI symptoms.  Pantoprazole previously changed to Pepcid.  She reports that she recently stopped Pepcid.  She has intermittent chest discomfort that she is concerned could be due to spasm that is improved with drinking water.    She had follow-up with Dr. Damon on 2/26/2024 due to polycythemia rubra vera, recurrent DVT and pulmonary embolism, May Thurner, additional history as below.  Review of documentation at that time reveals she had a EGD in 2019 with Dr. Heath that showed significant reflux and on pantoprazole nausea improved.  She continues to have thrombocytosis, monthly CBC ordered, recommendation to follow-up with gastroenterology due to hepatic steatosis, thrombocytosis suspected to be due to myeloproliferative JAK2 positive disease.  Plan to continue lifetime Xarelto for May Thurner.    Past Medical History:   Diagnosis Date    AAA (abdominal aortic aneurysm)     Abnormal chest CT 12/22/2023    enlarged Aortic Aneurysm    Anxiety 12/22/2023    Arthritis 12/22/2023    Cellulitis of lower limb 05/03/2016    From Automated Load;Provider: Radha Lopez;Status: Active    Chronic bilateral low back pain without sciatica 12/22/2023    Deep vein thrombophlebitis of calf, left     Encounter for long-term (current) use of high-risk medication 08/15/2022    Fatty liver     GERD (gastroesophageal reflux disease)     Hepatosplenomegaly 12/22/2023    Hyperlipidemia     Insomnia 12/22/2023     Lumbar spondylosis 2023    May-Thurner syndrome     Neuropathy 2023    Obstructive sleep apnea     Pancreatitis     Polycythemia rubra vera     Postsurgical menopause 2023    Pulmonary embolism     Recurrent major depression 2016    From Automated Load;Provider: Ange Alex;Status: Active    Small bowel obstruction 2023    Stage 3b chronic kidney disease 2023    Thoracic aortic aneurysm without rupture 2023    4.1 cm. 10/6/2023: Pt. has checked yearly at [facility]. Last checked 2022. Pt. to schedule f/u.        Past Surgical History:   Procedure Laterality Date    BILATERAL BREAST REDUCTION      CAROTID STENT       SECTION      COLONOSCOPY      ELBOW FUSION      HYSTERECTOMY      ILIAC ARTERY STENT Left 2020    Mesh    OOPHORECTOMY      OTHER SURGICAL HISTORY  2020    Iliac vein carterization    REDUCTION MAMMAPLASTY Bilateral 1999    TONSILLECTOMY          Current Outpatient Medications on File Prior to Visit   Medication Sig    aspirin 81 MG EC tablet Take 1 tablet by mouth Daily.    Cholecalciferol 50 MCG (2000) tablet Take 1 tablet by mouth Daily.    fluticasone (FLONASE) 50 MCG/ACT nasal spray 1 spray by Each Nare route Daily.    gabapentin (NEURONTIN) 400 MG capsule Take 1 capsule by mouth 2 (Two) Times a Day.    hydroxyurea (HYDREA) 500 MG capsule Take 2 capsules by mouth Daily.    meloxicam (MOBIC) 7.5 MG tablet Take 1 tablet by mouth Daily.    pravastatin (PRAVACHOL) 40 MG tablet Take 1 tablet by mouth Daily.    sertraline (ZOLOFT) 25 MG tablet Take 1 tablet by mouth Daily.    Turmeric 400 MG capsule Take 3,000 mg by mouth Daily.    XARELTO 20 MG tablet Take 1 tablet by mouth Every Evening.    [DISCONTINUED] triamcinolone (KENALOG) 0.1 % cream Apply 1 Application topically to the appropriate area as directed 2 (Two) Times a Day.     No current facility-administered medications on file prior to visit.       Allergies   Allergen Reactions     "Clarithromycin Other (See Comments)     [place]       Family History   Problem Relation Age of Onset    Atrial fibrillation Mother     Dementia Mother     Diabetes Mother     Heart disease Mother     Leukemia Paternal Grandmother     Breast cancer Paternal Grandmother     Colon cancer Maternal Aunt     Colon cancer Cousin     Lung cancer Cousin     Breast cancer Cousin     Ovarian cancer Neg Hx        Social History     Socioeconomic History    Marital status:    Tobacco Use    Smoking status: Never    Smokeless tobacco: Never   Vaping Use    Vaping status: Never Used   Substance and Sexual Activity    Alcohol use: No    Drug use: No    Sexual activity: Defer       PHYSICAL EXAM   /60 (BP Location: Left arm, Patient Position: Sitting, Cuff Size: Adult)   Pulse 65   Ht 170.2 cm (67\")   Wt 90.7 kg (200 lb)   SpO2 96%   BMI 31.32 kg/m²   Physical Exam  Constitutional:       General: She is not in acute distress.     Appearance: She is not toxic-appearing.   HENT:      Head: Normocephalic and atraumatic. No contusion.      Right Ear: External ear normal.      Left Ear: External ear normal.   Eyes:      General: Lids are normal. No scleral icterus.        Right eye: No discharge.         Left eye: No discharge.      Extraocular Movements: Extraocular movements intact.   Neck:      Trachea: Trachea normal.      Comments: No visible mass  No visible adenopathy  Cardiovascular:      Rate and Rhythm: Normal rate.   Pulmonary:      Effort: No respiratory distress.      Comments: Symmetrical expansion    Abdominal:      Palpations: Abdomen is soft. There is no mass.      Tenderness: There is no abdominal tenderness.   Musculoskeletal:      Comments: Symmetrical movement of upper extremities  Symmetrical movement of lower extremities  No visible deformities   Skin:     General: Skin is warm and dry.      Coloration: Skin is not jaundiced.   Neurological:      General: No focal deficit present.      Mental " Status: She is alert and oriented to person, place, and time.   Psychiatric:         Mood and Affect: Mood normal.         Behavior: Behavior normal.         Thought Content: Thought content normal.         Results Review:  US GB in 2019 revealed contracted gallbladder without stones, increased echogenicity throughout liver suggestive of hepatic steatosis.      7/22/2021 comprehensive metabolic panel revealed elevated AST of 33.      CT scan 5/2023 with findings suggestive of pancreatitis, mild mesenteric fat stranding that may represent mild mesenteric panniculitis. No clear etiology of pancreatitis was established. Lipase was elevated. liver enzymes were normal. Trigylcerides were 80.       10/30/2023 hepatitis C antibody nonreactive, CMP with elevated glucose 157, elevated calcium 10.7, normal alkaline phosphatase 75, normal ALT 45 (lab range of 7-52), AST elevated at 57 (lab range of 13-39), bilirubin normal 0.7.  Lipase normal 24.  OMAR negative.  RF latex less than 10.  CBC with normal white blood cell count 5.8, normal hemoglobin 14.1, normal hematocrit 42.5 and elevated platelet count 514.     11/14/2023 ultrasound of the abdomen revealed liver measures 22.5 cm, gallbladder is present with normal wall thickness, spleen measures 15.2 cm and is enlarged, overall impression changes of hepatosplenomegaly with hepatosteatosis.     11/27/2023 CMP revealed elevated chloride 108, elevated glucose 105, alkaline phosphatase normal at 57, ALT normal per above lab reference range at 41, AST elevated but improved at 43, bilirubin normal 0.8.  INR 1.4.  Hepatitis A IgM nonreactive, hepatitis B surface antigen nonreactive, hepatitis B core antibody nonreactive, hepatitis C antibody nonreactive.  Ceruloplasmin 25.  Smooth muscle antibody negative.  Ferritin 125.7.     She reports she was notified by her primary care provider's office that mitochondrial antibody was mildly elevated around 21.     12/12/2023 abdominal duplex  ultrasound at [facility] due to hepatosplenomegaly with prior ultrasound from 11/14/2023 that revealed changes of hepatosteatosis, mild splenomegaly     Prior colonoscopy in 2021 at [facility].  Repeat colonoscopy recommended in 10 years.    2/20/2024 CLARKE FibroSure revealed fibrosis stage F0 to F1, S2-S3 moderate to severe steatosis and N3 severe CLARKE  CMP          5/14/2023    08:13 2/20/2024    10:33   CMP   Glucose 164  100     105    BUN 18  16     17    Creatinine 0.79  0.89     0.77    EGFR 83.6  85.7    Sodium 139  141     143    Potassium 4.9  4.5     4.6    Chloride 106  107     108    Calcium 9.9  9.5     9.9    Total Protein  7.5    Total Protein 7.6  7.5    Albumin 4.3  4.6     4.4    Globulin  2.9    Globulin 3.3  3.1    Total Bilirubin 0.6  0.7     0.8    Alkaline Phosphatase 91  73     75    AST (SGOT) 21  67     63    ALT (SGPT) 20  50     49    Albumin/Globulin Ratio 1.3  1.4    BUN/Creatinine Ratio 22.8  18.0     22.1    Anion Gap 9.0  10.0      CBC          8/21/2023    09:31 2/20/2024    10:33 2/26/2024    09:43   CBC   WBC 5.91  6.03  6.70    RBC 4.11  4.23  4.09    Hemoglobin 14.0  14.3  13.9    Hematocrit 43.2  44.1  42.0    .1  104.3  102.7    MCH 34.1  33.8  34.0    MCHC 32.4  32.4  33.1    RDW 14.6  13.8  13.9    Platelets 532  577  551        ASSESSMENT / PLAN  1. Hepatosplenomegaly  2. Elevated AST (SGOT)  3. Elevated liver function tests  4. Class 1 obesity with body mass index (BMI) of 31.0 to 31.9 in adult, unspecified obesity type, unspecified whether serious comorbidity present  5. Fatty infiltration of liver  6. Abnormal laboratory test  - 2/20/2024 CLARKE fibrosure concerning for CLARKE for which I recommend continued weight loss.  She has done well with weight loss losing 13 pounds over the past few months.  Liver enzymes remain elevated, slightly increased from when previously checked at primary care provider's office but with different lab reference range.   Recommend repeat CMP with standing order placed when CBC is drawn as previously ordered by Dr. Damon.  We discussed consideration for liver biopsy if liver enzymes remain elevated despite continued weight loss.  She has history of mildly elevated mitochondrial antibody.  I previously expressed concern that turmeric may contribute to elevated liver enzymes but when liver enzymes were checked most recently she had been off turmeric I believe for approximately 2 months.  -Meloxicam reported as new medication since liver enzymes were normal May 2023.  The following was reviewed on [facility] liver tox:  Meloxicam has been linked to rare instances of acute, clinically apparent liver injury.  It does not seem as though she uses meloxicam on a regular basis lowering suspicion that meloxicam is reason or contributing to elevated liver enzymes  -Due to prior CT scan revealing mild mesenteric panniculitis, elevated liver enzymes, check celiac serology although review of documentation by Dr. Damon reveals prior EGD by Dr. Heath without mention of changes concerning for celiac  -Try to limit or avoid alcohol  - Comprehensive Metabolic Panel; Standing  - Tissue Transglutaminase, IgA; Future  - IgA; Future    7. History of colonoscopy  - likely due for repeat colonoscopy 3/2031. Prior colonoscopy at [facility] . See plan for abdominal discomfort and abnormal CT scan as below regarding repeat colonoscopy    8. History of pancreatitis  admitted 5/2023 with diagnosis of pancreatitis (CT abnormal, lipase elevated) of uncertain etiology. She rarely drinks alcohol. Triglycerides normal. No gallstones on prior imaging. Another etiology to consider is medication induced pancreatitis. The following was previously reviewed on up to date: Pravastatin is a class Ia medication that can cause drug induced pancreatitis.Sertraline is a class IV medications that can cause medication induced pancreatitis.  Consider patient discussing different medications if  she has recurrent pancreatitis in the future    9. Abdominal discomfort  10. Abnormal abdominal CT scan  CT scan 5/2023 mild mesenteric fat stranding that may represent mild mesenteric panniculitis, thrombocytosis and left abdominal discomfort for which we discussed colonoscopy for additional evaluation and she is willing to proceed      Return for Follow-up after Colonoscopy.    Lilo Townsend, APRN  03/05/2024

## 2024-03-05 NOTE — PROGRESS NOTES
GASTROENTEROLOGY OFFICE NOTE    Gisell Wylie  [id number]  1960    CARE TEAM  Patient Care Team:  Zainab Childs MD as PCP - General (Internal Medicine & Pediatrics)  Jaxson Alcantara MD as Surgeon (General Surgery)    Referring Provider: No ref. provider found    Chief Complaint   Patient presents with    Hepatosplenomegaly     3mth follow up.         HISTORY OF PRESENT ILLNESS:   Gisell Wylie is a 63 y.o. female who presents to the clinic today for ***    Past Medical History:   Diagnosis Date    AAA (abdominal aortic aneurysm)     Abnormal chest CT 2023    enlarged Aortic Aneurysm    Anxiety 2023    Arthritis 2023    Cellulitis of lower limb 2016    From Automated Load;Provider: Radha Lopez;Status: Active    Chronic bilateral low back pain without sciatica 2023    Deep vein thrombophlebitis of calf, left     Encounter for long-term (current) use of high-risk medication 08/15/2022    Fatty liver     GERD (gastroesophageal reflux disease)     Hepatosplenomegaly 2023    Hyperlipidemia     Insomnia 2023    Lumbar spondylosis 2023    May-Thurner syndrome     Neuropathy 2023    Obstructive sleep apnea     Pancreatitis     Polycythemia rubra vera     Postsurgical menopause 2023    Pulmonary embolism     Recurrent major depression 2016    From Automated Load;Provider: Ange Alex;Status: Active    Small bowel obstruction 2023    Stage 3b chronic kidney disease 2023    Thoracic aortic aneurysm without rupture 2023    4.1 cm. 10/6/2023: Pt. has checked yearly at . Last checked 2022. Pt. to schedule f/u.        Past Surgical History:   Procedure Laterality Date    BILATERAL BREAST REDUCTION      CAROTID STENT       SECTION      COLONOSCOPY      ELBOW FUSION      HYSTERECTOMY      ILIAC ARTERY STENT Left 2020    Mesh    OOPHORECTOMY      OTHER SURGICAL HISTORY  2020    Iliac vein carterization     REDUCTION MAMMAPLASTY Bilateral 1999    TONSILLECTOMY          Current Outpatient Medications on File Prior to Visit   Medication Sig    aspirin 81 MG EC tablet Take 1 tablet by mouth Daily.    Cholecalciferol 50 MCG (2000 UT) tablet Take 1 tablet by mouth Daily.    fluticasone (FLONASE) 50 MCG/ACT nasal spray 1 spray by Each Nare route Daily.    gabapentin (NEURONTIN) 400 MG capsule Take 1 capsule by mouth 2 (Two) Times a Day.    hydroxyurea (HYDREA) 500 MG capsule Take 2 capsules by mouth Daily.    meloxicam (MOBIC) 7.5 MG tablet Take 1 tablet by mouth Daily.    pravastatin (PRAVACHOL) 40 MG tablet Take 1 tablet by mouth Daily.    sertraline (ZOLOFT) 25 MG tablet Take 1 tablet by mouth Daily.    Turmeric 400 MG capsule Take 3,000 mg by mouth Daily.    XARELTO 20 MG tablet Take 1 tablet by mouth Every Evening.    triamcinolone (KENALOG) 0.1 % cream Apply 1 Application topically to the appropriate area as directed 2 (Two) Times a Day.     No current facility-administered medications on file prior to visit.       Allergies   Allergen Reactions    Clarithromycin Other (See Comments)     [place]       Family History   Problem Relation Age of Onset    Atrial fibrillation Mother     Dementia Mother     Diabetes Mother     Heart disease Mother     Leukemia Paternal Grandmother     Breast cancer Paternal Grandmother     Colon cancer Maternal Aunt     Colon cancer Cousin     Lung cancer Cousin     Breast cancer Cousin     Ovarian cancer Neg Hx        Social History     Socioeconomic History    Marital status:    Tobacco Use    Smoking status: Never    Smokeless tobacco: Never   Vaping Use    Vaping status: Never Used   Substance and Sexual Activity    Alcohol use: No    Drug use: No    Sexual activity: Defer       PHYSICAL EXAM   There were no vitals taken for this visit.  Physical Exam    Results Review:  ***  {Ambulatory Labs:08262}    ASSESSMENT / PLAN  There are no diagnoses linked to this encounter.    No  follow-ups on file.    Renetta Roach MA  03/05/2024

## 2024-03-12 ENCOUNTER — SPECIALTY PHARMACY (OUTPATIENT)
Dept: ONCOLOGY | Facility: HOSPITAL | Age: 64
End: 2024-03-12
Payer: MEDICARE

## 2024-03-12 NOTE — PROGRESS NOTES
Specialty Pharmacy Refill Coordination Note     Gisell is a 63 y.o. female contacted today regarding refills of  hydrea 1000mg PO QD of specialty medication(s).    Specialty medication(s) and dose(s) confirmed: yes    Refill Questions      Flowsheet Row Most Recent Value   Changes to allergies? No   Changes to medications? No   New conditions or infections since last clinic visit No   Unplanned office visit, urgent care, ED, or hospital admission in the last 4 weeks  No   How does patient/caregiver feel medication is working? Good   Financial problems or insurance changes  No   Since the previous refill, were any specialty medication doses or scheduled injections missed or delayed?  No   Does this patient require a clinical escalation to a pharmacist? No            Delivery Questions      Flowsheet Row Most Recent Value   Delivery method [employer]   Delivery address verified with patient/caregiver? Yes   Delivery address Home   Number of medications in delivery 1   Medication(s) being filled and delivered Hydroxyurea (Antineoplastics Misc.)   Doses left of specialty medications 7 days let   Copay verified? Yes   Copay amount $0   Copay form of payment No copayment ($0)              Medication Adherence    Adherence tools used: watch          Follow-up: 30 day(s)     Anita Persaud, Pharmacy Technician  Specialty Pharmacy Technician

## 2024-03-28 ENCOUNTER — LAB (OUTPATIENT)
Dept: LAB | Facility: HOSPITAL | Age: 64
End: 2024-03-28
Payer: MEDICARE

## 2024-03-28 DIAGNOSIS — R16.2 HEPATOSPLENOMEGALY: ICD-10-CM

## 2024-03-28 DIAGNOSIS — R74.01 ELEVATED AST (SGOT): ICD-10-CM

## 2024-03-28 DIAGNOSIS — D45 POLYCYTHEMIA RUBRA VERA: ICD-10-CM

## 2024-03-28 DIAGNOSIS — I87.1 MAY-THURNER SYNDROME: ICD-10-CM

## 2024-03-28 LAB
ALBUMIN SERPL-MCNC: 4.4 G/DL (ref 3.5–5.2)
ALBUMIN/GLOB SERPL: 1.5 G/DL
ALP SERPL-CCNC: 83 U/L (ref 39–117)
ALT SERPL W P-5'-P-CCNC: 30 U/L (ref 1–33)
ANION GAP SERPL CALCULATED.3IONS-SCNC: 11.6 MMOL/L (ref 5–15)
AST SERPL-CCNC: 33 U/L (ref 1–32)
BASOPHILS # BLD AUTO: 0.07 10*3/MM3 (ref 0–0.2)
BASOPHILS NFR BLD AUTO: 1.2 % (ref 0–1.5)
BILIRUB SERPL-MCNC: 0.6 MG/DL (ref 0–1.2)
BUN SERPL-MCNC: 17 MG/DL (ref 8–23)
BUN/CREAT SERPL: 19.5 (ref 7–25)
CALCIUM SPEC-SCNC: 10.5 MG/DL (ref 8.6–10.5)
CHLORIDE SERPL-SCNC: 104 MMOL/L (ref 98–107)
CO2 SERPL-SCNC: 24.4 MMOL/L (ref 22–29)
CREAT SERPL-MCNC: 0.87 MG/DL (ref 0.57–1)
DEPRECATED RDW RBC AUTO: 50.7 FL (ref 37–54)
EGFRCR SERPLBLD CKD-EPI 2021: 74 ML/MIN/1.73
EOSINOPHIL # BLD AUTO: 0.18 10*3/MM3 (ref 0–0.4)
EOSINOPHIL NFR BLD AUTO: 3.2 % (ref 0.3–6.2)
ERYTHROCYTE [DISTWIDTH] IN BLOOD BY AUTOMATED COUNT: 13.8 % (ref 12.3–15.4)
GLOBULIN UR ELPH-MCNC: 2.9 GM/DL
GLUCOSE SERPL-MCNC: 76 MG/DL (ref 65–99)
HCT VFR BLD AUTO: 41.1 % (ref 34–46.6)
HGB BLD-MCNC: 13.7 G/DL (ref 12–15.9)
IMM GRANULOCYTES # BLD AUTO: 0.03 10*3/MM3 (ref 0–0.05)
IMM GRANULOCYTES NFR BLD AUTO: 0.5 % (ref 0–0.5)
LYMPHOCYTES # BLD AUTO: 1.01 10*3/MM3 (ref 0.7–3.1)
LYMPHOCYTES NFR BLD AUTO: 17.8 % (ref 19.6–45.3)
MCH RBC QN AUTO: 33.5 PG (ref 26.6–33)
MCHC RBC AUTO-ENTMCNC: 33.3 G/DL (ref 31.5–35.7)
MCV RBC AUTO: 100.5 FL (ref 79–97)
MONOCYTES # BLD AUTO: 0.29 10*3/MM3 (ref 0.1–0.9)
MONOCYTES NFR BLD AUTO: 5.1 % (ref 5–12)
NEUTROPHILS NFR BLD AUTO: 4.11 10*3/MM3 (ref 1.7–7)
NEUTROPHILS NFR BLD AUTO: 72.2 % (ref 42.7–76)
NRBC BLD AUTO-RTO: 0 /100 WBC (ref 0–0.2)
PLATELET # BLD AUTO: 556 10*3/MM3 (ref 140–450)
PMV BLD AUTO: 9.1 FL (ref 6–12)
POTASSIUM SERPL-SCNC: 4.4 MMOL/L (ref 3.5–5.2)
PROT SERPL-MCNC: 7.3 G/DL (ref 6–8.5)
RBC # BLD AUTO: 4.09 10*6/MM3 (ref 3.77–5.28)
SODIUM SERPL-SCNC: 140 MMOL/L (ref 136–145)
WBC NRBC COR # BLD AUTO: 5.69 10*3/MM3 (ref 3.4–10.8)

## 2024-03-28 PROCEDURE — 86364 TISS TRNSGLTMNASE EA IG CLAS: CPT

## 2024-03-28 PROCEDURE — 80053 COMPREHEN METABOLIC PANEL: CPT

## 2024-03-28 PROCEDURE — 36415 COLL VENOUS BLD VENIPUNCTURE: CPT

## 2024-03-28 PROCEDURE — 85025 COMPLETE CBC W/AUTO DIFF WBC: CPT

## 2024-03-29 LAB — TTG IGA SER-ACNC: <2 U/ML (ref 0–3)

## 2024-04-01 RX ORDER — SODIUM, POTASSIUM,MAG SULFATES 17.5-3.13G
SOLUTION, RECONSTITUTED, ORAL ORAL
Qty: 354 ML | Refills: 0 | Status: SHIPPED | OUTPATIENT
Start: 2024-04-01

## 2024-04-08 ENCOUNTER — SPECIALTY PHARMACY (OUTPATIENT)
Dept: ONCOLOGY | Facility: HOSPITAL | Age: 64
End: 2024-04-08
Payer: MEDICARE

## 2024-04-08 NOTE — PROGRESS NOTES
Specialty Pharmacy Refill Coordination Note     Gisell is a 63 y.o. female contacted today regarding refills of Hydrea 1,000 mg PO daily specialty medication(s).    Shipping today for deliver to home address tomorrow, 4/9/2024.    Specialty medication(s) and dose(s) confirmed: yes    Refill Questions      Flowsheet Row Most Recent Value   Changes to allergies? No   Changes to medications? No   New conditions or infections since last clinic visit No   Unplanned office visit, urgent care, ED, or hospital admission in the last 4 weeks  No   How does patient/caregiver feel medication is working? Very good   Financial problems or insurance changes  No   Since the previous refill, were any specialty medication doses or scheduled injections missed or delayed?  No   Does this patient require a clinical escalation to a pharmacist? No            Delivery Questions      Flowsheet Row Most Recent Value   Delivery method [employer]   Delivery address verified with patient/caregiver? Yes   Delivery address Home   Number of medications in delivery 1   Medication(s) being filled and delivered Hydroxyurea (Antineoplastics Misc.)   Doses left of specialty medications around 5-7 days per pt   Copay verified? Yes   Copay amount $0 copay verified   Copay form of payment No copayment ($0)              Medication Adherence    Adherence tools used: watch          Follow-up: 30 day(s)     Larissa Vasquez, Pharmacy Technician  Specialty Pharmacy Technician

## 2024-04-11 ENCOUNTER — OUTSIDE FACILITY SERVICE (OUTPATIENT)
Dept: GASTROENTEROLOGY | Facility: CLINIC | Age: 64
End: 2024-04-11
Payer: MEDICARE

## 2024-04-26 ENCOUNTER — LAB (OUTPATIENT)
Dept: LAB | Facility: HOSPITAL | Age: 64
End: 2024-04-26
Payer: MEDICARE

## 2024-04-26 DIAGNOSIS — R16.2 HEPATOSPLENOMEGALY: ICD-10-CM

## 2024-04-26 DIAGNOSIS — D45 POLYCYTHEMIA RUBRA VERA: ICD-10-CM

## 2024-04-26 DIAGNOSIS — R74.01 ELEVATED AST (SGOT): ICD-10-CM

## 2024-04-26 DIAGNOSIS — Z79.899 ENCOUNTER FOR LONG-TERM (CURRENT) USE OF HIGH-RISK MEDICATION: ICD-10-CM

## 2024-04-26 LAB
BASOPHILS # BLD AUTO: 0.09 10*3/MM3 (ref 0–0.2)
BASOPHILS NFR BLD AUTO: 1.7 % (ref 0–1.5)
DEPRECATED RDW RBC AUTO: 53.8 FL (ref 37–54)
EOSINOPHIL # BLD AUTO: 0.12 10*3/MM3 (ref 0–0.4)
EOSINOPHIL NFR BLD AUTO: 2.3 % (ref 0.3–6.2)
ERYTHROCYTE [DISTWIDTH] IN BLOOD BY AUTOMATED COUNT: 14.5 % (ref 12.3–15.4)
HCT VFR BLD AUTO: 42.1 % (ref 34–46.6)
HGB BLD-MCNC: 13.8 G/DL (ref 12–15.9)
IMM GRANULOCYTES # BLD AUTO: 0.03 10*3/MM3 (ref 0–0.05)
IMM GRANULOCYTES NFR BLD AUTO: 0.6 % (ref 0–0.5)
LYMPHOCYTES # BLD AUTO: 1.01 10*3/MM3 (ref 0.7–3.1)
LYMPHOCYTES NFR BLD AUTO: 19.6 % (ref 19.6–45.3)
MCH RBC QN AUTO: 33.4 PG (ref 26.6–33)
MCHC RBC AUTO-ENTMCNC: 32.8 G/DL (ref 31.5–35.7)
MCV RBC AUTO: 101.9 FL (ref 79–97)
MONOCYTES # BLD AUTO: 0.23 10*3/MM3 (ref 0.1–0.9)
MONOCYTES NFR BLD AUTO: 4.5 % (ref 5–12)
NEUTROPHILS NFR BLD AUTO: 3.68 10*3/MM3 (ref 1.7–7)
NEUTROPHILS NFR BLD AUTO: 71.3 % (ref 42.7–76)
NRBC BLD AUTO-RTO: 0 /100 WBC (ref 0–0.2)
PLATELET # BLD AUTO: 570 10*3/MM3 (ref 140–450)
PMV BLD AUTO: 9 FL (ref 6–12)
RBC # BLD AUTO: 4.13 10*6/MM3 (ref 3.77–5.28)
WBC NRBC COR # BLD AUTO: 5.16 10*3/MM3 (ref 3.4–10.8)

## 2024-04-26 PROCEDURE — 36415 COLL VENOUS BLD VENIPUNCTURE: CPT

## 2024-04-26 PROCEDURE — 85025 COMPLETE CBC W/AUTO DIFF WBC: CPT

## 2024-04-27 LAB
ALBUMIN SERPL-MCNC: 4.4 G/DL (ref 3.5–5.2)
ALBUMIN/GLOB SERPL: 1.7 G/DL
ALP SERPL-CCNC: 76 U/L (ref 39–117)
ALT SERPL-CCNC: 36 U/L (ref 1–33)
AST SERPL-CCNC: 42 U/L (ref 1–32)
BILIRUB SERPL-MCNC: 0.5 MG/DL (ref 0–1.2)
BUN SERPL-MCNC: 17 MG/DL (ref 8–23)
BUN/CREAT SERPL: 22.1 (ref 7–25)
CALCIUM SERPL-MCNC: 10.4 MG/DL (ref 8.6–10.5)
CHLORIDE SERPL-SCNC: 107 MMOL/L (ref 98–107)
CO2 SERPL-SCNC: 23.8 MMOL/L (ref 22–29)
CREAT SERPL-MCNC: 0.77 MG/DL (ref 0.57–1)
EGFRCR SERPLBLD CKD-EPI 2021: 85.7 ML/MIN/1.73
GLOBULIN SER CALC-MCNC: 2.6 GM/DL
GLUCOSE SERPL-MCNC: 82 MG/DL (ref 65–99)
IGA SERPL-MCNC: 234 MG/DL (ref 87–352)
POTASSIUM SERPL-SCNC: 5 MMOL/L (ref 3.5–5.2)
PROT SERPL-MCNC: 7 G/DL (ref 6–8.5)
SODIUM SERPL-SCNC: 141 MMOL/L (ref 136–145)

## 2024-05-03 ENCOUNTER — SPECIALTY PHARMACY (OUTPATIENT)
Dept: ONCOLOGY | Facility: HOSPITAL | Age: 64
End: 2024-05-03
Payer: MEDICARE

## 2024-05-03 NOTE — PROGRESS NOTES
Specialty Pharmacy Refill Coordination Note     Gisell is a 63 y.o. female contacted today regarding refills of  hydroxyurea 1000mg PO QD specialty medication(s).    Reviewed and verified with patient: YES  Specialty medication(s) and dose(s) confirmed: yes    Refill Questions      Flowsheet Row Most Recent Value   Changes to allergies? No   Changes to medications? No   New conditions or infections since last clinic visit No   Unplanned office visit, urgent care, ED, or hospital admission in the last 4 weeks  No   How does patient/caregiver feel medication is working? Good   Financial problems or insurance changes  No   Since the previous refill, were any specialty medication doses or scheduled injections missed or delayed?  No   Does this patient require a clinical escalation to a pharmacist? No            Delivery Questions      Flowsheet Row Most Recent Value   Delivery method [employer]   Delivery address verified with patient/caregiver? Yes   Delivery address Home   Number of medications in delivery 1   Medication(s) being filled and delivered Hydroxyurea (Antineoplastics Misc.)   Doses left of specialty medications 14 days   Copay verified? Yes   Copay amount 0   Copay form of payment No copayment ($0)              Medication Adherence    Adherence tools used: watch          Follow-up: 3 week(s)     Jack Armstrong, Pharmacy Technician  Specialty Pharmacy Technician

## 2024-05-21 ENCOUNTER — LAB (OUTPATIENT)
Dept: LAB | Facility: HOSPITAL | Age: 64
End: 2024-05-21
Payer: MEDICARE

## 2024-05-21 DIAGNOSIS — R16.2 HEPATOSPLENOMEGALY: ICD-10-CM

## 2024-05-21 DIAGNOSIS — R74.01 ELEVATED AST (SGOT): ICD-10-CM

## 2024-05-21 DIAGNOSIS — D45 POLYCYTHEMIA RUBRA VERA: ICD-10-CM

## 2024-05-21 DIAGNOSIS — Z79.899 ENCOUNTER FOR LONG-TERM (CURRENT) USE OF HIGH-RISK MEDICATION: ICD-10-CM

## 2024-05-21 LAB
ALBUMIN SERPL-MCNC: 4.2 G/DL (ref 3.5–5.2)
ALBUMIN/GLOB SERPL: 1.5 G/DL
ALP SERPL-CCNC: 68 U/L (ref 39–117)
ALT SERPL W P-5'-P-CCNC: 34 U/L (ref 1–33)
ANION GAP SERPL CALCULATED.3IONS-SCNC: 11.5 MMOL/L (ref 5–15)
AST SERPL-CCNC: 48 U/L (ref 1–32)
BASOPHILS # BLD AUTO: 0.07 10*3/MM3 (ref 0–0.2)
BASOPHILS NFR BLD AUTO: 1.3 % (ref 0–1.5)
BILIRUB SERPL-MCNC: 0.7 MG/DL (ref 0–1.2)
BUN SERPL-MCNC: 21 MG/DL (ref 8–23)
BUN/CREAT SERPL: 22.8 (ref 7–25)
CALCIUM SPEC-SCNC: 10.1 MG/DL (ref 8.6–10.5)
CHLORIDE SERPL-SCNC: 106 MMOL/L (ref 98–107)
CO2 SERPL-SCNC: 23.5 MMOL/L (ref 22–29)
CREAT SERPL-MCNC: 0.92 MG/DL (ref 0.57–1)
DEPRECATED RDW RBC AUTO: 58 FL (ref 37–54)
EGFRCR SERPLBLD CKD-EPI 2021: 69.2 ML/MIN/1.73
EOSINOPHIL # BLD AUTO: 0.15 10*3/MM3 (ref 0–0.4)
EOSINOPHIL NFR BLD AUTO: 2.7 % (ref 0.3–6.2)
ERYTHROCYTE [DISTWIDTH] IN BLOOD BY AUTOMATED COUNT: 15.1 % (ref 12.3–15.4)
GLOBULIN UR ELPH-MCNC: 2.8 GM/DL
GLUCOSE SERPL-MCNC: 87 MG/DL (ref 65–99)
HCT VFR BLD AUTO: 41.2 % (ref 34–46.6)
HGB BLD-MCNC: 13.2 G/DL (ref 12–15.9)
IMM GRANULOCYTES # BLD AUTO: 0.04 10*3/MM3 (ref 0–0.05)
IMM GRANULOCYTES NFR BLD AUTO: 0.7 % (ref 0–0.5)
LYMPHOCYTES # BLD AUTO: 1.07 10*3/MM3 (ref 0.7–3.1)
LYMPHOCYTES NFR BLD AUTO: 19.3 % (ref 19.6–45.3)
MCH RBC QN AUTO: 33.6 PG (ref 26.6–33)
MCHC RBC AUTO-ENTMCNC: 32 G/DL (ref 31.5–35.7)
MCV RBC AUTO: 104.8 FL (ref 79–97)
MONOCYTES # BLD AUTO: 0.33 10*3/MM3 (ref 0.1–0.9)
MONOCYTES NFR BLD AUTO: 6 % (ref 5–12)
NEUTROPHILS NFR BLD AUTO: 3.87 10*3/MM3 (ref 1.7–7)
NEUTROPHILS NFR BLD AUTO: 70 % (ref 42.7–76)
NRBC BLD AUTO-RTO: 0 /100 WBC (ref 0–0.2)
PLATELET # BLD AUTO: 562 10*3/MM3 (ref 140–450)
PMV BLD AUTO: 9 FL (ref 6–12)
POTASSIUM SERPL-SCNC: 4.9 MMOL/L (ref 3.5–5.2)
PROT SERPL-MCNC: 7 G/DL (ref 6–8.5)
RBC # BLD AUTO: 3.93 10*6/MM3 (ref 3.77–5.28)
SODIUM SERPL-SCNC: 141 MMOL/L (ref 136–145)
WBC NRBC COR # BLD AUTO: 5.53 10*3/MM3 (ref 3.4–10.8)

## 2024-05-21 PROCEDURE — 85025 COMPLETE CBC W/AUTO DIFF WBC: CPT

## 2024-05-21 PROCEDURE — 80053 COMPREHEN METABOLIC PANEL: CPT

## 2024-05-21 PROCEDURE — 36415 COLL VENOUS BLD VENIPUNCTURE: CPT

## 2024-06-03 ENCOUNTER — SPECIALTY PHARMACY (OUTPATIENT)
Facility: HOSPITAL | Age: 64
End: 2024-06-03
Payer: MEDICARE

## 2024-06-03 ENCOUNTER — SPECIALTY PHARMACY (OUTPATIENT)
Dept: ONCOLOGY | Facility: HOSPITAL | Age: 64
End: 2024-06-03
Payer: MEDICARE

## 2024-06-03 ENCOUNTER — OFFICE VISIT (OUTPATIENT)
Dept: ONCOLOGY | Facility: CLINIC | Age: 64
End: 2024-06-03
Payer: MEDICARE

## 2024-06-03 VITALS
HEIGHT: 67 IN | WEIGHT: 198 LBS | HEART RATE: 60 BPM | RESPIRATION RATE: 18 BRPM | DIASTOLIC BLOOD PRESSURE: 62 MMHG | SYSTOLIC BLOOD PRESSURE: 126 MMHG | BODY MASS INDEX: 31.08 KG/M2 | OXYGEN SATURATION: 98 % | TEMPERATURE: 97.2 F

## 2024-06-03 DIAGNOSIS — D45 POLYCYTHEMIA RUBRA VERA: Primary | Chronic | ICD-10-CM

## 2024-06-03 DIAGNOSIS — D45 POLYCYTHEMIA RUBRA VERA: ICD-10-CM

## 2024-06-03 PROCEDURE — 1126F AMNT PAIN NOTED NONE PRSNT: CPT | Performed by: INTERNAL MEDICINE

## 2024-06-03 PROCEDURE — 1160F RVW MEDS BY RX/DR IN RCRD: CPT | Performed by: INTERNAL MEDICINE

## 2024-06-03 PROCEDURE — 1159F MED LIST DOCD IN RCRD: CPT | Performed by: INTERNAL MEDICINE

## 2024-06-03 PROCEDURE — 99214 OFFICE O/P EST MOD 30 MIN: CPT | Performed by: INTERNAL MEDICINE

## 2024-06-03 RX ORDER — HYDROXYUREA 500 MG/1
1000 CAPSULE ORAL TAKE AS DIRECTED
Qty: 75 CAPSULE | Refills: 11 | Status: SHIPPED | OUTPATIENT
Start: 2024-06-03

## 2024-06-03 NOTE — PROGRESS NOTES
Specialty Pharmacy Refill Coordination Note     Gisell is a 63 y.o. female contacted today regarding refills of  hydrea 1000mg PO every other day with alternating 1500mg PO every other day of specialty medication(s).    Reviewed and verified with patient: YES: DOSE CHANGE    Specialty medication(s) and dose(s) confirmed: yes    Refill Questions      Flowsheet Row Most Recent Value   Changes to allergies? No   Changes to medications? Yes  [dose increase]   New conditions or infections since last clinic visit No   Unplanned office visit, urgent care, ED, or hospital admission in the last 4 weeks  No   How does patient/caregiver feel medication is working? Fair   Financial problems or insurance changes  No   Since the previous refill, were any specialty medication doses or scheduled injections missed or delayed?  No   Does this patient require a clinical escalation to a pharmacist? Yes            Delivery Questions      Flowsheet Row Most Recent Value   Delivery method [employer]   Delivery address verified with patient/caregiver? Yes   Delivery address Home   Number of medications in delivery 1   Medication(s) being filled and delivered Hydroxyurea (Antineoplastics Misc.)   Doses left of specialty medications 2 days left   Copay verified? Yes   Copay amount $0   Copay form of payment No copayment ($0)              Medication Adherence    Adherence tools used: watch          Follow-up: 30 day(s)     Anita Persaud, Pharmacy Technician  Specialty Pharmacy Technician

## 2024-06-03 NOTE — LETTER
Maile 3, 2024       No Recipients    Patient: Gisell Wylie   YOB: 1960   Date of Visit: 6/3/2024     Dear Zainab Childs MD:       Thank you for referring Gisell Wylie to me for evaluation. Below are the relevant portions of my assessment and plan of care.    If you have questions, please do not hesitate to call me. I look forward to following Gisell along with you.         Sincerely,        Yogesh Damon MD        CC:   No Recipients    Yogesh Damon MD  06/03/24 1022  Sign when Signing Visit  CHIEF COMPLAINT: No new somatic complaints    Problem List:  Oncology/Hematology History Overview Note   1.  Collin 2+ polycythemia rubra vera on Hydrea and aspirin  2.  Recurrent DVT and PE due to problem #2 with low titer positive lupus anticoagulant per prior testing by Dr. Major apparently on 2 separate occasions confirmatory 3 months apart  3.  May Thurner  4.  Ascending aortic aneurysm  5.  History of SOLOMON  6.  Obstructive sleep apnea  7.  Contrast-induced nephropathy  8.  Reflux with nausea  9.  Pancreatitis, hospitalized May 2023, treated with pain medications and IV fluids.    -8/2/2019 initial [facility] medical oncology consultation: May 2016 developed unprovoked left lower extremity deep vein thrombosis treated with left common iliac stent, thrombolyzes, temporary vena cava filter placement and retrieval.  Saw hematologist who told her she might have a low level lupus anticoagulant but she does not have that data and they were told that they did not think that was significant enough to warrant lifetime anticoagulation.  She stopped Xarelto after 8 months.  Starting the end of June she was developing pain in the left lower extremity again with progressive swelling and shortness of breath.  Went to [facility] ER on 4 July.  Was found to have left lower extremity extensive deep vein thrombosis and right lower lobe pulmonary embolus.  She was treated with thrombectomy and thrombolysis by Rigoberto  and Pavel.  Previously she could not afford her Xarelto and they have switched her from heparin to Coumadin but before discharge switched her to Xarelto.  Her creatinine during her hospitalization increased to 1.38 from a baseline of 1.2.  She comes to hematology at [facility] on 8/2/2019 with labs showing creatinine of 1.23 on 7/11/2019, 1.69 on 7/17/2019, and she says that it was repeated again but she does not have that lab and was told that it was back down but not at normal.  Her CBC reveals platelets of 697,000 on 7/17/2019 and 589,000 on 7/10/2019.Labs from 7/10/2019 revealed platelet count of 490,000 with hematocrit 54.7%.  Creatinine on 7/3/2019 was 0.92.  She understandably had a d-dimer elevation to 1.22 on 7/3/2019 with her clot.  Given the magnitude of her clot and the likelihood of may Thurner syndrome, she needs lifetime anticoagulation.  On patients with deep vein thrombosis and/or PE and indefinite anticoagulation, use of Xarelto in patients with GFR less than 30 is recommended to be avoided varied will need to watch that.  With her prior recent elevation of hematocrit and persistent elevation of platelets, we will continue to watch that and if they remain persistent suggesting this being more than just reactive to her PE and clots, then we need to look for myeloproliferative disorder contributing to her clotting.  She is going to get her records from her other hematologist she saw back in 2016 just for completeness sake but I would not repeat, nor would I do for the first time now, her hypercoagulable panel as any hypercoagulable disorder we stumbled upon would not alter her need for lifetime full dose anticoagulation.  The only question is which drug, and whether her creatinine will allow DOAC's.  I will check her BMP weekly for the next month to make sure her creatinine is doing adequate relative to her use of Xarelto and will get her blood count now and again prior to return in a month and  if the platelets are remaining elevated we will do Collin 2 with reflex testing.  Since her discharge from the hospital she had significant nausea for which she had an ultrasound that showed fatty liver but normal gallbladder.  She had an EGD in the past few days with Dr. Heath that showed significant reflux and on Protonix her nausea has improved.  She will need close follow-up of her ascending aortic aneurysm as well with her thoracic surgeons.    -9/9/2019 medical oncology follow-up: Platelets persistently elevated 807,000 with hematocrit 52.7% and white count 8320 on 8/30/2019.  GFR staying over 59 on Xarelto.  We will check Collin 2 with reflex testing and treat with Hydrea plus or minus phlebotomy if we confirm myeloproliferative disorder.  Will need bone marrow before start of that to assess for myelofibrosis if this is confirmed.  Records from Dr.Scott Major of 6/7/2016 mention that she had been anemic during her hospitalization.  Factor V Leiden, prothrombin mutation, Antithrombin III, protein C&S levels were all normal and the anticardiolipin and beta-2 microglobulin were all negative according to Dr. Major.  The lupus anticoagulant was borderline positive at 10.5 with him and he recommended repeat of that done 3 months apart to prove the diagnosis but this came back low positive according to his notes and he suggested switching to aspirin after a full 6 months of anticoagulation.  This was of course before the low-dose DOAC trials have been published she had had thrombolytics and stenting of her May Thurner according to his records.  Given her presence of May Thurner despite the stenting, I would be inclined towards keeping her on anticoagulants regardless.  She brings to me records that includes labs back to 2016 where her white count, hemoglobin and platelets were normal but as recently as June 2019 her hematocrit was 52% whereas back in May 2016 her hemoglobin was actually down in the eights with  normochromic normocytic indices.  On Xarelto, there is increased risk with aspirin I will hold on that for present but we may have to make such a decision as Xarelto may not be sufficient for viscosity issues related to her elevated platelets and hematocrit if this is a myeloproliferative disorder.    -9/30/2019 [facility] hematology oncology follow-up visit: 9/9/2019 Collin 2 [id number] mutation positive with erythropoietin level low at 1 commensurate with polycythemia rubra vera and a hematocrit of 53% with platelets of 807,000 and normal white count.  Normally would do bone marrow to look for myelofibrosis but would not risk taking her off Xarelto at the moment.  We will start her on Hydrea 500 mg daily with CBC weekly and if we get her hematocrit below 45% platelets in the 400,000 range without significant leukopenia that would be my goal.  No phlebotomies for now until we see what Hydrea does.  After we see her back, if her platelets are down and tolerating Hydrea, I will consider stopping the Xarelto and switching her to full dose aspirin for life for the PRV.  This PRV did not cause her original venous thromboembolic phenomenon in fact was anemic when Dr. Major saw her in 2016.  We talked about the potential natural history of PRV including spent phase, leukemic transformation, myelofibrosis.  She will need AAA follow-up with surgeons.    -1/15/2021 [facility] hematology follow-up: 1/8/2021 white count 5790 with hemoglobin 12.6 and platelets 392,000 stable over the last 5 months but her last CBC prior to this was 4 months ago.  CMP unremarkable.  With chronic left lower extremity swelling and ultrasound showing left iliac venous stent obstruction/thrombosis with venous stent and angioplasty by vascular surgery 5/4/2020 as well as a grade 4 left greater saphenous vein reflux for which saphenous vein ablation and phlebectomy performed 6/26/2020, she will continue chronic Xarelto for this and baby aspirin for the JAK2  positive polycythemia rubra vera.  There is increased risk of bleeding but the DOAC does not necessarily mitigate the risk of thrombosis from the JAK2 positive myeloproliferative disorder and the aspirin does not necessarily obviate the need for Xarelto for her May Thurner syndrome venous obstructions occurring despite good control of her blood counts on Hydrea.  On dual DOAC and antiplatelet therapy, it is all the more important to watch her blood counts monthly and I will have her do a video visit with my nurse practitioner in about 3 months.  I would also ask her to be sure her vascular surgeons are following her history of abdominal aortic aneurysm with serial imaging which I would not do routinely for her myeloproliferative disorder which I am managing.  As stated previously, I do not think myeloproliferative disorder caused her venous thromboembolic phenomena and in fact her original thromboembolic event occurred when she was anemic under the care of Dr. Major in 2016.    -7/29/2021 [facility] hematology telemedicine clinic follow-up: Gisell continues to do well, tolerating Hydrea 1000 mg daily along with Xarelto and aspirin 81 mg.  She has had no bleeding events.  We will continue therapy unchanged.  CBC for the past 2 months looks good with no anemia, WBC normal, platelet count normal.  I reminded her to get her CBC monthly as we wanted to watch her more closely as she is on not only Hydrea but blood thinner with Xarelto along with aspirin putting her more at risk.  She states understanding and will do her CBC monthly, new order entered.    -2/7/2022 [facility] hematology oncology follow-up visit: Today's platelets are 588,000 with hemoglobin 13.5 hematocrit 41.3% with white count 5800.  CMP is pending.  She did not get her CBC monthly as ordered last time and as she is on Xarelto and aspirin that is important.  I will not increase her Hydrea from the 1000 mg dose despite her platelets running a little higher  but if they continue to rise we will have to be cautious of acquired von Willebrand's disease which actually further increases her bleeding risk.    -8/15/2022 [facility] Hematology clinic follow-up: Gisell continues to do well on Hydrea 1000 mg daily, I have reviewed her CBCs for the past 6 months and her platelet count overall looks good, most recent platelet count on 8/2/2022 was 455,000, in April her platelet count was normal at 444,000, it has never been higher than 530,000 since February.  She has been taking turmeric and whether or not this is helping it is hard to say but there is no contraindication to her taking turmeric.  She will continue Hydrea 1000 mg daily unchanged along with aspirin 81 mg daily, she also continues on Xarelto 20 mg daily.  She has had no unusual bleeding, no further clotting events.  We will continue to monitor CBC monthly.  We will see her back in 6 months for follow-up.    -2/20/2023 [facility] hematology oncology follow-up: 1/30/2023 hemoglobin 13 white count 5240 with platelets 436,000 with .7.  We will get her CBC today but her counts have been stable 1000 mg Hydrea, aspirin, and Xarelto for the better part of a year now and we will see her twice a year with CBC once a quarter.    -8/28/2023 [facility] Hematology/Oncology clinic follow-up: Gisell is doing well on Hydrea 1000 mg daily along with aspirin 81 mg daily.  CBC from 8/21/2023 with normal WBC 5.91, hemoglobin 14, hematocrit 43.2%, platelet count 532,000.  Would not recommend any adjustments in her Hydrea at this time.  We will continue to monitor CBC quarterly and I have ordered that today.  She remains on Xarelto for lifetime, she has had no further clotting events, no abnormal bleeding.  We did discuss the pros and cons of Mobic today, she takes this occasionally for her chronic back pain.  She understands to avoid any other NSAID, she can take Tylenol as needed.  She takes over-the-counter reflux medication.  She  understands there is slight increased risk of bleeding with Mobic since she is on Xarelto but currently she is just taking it as needed and benefit with relief of her pain is worth it.    - 12/20/2023 saw gastroenterology for elevated AST with hepatosplenomegaly with ultrasound showing hepatic steatosis with history of pancreatitis.  Asked her to stop her turmeric that can increase liver enzymes.  Mildly elevated antimitochondrial antibody.  They suspect CLARKE.  Follow-up planned end of March 2024.    -2/20/2024 fibrosis score 0.26 fibrosis stage 0-F1.  Steatosis score 0.63 steatosis grade S2-S3 moderate to severe.  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  ALT 59 AST 64.  Hemoglobin 14.3 hematocrit 44.1 platelets 577,000    -2/26/2024 [facility] hematology oncology follow-up: She did not get quarterly labs as previously ordered.  We will need to push harder on the Hydrea with a goal to get her platelets consistently in the 400,000's.  In August the platelets were 532,000 and in February they were 577,000.  Will get labs today and monthly and have her see my nurse practitioner back in 3 months to make sure that the platelets are trending downward..  If with monthly CBCs being 1000 mg dose that she has been taking for quite some time is not throwing her platelets consistently below 500,000 into the 400,000's or lower, then I would increase her to 1000 mg alternating with 1500 mg.  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  She will need to follow with gastroenterology regarding her hepatic steatosis.  Certainly a fair portion of hersplenomegaly may be related to her myeloproliferative disorder and not just the hepatic steatosis and it is hard to sort out the proportions of each contribution.  November CT chest without  contrast done at [facility] showed 40 mm ascending aorta unchanged from August 2022 done by Dr. Muniz..  That needs to continue to be monitored as well by by Dr. Muniz.  I will add a sedimentation rate to her labs today as well.  If that is significantly elevated then some of her platelet elevation could be an acute phase reactant but I suspect it is mostly for myeloproliferative JAK2 positive disease.I do not palpate any hepatosplenomegaly on exam today and her CAT scan in May did not corroborate any significant hepatosplenomegaly.  Continues lifetime Xarelto for May Thurner.    -6/3/2024 [facility] hematology oncology follow-up:3/28/2024 with 556,000.  C-reactive protein in February 0.31 with sedimentation rate normal 15.  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  April 26 platelets 570,000 .9 otherwise unremarkable CBC.  5/21/2024 platelets 562,000 otherwise unremarkable CBC.  ALT 34 AST 48.  She had only been taking the Hydrea 2 tablets daily.  We will go to 2 tablets alternating with 3 as her white count and hemoglobin are doing well and the platelets are still staying in the 500,000's and our goal is to 400,000's or lower.  We will check her CBC monthly and see her back in 6 months.  This is not likely a reactive thrombocytosis given her lack of elevation of inflammatory markers and no sign of iron deficiency.  She does have CLARKE and follows with gastroenterology [facility] post colonoscopy.  Encouraged her to stay vigilant with Dr. Muniz re AAA.  Continues lifetime Xarelto for May Thurner.     May-Thurner syndrome   8/2/2019 Initial Diagnosis    May-Thurner syndrome     Polycythemia rubra vera   9/30/2019 Initial Diagnosis    Polycythemia rubra vera (CMS/HCC)         HISTORY OF PRESENT ILLNESS:  The patient is a 63 y.o. female, here for follow up on management of PRV with May Thurner and AAA    Past Medical History:   Diagnosis Date   •  AAA (abdominal aortic aneurysm)    • Abnormal chest CT 2023    enlarged Aortic Aneurysm   • Anxiety 2023   • Arthritis 2023   • Cellulitis of lower limb 2016    From Automated Load;Provider: Radha Lopez;Status: Active   • Chronic bilateral low back pain without sciatica 2023   • Deep vein thrombophlebitis of calf, left    • Encounter for long-term (current) use of high-risk medication 08/15/2022   • Fatty liver    • GERD (gastroesophageal reflux disease)    • Hepatosplenomegaly 2023   • Hyperlipidemia    • Insomnia 2023   • Lumbar spondylosis 2023   • May-Thurner syndrome    • Neuropathy 2023   • Obstructive sleep apnea    • Pancreatitis    • Polycythemia rubra vera    • Postsurgical menopause 2023   • Pulmonary embolism    • Recurrent major depression 2016    From Automated Load;Provider: Ange Alex;Status: Active   • Small bowel obstruction 2023   • Stage 3b chronic kidney disease 2023   • Thoracic aortic aneurysm without rupture 2023    4.1 cm. 10/6/2023: Pt. has checked yearly at [facility]. Last checked 2022. Pt. to schedule f/u.     Past Surgical History:   Procedure Laterality Date   • BILATERAL BREAST REDUCTION     • CAROTID STENT     •  SECTION     • COLONOSCOPY     • ELBOW FUSION     • HYSTERECTOMY     • ILIAC ARTERY STENT Left 2020    Mesh   • OOPHORECTOMY     • OTHER SURGICAL HISTORY  2020    Iliac vein carterization   • REDUCTION MAMMAPLASTY Bilateral    • TONSILLECTOMY         Allergies   Allergen Reactions   • Clarithromycin Other (See Comments)     [place]       Family History and Social History reviewed and changed as necessary    REVIEW OF SYSTEM:   No new somatic complaints    PHYSICAL EXAM:  Jaundice icterus or pallor.  No respiratory distress.  No rashes.    Vitals:    24 1000   BP: 126/62   Pulse: 60   Resp: 18   Temp: 97.2 °F (36.2 °C)   SpO2: 98%   Weight: 89.8 kg (198 lb)   Height:  "170.2 cm (67\")     Vitals:    06/03/24 1000   PainSc: 0-No pain          ECOG score: 0           Vitals reviewed.    ECOG: (0) Fully Active - Able to Carry On All Pre-disease Performance Without Restriction    Lab Results   Component Value Date    HGB 13.2 05/21/2024    HCT 41.2 05/21/2024    .8 (H) 05/21/2024     (H) 05/21/2024    WBC 5.53 05/21/2024    NEUTROABS 3.87 05/21/2024    LYMPHSABS 1.07 05/21/2024    MONOSABS 0.33 05/21/2024    EOSABS 0.15 05/21/2024    BASOSABS 0.07 05/21/2024       Lab Results   Component Value Date    GLUCOSE 87 05/21/2024    BUN 21 05/21/2024    CREATININE 0.92 05/21/2024     05/21/2024    K 4.9 05/21/2024     05/21/2024    CO2 23.5 05/21/2024    CALCIUM 10.1 05/21/2024    PROTEINTOT 7.0 05/21/2024    ALBUMIN 4.2 05/21/2024    BILITOT 0.7 05/21/2024    ALKPHOS 68 05/21/2024    AST 48 (H) 05/21/2024    ALT 34 (H) 05/21/2024             ASSESSMENT & PLAN:  1.  Collin 2+ polycythemia rubra vera on Hydrea and aspirin  2.  Recurrent DVT and PE due to problem #2 with low titer positive lupus anticoagulant per prior testing by Dr. Major apparently on 2 separate occasions confirmatory 3 months apart  3.  May Thurner  4.  Ascending aortic aneurysm  5.  History of SOLOMON  6.  Obstructive sleep apnea  7.  Contrast-induced nephropathy  8.  Reflux with nausea  9.  Pancreatitis, hospitalized May 2023, treated with pain medications and IV fluids.    -8/2/2019 initial [facility] medical oncology consultation: May 2016 developed unprovoked left lower extremity deep vein thrombosis treated with left common iliac stent, thrombolyzes, temporary vena cava filter placement and retrieval.  Saw hematologist who told her she might have a low level lupus anticoagulant but she does not have that data and they were told that they did not think that was significant enough to warrant lifetime anticoagulation.  She stopped Xarelto after 8 months.  Starting the end of June she was developing pain " in the left lower extremity again with progressive swelling and shortness of breath.  Went to [facility] ER on 4 July.  Was found to have left lower extremity extensive deep vein thrombosis and right lower lobe pulmonary embolus.  She was treated with thrombectomy and thrombolysis by Rigoberto and Pavel.  Previously she could not afford her Xarelto and they have switched her from heparin to Coumadin but before discharge switched her to Xarelto.  Her creatinine during her hospitalization increased to 1.38 from a baseline of 1.2.  She comes to hematology at [facility] on 8/2/2019 with labs showing creatinine of 1.23 on 7/11/2019, 1.69 on 7/17/2019, and she says that it was repeated again but she does not have that lab and was told that it was back down but not at normal.  Her CBC reveals platelets of 697,000 on 7/17/2019 and 589,000 on 7/10/2019.Labs from 7/10/2019 revealed platelet count of 490,000 with hematocrit 54.7%.  Creatinine on 7/3/2019 was 0.92.  She understandably had a d-dimer elevation to 1.22 on 7/3/2019 with her clot.  Given the magnitude of her clot and the likelihood of may Thurner syndrome, she needs lifetime anticoagulation.  On patients with deep vein thrombosis and/or PE and indefinite anticoagulation, use of Xarelto in patients with GFR less than 30 is recommended to be avoided varied will need to watch that.  With her prior recent elevation of hematocrit and persistent elevation of platelets, we will continue to watch that and if they remain persistent suggesting this being more than just reactive to her PE and clots, then we need to look for myeloproliferative disorder contributing to her clotting.  She is going to get her records from her other hematologist she saw back in 2016 just for completeness sake but I would not repeat, nor would I do for the first time now, her hypercoagulable panel as any hypercoagulable disorder we stumbled upon would not alter her need for lifetime full dose  anticoagulation.  The only question is which drug, and whether her creatinine will allow DOAC's.  I will check her BMP weekly for the next month to make sure her creatinine is doing adequate relative to her use of Xarelto and will get her blood count now and again prior to return in a month and if the platelets are remaining elevated we will do Collin 2 with reflex testing.  Since her discharge from the hospital she had significant nausea for which she had an ultrasound that showed fatty liver but normal gallbladder.  She had an EGD in the past few days with Dr. Heath that showed significant reflux and on Protonix her nausea has improved.  She will need close follow-up of her ascending aortic aneurysm as well with her thoracic surgeons.    -9/9/2019 medical oncology follow-up: Platelets persistently elevated 807,000 with hematocrit 52.7% and white count 8320 on 8/30/2019.  GFR staying over 59 on Xarelto.  We will check Collin 2 with reflex testing and treat with Hydrea plus or minus phlebotomy if we confirm myeloproliferative disorder.  Will need bone marrow before start of that to assess for myelofibrosis if this is confirmed.  Records from Dr.Scott Major of 6/7/2016 mention that she had been anemic during her hospitalization.  Factor V Leiden, prothrombin mutation, Antithrombin III, protein C&S levels were all normal and the anticardiolipin and beta-2 microglobulin were all negative according to Dr. Major.  The lupus anticoagulant was borderline positive at 10.5 with him and he recommended repeat of that done 3 months apart to prove the diagnosis but this came back low positive according to his notes and he suggested switching to aspirin after a full 6 months of anticoagulation.  This was of course before the low-dose DOAC trials have been published she had had thrombolytics and stenting of her May Thurner according to his records.  Given her presence of May Thurner despite the stenting, I would be inclined towards  keeping her on anticoagulants regardless.  She brings to me records that includes labs back to 2016 where her white count, hemoglobin and platelets were normal but as recently as June 2019 her hematocrit was 52% whereas back in May 2016 her hemoglobin was actually down in the eights with normochromic normocytic indices.  On Xarelto, there is increased risk with aspirin I will hold on that for present but we may have to make such a decision as Xarelto may not be sufficient for viscosity issues related to her elevated platelets and hematocrit if this is a myeloproliferative disorder.    -9/30/2019 [facility] hematology oncology follow-up visit: 9/9/2019 [place] 2 [id number] mutation positive with erythropoietin level low at 1 commensurate with polycythemia rubra vera and a hematocrit of 53% with platelets of 807,000 and normal white count.  Normally would do bone marrow to look for myelofibrosis but would not risk taking her off Xarelto at the moment.  We will start her on Hydrea 500 mg daily with CBC weekly and if we get her hematocrit below 45% platelets in the 400,000 range without significant leukopenia that would be my goal.  No phlebotomies for now until we see what Hydrea does.  After we see her back, if her platelets are down and tolerating Hydrea, I will consider stopping the Xarelto and switching her to full dose aspirin for life for the PRV.  This PRV did not cause her original venous thromboembolic phenomenon in fact was anemic when Dr. Major saw her in 2016.  We talked about the potential natural history of PRV including spent phase, leukemic transformation, myelofibrosis.  She will need AAA follow-up with surgeons.    -1/15/2021 [facility] hematology follow-up: 1/8/2021 white count 5790 with hemoglobin 12.6 and platelets 392,000 stable over the last 5 months but her last CBC prior to this was 4 months ago.  CMP unremarkable.  With chronic left lower extremity swelling and ultrasound showing left iliac venous stent  obstruction/thrombosis with venous stent and angioplasty by vascular surgery 5/4/2020 as well as a grade 4 left greater saphenous vein reflux for which saphenous vein ablation and phlebectomy performed 6/26/2020, she will continue chronic Xarelto for this and baby aspirin for the JAK2 positive polycythemia rubra vera.  There is increased risk of bleeding but the DOAC does not necessarily mitigate the risk of thrombosis from the JAK2 positive myeloproliferative disorder and the aspirin does not necessarily obviate the need for Xarelto for her May Thurner syndrome venous obstructions occurring despite good control of her blood counts on Hydrea.  On dual DOAC and antiplatelet therapy, it is all the more important to watch her blood counts monthly and I will have her do a video visit with my nurse practitioner in about 3 months.  I would also ask her to be sure her vascular surgeons are following her history of abdominal aortic aneurysm with serial imaging which I would not do routinely for her myeloproliferative disorder which I am managing.  As stated previously, I do not think myeloproliferative disorder caused her venous thromboembolic phenomena and in fact her original thromboembolic event occurred when she was anemic under the care of Dr. Major in 2016.    -7/29/2021 [facility] hematology telemedicine clinic follow-up: Gisell continues to do well, tolerating Hydrea 1000 mg daily along with Xarelto and aspirin 81 mg.  She has had no bleeding events.  We will continue therapy unchanged.  CBC for the past 2 months looks good with no anemia, WBC normal, platelet count normal.  I reminded her to get her CBC monthly as we wanted to watch her more closely as she is on not only Hydrea but blood thinner with Xarelto along with aspirin putting her more at risk.  She states understanding and will do her CBC monthly, new order entered.    -2/7/2022 [facility] hematology oncology follow-up visit: Today's platelets are 588,000 with  hemoglobin 13.5 hematocrit 41.3% with white count 5800.  CMP is pending.  She did not get her CBC monthly as ordered last time and as she is on Xarelto and aspirin that is important.  I will not increase her Hydrea from the 1000 mg dose despite her platelets running a little higher but if they continue to rise we will have to be cautious of acquired von Willebrand's disease which actually further increases her bleeding risk.    -8/15/2022 [facility] Hematology clinic follow-up: Gisell continues to do well on Hydrea 1000 mg daily, I have reviewed her CBCs for the past 6 months and her platelet count overall looks good, most recent platelet count on 8/2/2022 was 455,000, in April her platelet count was normal at 444,000, it has never been higher than 530,000 since February.  She has been taking turmeric and whether or not this is helping it is hard to say but there is no contraindication to her taking turmeric.  She will continue Hydrea 1000 mg daily unchanged along with aspirin 81 mg daily, she also continues on Xarelto 20 mg daily.  She has had no unusual bleeding, no further clotting events.  We will continue to monitor CBC monthly.  We will see her back in 6 months for follow-up.    -2/20/2023 [facility] hematology oncology follow-up: 1/30/2023 hemoglobin 13 white count 5240 with platelets 436,000 with .7.  We will get her CBC today but her counts have been stable 1000 mg Hydrea, aspirin, and Xarelto for the better part of a year now and we will see her twice a year with CBC once a quarter.    -8/28/2023 [facility] Hematology/Oncology clinic follow-up: Gisell is doing well on Hydrea 1000 mg daily along with aspirin 81 mg daily.  CBC from 8/21/2023 with normal WBC 5.91, hemoglobin 14, hematocrit 43.2%, platelet count 532,000.  Would not recommend any adjustments in her Hydrea at this time.  We will continue to monitor CBC quarterly and I have ordered that today.  She remains on Xarelto for lifetime, she has had no  further clotting events, no abnormal bleeding.  We did discuss the pros and cons of Mobic today, she takes this occasionally for her chronic back pain.  She understands to avoid any other NSAID, she can take Tylenol as needed.  She takes over-the-counter reflux medication.  She understands there is slight increased risk of bleeding with Mobic since she is on Xarelto but currently she is just taking it as needed and benefit with relief of her pain is worth it.    - 12/20/2023 saw gastroenterology for elevated AST with hepatosplenomegaly with ultrasound showing hepatic steatosis with history of pancreatitis.  Asked her to stop her turmeric that can increase liver enzymes.  Mildly elevated antimitochondrial antibody.  They suspect CLARKE.  Follow-up planned end of March 2024.    -2/20/2024 fibrosis score 0.26 fibrosis stage 0-F1.  Steatosis score 0.63 steatosis grade S2-S3 moderate to severe.  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  ALT 59 AST 64.  Hemoglobin 14.3 hematocrit 44.1 platelets 577,000    -2/26/2024 [facility] hematology oncology follow-up: She did not get quarterly labs as previously ordered.  We will need to push harder on the Hydrea with a goal to get her platelets consistently in the 400,000's.  In August the platelets were 532,000 and in February they were 577,000.  Will get labs today and monthly and have her see my nurse practitioner back in 3 months to make sure that the platelets are trending downward..  If with monthly CBCs being 1000 mg dose that she has been taking for quite some time is not throwing her platelets consistently below 500,000 into the 400,000's or lower, then I would increase her to 1000 mg alternating with 1500 mg.  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  She will need to  follow with gastroenterology regarding her hepatic steatosis.  Certainly a fair portion of her splenomegaly may be related to her myeloproliferative disorder and not just the hepatic steatosis and it is hard to sort out the proportions of each contribution.  November CT chest without contrast done at [facility] showed 40 mm ascending aorta unchanged from August 2022 done by Dr. Muniz..  That needs to continue to be monitored as well by by Dr. Muniz.  I will add a sedimentation rate to her labs today as well.  If that is significantly elevated then some of her platelet elevation could be an acute phase reactant but I suspect it is mostly for myeloproliferative JAK2 positive disease.I do not palpate any hepatosplenomegaly on exam today and her CAT scan in May did not corroborate any significant hepatosplenomegaly.  Continues lifetime Xarelto for May Thurner.    -6/3/2024 [facility] hematology oncology follow-up:3/28/2024 with 556,000.  C-reactive protein in February 0.31 with sedimentation rate normal 15.  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  April 26 platelets 570,000 .9 otherwise unremarkable CBC.  5/21/2024 platelets 562,000 otherwise unremarkable CBC.  ALT 34 AST 48.  She had only been taking the Hydrea 2 tablets daily.  We will go to 2 tablets alternating with 3 as her white count and hemoglobin are doing well and the platelets are still staying in the 500,000's and our goal is to 400,000's or lower.  We will check her CBC monthly and see her back in 6 months.  This is not likely a reactive thrombocytosis given her lack of elevation of inflammatory markers and no sign of iron deficiency.  She does have CLARKE and follows with gastroenterology [facility] post colonoscopy.  Encouraged her to stay vigilant with Dr. Muniz re AAA.  Continues lifetime Xarelto for May Thurner.    Total time of care today inclusive of time spent today prior to  patient's arrival reviewing interval data and during visit translating that information to her and interviewing her as to signs or symptoms of her disease and management thereof and after visit instituting this plan took 35 minutes of patient care time throughout the day today.  Yogesh Damon MD    06/03/2024

## 2024-06-03 NOTE — PROGRESS NOTES
Re: Refills of Oral Specialty Medication - Hydrea (hydroxyurea)    Drug-Drug Interactions: The current medication list was reviewed and there are no relevant drug-drug interactions with the specialty medication.  Medication Allergies: The patient has no relevant allergies as it relates to their oral specialty medication  Review of Labs/Dose Adjustments: DOSE CHANGE - I reviewed the most recent note and labs. Due to high platelets the dose is being increased. I sent refills as described below.  She has been taking the Hydrea 2 tablets daily. We will go to 2 tablets alternating with 3 as her white count and hemoglobin are doing well and the platelets are still staying in the 500,000's and our goal is to 400,000's or lower. We will check her CBC monthly and see her back in 6 months.      Latest Reference Range & Units 03/28/24 10:45 04/26/24 05/21/24 08:41   WBC 3.40 - 10.80 10*3/mm3 5.69 5.16 5.53   Neutrophils Absolute 1.70 - 7.00 10*3/mm3 4.11 3.68 3.87   Hemoglobin 12.0 - 15.9 g/dL 13.7 13.8 13.2   Hematocrit 34.0 - 46.6 % 41.1 42.1 41.2   Platelets 140 - 450 10*3/mm3 556 (H) 570 (H) 562 (H)   (H): Data is abnormally high    Drug: Hydrea (hydroxyurea)  Strength: 500 mg  Directions: Take 2 capsules PO every other day alternating with 3 capsules PO every other day   Quantity: 75  Refills: 11  Pharmacy prescription sent to: [facility]    Rose Coffey PharmD, [facility]  Clinical Oncology Pharmacy Specialist  Phone: [phone]      6/3/2024  10:51 EDT

## 2024-06-03 NOTE — PROGRESS NOTES
CHIEF COMPLAINT: No new somatic complaints    Problem List:  Oncology/Hematology History Overview Note   1.  Collin 2+ polycythemia rubra vera on Hydrea and aspirin  2.  Recurrent DVT and PE due to problem #2 with low titer positive lupus anticoagulant per prior testing by Dr. Major apparently on 2 separate occasions confirmatory 3 months apart  3.  May Thurner  4.  Ascending aortic aneurysm  5.  History of SOLOMON  6.  Obstructive sleep apnea  7.  Contrast-induced nephropathy  8.  Reflux with nausea  9.  Pancreatitis, hospitalized May 2023, treated with pain medications and IV fluids.    -8/2/2019 initial [facility] medical oncology consultation: May 2016 developed unprovoked left lower extremity deep vein thrombosis treated with left common iliac stent, thrombolyzes, temporary vena cava filter placement and retrieval.  Saw hematologist who told her she might have a low level lupus anticoagulant but she does not have that data and they were told that they did not think that was significant enough to warrant lifetime anticoagulation.  She stopped Xarelto after 8 months.  Starting the end of June she was developing pain in the left lower extremity again with progressive swelling and shortness of breath.  Went to [facility] ER on 4 July.  Was found to have left lower extremity extensive deep vein thrombosis and right lower lobe pulmonary embolus.  She was treated with thrombectomy and thrombolysis by Rigoberto and Pavel.  Previously she could not afford her Xarelto and they have switched her from heparin to Coumadin but before discharge switched her to Xarelto.  Her creatinine during her hospitalization increased to 1.38 from a baseline of 1.2.  She comes to hematology at [facility] on 8/2/2019 with labs showing creatinine of 1.23 on 7/11/2019, 1.69 on 7/17/2019, and she says that it was repeated again but she does not have that lab and was told that it was back down but not at normal.  Her CBC reveals platelets of  697,000 on 7/17/2019 and 589,000 on 7/10/2019.Labs from 7/10/2019 revealed platelet count of 490,000 with hematocrit 54.7%.  Creatinine on 7/3/2019 was 0.92.  She understandably had a d-dimer elevation to 1.22 on 7/3/2019 with her clot.  Given the magnitude of her clot and the likelihood of may Thurner syndrome, she needs lifetime anticoagulation.  On patients with deep vein thrombosis and/or PE and indefinite anticoagulation, use of Xarelto in patients with GFR less than 30 is recommended to be avoided varied will need to watch that.  With her prior recent elevation of hematocrit and persistent elevation of platelets, we will continue to watch that and if they remain persistent suggesting this being more than just reactive to her PE and clots, then we need to look for myeloproliferative disorder contributing to her clotting.  She is going to get her records from her other hematologist she saw back in 2016 just for completeness sake but I would not repeat, nor would I do for the first time now, her hypercoagulable panel as any hypercoagulable disorder we stumbled upon would not alter her need for lifetime full dose anticoagulation.  The only question is which drug, and whether her creatinine will allow DOAC's.  I will check her BMP weekly for the next month to make sure her creatinine is doing adequate relative to her use of Xarelto and will get her blood count now and again prior to return in a month and if the platelets are remaining elevated we will do Collin 2 with reflex testing.  Since her discharge from the hospital she had significant nausea for which she had an ultrasound that showed fatty liver but normal gallbladder.  She had an EGD in the past few days with Dr. Heath that showed significant reflux and on Protonix her nausea has improved.  She will need close follow-up of her ascending aortic aneurysm as well with her thoracic surgeons.    -9/9/2019 medical oncology follow-up: Platelets persistently  elevated 807,000 with hematocrit 52.7% and white count 8320 on 8/30/2019.  GFR staying over 59 on Xarelto.  We will check Collin 2 with reflex testing and treat with Hydrea plus or minus phlebotomy if we confirm myeloproliferative disorder.  Will need bone marrow before start of that to assess for myelofibrosis if this is confirmed.  Records from Dr.Scott Major of 6/7/2016 mention that she had been anemic during her hospitalization.  Factor V Leiden, prothrombin mutation, Antithrombin III, protein C&S levels were all normal and the anticardiolipin and beta-2 microglobulin were all negative according to Dr. Major.  The lupus anticoagulant was borderline positive at 10.5 with him and he recommended repeat of that done 3 months apart to prove the diagnosis but this came back low positive according to his notes and he suggested switching to aspirin after a full 6 months of anticoagulation.  This was of course before the low-dose DOAC trials have been published she had had thrombolytics and stenting of her May Thurner according to his records.  Given her presence of May Thurner despite the stenting, I would be inclined towards keeping her on anticoagulants regardless.  She brings to me records that includes labs back to 2016 where her white count, hemoglobin and platelets were normal but as recently as June 2019 her hematocrit was 52% whereas back in May 2016 her hemoglobin was actually down in the eights with normochromic normocytic indices.  On Xarelto, there is increased risk with aspirin I will hold on that for present but we may have to make such a decision as Xarelto may not be sufficient for viscosity issues related to her elevated platelets and hematocrit if this is a myeloproliferative disorder.    -9/30/2019 [facility] hematology oncology follow-up visit: 9/9/2019 Collin 2 V617F mutation positive with erythropoietin level low at 1 commensurate with polycythemia rubra vera and a hematocrit of 53% with platelets of  807,000 and normal white count.  Normally would do bone marrow to look for myelofibrosis but would not risk taking her off Xarelto at the moment.  We will start her on Hydrea 500 mg daily with CBC weekly and if we get her hematocrit below 45% platelets in the 400,000 range without significant leukopenia that would be my goal.  No phlebotomies for now until we see what Hydrea does.  After we see her back, if her platelets are down and tolerating Hydrea, I will consider stopping the Xarelto and switching her to full dose aspirin for life for the PRV.  This PRV did not cause her original venous thromboembolic phenomenon in fact was anemic when Dr. Major saw her in 2016.  We talked about the potential natural history of PRV including spent phase, leukemic transformation, myelofibrosis.  She will need AAA follow-up with surgeons.    -1/15/2021 [facility] [place], operated by [facility] hematology follow-up: 1/8/2021 white count 5790 with hemoglobin 12.6 and platelets 392,000 stable over the last 5 months but her last CBC prior to this was 4 months ago.  CMP unremarkable.  With chronic left lower extremity swelling and ultrasound showing left iliac venous stent obstruction/thrombosis with venous stent and angioplasty by vascular surgery 5/4/2020 as well as a grade 4 left greater saphenous vein reflux for which saphenous vein ablation and phlebectomy performed 6/26/2020, she will continue chronic Xarelto for this and baby aspirin for the JAK2 positive polycythemia rubra vera.  There is increased risk of bleeding but the DOAC does not necessarily mitigate the risk of thrombosis from the JAK2 positive myeloproliferative disorder and the aspirin does not necessarily obviate the need for Xarelto for her May Thurner syndrome venous obstructions occurring despite good control of her blood counts on Hydrea.  On dual DOAC and antiplatelet therapy, it is all the more important to watch her blood counts monthly and I will have her do a video visit with my nurse  practitioner in about 3 months.  I would also ask her to be sure her vascular surgeons are following her history of abdominal aortic aneurysm with serial imaging which I would not do routinely for her myeloproliferative disorder which I am managing.  As stated previously, I do not think myeloproliferative disorder caused her venous thromboembolic phenomena and in fact her original thromboembolic event occurred when she was anemic under the care of Dr. Major in 2016.    -7/29/2021 [facility] hematology telemedicine clinic follow-up: Gisell continues to do well, tolerating Hydrea 1000 mg daily along with Xarelto and aspirin 81 mg.  She has had no bleeding events.  We will continue therapy unchanged.  CBC for the past 2 months looks good with no anemia, WBC normal, platelet count normal.  I reminded her to get her CBC monthly as we wanted to watch her more closely as she is on not only Hydrea but blood thinner with Xarelto along with aspirin putting her more at risk.  She states understanding and will do her CBC monthly, new order entered.    -2/7/2022 [facility] hematology oncology follow-up visit: Today's platelets are 588,000 with hemoglobin 13.5 hematocrit 41.3% with white count 5800.  CMP is pending.  She did not get her CBC monthly as ordered last time and as she is on Xarelto and aspirin that is important.  I will not increase her Hydrea from the 1000 mg dose despite her platelets running a little higher but if they continue to rise we will have to be cautious of acquired von Willebrand's disease which actually further increases her bleeding risk.    -8/15/2022 [facility] Hematology clinic follow-up: Gisell continues to do well on Hydrea 1000 mg daily, I have reviewed her CBCs for the past 6 months and her platelet count overall looks good, most recent platelet count on 8/2/2022 was 455,000, in April her platelet count was normal at 444,000, it has never been higher than 530,000 since February.  She has been taking  turmeric and whether or not this is helping it is hard to say but there is no contraindication to her taking turmeric.  She will continue Hydrea 1000 mg daily unchanged along with aspirin 81 mg daily, she also continues on Xarelto 20 mg daily.  She has had no unusual bleeding, no further clotting events.  We will continue to monitor CBC monthly.  We will see her back in 6 months for follow-up.    -2/20/2023 [facility] hematology oncology follow-up: 1/30/2023 hemoglobin 13 white count 5240 with platelets 436,000 with .7.  We will get her CBC today but her counts have been stable 1000 mg Hydrea, aspirin, and Xarelto for the better part of a year now and we will see her twice a year with CBC once a quarter.    -8/28/2023 [facility] Hematology/Oncology clinic follow-up: Gisell is doing well on Hydrea 1000 mg daily along with aspirin 81 mg daily.  CBC from 8/21/2023 with normal WBC 5.91, hemoglobin 14, hematocrit 43.2%, platelet count 532,000.  Would not recommend any adjustments in her Hydrea at this time.  We will continue to monitor CBC quarterly and I have ordered that today.  She remains on Xarelto for lifetime, she has had no further clotting events, no abnormal bleeding.  We did discuss the pros and cons of Mobic today, she takes this occasionally for her chronic back pain.  She understands to avoid any other NSAID, she can take Tylenol as needed.  She takes over-the-counter reflux medication.  She understands there is slight increased risk of bleeding with Mobic since she is on Xarelto but currently she is just taking it as needed and benefit with relief of her pain is worth it.    - 12/20/2023 saw gastroenterology for elevated AST with hepatosplenomegaly with ultrasound showing hepatic steatosis with history of pancreatitis.  Asked her to stop her turmeric that can increase liver enzymes.  Mildly elevated antimitochondrial antibody.  They suspect CLARKE.  Follow-up planned end of March 2024.    -2/20/2024  fibrosis score 0.26 fibrosis stage 0-F1.  Steatosis score 0.63 steatosis grade S2-S3 moderate to severe.  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  ALT 59 AST 64.  Hemoglobin 14.3 hematocrit 44.1 platelets 577,000    -2/26/2024 [facility] hematology oncology follow-up: She did not get quarterly labs as previously ordered.  We will need to push harder on the Hydrea with a goal to get her platelets consistently in the 400,000's.  In August the platelets were 532,000 and in February they were 577,000.  Will get labs today and monthly and have her see my nurse practitioner back in 3 months to make sure that the platelets are trending downward..  If with monthly CBCs being 1000 mg dose that she has been taking for quite some time is not throwing her platelets consistently below 500,000 into the 400,000's or lower, then I would increase her to 1000 mg alternating with 1500 mg.  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  She will need to follow with gastroenterology regarding her hepatic steatosis.  Certainly a fair portion of hersplenomegaly may be related to her myeloproliferative disorder and not just the hepatic steatosis and it is hard to sort out the proportions of each contribution.  November CT chest without contrast done at [facility] showed 40 mm ascending aorta unchanged from August 2022 done by Dr. Muniz..  That needs to continue to be monitored as well by by Dr. Muniz.  I will add a sedimentation rate to her labs today as well.  If that is significantly elevated then some of her platelet elevation could be an acute phase reactant but I suspect it is mostly for myeloproliferative JAK2 positive disease.I do not palpate any hepatosplenomegaly on exam today and her CAT scan in May did not  corroborate any significant hepatosplenomegaly.  Continues lifetime Xarelto for May Thurner.    -6/3/2024 [facility] hematology oncology follow-up:3/28/2024 with 556,000.  C-reactive protein in February 0.31 with sedimentation rate normal 15.  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  April 26 platelets 570,000 .9 otherwise unremarkable CBC.  5/21/2024 platelets 562,000 otherwise unremarkable CBC.  ALT 34 AST 48.  She had only been taking the Hydrea 2 tablets daily.  We will go to 2 tablets alternating with 3 as her white count and hemoglobin are doing well and the platelets are still staying in the 500,000's and our goal is to 400,000's or lower.  We will check her CBC monthly and see her back in 6 months.  This is not likely a reactive thrombocytosis given her lack of elevation of inflammatory markers and no sign of iron deficiency.  She does have CLARKE and follows with gastroenterology [facility] post colonoscopy.  Encouraged her to stay vigilant with Dr. Muniz re AAA.  Continues lifetime Xarelto for May Thurner.     May-Thurner syndrome   8/2/2019 Initial Diagnosis    May-Thurner syndrome     Polycythemia rubra vera   9/30/2019 Initial Diagnosis    Polycythemia rubra vera (CMS/HCC)         HISTORY OF PRESENT ILLNESS:  The patient is a 63 y.o. female, here for follow up on management of PRV with May Thurner and AAA    Past Medical History:   Diagnosis Date    AAA (abdominal aortic aneurysm)     Abnormal chest CT 12/22/2023    enlarged Aortic Aneurysm    Anxiety 12/22/2023    Arthritis 12/22/2023    Cellulitis of lower limb 05/03/2016    From Automated Load;Provider: Radha Lopez;Status: Active    Chronic bilateral low back pain without sciatica 12/22/2023    Deep vein thrombophlebitis of calf, left     Encounter for long-term (current) use of high-risk medication 08/15/2022    Fatty liver     GERD (gastroesophageal reflux disease)     Hepatosplenomegaly  "2023    Hyperlipidemia     Insomnia 2023    Lumbar spondylosis 2023    May-Thurner syndrome     Neuropathy 2023    Obstructive sleep apnea     Pancreatitis     Polycythemia rubra vera     Postsurgical menopause 2023    Pulmonary embolism     Recurrent major depression 2016    From Automated Load;Provider: Ange Alex;Status: Active    Small bowel obstruction 2023    Stage 3b chronic kidney disease 2023    Thoracic aortic aneurysm without rupture 2023    4.1 cm. 10/6/2023: Pt. has checked yearly at [facility]. Last checked 2022. Pt. to schedule f/u.     Past Surgical History:   Procedure Laterality Date    BILATERAL BREAST REDUCTION      CAROTID STENT       SECTION      COLONOSCOPY      ELBOW FUSION      HYSTERECTOMY      ILIAC ARTERY STENT Left 2020    Mesh    OOPHORECTOMY      OTHER SURGICAL HISTORY  2020    Iliac vein carterization    REDUCTION MAMMAPLASTY Bilateral     TONSILLECTOMY         Allergies   Allergen Reactions    Clarithromycin Other (See Comments)     [place]       Family History and Social History reviewed and changed as necessary    REVIEW OF SYSTEM:   No new somatic complaints    PHYSICAL EXAM:  Jaundice icterus or pallor.  No respiratory distress.  No rashes.    Vitals:    24 1000   BP: 126/62   Pulse: 60   Resp: 18   Temp: 97.2 °F (36.2 °C)   SpO2: 98%   Weight: 89.8 kg (198 lb)   Height: 170.2 cm (67\")     Vitals:    24 1000   PainSc: 0-No pain          ECOG score: 0           Vitals reviewed.    ECOG: (0) Fully Active - Able to Carry On All Pre-disease Performance Without Restriction    Lab Results   Component Value Date    HGB 13.2 2024    HCT 41.2 2024    .8 (H) 2024     (H) 2024    WBC 5.53 2024    NEUTROABS 3.87 2024    LYMPHSABS 1.07 2024    MONOSABS 0.33 2024    EOSABS 0.15 2024    BASOSABS 0.07 2024       Lab Results   Component " Value Date    GLUCOSE 87 05/21/2024    BUN 21 05/21/2024    CREATININE 0.92 05/21/2024     05/21/2024    K 4.9 05/21/2024     05/21/2024    CO2 23.5 05/21/2024    CALCIUM 10.1 05/21/2024    PROTEINTOT 7.0 05/21/2024    ALBUMIN 4.2 05/21/2024    BILITOT 0.7 05/21/2024    ALKPHOS 68 05/21/2024    AST 48 (H) 05/21/2024    ALT 34 (H) 05/21/2024             ASSESSMENT & PLAN:  1.  Collin 2+ polycythemia rubra vera on Hydrea and aspirin  2.  Recurrent DVT and PE due to problem #2 with low titer positive lupus anticoagulant per prior testing by Dr. Major apparently on 2 separate occasions confirmatory 3 months apart  3.  May Thurner  4.  Ascending aortic aneurysm  5.  History of SOLOMON  6.  Obstructive sleep apnea  7.  Contrast-induced nephropathy  8.  Reflux with nausea  9.  Pancreatitis, hospitalized May 2023, treated with pain medications and IV fluids.    -8/2/2019 initial [facility] medical oncology consultation: May 2016 developed unprovoked left lower extremity deep vein thrombosis treated with left common iliac stent, thrombolyzes, temporary vena cava filter placement and retrieval.  Saw hematologist who told her she might have a low level lupus anticoagulant but she does not have that data and they were told that they did not think that was significant enough to warrant lifetime anticoagulation.  She stopped Xarelto after 8 months.  Starting the end of June she was developing pain in the left lower extremity again with progressive swelling and shortness of breath.  Went to [facility] ER on 4 July.  Was found to have left lower extremity extensive deep vein thrombosis and right lower lobe pulmonary embolus.  She was treated with thrombectomy and thrombolysis by Rigoberto and Pavel.  Previously she could not afford her Xarelto and they have switched her from heparin to Coumadin but before discharge switched her to Xarelto.  Her creatinine during her hospitalization increased to 1.38 from a baseline of  1.2.  She comes to hematology at [facility] on 8/2/2019 with labs showing creatinine of 1.23 on 7/11/2019, 1.69 on 7/17/2019, and she says that it was repeated again but she does not have that lab and was told that it was back down but not at normal.  Her CBC reveals platelets of 697,000 on 7/17/2019 and 589,000 on 7/10/2019.Labs from 7/10/2019 revealed platelet count of 490,000 with hematocrit 54.7%.  Creatinine on 7/3/2019 was 0.92.  She understandably had a d-dimer elevation to 1.22 on 7/3/2019 with her clot.  Given the magnitude of her clot and the likelihood of may Thurner syndrome, she needs lifetime anticoagulation.  On patients with deep vein thrombosis and/or PE and indefinite anticoagulation, use of Xarelto in patients with GFR less than 30 is recommended to be avoided varied will need to watch that.  With her prior recent elevation of hematocrit and persistent elevation of platelets, we will continue to watch that and if they remain persistent suggesting this being more than just reactive to her PE and clots, then we need to look for myeloproliferative disorder contributing to her clotting.  She is going to get her records from her other hematologist she saw back in 2016 just for completeness sake but I would not repeat, nor would I do for the first time now, her hypercoagulable panel as any hypercoagulable disorder we stumbled upon would not alter her need for lifetime full dose anticoagulation.  The only question is which drug, and whether her creatinine will allow DOAC's.  I will check her BMP weekly for the next month to make sure her creatinine is doing adequate relative to her use of Xarelto and will get her blood count now and again prior to return in a month and if the platelets are remaining elevated we will do Collin 2 with reflex testing.  Since her discharge from the hospital she had significant nausea for which she had an ultrasound that showed fatty liver but normal gallbladder.  She had an EGD in  the past few days with Dr. Heath that showed significant reflux and on Protonix her nausea has improved.  She will need close follow-up of her ascending aortic aneurysm as well with her thoracic surgeons.    -9/9/2019 medical oncology follow-up: Platelets persistently elevated 807,000 with hematocrit 52.7% and white count 8320 on 8/30/2019.  GFR staying over 59 on Xarelto.  We will check Collin 2 with reflex testing and treat with Hydrea plus or minus phlebotomy if we confirm myeloproliferative disorder.  Will need bone marrow before start of that to assess for myelofibrosis if this is confirmed.  Records from Dr.Scott Major of 6/7/2016 mention that she had been anemic during her hospitalization.  Factor V Leiden, prothrombin mutation, Antithrombin III, protein C&S levels were all normal and the anticardiolipin and beta-2 microglobulin were all negative according to Dr. Major.  The lupus anticoagulant was borderline positive at 10.5 with him and he recommended repeat of that done 3 months apart to prove the diagnosis but this came back low positive according to his notes and he suggested switching to aspirin after a full 6 months of anticoagulation.  This was of course before the low-dose DOAC trials have been published she had had thrombolytics and stenting of her May Thurner according to his records.  Given her presence of May Thurner despite the stenting, I would be inclined towards keeping her on anticoagulants regardless.  She brings to me records that includes labs back to 2016 where her white count, hemoglobin and platelets were normal but as recently as June 2019 her hematocrit was 52% whereas back in May 2016 her hemoglobin was actually down in the eights with normochromic normocytic indices.  On Xarelto, there is increased risk with aspirin I will hold on that for present but we may have to make such a decision as Xarelto may not be sufficient for viscosity issues related to her elevated platelets and  hematocrit if this is a myeloproliferative disorder.    -9/30/2019 [facility] hematology oncology follow-up visit: 9/9/2019 Collin 2 V617F mutation positive with erythropoietin level low at 1 commensurate with polycythemia rubra vera and a hematocrit of 53% with platelets of 807,000 and normal white count.  Normally would do bone marrow to look for myelofibrosis but would not risk taking her off Xarelto at the moment.  We will start her on Hydrea 500 mg daily with CBC weekly and if we get her hematocrit below 45% platelets in the 400,000 range without significant leukopenia that would be my goal.  No phlebotomies for now until we see what Hydrea does.  After we see her back, if her platelets are down and tolerating Hydrea, I will consider stopping the Xarelto and switching her to full dose aspirin for life for the PRV.  This PRV did not cause her original venous thromboembolic phenomenon in fact was anemic when Dr. Major saw her in 2016.  We talked about the potential natural history of PRV including spent phase, leukemic transformation, myelofibrosis.  She will need AAA follow-up with surgeons.    -1/15/2021 [facility] hematology follow-up: 1/8/2021 white count 5790 with hemoglobin 12.6 and platelets 392,000 stable over the last 5 months but her last CBC prior to this was 4 months ago.  CMP unremarkable.  With chronic left lower extremity swelling and ultrasound showing left iliac venous stent obstruction/thrombosis with venous stent and angioplasty by vascular surgery 5/4/2020 as well as a grade 4 left greater saphenous vein reflux for which saphenous vein ablation and phlebectomy performed 6/26/2020, she will continue chronic Xarelto for this and baby aspirin for the JAK2 positive polycythemia rubra vera.  There is increased risk of bleeding but the DOAC does not necessarily mitigate the risk of thrombosis from the JAK2 positive myeloproliferative disorder and the aspirin does not necessarily obviate the need for Xarelto  for her May Thurner syndrome venous obstructions occurring despite good control of her blood counts on Hydrea.  On dual DOAC and antiplatelet therapy, it is all the more important to watch her blood counts monthly and I will have her do a video visit with my nurse practitioner in about 3 months.  I would also ask her to be sure her vascular surgeons are following her history of abdominal aortic aneurysm with serial imaging which I would not do routinely for her myeloproliferative disorder which I am managing.  As stated previously, I do not think myeloproliferative disorder caused her venous thromboembolic phenomena and in fact her original thromboembolic event occurred when she was anemic under the care of Dr. Major in 2016.    -7/29/2021 [facility] hematology telemedicine clinic follow-up: Gisell continues to do well, tolerating Hydrea 1000 mg daily along with Xarelto and aspirin 81 mg.  She has had no bleeding events.  We will continue therapy unchanged.  CBC for the past 2 months looks good with no anemia, WBC normal, platelet count normal.  I reminded her to get her CBC monthly as we wanted to watch her more closely as she is on not only Hydrea but blood thinner with Xarelto along with aspirin putting her more at risk.  She states understanding and will do her CBC monthly, new order entered.    -2/7/2022 [facility] hematology oncology follow-up visit: Today's platelets are 588,000 with hemoglobin 13.5 hematocrit 41.3% with white count 5800.  CMP is pending.  She did not get her CBC monthly as ordered last time and as she is on Xarelto and aspirin that is important.  I will not increase her Hydrea from the 1000 mg dose despite her platelets running a little higher but if they continue to rise we will have to be cautious of acquired von Willebrand's disease which actually further increases her bleeding risk.    -8/15/2022 [facility] Hematology clinic follow-up: Gisell continues to do well on Hydrea 1000 mg daily, I  have reviewed her CBCs for the past 6 months and her platelet count overall looks good, most recent platelet count on 8/2/2022 was 455,000, in April her platelet count was normal at 444,000, it has never been higher than 530,000 since February.  She has been taking turmeric and whether or not this is helping it is hard to say but there is no contraindication to her taking turmeric.  She will continue Hydrea 1000 mg daily unchanged along with aspirin 81 mg daily, she also continues on Xarelto 20 mg daily.  She has had no unusual bleeding, no further clotting events.  We will continue to monitor CBC monthly.  We will see her back in 6 months for follow-up.    -2/20/2023 [facility] hematology oncology follow-up: 1/30/2023 hemoglobin 13 white count 5240 with platelets 436,000 with .7.  We will get her CBC today but her counts have been stable 1000 mg Hydrea, aspirin, and Xarelto for the better part of a year now and we will see her twice a year with CBC once a quarter.    -8/28/2023 [facility] Hematology/Oncology clinic follow-up: Gisell is doing well on Hydrea 1000 mg daily along with aspirin 81 mg daily.  CBC from 8/21/2023 with normal WBC 5.91, hemoglobin 14, hematocrit 43.2%, platelet count 532,000.  Would not recommend any adjustments in her Hydrea at this time.  We will continue to monitor CBC quarterly and I have ordered that today.  She remains on Xarelto for lifetime, she has had no further clotting events, no abnormal bleeding.  We did discuss the pros and cons of Mobic today, she takes this occasionally for her chronic back pain.  She understands to avoid any other NSAID, she can take Tylenol as needed.  She takes over-the-counter reflux medication.  She understands there is slight increased risk of bleeding with Mobic since she is on Xarelto but currently she is just taking it as needed and benefit with relief of her pain is worth it.    - 12/20/2023 saw gastroenterology for elevated AST with  hepatosplenomegaly with ultrasound showing hepatic steatosis with history of pancreatitis.  Asked her to stop her turmeric that can increase liver enzymes.  Mildly elevated antimitochondrial antibody.  They suspect CLARKE.  Follow-up planned end of March 2024.    -2/20/2024 fibrosis score 0.26 fibrosis stage 0-F1.  Steatosis score 0.63 steatosis grade S2-S3 moderate to severe.  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  ALT 59 AST 64.  Hemoglobin 14.3 hematocrit 44.1 platelets 577,000    -2/26/2024 [facility] hematology oncology follow-up: She did not get quarterly labs as previously ordered.  We will need to push harder on the Hydrea with a goal to get her platelets consistently in the 400,000's.  In August the platelets were 532,000 and in February they were 577,000.  Will get labs today and monthly and have her see my nurse practitioner back in 3 months to make sure that the platelets are trending downward..  If with monthly CBCs being 1000 mg dose that she has been taking for quite some time is not throwing her platelets consistently below 500,000 into the 400,000's or lower, then I would increase her to 1000 mg alternating with 1500 mg.  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  She will need to follow with gastroenterology regarding her hepatic steatosis.  Certainly a fair portion of her splenomegaly may be related to her myeloproliferative disorder and not just the hepatic steatosis and it is hard to sort out the proportions of each contribution.  November CT chest without contrast done at [facility] showed 40 mm ascending aorta unchanged from August 2022 done by Dr. Muniz..  That needs to continue to be monitored as well by by Dr. Muniz.  I will add a sedimentation rate to her labs today as  well.  If that is significantly elevated then some of her platelet elevation could be an acute phase reactant but I suspect it is mostly for myeloproliferative JAK2 positive disease.I do not palpate any hepatosplenomegaly on exam today and her CAT scan in May did not corroborate any significant hepatosplenomegaly.  Continues lifetime Xarelto for May Thurner.    -6/3/2024 [facility] hematology oncology follow-up:3/28/2024 with 556,000.  C-reactive protein in February 0.31 with sedimentation rate normal 15.  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  April 26 platelets 570,000 .9 otherwise unremarkable CBC.  5/21/2024 platelets 562,000 otherwise unremarkable CBC.  ALT 34 AST 48.  She had only been taking the Hydrea 2 tablets daily.  We will go to 2 tablets alternating with 3 as her white count and hemoglobin are doing well and the platelets are still staying in the 500,000's and our goal is to 400,000's or lower.  We will check her CBC monthly and see her back in 6 months.  This is not likely a reactive thrombocytosis given her lack of elevation of inflammatory markers and no sign of iron deficiency.  She does have CLARKE and follows with gastroenterology [facility] post colonoscopy.  Encouraged her to stay vigilant with Dr. Cristy POLANCO.  Continues lifetime Xarelto for May Thurner.    Total time of care today inclusive of time spent today prior to patient's arrival reviewing interval data and during visit translating that information to her and interviewing her as to signs or symptoms of her disease and management thereof and after visit instituting this plan took 35 minutes of patient care time throughout the day today.  Yogesh Damon MD    06/03/2024

## 2024-06-04 NOTE — PROGRESS NOTES
I completed an independent review of the medication order and prescription. I agree with Dr. Monroy' assessment.  I confirm she sent the prescription to [facility] as described.     Tiff Jones, PharmD, [facility]  Oncology Clinical Pharmacist  6/4/2024  07:29 EDT

## 2024-06-12 ENCOUNTER — OFFICE VISIT (OUTPATIENT)
Dept: GASTROENTEROLOGY | Facility: CLINIC | Age: 64
End: 2024-06-12
Payer: MEDICARE

## 2024-06-12 VITALS
HEART RATE: 62 BPM | SYSTOLIC BLOOD PRESSURE: 100 MMHG | BODY MASS INDEX: 31.08 KG/M2 | HEIGHT: 67 IN | DIASTOLIC BLOOD PRESSURE: 60 MMHG | WEIGHT: 198 LBS

## 2024-06-12 DIAGNOSIS — R10.9 ABDOMINAL DISCOMFORT: ICD-10-CM

## 2024-06-12 DIAGNOSIS — Z98.890 HISTORY OF COLONOSCOPY: ICD-10-CM

## 2024-06-12 DIAGNOSIS — E66.9 CLASS 1 OBESITY WITH BODY MASS INDEX (BMI) OF 31.0 TO 31.9 IN ADULT, UNSPECIFIED OBESITY TYPE, UNSPECIFIED WHETHER SERIOUS COMORBIDITY PRESENT: ICD-10-CM

## 2024-06-12 DIAGNOSIS — K76.0 FATTY INFILTRATION OF LIVER: ICD-10-CM

## 2024-06-12 DIAGNOSIS — R19.8 ALTERNATING CONSTIPATION AND DIARRHEA: ICD-10-CM

## 2024-06-12 DIAGNOSIS — R79.89 ELEVATED LIVER FUNCTION TESTS: ICD-10-CM

## 2024-06-12 DIAGNOSIS — R16.2 HEPATOSPLENOMEGALY: Primary | ICD-10-CM

## 2024-06-12 DIAGNOSIS — Q85.9: ICD-10-CM

## 2024-06-12 DIAGNOSIS — R89.9 ABNORMAL LABORATORY TEST: ICD-10-CM

## 2024-06-12 DIAGNOSIS — Z87.19 HISTORY OF PANCREATITIS: ICD-10-CM

## 2024-06-12 DIAGNOSIS — R93.5 ABNORMAL ABDOMINAL CT SCAN: ICD-10-CM

## 2024-06-12 PROBLEM — R94.5 ABNORMAL RESULTS OF LIVER FUNCTION STUDIES: Status: ACTIVE | Noted: 2024-06-12

## 2024-06-12 PROBLEM — R19.7 DIARRHEA: Status: ACTIVE | Noted: 2024-06-12

## 2024-06-12 PROBLEM — K85.00 IDIOPATHIC ACUTE PANCREATITIS WITHOUT INFECTION OR NECROSIS: Status: ACTIVE | Noted: 2024-06-12

## 2024-06-12 PROBLEM — L24.9 IRRITANT CONTACT DERMATITIS: Status: ACTIVE | Noted: 2024-06-12

## 2024-06-12 PROBLEM — J20.9 ACUTE BRONCHITIS: Status: ACTIVE | Noted: 2024-06-12

## 2024-06-12 PROBLEM — Z13.820 SCREENING FOR OSTEOPOROSIS: Status: ACTIVE | Noted: 2024-06-12

## 2024-06-12 NOTE — PROGRESS NOTES
GASTROENTEROLOGY OFFICE NOTE    Gisell Wylie  [id number]  1960    CARE TEAM  Patient Care Team:  Zainab Childs MD as PCP - General (Internal Medicine & Pediatrics)  Jaxson Alcantara MD as Surgeon (General Surgery)    Referring Provider: No ref. provider found    Chief Complaint   Patient presents with    Hepatic Disease     Hepatosplenomegaly-3mth follow up.  Follow up from colonoscopy done with Dr. Boyd on 4/11/24.        HISTORY OF PRESENT ILLNESS:   Gisell Wylie is a 63 y.o. female Who returns for 3-month follow-up regarding elevated AST and hepatosplenomegaly on imaging (Weight loss recommended for suspected fatty liver); history of mildly elevated mitochondrial antibody around 21; history of polycythemia vera, prescribed Xarelto, established with Dr. Damon; prior ultrasound gallbladder in 2019 that revealed contracted gallbladder without stones, increased echogenicity throughout the liver suggestive hepatic steatosis, CT scan 5/2023 with findings suggestive of pancreatitis, mild mesenteric fat stranding that may represent mild mesenteric panniculitis, no clear etiology of pancreatitis established, lipase elevated, liver enzymes normal, triglycerides 80.      11/14/2023 ultrasound of abdomen revealed hepatosplenomegaly with hepatosteatosis,     12/12/2023 abdominal duplex ultrasound at [facility] due to hepatosplenomegaly with prior ultrasound from 11/14/2023 that revealed changes of hepatosteatosis, mild splenomegaly.      She returns for follow-up regarding abdominal discomfort, at last office visit reported bowel movement every 3 days since following weight watchers for weight loss but abdominal discomfort present prior to starting weight watchers and skipping a few days without a bowel movement.      She also previously reported history of taking pantoprazole, Pepcid, intermittent chest discomfort concerned could be due to spasm that improved with drinking water.       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  Colonoscopy also planned due to abdominal discomfort and abnormal CT scan.     2/20/2024 CLARKE FibroSure revealed fibrosis stage F0 to F1, S2-S3 moderate to severe steatosis and N3 severe CLARKE      3/28/2024 Celiac comprehensive panel normal/negative    4/11/2024 colonoscopy per Dr. Boyd with excellent bowel prep revealed 2 polyps measuring 2 to 3 mm in size at 35 cm proximal to anus and in sigmoid colon.  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 These are rare, benign and intermucosal tumors and are most frequently in observed in women in their 60s.  You need no special follow-up from that standpoint as this is a benign lesion.    4/26/2024 IgA normal.     5/21/2024 CMP as below with elevated ALT and AST but improved over the past 4 months.    Weight loss recommended.  Weight at office visit on 3/5/2024 was 200 pounds.  Today's weight 198 pounds.  (Since December 2020 she has lost approximately 15 pounds)    She continues to have occasional left mid abdominal discomfort with unknown trigger.  She has been taking MiraLAX as needed as she reports difficulty remembering to take MiraLAX.  When she has left mid abdominal discomfort she reports discomfort is there for 5 to 10 minutes intermittently throughout the day.    She has recently experienced 2 days or episodes of diarrhea for which she took probiotic with improvement.    Past Medical History:   Diagnosis Date    AAA (abdominal aortic aneurysm)     Abnormal chest CT 12/22/2023    enlarged Aortic Aneurysm    Anxiety 12/22/2023    Arthritis 12/22/2023    Cellulitis of lower limb 05/03/2016    From Automated Load;Provider: Radha Lopez;Status: Active    Chronic bilateral low back pain without sciatica 12/22/2023    Deep vein thrombophlebitis of calf, left     Encounter for long-term (current) use of high-risk medication  08/15/2022    Fatty liver     GERD (gastroesophageal reflux disease)     Hepatosplenomegaly 2023    Hyperlipidemia     Insomnia 2023    Lumbar spondylosis 2023    May-Thurner syndrome     Neuropathy 2023    Obstructive sleep apnea     Pancreatitis     Polycythemia rubra vera     Postsurgical menopause 2023    Pulmonary embolism     Recurrent major depression 2016    From Automated Load;Provider: Ange Alex;Status: Active    Small bowel obstruction 2023    Stage 3b chronic kidney disease 2023    Thoracic aortic aneurysm without rupture 2023    4.1 cm. 10/6/2023: Pt. has checked yearly at [facility]. Last checked 2022. Pt. to schedule f/u.        Past Surgical History:   Procedure Laterality Date    BILATERAL BREAST REDUCTION      CAROTID STENT       SECTION      COLONOSCOPY  2024    Dr. Boyd    ELBOW FUSION      HYSTERECTOMY      ILIAC ARTERY STENT Left 2020    Mesh    OOPHORECTOMY      OTHER SURGICAL HISTORY  2020    Iliac vein carterization    REDUCTION MAMMAPLASTY Bilateral 1999    TONSILLECTOMY          Current Outpatient Medications on File Prior to Visit   Medication Sig    aspirin 81 MG EC tablet Take 1 tablet by mouth Daily.    Cholecalciferol 50 MCG (2000) tablet Take 1 tablet by mouth Daily.    fluticasone (FLONASE) 50 MCG/ACT nasal spray 1 spray by Each Nare route Daily.    gabapentin (NEURONTIN) 400 MG capsule Take 1 capsule by mouth 2 (Two) Times a Day.    hydroxyurea (HYDREA) 500 MG capsule Take 2 capsules by mouth every other day alternating with 3 capsules by mouth every other day.    meloxicam (MOBIC) 7.5 MG tablet Take 1 tablet by mouth Daily.    pravastatin (PRAVACHOL) 40 MG tablet Take 1 tablet by mouth Daily.    sertraline (ZOLOFT) 25 MG tablet Take 1 tablet by mouth Daily.    Turmeric 400 MG capsule Take 3,000 mg by mouth Daily.    XARELTO 20 MG tablet Take 1 tablet by mouth Every Evening.    [DISCONTINUED]  "sodium-potassium-magnesium sulfates (Suprep Bowel Prep Kit) 17.5-3.13-1.6 GM/177ML solution oral solution Use as directed by provider for colonoscopy prep (Patient not taking: Reported on 6/12/2024)     No current facility-administered medications on file prior to visit.       Allergies   Allergen Reactions    Clarithromycin Other (See Comments)     [place]       Family History   Problem Relation Age of Onset    Atrial fibrillation Mother     Dementia Mother     Diabetes Mother     Heart disease Mother     Leukemia Paternal Grandmother     Breast cancer Paternal Grandmother     Colon cancer Maternal Aunt     Colon cancer Cousin     Lung cancer Cousin     Breast cancer Cousin     Ovarian cancer Neg Hx        Social History     Socioeconomic History    Marital status:    Tobacco Use    Smoking status: Never    Smokeless tobacco: Never   Vaping Use    Vaping status: Never Used   Substance and Sexual Activity    Alcohol use: No    Drug use: No    Sexual activity: Defer       PHYSICAL EXAM   /60 (BP Location: Left arm, Patient Position: Sitting, Cuff Size: Adult)   Pulse 62   Ht 170.2 cm (67\")   Wt 89.8 kg (198 lb)   BMI 31.01 kg/m²   Physical Exam  Constitutional:       General: She is not in acute distress.     Appearance: She is not toxic-appearing.   HENT:      Head: Normocephalic and atraumatic. No contusion.      Right Ear: External ear normal.      Left Ear: External ear normal.   Eyes:      General: Lids are normal. No scleral icterus.        Right eye: No discharge.         Left eye: No discharge.      Extraocular Movements: Extraocular movements intact.   Neck:      Trachea: Trachea normal.      Comments: No visible mass  No visible adenopathy  Cardiovascular:      Rate and Rhythm: Normal rate.   Pulmonary:      Effort: No respiratory distress.      Comments: Symmetrical expansion    Musculoskeletal:      Comments: Symmetrical movement of upper extremities  Symmetrical movement of lower " extremities  No visible deformities   Skin:     General: Skin is warm and dry.      Coloration: Skin is not jaundiced.   Neurological:      General: No focal deficit present.      Mental Status: She is alert and oriented to person, place, and time.   Psychiatric:         Mood and Affect: Mood normal.         Behavior: Behavior normal.         Thought Content: Thought content normal.     Results Review:  US GB in 2019 revealed contracted gallbladder without stones, increased echogenicity throughout liver suggestive of hepatic steatosis.       7/22/2021 comprehensive metabolic panel revealed elevated AST of 33.     CT scan 5/2023 with findings suggestive of pancreatitis, mild mesenteric fat stranding that may represent mild mesenteric panniculitis. No clear etiology of pancreatitis was established. Lipase was elevated. liver enzymes were normal. Trigylcerides were 80.       10/30/2023 hepatitis C antibody nonreactive, CMP with elevated glucose 157, elevated calcium 10.7, normal alkaline phosphatase 75, normal ALT 45 (lab range of 7-52), AST elevated at 57 (lab range of 13-39), bilirubin normal 0.7.  Lipase normal 24.  OMAR negative.  RF latex less than 10.  CBC with normal white blood cell count 5.8, normal hemoglobin 14.1, normal hematocrit 42.5 and elevated platelet count 514.     11/14/2023 ultrasound of the abdomen revealed liver measures 22.5 cm, gallbladder is present with normal wall thickness, spleen measures 15.2 cm and is enlarged, overall impression changes of hepatosplenomegaly with hepatosteatosis.     11/27/2023 CMP revealed elevated chloride 108, elevated glucose 105, alkaline phosphatase normal at 57, ALT normal per above lab reference range at 41, AST elevated but improved at 43, bilirubin normal 0.8.  INR 1.4.  Hepatitis A IgM nonreactive, hepatitis B surface antigen nonreactive, hepatitis B core antibody nonreactive, hepatitis C antibody nonreactive.  Ceruloplasmin 25.  Smooth muscle antibody  negative.  Ferritin 125.7.     She reports she was notified by her primary care provider's office that mitochondrial antibody was mildly elevated around 21.     12/12/2023 abdominal duplex ultrasound at [facility] due to hepatosplenomegaly with prior ultrasound from 11/14/2023 that revealed changes of hepatosteatosis, mild splenomegaly     Prior colonoscopy in 2021 at [facility].  Repeat colonoscopy recommended in 10 years.     2/20/2024 CLARKE FibroSure revealed fibrosis stage F0 to F1, S2-S3 moderate to severe steatosis and N3 severe CLARKE    3/28/2024 Celiac comprehensive panel normal/negative    4/11/2024 colonoscopy per Dr. Boyd with excellent bowel prep revealed 2 polyps measuring 2 to 3 mm in size at 35 cm proximal to anus and in sigmoid colon.  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 These are rare, benign and intermucosal tumors and are most frequently in observed in women in their 60s.  You need no special follow-up from that standpoint as this is a benign lesion.    4/26/2024 IgA normal.     5/21/2024 CMP as below with elevated ALT and AST but improved over the past 4 months.  CMP          3/28/2024    10:45 4/26/2024    10:59 5/21/2024    08:41   CMP   Glucose 76  82  87    BUN 17  17  21    Creatinine 0.87  0.77  0.92    EGFR 74.0   69.2    Sodium 140  141  141    Potassium 4.4  5.0  4.9    Chloride 104  107  106    Calcium 10.5  10.4  10.1    Total Protein  7.0     Total Protein 7.3   7.0    Albumin 4.4  4.4  4.2    Globulin  2.6     Globulin 2.9   2.8    Total Bilirubin 0.6  0.5  0.7    Alkaline Phosphatase 83  76  68    AST (SGOT) 33  42  48    ALT (SGPT) 30  36  34    Albumin/Globulin Ratio 1.5   1.5    BUN/Creatinine Ratio 19.5  22.1  22.8    Anion Gap 11.6   11.5      CBC          3/28/2024    10:45 4/26/2024    11:01 5/21/2024    08:41   CBC   WBC 5.69  5.16  5.53    RBC 4.09  4.13  3.93    Hemoglobin 13.7  13.8  13.2    Hematocrit 41.1  42.1  41.2    .5  101.9   104.8    MCH 33.5  33.4  33.6    MCHC 33.3  32.8  32.0    RDW 13.8  14.5  15.1    Platelets 556  570  562    ASSESSMENT / PLAN  1. Hepatosplenomegaly  2. Elevated liver function tests  3. Class 1 obesity with body mass index (BMI) of 31.0 to 31.9 in adult, unspecified obesity type, unspecified whether serious comorbidity present  4. Fatty infiltration of liver  5. Abnormal laboratory test  - 2/20/2024 CLARKE fibrosure concerning for CLARKE for which I recommend continued weight loss.      She has done well with weight loss losing 15 pounds since December 2023 (total weight loss of around 40 pounds or 10% of prior body weight of 215 pounds would be great!).      Liver enzymes remain elevated, but improved on review of [facility] labs over the past 4 months.      Recommend repeat CMP with standing order previously placed when CBC is drawn as previously ordered by Dr. Damon.      We discussed consideration for liver biopsy if liver enzymes remain elevated despite continued weight loss.  She has history of mildly elevated mitochondrial antibody.      I previously expressed concern that turmeric may contribute to elevated liver enzymes but she had elevated liver enzymes off turmeric    -Meloxicam reported as new medication since liver enzymes were normal May 2023.  The following was reviewed on [facility] liver tox:  Meloxicam has been linked to rare instances of acute, clinically apparent liver injury.  It does not seem as though she uses meloxicam on a regular basis lowering suspicion that meloxicam is reason or contributing to elevated liver enzymes  - she had negative celiac serology and prior EGD per Dr. Heath that I do not believe mentioned elevated liver enzymes.     Try to limit or avoid alcohol     6. History of colonoscopy  - likely due for repeat colonoscopy 3/2031. Prior colonoscopy at [facility] . See plan for abdominal discomfort and abnormal CT scan as below regarding repeat colonoscopy   7. History of  pancreatitis  admitted 5/2023 with diagnosis of pancreatitis (CT abnormal, lipase elevated) of uncertain etiology. She rarely drinks alcohol. Triglycerides normal. No gallstones on prior imaging. Another etiology to consider is medication induced pancreatitis. The following was previously reviewed on up to date: Pravastatin is a class Ia medication that can cause drug induced pancreatitis.Sertraline is a class IV medications that can cause medication induced pancreatitis.  Consider patient discussing different medications if she has recurrent pancreatitis in the future     8. Abdominal discomfort  9. Alternating constipation and diarrhea  10. Abnormal abdominal CT scan  CT scan 5/2023 mild mesenteric fat stranding that may represent mild mesenteric panniculitis, thrombocytosis and left abdominal discomfort . 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 Try to take Miralax daily. Take probiotic as needed for diarrhea as probiotic has been helpful for 2 recent episodes of diarrhea.     11. Hamartoma determined by colorectal biopsy  - review of letter to patient from Dr. 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 These are rare, benign and intermucosal tumors and are most frequently in observed in women in their 60s.  You need no special follow-up from that standpoint as this is a benign lesion. Repeat colonoscopy 4/11/2034    Return in about 6 months (around 12/12/2024).    Lilo Townsend, ASHISH  06/12/2024

## 2024-06-26 ENCOUNTER — LAB (OUTPATIENT)
Dept: LAB | Facility: HOSPITAL | Age: 64
End: 2024-06-26
Payer: MEDICARE

## 2024-06-26 DIAGNOSIS — D45 POLYCYTHEMIA RUBRA VERA: Chronic | ICD-10-CM

## 2024-06-26 DIAGNOSIS — R74.01 ELEVATED AST (SGOT): ICD-10-CM

## 2024-06-26 DIAGNOSIS — R16.2 HEPATOSPLENOMEGALY: ICD-10-CM

## 2024-06-26 LAB
BASOPHILS # BLD AUTO: 0.07 10*3/MM3 (ref 0–0.2)
BASOPHILS NFR BLD AUTO: 1.1 % (ref 0–1.5)
DEPRECATED RDW RBC AUTO: 56 FL (ref 37–54)
EOSINOPHIL # BLD AUTO: 0.1 10*3/MM3 (ref 0–0.4)
EOSINOPHIL NFR BLD AUTO: 1.6 % (ref 0.3–6.2)
ERYTHROCYTE [DISTWIDTH] IN BLOOD BY AUTOMATED COUNT: 15.1 % (ref 12.3–15.4)
HCT VFR BLD AUTO: 39.2 % (ref 34–46.6)
HGB BLD-MCNC: 13.1 G/DL (ref 12–15.9)
IMM GRANULOCYTES # BLD AUTO: 0.04 10*3/MM3 (ref 0–0.05)
IMM GRANULOCYTES NFR BLD AUTO: 0.6 % (ref 0–0.5)
LYMPHOCYTES # BLD AUTO: 1.05 10*3/MM3 (ref 0.7–3.1)
LYMPHOCYTES NFR BLD AUTO: 16.6 % (ref 19.6–45.3)
MCH RBC QN AUTO: 34 PG (ref 26.6–33)
MCHC RBC AUTO-ENTMCNC: 33.4 G/DL (ref 31.5–35.7)
MCV RBC AUTO: 101.8 FL (ref 79–97)
MONOCYTES # BLD AUTO: 0.28 10*3/MM3 (ref 0.1–0.9)
MONOCYTES NFR BLD AUTO: 4.4 % (ref 5–12)
NEUTROPHILS NFR BLD AUTO: 4.8 10*3/MM3 (ref 1.7–7)
NEUTROPHILS NFR BLD AUTO: 75.7 % (ref 42.7–76)
NRBC BLD AUTO-RTO: 0 /100 WBC (ref 0–0.2)
PLATELET # BLD AUTO: 568 10*3/MM3 (ref 140–450)
PMV BLD AUTO: 9.1 FL (ref 6–12)
RBC # BLD AUTO: 3.85 10*6/MM3 (ref 3.77–5.28)
WBC NRBC COR # BLD AUTO: 6.34 10*3/MM3 (ref 3.4–10.8)

## 2024-06-27 ENCOUNTER — SPECIALTY PHARMACY (OUTPATIENT)
Facility: HOSPITAL | Age: 64
End: 2024-06-27
Payer: MEDICARE

## 2024-06-27 LAB
ALBUMIN SERPL-MCNC: 4.6 G/DL (ref 3.5–5.2)
ALBUMIN/GLOB SERPL: 1.8 G/DL
ALP SERPL-CCNC: 79 U/L (ref 39–117)
ALT SERPL W P-5'-P-CCNC: 27 U/L (ref 1–33)
ANION GAP SERPL CALCULATED.3IONS-SCNC: 11.6 MMOL/L (ref 5–15)
AST SERPL-CCNC: 35 U/L (ref 1–32)
BILIRUB SERPL-MCNC: 0.5 MG/DL (ref 0–1.2)
BUN SERPL-MCNC: 23 MG/DL (ref 8–23)
BUN/CREAT SERPL: 27.1 (ref 7–25)
CALCIUM SPEC-SCNC: 9.9 MG/DL (ref 8.6–10.5)
CHLORIDE SERPL-SCNC: 107 MMOL/L (ref 98–107)
CO2 SERPL-SCNC: 23.4 MMOL/L (ref 22–29)
CREAT SERPL-MCNC: 0.85 MG/DL (ref 0.57–1)
EGFRCR SERPLBLD CKD-EPI 2021: 76.1 ML/MIN/1.73
GLOBULIN UR ELPH-MCNC: 2.5 GM/DL
GLUCOSE SERPL-MCNC: 104 MG/DL (ref 65–99)
IGA1 MFR SER: 238 MG/DL (ref 70–400)
POTASSIUM SERPL-SCNC: 4.4 MMOL/L (ref 3.5–5.2)
PROT SERPL-MCNC: 7.1 G/DL (ref 6–8.5)
SODIUM SERPL-SCNC: 142 MMOL/L (ref 136–145)

## 2024-06-27 NOTE — PROGRESS NOTES
Specialty Pharmacy Refill Coordination Note     Gisell is a 63 y.o. female contacted today regarding refills of  hydroxyurea 500mg capsules. Take 2 capsules by mouth every other day alternating with 3 capsules by mouth every other day of specialty medication(s).    Reviewed and verified with patient: YES; scheduled fill for 7/8/24 per patients request    Specialty medication(s) and dose(s) confirmed: yes    Refill Questions      Flowsheet Row Most Recent Value   Changes to allergies? No   Changes to medications? No   New conditions or infections since last clinic visit No   Unplanned office visit, urgent care, ED, or hospital admission in the last 4 weeks  No   How does patient/caregiver feel medication is working? Very good   Financial problems or insurance changes  No   Since the previous refill, were any specialty medication doses or scheduled injections missed or delayed?  No   Does this patient require a clinical escalation to a pharmacist? No            Delivery Questions      Flowsheet Row Most Recent Value   Delivery method [employer]   Delivery address verified with patient/caregiver? Yes   Delivery address Home   Number of medications in delivery 1   Medication(s) being filled and delivered Hydroxyurea (Antineoplastics Misc.)   Doses left of specialty medications 7 doses left   Copay verified? Yes   Copay amount $0   Copay form of payment No copayment ($0)              Medication Adherence    Adherence tools used: watch          Follow-up: 28 day(s)     Anita Persaud, Pharmacy Technician  Specialty Pharmacy Technician

## 2024-07-24 ENCOUNTER — LAB (OUTPATIENT)
Dept: LAB | Facility: HOSPITAL | Age: 64
End: 2024-07-24
Payer: MEDICARE

## 2024-07-24 DIAGNOSIS — D45 POLYCYTHEMIA RUBRA VERA: ICD-10-CM

## 2024-07-24 LAB
BASOPHILS # BLD AUTO: 0.06 10*3/MM3 (ref 0–0.2)
BASOPHILS NFR BLD AUTO: 1.1 % (ref 0–1.5)
DEPRECATED RDW RBC AUTO: 55.8 FL (ref 37–54)
EOSINOPHIL # BLD AUTO: 0.08 10*3/MM3 (ref 0–0.4)
EOSINOPHIL NFR BLD AUTO: 1.5 % (ref 0.3–6.2)
ERYTHROCYTE [DISTWIDTH] IN BLOOD BY AUTOMATED COUNT: 14.5 % (ref 12.3–15.4)
HCT VFR BLD AUTO: 39.4 % (ref 34–46.6)
HGB BLD-MCNC: 12.9 G/DL (ref 12–15.9)
IMM GRANULOCYTES # BLD AUTO: 0.04 10*3/MM3 (ref 0–0.05)
IMM GRANULOCYTES NFR BLD AUTO: 0.8 % (ref 0–0.5)
LYMPHOCYTES # BLD AUTO: 1.02 10*3/MM3 (ref 0.7–3.1)
LYMPHOCYTES NFR BLD AUTO: 19.5 % (ref 19.6–45.3)
MCH RBC QN AUTO: 34.6 PG (ref 26.6–33)
MCHC RBC AUTO-ENTMCNC: 32.7 G/DL (ref 31.5–35.7)
MCV RBC AUTO: 105.6 FL (ref 79–97)
MONOCYTES # BLD AUTO: 0.26 10*3/MM3 (ref 0.1–0.9)
MONOCYTES NFR BLD AUTO: 5 % (ref 5–12)
NEUTROPHILS NFR BLD AUTO: 3.78 10*3/MM3 (ref 1.7–7)
NEUTROPHILS NFR BLD AUTO: 72.1 % (ref 42.7–76)
NRBC BLD AUTO-RTO: 0 /100 WBC (ref 0–0.2)
PLATELET # BLD AUTO: 502 10*3/MM3 (ref 140–450)
PMV BLD AUTO: 9.1 FL (ref 6–12)
RBC # BLD AUTO: 3.73 10*6/MM3 (ref 3.77–5.28)
WBC NRBC COR # BLD AUTO: 5.24 10*3/MM3 (ref 3.4–10.8)

## 2024-07-24 PROCEDURE — 85025 COMPLETE CBC W/AUTO DIFF WBC: CPT

## 2024-07-24 PROCEDURE — 36415 COLL VENOUS BLD VENIPUNCTURE: CPT

## 2024-07-26 ENCOUNTER — SPECIALTY PHARMACY (OUTPATIENT)
Dept: ONCOLOGY | Facility: HOSPITAL | Age: 64
End: 2024-07-26
Payer: MEDICARE

## 2024-07-26 NOTE — PROGRESS NOTES
Specialty Pharmacy Patient Management Program  Oncology 6-Month Clinical Assessment       Gisell Wylie is a 64 y.o. female with PCV seen today to assess adherence and side effects.    Reason for Outreach: Routine medication check-in .    Regimen: Hydroxyurea 1000mg PO every other day, alternating with 1500mg PO every other day    Specialty Pharmacy Goal   Goals Addressed Today         Specialty Pharmacy General Goal (pt-stated)       Clinical goal/therapeutic target: disease control, per the recent oncology clinic notes and labs. And platelets less than 400,000   Latest Reference Range & Units Most Recent 04/26/24 11:01 05/21/24 08:41 06/26/24 14:20 07/24/24 14:49   Platelets 140 - 450 10*3/mm3 502 (H)  7/24/24 14:49 570 (H) 562 (H) 568 (H) 502 (H)   (H): Data is abnormally high                  Problem List   Problem list reviewed by Rose Coffey, PharmD on 7/26/2024 at  1:33 PM  Patient Active Problem List   Diagnosis Code    May-Thurner syndrome I87.1    Polycythemia rubra vera D45    Encounter for long-term (current) use of high-risk medication Z79.899    Other hyperlipidemia E78.49    History of DVT of lower extremity Z86.718    Abnormal chest CT R93.89    Abnormal kidney function N28.9    Anxiety F41.9    Cellulitis of lower limb L03.119    Chronic bilateral low back pain without sciatica M54.50, G89.29    Elevated liver function tests R79.89    Hepatosplenomegaly R16.2    Insomnia G47.00    Lumbar spondylosis M47.816    Neuropathy G62.9    Arthritis M19.90    Postsurgical menopause E89.40    Recurrent major depression F33.9    Small bowel obstruction K56.609    Stage 3b chronic kidney disease N18.32    Thoracic aortic aneurysm without rupture I71.20    Diarrhea R19.7    Irritant contact dermatitis L24.9    Acute bronchitis J20.9    Idiopathic acute pancreatitis without infection or necrosis K85.00    Abnormal results of liver function studies R94.5    Screening for osteoporosis Z13.820     Hyperlipidemia E78.5       Medication Assessment for Specialty Medication(s):  Medication Assessment  Follow Up Clinical Assessment  Linked Medication(s) Assessed: Hydroxyurea (Antineoplastics Misc.)  Therapeutic appropriateness: Appropriate  Medication tolerability: Tolerating with no to minimal ADRs  Medication plan: Continue therapy with normal follow-up  Quality of Life Improvement Scale: 10-Significantly better  Administration: Patient is taking every day at the same time .   Patient can self administer medications: yes  Medication Follow-Up Plan: Next clinical assessment and Refill coordination  Lab Review: The labs listed below have been reviewed. No dose adjustments are needed for the oral specialty medication(s) based on the labs.    Lab Results   Component Value Date    GLUCOSE 104 (H) 06/26/2024    CALCIUM 9.9 06/26/2024     06/26/2024    K 4.4 06/26/2024    CO2 23.4 06/26/2024     06/26/2024    BUN 23 06/26/2024    CREATININE 0.85 06/26/2024    EGFRIFAFRI 79 09/16/2020    EGFRIFNONA 66 02/07/2022    BCR 27.1 (H) 06/26/2024    ANIONGAP 11.6 06/26/2024     Lab Results   Component Value Date    WBC 5.24 07/24/2024    RBC 3.73 (L) 07/24/2024    HGB 12.9 07/24/2024    HCT 39.4 07/24/2024    .6 (H) 07/24/2024    MCH 34.6 (H) 07/24/2024    MCHC 32.7 07/24/2024    RDW 14.5 07/24/2024    RDWSD 55.8 (H) 07/24/2024    MPV 9.1 07/24/2024     (H) 07/24/2024    NEUTRORELPCT 72.1 07/24/2024    LYMPHORELPCT 19.5 (L) 07/24/2024    MONORELPCT 5.0 07/24/2024    EOSRELPCT 1.5 07/24/2024    BASORELPCT 1.1 07/24/2024    AUTOIGPER 0.8 (H) 07/24/2024    NEUTROABS 3.78 07/24/2024    LYMPHSABS 1.02 07/24/2024    MONOSABS 0.26 07/24/2024    EOSABS 0.08 07/24/2024    BASOSABS 0.06 07/24/2024    AUTOIGNUM 0.04 07/24/2024    NRBC 0.0 07/24/2024     Drug-drug interactions  Completed medication reconciliation today to assess for drug interactions. Patient denies starting or stopping any medications.     Assessed medication list for interactions, no significant drug interactions noted.   Advised patient to call the clinic if any new medications are started so we can assess for drug-drug interactions.  Drug-food interactions discussed  Vaccines are coordinated by the patient's oncologist and primary care provider.    Medications   Medicines reviewed by Rose Coffey PharmD on 7/26/2024 at  1:33 PM  Prior to Admission medications    Medication Sig Start Date End Date Taking? Authorizing Provider   aspirin 81 MG EC tablet Take 1 tablet by mouth Daily.    Neelima Witt MD   Cholecalciferol 50 MCG (2000 UT) tablet Take 1 tablet by mouth Daily.    Neelima Witt MD   fluticasone (FLONASE) 50 MCG/ACT nasal spray 1 spray by Each Nare route Daily.    Neelima Witt MD   gabapentin (NEURONTIN) 400 MG capsule Take 1 capsule by mouth 2 (Two) Times a Day. 1/2/24   Neelima Witt MD   hydroxyurea (HYDREA) 500 MG capsule Take 2 capsules by mouth every other day alternating with 3 capsules by mouth every other day. 6/3/24   Yogesh Damon MD   meloxicam (MOBIC) 7.5 MG tablet Take 1 tablet by mouth Daily.    Neelima Witt MD   pravastatin (PRAVACHOL) 40 MG tablet Take 1 tablet by mouth Daily. 6/4/19   Neelima Witt MD   sertraline (ZOLOFT) 25 MG tablet Take 1 tablet by mouth Daily. 8/21/20   Neelima Witt MD   Turmeric 400 MG capsule Take 3,000 mg by mouth Daily.    Neelima Witt MD   XARELTO 20 MG tablet Take 1 tablet by mouth Every Evening. 8/12/19   Neelima Witt MD       Allergies  Known allergies and reactions were discussed with the patient. The patient's chart has been reviewed for allergy information and updated as necessary.   Allergies   Allergen Reactions    Clarithromycin Other (See Comments)     [place]         Allergies reviewed by Rose Coffey, PharmD on 7/26/2024 at  1:33 PM    Hospitalizations and Urgent Care Visits Since Last  Assessment:  Unplanned hospitalizations or inpatient admissions: 0  ED Visits: 0  Urgent Office Visits: 0    Adherence Assessment:  Adherence Questions  Linked Medication(s) Assessed: Hydroxyurea (Antineoplastics Misc.)  On average, how many doses/injections does the patient miss per month?: 0  What are the identified reasons for non-adherence or missed doses? : no problems identified  What is the estimated medication adherence level?: %  Based on the patient/caregiver response and refill history, does this patient require an MTP to track adherence improvements?: no    Quality of Life Assessment:  Quality of Life Assessment  Quality of Life Improvement Scale: 10-Significantly better  -- Quality of Life: 10/10    Financial Assessment:  Medication availability/affordability: Patient has had no issues obtaining medication from pharmacy.    Attestation:  I attest the patient was actively involved in and has agreed to the above plan of care. If the prescribed therapy is at any point deemed not appropriate based on the current or future assessments, a consultation will be initiated with the patient's specialty care provider to determine the best course of action. The revised plan of therapy will be documented along with any required assessments and/or additional patient education provided.       All questions addressed and patient had no additional concerns. Patient has pharmacy contact information.    Rose Coffey PharmD, BCOP  Clinical Oncology Pharmacy Specialist  Phone: [phone]      7/26/2024  13:34 EDT

## 2024-08-01 ENCOUNTER — SPECIALTY PHARMACY (OUTPATIENT)
Dept: ONCOLOGY | Facility: HOSPITAL | Age: 64
End: 2024-08-01
Payer: MEDICARE

## 2024-08-01 NOTE — PROGRESS NOTES
Specialty Pharmacy Refill Coordination Note     Gisell is a 64 y.o. female contacted today regarding refills of Hydroxyurea 2500mg.  Take 2 capsules by mouth every other day alternating with 3 capsules by mouth every other day of specialty medication(s).    Reviewed and verified with patient: Yes    Specialty medication(s) and dose(s) confirmed: Yes    Refill Questions      Flowsheet Row Most Recent Value   Changes to allergies? No   Changes to medications? No   New conditions or infections since last clinic visit No   Unplanned office visit, urgent care, ED, or hospital admission in the last 4 weeks  No   How does patient/caregiver feel medication is working? Good   Financial problems or insurance changes  No   Since the previous refill, were any specialty medication doses or scheduled injections missed or delayed?  No   Does this patient require a clinical escalation to a pharmacist? No            Delivery Questions      Flowsheet Row Most Recent Value   Delivery method [employer]   Delivery address verified with patient/caregiver? Yes   Delivery address Home   Number of medications in delivery 1   Medication(s) being filled and delivered Hydroxyurea (Antineoplastics Misc.)   Doses left of specialty medications 1 week left   Copay verified? Yes   Copay amount $0   Copay form of payment No copayment ($0)   Ship Date 08/01/24   Delivery Date 08/02/24   Signature Required No              Medication Adherence    Adherence tools used: watch          Follow-up: 30 day(s)     Jennifer Hylton, Pharmacy Care Coordinator  Specialty Pharmacy

## 2024-08-28 ENCOUNTER — LAB (OUTPATIENT)
Dept: LAB | Facility: HOSPITAL | Age: 64
End: 2024-08-28
Payer: MEDICARE

## 2024-08-28 DIAGNOSIS — D45 POLYCYTHEMIA RUBRA VERA: ICD-10-CM

## 2024-08-28 LAB
BASOPHILS # BLD AUTO: 0.06 10*3/MM3 (ref 0–0.2)
BASOPHILS NFR BLD AUTO: 1.1 % (ref 0–1.5)
DEPRECATED RDW RBC AUTO: 56.4 FL (ref 37–54)
EOSINOPHIL # BLD AUTO: 0.11 10*3/MM3 (ref 0–0.4)
EOSINOPHIL NFR BLD AUTO: 2.1 % (ref 0.3–6.2)
ERYTHROCYTE [DISTWIDTH] IN BLOOD BY AUTOMATED COUNT: 14.6 % (ref 12.3–15.4)
HCT VFR BLD AUTO: 39.3 % (ref 34–46.6)
HGB BLD-MCNC: 12.8 G/DL (ref 12–15.9)
IMM GRANULOCYTES # BLD AUTO: 0.03 10*3/MM3 (ref 0–0.05)
IMM GRANULOCYTES NFR BLD AUTO: 0.6 % (ref 0–0.5)
LYMPHOCYTES # BLD AUTO: 1.14 10*3/MM3 (ref 0.7–3.1)
LYMPHOCYTES NFR BLD AUTO: 21.3 % (ref 19.6–45.3)
MCH RBC QN AUTO: 34.4 PG (ref 26.6–33)
MCHC RBC AUTO-ENTMCNC: 32.6 G/DL (ref 31.5–35.7)
MCV RBC AUTO: 105.6 FL (ref 79–97)
MONOCYTES # BLD AUTO: 0.26 10*3/MM3 (ref 0.1–0.9)
MONOCYTES NFR BLD AUTO: 4.9 % (ref 5–12)
NEUTROPHILS NFR BLD AUTO: 3.75 10*3/MM3 (ref 1.7–7)
NEUTROPHILS NFR BLD AUTO: 70 % (ref 42.7–76)
NRBC BLD AUTO-RTO: 0 /100 WBC (ref 0–0.2)
PLATELET # BLD AUTO: 525 10*3/MM3 (ref 140–450)
PMV BLD AUTO: 9.1 FL (ref 6–12)
RBC # BLD AUTO: 3.72 10*6/MM3 (ref 3.77–5.28)
WBC NRBC COR # BLD AUTO: 5.35 10*3/MM3 (ref 3.4–10.8)

## 2024-08-28 PROCEDURE — 85025 COMPLETE CBC W/AUTO DIFF WBC: CPT

## 2024-08-28 PROCEDURE — 36415 COLL VENOUS BLD VENIPUNCTURE: CPT

## 2024-08-30 ENCOUNTER — SPECIALTY PHARMACY (OUTPATIENT)
Facility: HOSPITAL | Age: 64
End: 2024-08-30
Payer: MEDICARE

## 2024-08-30 NOTE — PROGRESS NOTES
Specialty Pharmacy Refill Coordination Note     Gisell is a 64 y.o. female contacted today regarding refills of  hydrea 1000mg PO every other day alternating with 1500mg PO every other day of specialty medication(s).    Reviewed and verified with patient: YES    Specialty medication(s) and dose(s) confirmed: yes    Refill Questions      Flowsheet Row Most Recent Value   Changes to allergies? No   Changes to medications? No   New conditions or infections since last clinic visit No   Unplanned office visit, urgent care, ED, or hospital admission in the last 4 weeks  No   How does patient/caregiver feel medication is working? Good   Financial problems or insurance changes  No   Since the previous refill, were any specialty medication doses or scheduled injections missed or delayed?  No   Does this patient require a clinical escalation to a pharmacist? No            Delivery Questions      Flowsheet Row Most Recent Value   Delivery method [employer]   Delivery address verified with patient/caregiver? Yes   Delivery address Home   Number of medications in delivery 1   Medication(s) being filled and delivered Hydroxyurea (Antineoplastics Misc.)   Doses left of specialty medications 6 doses   Copay verified? Yes   Copay amount $0   Copay form of payment No copayment ($0)   Ship Date 8/30   Delivery Date 8/31   Signature Required No              Medication Adherence    Adherence tools used: watch          Follow-up: 28 day(s)     Anita Persaud, Pharmacy Technician  Specialty Pharmacy Technician

## 2024-09-24 ENCOUNTER — SPECIALTY PHARMACY (OUTPATIENT)
Facility: HOSPITAL | Age: 64
End: 2024-09-24
Payer: MEDICARE

## 2024-10-11 ENCOUNTER — TRANSCRIBE ORDERS (OUTPATIENT)
Dept: ADMINISTRATIVE | Facility: HOSPITAL | Age: 64
End: 2024-10-11
Payer: MEDICARE

## 2024-10-11 DIAGNOSIS — Z12.31 VISIT FOR SCREENING MAMMOGRAM: Primary | ICD-10-CM

## 2024-11-14 ENCOUNTER — SPECIALTY PHARMACY (OUTPATIENT)
Facility: HOSPITAL | Age: 64
End: 2024-11-14
Payer: MEDICARE

## 2024-11-14 NOTE — PROGRESS NOTES
Specialty Pharmacy Refill Coordination Note     Gisell is a 64 y.o. female contacted today regarding refills of  Hydrea 1000mg PO every other day and 1500mg PO on the opposite days of specialty medication(s).    Reviewed and verified with patient: YES    Specialty medication(s) and dose(s) confirmed: yes    Refill Questions      Flowsheet Row Most Recent Value   Changes to allergies? No   Changes to medications? No   New conditions or infections since last clinic visit No   Unplanned office visit, urgent care, ED, or hospital admission in the last 4 weeks  No   How does patient/caregiver feel medication is working? Very good   Financial problems or insurance changes  No   Since the previous refill, were any specialty medication doses or scheduled injections missed or delayed?  No   Does this patient require a clinical escalation to a pharmacist? No            Delivery Questions      Flowsheet Row Most Recent Value   Delivery method UPS   Delivery address verified with patient/caregiver? Yes   Delivery address Home   Number of medications in delivery 1   Medication(s) being filled and delivered Hydroxyurea (HYDREA)   Doses left of specialty medications 8 days   Copay verified? Yes   Copay amount $0   Copay form of payment No copayment ($0)   Ship Date 11/14   Delivery Date 11/15   Signature Required No              Medication Adherence    Adherence tools used: watch          Follow-up: 30 day(s)     Anita Persaud, Pharmacy Technician  Specialty Pharmacy Technician

## 2024-11-25 ENCOUNTER — LAB (OUTPATIENT)
Dept: LAB | Facility: HOSPITAL | Age: 64
End: 2024-11-25
Payer: MEDICARE

## 2024-11-25 DIAGNOSIS — D45 POLYCYTHEMIA RUBRA VERA: ICD-10-CM

## 2024-11-25 LAB
BASOPHILS # BLD AUTO: 0.07 10*3/MM3 (ref 0–0.2)
BASOPHILS NFR BLD AUTO: 1.3 % (ref 0–1.5)
DEPRECATED RDW RBC AUTO: 52.1 FL (ref 37–54)
EOSINOPHIL # BLD AUTO: 0.06 10*3/MM3 (ref 0–0.4)
EOSINOPHIL NFR BLD AUTO: 1.1 % (ref 0.3–6.2)
ERYTHROCYTE [DISTWIDTH] IN BLOOD BY AUTOMATED COUNT: 13.5 % (ref 12.3–15.4)
HCT VFR BLD AUTO: 38.6 % (ref 34–46.6)
HGB BLD-MCNC: 12.9 G/DL (ref 12–15.9)
IMM GRANULOCYTES # BLD AUTO: 0.03 10*3/MM3 (ref 0–0.05)
IMM GRANULOCYTES NFR BLD AUTO: 0.6 % (ref 0–0.5)
LYMPHOCYTES # BLD AUTO: 1.14 10*3/MM3 (ref 0.7–3.1)
LYMPHOCYTES NFR BLD AUTO: 21.8 % (ref 19.6–45.3)
MCH RBC QN AUTO: 35.1 PG (ref 26.6–33)
MCHC RBC AUTO-ENTMCNC: 33.4 G/DL (ref 31.5–35.7)
MCV RBC AUTO: 105.2 FL (ref 79–97)
MONOCYTES # BLD AUTO: 0.24 10*3/MM3 (ref 0.1–0.9)
MONOCYTES NFR BLD AUTO: 4.6 % (ref 5–12)
NEUTROPHILS NFR BLD AUTO: 3.69 10*3/MM3 (ref 1.7–7)
NEUTROPHILS NFR BLD AUTO: 70.6 % (ref 42.7–76)
NRBC BLD AUTO-RTO: 0 /100 WBC (ref 0–0.2)
PLATELET # BLD AUTO: 422 10*3/MM3 (ref 140–450)
PMV BLD AUTO: 8.8 FL (ref 6–12)
RBC # BLD AUTO: 3.67 10*6/MM3 (ref 3.77–5.28)
WBC NRBC COR # BLD AUTO: 5.23 10*3/MM3 (ref 3.4–10.8)

## 2024-11-25 PROCEDURE — 36415 COLL VENOUS BLD VENIPUNCTURE: CPT

## 2024-11-25 PROCEDURE — 85025 COMPLETE CBC W/AUTO DIFF WBC: CPT

## 2024-12-02 ENCOUNTER — OFFICE VISIT (OUTPATIENT)
Dept: GASTROENTEROLOGY | Facility: CLINIC | Age: 64
End: 2024-12-02
Payer: MEDICARE

## 2024-12-02 VITALS
DIASTOLIC BLOOD PRESSURE: 60 MMHG | WEIGHT: 201.2 LBS | SYSTOLIC BLOOD PRESSURE: 106 MMHG | BODY MASS INDEX: 31.58 KG/M2 | HEIGHT: 67 IN | HEART RATE: 61 BPM | OXYGEN SATURATION: 97 %

## 2024-12-02 DIAGNOSIS — Z87.19 HISTORY OF PANCREATITIS: ICD-10-CM

## 2024-12-02 DIAGNOSIS — R11.0 NAUSEA: Primary | ICD-10-CM

## 2024-12-02 DIAGNOSIS — K76.0 FATTY INFILTRATION OF LIVER: ICD-10-CM

## 2024-12-02 DIAGNOSIS — Q85.9: ICD-10-CM

## 2024-12-02 DIAGNOSIS — R93.5 ABNORMAL ABDOMINAL CT SCAN: ICD-10-CM

## 2024-12-02 DIAGNOSIS — R10.9 ABDOMINAL DISCOMFORT: ICD-10-CM

## 2024-12-02 DIAGNOSIS — R16.2 HEPATOSPLENOMEGALY: ICD-10-CM

## 2024-12-02 DIAGNOSIS — R74.01 ELEVATED AST (SGOT): ICD-10-CM

## 2024-12-02 DIAGNOSIS — Z98.890 HISTORY OF COLONOSCOPY: ICD-10-CM

## 2024-12-02 DIAGNOSIS — R89.9 ABNORMAL LABORATORY TEST: ICD-10-CM

## 2024-12-02 DIAGNOSIS — E66.811 CLASS 1 OBESITY WITH BODY MASS INDEX (BMI) OF 31.0 TO 31.9 IN ADULT, UNSPECIFIED OBESITY TYPE, UNSPECIFIED WHETHER SERIOUS COMORBIDITY PRESENT: ICD-10-CM

## 2024-12-02 NOTE — PROGRESS NOTES
GASTROENTEROLOGY OFFICE NOTE    Gisell Wylie  [id number]  1960    CARE TEAM  Patient Care Team:  Zainab Childs MD as PCP - General (Internal Medicine & Pediatrics)  Jaxson Alcantara MD as Surgeon (General Surgery)  Lilo Townsend APRN as Nurse Practitioner (Gastroenterology)    Referring Provider: No ref. provider found    Chief Complaint   Patient presents with    Hepatic Disease     6mth follow up        HISTORY OF PRESENT ILLNESS:   Gisell Wylie is a 64 y.o. female (employed part time) Who returns for 6-month follow-up regarding elevated AST and hepatosplenomegaly on imaging (Weight loss recommended for suspected fatty liver); history of mildly elevated mitochondrial antibody around 21; history of polycythemia vera, prescribed Xarelto, established with Dr. Damon; prior ultrasound gallbladder in 2019 that revealed contracted gallbladder without stones, increased echogenicity throughout the liver suggestive hepatic steatosis, CT scan 5/2023 with findings suggestive of pancreatitis, mild mesenteric fat stranding that may represent mild mesenteric panniculitis, no clear etiology of pancreatitis established, lipase elevated, liver enzymes normal, triglycerides 80.       11/14/2023 ultrasound of abdomen revealed hepatosplenomegaly with hepatosteatosis     12/12/2023 abdominal duplex ultrasound at [facility] due to hepatosplenomegaly with prior ultrasound from 11/14/2023 that revealed changes of hepatosteatosis, mild splenomegaly.      She also has history of abdominal discomfort with prior report of bowel movement every 3 days, history of intermittent chest discomfort with prior expressed concern that chest discomfort could be due to spasm, history of taking pantoprazole, Pepcid.  At last office visit she reported occasional left mid abdominal discomfort without known trigger, taking MiraLAX as needed as she reported difficulty remembering to take MiraLAX    She had prior  normal/negative celiac comprehensive panel.    Her weight at last office visit on 2024 was 198 pounds.  Today's weight 201 pounds.  She has history of losing 15 pounds.    2024 comprehensive metabolic panel as below with normal ALT and slightly elevated AST of 35.    She had repeat labs 2 months ago with primary care provider with report of possible normal liver enzymes.    She reports experiencing nausea daily.    She expresses concern regarding possible recurrent pancreatitis a few weeks ago due to experiencing upper abdominal discomfort.      Past Medical History:   Diagnosis Date    AAA (abdominal aortic aneurysm)     Abnormal chest CT 2023    enlarged Aortic Aneurysm    Anxiety 2023    Arthritis 2023    Cellulitis of lower limb 2016    From Automated Load;Provider: Radha Lopez;Status: Active    Chronic bilateral low back pain without sciatica 2023    Deep vein thrombophlebitis of calf, left     Encounter for long-term (current) use of high-risk medication 08/15/2022    Fatty liver     GERD (gastroesophageal reflux disease)     Hepatosplenomegaly 2023    Hyperlipidemia     Insomnia 2023    Lumbar spondylosis 2023    May-Thurner syndrome     Neuropathy 2023    Obstructive sleep apnea     Pancreatitis     Polycythemia rubra vera     Postsurgical menopause 2023    Pulmonary embolism     Recurrent major depression 2016    From Automated Load;Provider: Ange Alex;Status: Active    Small bowel obstruction 2023    Stage 3b chronic kidney disease 2023    Thoracic aortic aneurysm without rupture 2023    4.1 cm. 10/6/2023: Pt. has checked yearly at . Last checked 2022. Pt. to schedule f/u.        Past Surgical History:   Procedure Laterality Date    BILATERAL BREAST REDUCTION      CAROTID STENT       SECTION      COLONOSCOPY  2024    Dr. Boyd    ELBOW FUSION      HYSTERECTOMY      ILIAC ARTERY STENT Left  "05/2020    Mesh    OOPHORECTOMY      OTHER SURGICAL HISTORY  06/2020    Iliac vein carterization    REDUCTION MAMMAPLASTY Bilateral 1999    TONSILLECTOMY          Current Outpatient Medications on File Prior to Visit   Medication Sig    aspirin 81 MG EC tablet Take 1 tablet by mouth Daily.    Cholecalciferol 50 MCG (2000 UT) tablet Take 1 tablet by mouth Daily.    fluticasone (FLONASE) 50 MCG/ACT nasal spray 1 spray by Each Nare route Daily.    gabapentin (NEURONTIN) 400 MG capsule Take 1 capsule by mouth 2 (Two) Times a Day.    hydroxyurea (HYDREA) 500 MG capsule Take 2 capsules by mouth every other day alternating with 3 capsules by mouth every other day.    meloxicam (MOBIC) 7.5 MG tablet Take 1 tablet by mouth Daily.    pravastatin (PRAVACHOL) 40 MG tablet Take 1 tablet by mouth Daily.    sertraline (ZOLOFT) 25 MG tablet Take 1 tablet by mouth Daily.    XARELTO 20 MG tablet Take 1 tablet by mouth Every Evening.     No current facility-administered medications on file prior to visit.       Allergies   Allergen Reactions    Clarithromycin Other (See Comments)     [place]       Family History   Problem Relation Age of Onset    Atrial fibrillation Mother     Dementia Mother     Diabetes Mother     Heart disease Mother     Leukemia Paternal Grandmother     Breast cancer Paternal Grandmother     Colon cancer Maternal Aunt     Colon cancer Cousin     Lung cancer Cousin     Breast cancer Cousin     Ovarian cancer Neg Hx        Social History     Socioeconomic History    Marital status:    Tobacco Use    Smoking status: Never    Smokeless tobacco: Never   Vaping Use    Vaping status: Never Used   Substance and Sexual Activity    Alcohol use: No    Drug use: No    Sexual activity: Defer       PHYSICAL EXAM   /60 (BP Location: Right arm, Patient Position: Sitting, Cuff Size: Adult)   Pulse 61   Ht 170.2 cm (67\")   Wt 91.3 kg (201 lb 3.2 oz)   SpO2 97%   BMI 31.51 kg/m²   Physical Exam  Constitutional:     "   General: She is not in acute distress.     Appearance: She is not toxic-appearing.   HENT:      Head: Normocephalic and atraumatic. No contusion.      Right Ear: External ear normal.      Left Ear: External ear normal.   Eyes:      General: Lids are normal. No scleral icterus.        Right eye: No discharge.         Left eye: No discharge.      Extraocular Movements: Extraocular movements intact.   Neck:      Trachea: Trachea normal.      Comments: No visible mass  No visible adenopathy  Cardiovascular:      Rate and Rhythm: Normal rate.   Pulmonary:      Effort: No respiratory distress.      Comments: Symmetrical expansion    Musculoskeletal:      Comments: Symmetrical movement of upper extremities  Symmetrical movement of lower extremities  No visible deformities   Skin:     General: Skin is warm and dry.      Coloration: Skin is not jaundiced.   Neurological:      General: No focal deficit present.      Mental Status: She is alert and oriented to person, place, and time.   Psychiatric:         Mood and Affect: Mood normal.         Behavior: Behavior normal.         Thought Content: Thought content normal.     Results Review:  US GB in 2019 revealed contracted gallbladder without stones, increased echogenicity throughout liver suggestive of hepatic steatosis.       7/22/2021 comprehensive metabolic panel revealed elevated AST of 33.     CT scan 5/2023 with findings suggestive of pancreatitis, mild mesenteric fat stranding that may represent mild mesenteric panniculitis. No clear etiology of pancreatitis was established. Lipase was elevated. liver enzymes were normal. Trigylcerides were 80.       10/30/2023 hepatitis C antibody nonreactive, CMP with elevated glucose 157, elevated calcium 10.7, normal alkaline phosphatase 75, normal ALT 45 (lab range of 7-52), AST elevated at 57 (lab range of 13-39), bilirubin normal 0.7.  Lipase normal 24.  OMAR negative.  RF latex less than 10.  CBC with normal white blood cell  count 5.8, normal hemoglobin 14.1, normal hematocrit 42.5 and elevated platelet count 514.     11/14/2023 ultrasound of the abdomen revealed liver measures 22.5 cm, gallbladder is present with normal wall thickness, spleen measures 15.2 cm and is enlarged, overall impression changes of hepatosplenomegaly with hepatosteatosis.     11/27/2023 CMP revealed elevated chloride 108, elevated glucose 105, alkaline phosphatase normal at 57, ALT normal per above lab reference range at 41, AST elevated but improved at 43, bilirubin normal 0.8.  INR 1.4.  Hepatitis A IgM nonreactive, hepatitis B surface antigen nonreactive, hepatitis B core antibody nonreactive, hepatitis C antibody nonreactive.  Ceruloplasmin 25.  Smooth muscle antibody negative.  Ferritin 125.7.     She reports she was notified by her primary care provider's office that mitochondrial antibody was mildly elevated around 21.     12/12/2023 abdominal duplex ultrasound at [facility] due to hepatosplenomegaly with prior ultrasound from 11/14/2023 that revealed changes of hepatosteatosis, mild splenomegaly     Prior colonoscopy in 2021 at [facility].  Repeat colonoscopy recommended in 10 years.     2/20/2024 CLARKE FibroSure revealed fibrosis stage F0 to F1, S2-S3 moderate to severe steatosis and N3 severe CLARKE     3/28/2024 Celiac comprehensive panel normal/negative     4/11/2024 colonoscopy per Dr. Boyd with excellent bowel prep revealed 2 polyps measuring 2 to 3 mm in size at 35 cm proximal to anus and in sigmoid colon.  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 These are rare, benign and intermucosal tumors and are most frequently in observed in women in their 60s.  You need no special follow-up from that standpoint as this is a benign lesion.     4/26/2024 IgA normal.      5/21/2024 CMP as below with elevated ALT and AST but improved over the past 4 months.    CMP          4/26/2024    10:59 5/21/2024    08:41 6/26/2024    14:20   CMP    Glucose 82  87  104    BUN 17  21  23    Creatinine 0.77  0.92  0.85    EGFR  69.2  76.1    Sodium 141  141  142    Potassium 5.0  4.9  4.4    Chloride 107  106  107    Calcium 10.4  10.1  9.9    Total Protein 7.0      Total Protein  7.0  7.1    Albumin 4.4  4.2  4.6    Globulin 2.6      Globulin  2.8  2.5    Total Bilirubin 0.5  0.7  0.5    Alkaline Phosphatase 76  68  79    AST (SGOT) 42  48  35    ALT (SGPT) 36  34  27    Albumin/Globulin Ratio  1.5  1.8    BUN/Creatinine Ratio 22.1  22.8  27.1    Anion Gap  11.5  11.6      CBC          7/24/2024    14:49 8/28/2024    14:13 11/25/2024    12:21   CBC   WBC 5.24  5.35  5.23    RBC 3.73  3.72  3.67    Hemoglobin 12.9  12.8  12.9    Hematocrit 39.4  39.3  38.6    .6  105.6  105.2    MCH 34.6  34.4  35.1    MCHC 32.7  32.6  33.4    RDW 14.5  14.6  13.5    Platelets 502  525  422      ASSESSMENT / PLAN  1. Nausea  -Restart proton pump inhibitor.  She has a history of taking pantoprazole but experienced bloating when taking pantoprazole (there could have been another etiology for bloating such as possible constipation) but will try a different proton pump inhibitor to determine if nausea improves with daily use of proton pump inhibitor.  Prior use of Pepcid AC in the morning was helpful for nausea.  Due to intermittent use of meloxicam I would like for her to take proton pump inhibitor daily  -I also recommend she try to take MiraLAX daily in the event abdominal discomfort is due to irritable bowel syndrome  - omeprazole (priLOSEC) 20 MG capsule; Take 1 capsule by mouth Daily. 30 minutes prior to a meal  Dispense: 90 capsule; Refill: 3    2. Hepatosplenomegaly  3. Class 1 obesity with body mass index (BMI) of 31.0 to 31.9 in adult, unspecified obesity type, unspecified whether serious comorbidity present  4. Fatty infiltration of liver  5. Elevated AST (SGOT)  6. Abnormal laboratory test  -I will request the office call primary care provider's office to request  most recent lab result that may have revealed normal liver enzymes.   2/20/2024 CLARKE fibrosure concerning for CLARKE for which I recommend continued weight loss.       She has done well with weight loss  since December 2023 (total weight loss of around 40 pounds or 10% of prior body weight of 215 pounds would be great!).      We previously discussed consideration for liver biopsy if liver enzymes remain elevated despite continued weight loss. She has history of mildly elevated mitochondrial antibody.     I previously expressed concern that turmeric may contribute to elevated liver enzymes but she had elevated liver enzymes off turmeric     -Meloxicam reported as new medication since liver enzymes were normal May 2023.  The following was reviewed on [facility] liver tox:  Meloxicam has been linked to rare instances of acute, clinically apparent liver injury.  It does not seem as though she uses meloxicam on a regular basis lowering suspicion that meloxicam is reason or contributing to elevated liver enzymes  - she had negative celiac serology and prior EGD per Dr. Heath that I do not believe mentioned elevated liver enzymes.     7. History of pancreatitis  admitted 5/2023 with diagnosis of pancreatitis (CT abnormal, lipase elevated) of uncertain etiology. She rarely drinks alcohol. Triglycerides normal. No gallstones on prior imaging. Another etiology to consider is medication induced pancreatitis. The following was previously reviewed on up to date: Pravastatin is a class Ia medication that can cause drug induced pancreatitis.Sertraline is a class IV medications that can cause medication induced pancreatitis.  Consider patient discussing different medications with provider(s) that prescribe the medication if she has recurrent pancreatitis in the future   8. Abdominal discomfort  9. Abnormal abdominal CT scan  CT scan 5/2023 mild mesenteric fat stranding that may represent mild mesenteric panniculitis, thrombocytosis and  left abdominal discomfort . 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 Try to take Miralax daily. Take probiotic as needed for diarrhea as probiotic was previously helpful for 2 recent episodes of diarrhea.  10. Hamartoma determined by colorectal biopsy  11. History of colonoscopy  - review of letter to patient from Dr. 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 These are rare, benign and intermucosal tumors and are most frequently in observed in women in their 60s.   no special follow-up from that standpoint recommended as this is a benign lesion. Repeat colonoscopy 4/11/2034   Return in about 6 months (around 6/2/2025).    Lilo Townsend, APRN  12/02/2024    Addendum VASQUEZ Townsend APRN 12/4/2024: 10/28/2024 CBC revealed normal white blood cell count, normal hemoglobin 13.3, normal hematocrit 40.5 and normal platelet count 431.  Comprehensive metabolic panel revealed elevated glucose of 120 and otherwise appeared normal with normal alkaline phosphatase 66, normal ALT 26, normal AST 32 and normal bilirubin 0.8.

## 2024-12-04 DIAGNOSIS — R11.0 NAUSEA: ICD-10-CM

## 2024-12-04 NOTE — TELEPHONE ENCOUNTER
Rx sent to wrong pharmacy on 12/2/24. Sent to [facility], needs to go to [facility].

## 2024-12-06 ENCOUNTER — OFFICE VISIT (OUTPATIENT)
Dept: ONCOLOGY | Facility: CLINIC | Age: 64
End: 2024-12-06
Payer: MEDICARE

## 2024-12-06 VITALS
HEART RATE: 63 BPM | BODY MASS INDEX: 31.86 KG/M2 | SYSTOLIC BLOOD PRESSURE: 123 MMHG | RESPIRATION RATE: 18 BRPM | OXYGEN SATURATION: 97 % | WEIGHT: 203 LBS | DIASTOLIC BLOOD PRESSURE: 64 MMHG | HEIGHT: 67 IN | TEMPERATURE: 97.1 F

## 2024-12-06 DIAGNOSIS — I87.1 MAY-THURNER SYNDROME: Primary | Chronic | ICD-10-CM

## 2024-12-06 DIAGNOSIS — D45 POLYCYTHEMIA RUBRA VERA: Chronic | ICD-10-CM

## 2024-12-06 PROCEDURE — 1126F AMNT PAIN NOTED NONE PRSNT: CPT | Performed by: INTERNAL MEDICINE

## 2024-12-06 PROCEDURE — 1159F MED LIST DOCD IN RCRD: CPT | Performed by: INTERNAL MEDICINE

## 2024-12-06 PROCEDURE — 99214 OFFICE O/P EST MOD 30 MIN: CPT | Performed by: INTERNAL MEDICINE

## 2024-12-06 PROCEDURE — 1160F RVW MEDS BY RX/DR IN RCRD: CPT | Performed by: INTERNAL MEDICINE

## 2024-12-06 NOTE — PROGRESS NOTES
CHIEF COMPLAINT: No new somatic complaints without any stigmata from her Hydrea that she is aware of    Problem List:  Oncology/Hematology History Overview Note   1.  Collin 2+ polycythemia rubra vera on Hydrea and aspirin  2.  Recurrent DVT and PE due to problem #2 with low titer positive lupus anticoagulant per prior testing by Dr. Major apparently on 2 separate occasions confirmatory 3 months apart  3.  May Thurner  4.  Ascending aortic aneurysm  5.  History of SOLOMON  6.  Obstructive sleep apnea  7.  Contrast-induced nephropathy  8.  Reflux with nausea  9.  Pancreatitis, hospitalized May 2023, treated with pain medications and IV fluids.    -8/2/2019 initial [facility] medical oncology consultation: May 2016 developed unprovoked left lower extremity deep vein thrombosis treated with left common iliac stent, thrombolyzes, temporary vena cava filter placement and retrieval.  Saw hematologist who told her she might have a low level lupus anticoagulant but she does not have that data and they were told that they did not think that was significant enough to warrant lifetime anticoagulation.  She stopped Xarelto after 8 months.  Starting the end of June she was developing pain in the left lower extremity again with progressive swelling and shortness of breath.  Went to [facility] ER on 4 July.  Was found to have left lower extremity extensive deep vein thrombosis and right lower lobe pulmonary embolus.  She was treated with thrombectomy and thrombolysis by Rigoberto and Pavel.  Previously she could not afford her Xarelto and they have switched her from heparin to Coumadin but before discharge switched her to Xarelto.  Her creatinine during her hospitalization increased to 1.38 from a baseline of 1.2.  She comes to hematology at [facility] on 8/2/2019 with labs showing creatinine of 1.23 on 7/11/2019, 1.69 on 7/17/2019, and she says that it was repeated again but she does not have that lab and was told that it was back  down but not at normal.  Her CBC reveals platelets of 697,000 on 7/17/2019 and 589,000 on 7/10/2019.Labs from 7/10/2019 revealed platelet count of 490,000 with hematocrit 54.7%.  Creatinine on 7/3/2019 was 0.92.  She understandably had a d-dimer elevation to 1.22 on 7/3/2019 with her clot.  Given the magnitude of her clot and the likelihood of may Thurner syndrome, she needs lifetime anticoagulation.  On patients with deep vein thrombosis and/or PE and indefinite anticoagulation, use of Xarelto in patients with GFR less than 30 is recommended to be avoided varied will need to watch that.  With her prior recent elevation of hematocrit and persistent elevation of platelets, we will continue to watch that and if they remain persistent suggesting this being more than just reactive to her PE and clots, then we need to look for myeloproliferative disorder contributing to her clotting.  She is going to get her records from her other hematologist she saw back in 2016 just for completeness sake but I would not repeat, nor would I do for the first time now, her hypercoagulable panel as any hypercoagulable disorder we stumbled upon would not alter her need for lifetime full dose anticoagulation.  The only question is which drug, and whether her creatinine will allow DOAC's.  I will check her BMP weekly for the next month to make sure her creatinine is doing adequate relative to her use of Xarelto and will get her blood count now and again prior to return in a month and if the platelets are remaining elevated we will do Collin 2 with reflex testing.  Since her discharge from the hospital she had significant nausea for which she had an ultrasound that showed fatty liver but normal gallbladder.  She had an EGD in the past few days with Dr. Heath that showed significant reflux and on Protonix her nausea has improved.  She will need close follow-up of her ascending aortic aneurysm as well with her thoracic surgeons.    -9/9/2019  medical oncology follow-up: Platelets persistently elevated 807,000 with hematocrit 52.7% and white count 8320 on 8/30/2019.  GFR staying over 59 on Xarelto.  We will check Collin 2 with reflex testing and treat with Hydrea plus or minus phlebotomy if we confirm myeloproliferative disorder.  Will need bone marrow before start of that to assess for myelofibrosis if this is confirmed.  Records from Dr.Scott Major of 6/7/2016 mention that she had been anemic during her hospitalization.  Factor V Leiden, prothrombin mutation, Antithrombin III, protein C&S levels were all normal and the anticardiolipin and beta-2 microglobulin were all negative according to Dr. Major.  The lupus anticoagulant was borderline positive at 10.5 with him and he recommended repeat of that done 3 months apart to prove the diagnosis but this came back low positive according to his notes and he suggested switching to aspirin after a full 6 months of anticoagulation.  This was of course before the low-dose DOAC trials have been published she had had thrombolytics and stenting of her May Thurner according to his records.  Given her presence of May Thurner despite the stenting, I would be inclined towards keeping her on anticoagulants regardless.  She brings to me records that includes labs back to 2016 where her white count, hemoglobin and platelets were normal but as recently as June 2019 her hematocrit was 52% whereas back in May 2016 her hemoglobin was actually down in the eights with normochromic normocytic indices.  On Xarelto, there is increased risk with aspirin I will hold on that for present but we may have to make such a decision as Xarelto may not be sufficient for viscosity issues related to her elevated platelets and hematocrit if this is a myeloproliferative disorder.    -9/30/2019 [facility] hematology oncology follow-up visit: 9/9/2019 Collin 2 [id number] mutation positive with erythropoietin level low at 1 commensurate with polycythemia rubra  vera and a hematocrit of 53% with platelets of 807,000 and normal white count.  Normally would do bone marrow to look for myelofibrosis but would not risk taking her off Xarelto at the moment.  We will start her on Hydrea 500 mg daily with CBC weekly and if we get her hematocrit below 45% platelets in the 400,000 range without significant leukopenia that would be my goal.  No phlebotomies for now until we see what Hydrea does.  After we see her back, if her platelets are down and tolerating Hydrea, I will consider stopping the Xarelto and switching her to full dose aspirin for life for the PRV.  This PRV did not cause her original venous thromboembolic phenomenon in fact was anemic when Dr. Major saw her in 2016.  We talked about the potential natural history of PRV including spent phase, leukemic transformation, myelofibrosis.  She will need AAA follow-up with surgeons.    -1/15/2021 [facility] hematology follow-up: 1/8/2021 white count 5790 with hemoglobin 12.6 and platelets 392,000 stable over the last 5 months but her last CBC prior to this was 4 months ago.  CMP unremarkable.  With chronic left lower extremity swelling and ultrasound showing left iliac venous stent obstruction/thrombosis with venous stent and angioplasty by vascular surgery 5/4/2020 as well as a grade 4 left greater saphenous vein reflux for which saphenous vein ablation and phlebectomy performed 6/26/2020, she will continue chronic Xarelto for this and baby aspirin for the JAK2 positive polycythemia rubra vera.  There is increased risk of bleeding but the DOAC does not necessarily mitigate the risk of thrombosis from the JAK2 positive myeloproliferative disorder and the aspirin does not necessarily obviate the need for Xarelto for her May Thurner syndrome venous obstructions occurring despite good control of her blood counts on Hydrea.  On dual DOAC and antiplatelet therapy, it is all the more important to watch her blood counts monthly and I  will have her do a video visit with my nurse practitioner in about 3 months.  I would also ask her to be sure her vascular surgeons are following her history of abdominal aortic aneurysm with serial imaging which I would not do routinely for her myeloproliferative disorder which I am managing.  As stated previously, I do not think myeloproliferative disorder caused her venous thromboembolic phenomena and in fact her original thromboembolic event occurred when she was anemic under the care of Dr. Major in 2016.    -7/29/2021 [facility] hematology telemedicine clinic follow-up: Gisell continues to do well, tolerating Hydrea 1000 mg daily along with Xarelto and aspirin 81 mg.  She has had no bleeding events.  We will continue therapy unchanged.  CBC for the past 2 months looks good with no anemia, WBC normal, platelet count normal.  I reminded her to get her CBC monthly as we wanted to watch her more closely as she is on not only Hydrea but blood thinner with Xarelto along with aspirin putting her more at risk.  She states understanding and will do her CBC monthly, new order entered.    -2/7/2022 [facility] hematology oncology follow-up visit: Today's platelets are 588,000 with hemoglobin 13.5 hematocrit 41.3% with white count 5800.  CMP is pending.  She did not get her CBC monthly as ordered last time and as she is on Xarelto and aspirin that is important.  I will not increase her Hydrea from the 1000 mg dose despite her platelets running a little higher but if they continue to rise we will have to be cautious of acquired von Willebrand's disease which actually further increases her bleeding risk.    -8/15/2022 [facility] Hematology clinic follow-up: Gisell continues to do well on Hydrea 1000 mg daily, I have reviewed her CBCs for the past 6 months and her platelet count overall looks good, most recent platelet count on 8/2/2022 was 455,000, in April her platelet count was normal at 444,000, it has never been higher than  530,000 since February.  She has been taking turmeric and whether or not this is helping it is hard to say but there is no contraindication to her taking turmeric.  She will continue Hydrea 1000 mg daily unchanged along with aspirin 81 mg daily, she also continues on Xarelto 20 mg daily.  She has had no unusual bleeding, no further clotting events.  We will continue to monitor CBC monthly.  We will see her back in 6 months for follow-up.    -2/20/2023 [facility] hematology oncology follow-up: 1/30/2023 hemoglobin 13 white count 5240 with platelets 436,000 with .7.  We will get her CBC today but her counts have been stable 1000 mg Hydrea, aspirin, and Xarelto for the better part of a year now and we will see her twice a year with CBC once a quarter.    -8/28/2023 [facility] Hematology/Oncology clinic follow-up: Gisell is doing well on Hydrea 1000 mg daily along with aspirin 81 mg daily.  CBC from 8/21/2023 with normal WBC 5.91, hemoglobin 14, hematocrit 43.2%, platelet count 532,000.  Would not recommend any adjustments in her Hydrea at this time.  We will continue to monitor CBC quarterly and I have ordered that today.  She remains on Xarelto for lifetime, she has had no further clotting events, no abnormal bleeding.  We did discuss the pros and cons of Mobic today, she takes this occasionally for her chronic back pain.  She understands to avoid any other NSAID, she can take Tylenol as needed.  She takes over-the-counter reflux medication.  She understands there is slight increased risk of bleeding with Mobic since she is on Xarelto but currently she is just taking it as needed and benefit with relief of her pain is worth it.    - 12/20/2023 saw gastroenterology for elevated AST with hepatosplenomegaly with ultrasound showing hepatic steatosis with history of pancreatitis.  Asked her to stop her turmeric that can increase liver enzymes.  Mildly elevated antimitochondrial antibody.  They suspect CLARKE.  Follow-up  planned end of March 2024.    -2/20/2024 fibrosis score 0.26 fibrosis stage 0-F1.  Steatosis score 0.63 steatosis grade S2-S3 moderate to severe.  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  ALT 59 AST 64.  Hemoglobin 14.3 hematocrit 44.1 platelets 577,000    -2/26/2024 [facility] hematology oncology follow-up: She did not get quarterly labs as previously ordered.  We will need to push harder on the Hydrea with a goal to get her platelets consistently in the 400,000's.  In August the platelets were 532,000 and in February they were 577,000.  Will get labs today and monthly and have her see my nurse practitioner back in 3 months to make sure that the platelets are trending downward..  If with monthly CBCs being 1000 mg dose that she has been taking for quite some time is not throwing her platelets consistently below 500,000 into the 400,000's or lower, then I would increase her to 1000 mg alternating with 1500 mg.  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  She will need to follow with gastroenterology regarding her hepatic steatosis.  Certainly a fair portion of hersplenomegaly may be related to her myeloproliferative disorder and not just the hepatic steatosis and it is hard to sort out the proportions of each contribution.  November CT chest without contrast done at [facility] showed 40 mm ascending aorta unchanged from August 2022 done by Dr. Muniz..  That needs to continue to be monitored as well by by Dr. Muniz.  I will add a sedimentation rate to her labs today as well.  If that is significantly elevated then some of her platelet elevation could be an acute phase reactant but I suspect it is mostly for myeloproliferative JAK2 positive disease.I do not palpate any hepatosplenomegaly on exam  today and her CAT scan in May did not corroborate any significant hepatosplenomegaly.  Continues lifetime Xarelto for May Thurner.    -6/3/2024 [facility] hematology oncology follow-up:3/28/2024 with 556,000.  C-reactive protein in February 0.31 with sedimentation rate normal 15.  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  April 26 platelets 570,000 .9 otherwise unremarkable CBC.  5/21/2024 platelets 562,000 otherwise unremarkable CBC.  ALT 34 AST 48.  She had only been taking the Hydrea 2 tablets daily.  We will go to 2 tablets alternating with 3 as her white count and hemoglobin are doing well and the platelets are still staying in the 500,000's and our goal is to 400,000's or lower.  We will check her CBC monthly and see her back in 6 months.  This is not likely a reactive thrombocytosis given her lack of elevation of inflammatory markers and no sign of iron deficiency.  She does have CLARKE and follows with gastroenterology [facility] post colonoscopy.  Encouraged her to stay vigilant with Dr. Muniz re AAA.  Continues lifetime Xarelto for May Thurner.    - 12/6/2024 [facility] hematology oncology follow-up: Platelets now for 9/20/2000 with hemoglobin 12.9 and normal white count and differential.  Continue 3 alternating with 2 tablets of the Hydrea and see my nurse practitioner back in 6 months with CBC prior to return to my nurse practitioner.  Liver enzymes improving under watch of gastroenterology.  Dr. Muniz following AAA.  On lifetime Xarelto for May-Thurner.     May-Thurner syndrome   8/2/2019 Initial Diagnosis    May-Thurner syndrome     Polycythemia rubra vera   9/30/2019 Initial Diagnosis    Polycythemia rubra vera (CMS/HCC)         HISTORY OF PRESENT ILLNESS:  The patient is a 64 y.o. female, here for follow up on management of PRV on Hydrea and aspirin no new somatic complaints    Past Medical History:   Diagnosis Date    AAA (abdominal aortic  "aneurysm)     Abnormal chest CT 2023    enlarged Aortic Aneurysm    Anxiety 2023    Arthritis 2023    Cellulitis of lower limb 2016    From Automated Load;Provider: Radha Lopez;Status: Active    Chronic bilateral low back pain without sciatica 2023    Deep vein thrombophlebitis of calf, left     Encounter for long-term (current) use of high-risk medication 08/15/2022    Fatty liver     GERD (gastroesophageal reflux disease)     Hepatosplenomegaly 2023    Hyperlipidemia     Insomnia 2023    Lumbar spondylosis 2023    May-Thurner syndrome     Neuropathy 2023    Obstructive sleep apnea     Pancreatitis     Polycythemia rubra vera     Postsurgical menopause 2023    Pulmonary embolism     Recurrent major depression 2016    From Automated Load;Provider: Ange Alex;Status: Active    Small bowel obstruction 2023    Stage 3b chronic kidney disease 2023    Thoracic aortic aneurysm without rupture 2023    4.1 cm. 10/6/2023: Pt. has checked yearly at . Last checked 2022. Pt. to schedule f/u.     Past Surgical History:   Procedure Laterality Date    BILATERAL BREAST REDUCTION      CAROTID STENT       SECTION      COLONOSCOPY  2024    Dr. Boyd    ELBOW FUSION      HYSTERECTOMY      ILIAC ARTERY STENT Left 2020    Mesh    OOPHORECTOMY      OTHER SURGICAL HISTORY  2020    Iliac vein carterization    REDUCTION MAMMAPLASTY Bilateral     TONSILLECTOMY         Allergies   Allergen Reactions    Clarithromycin Other (See Comments)     [place]       Family History and Social History reviewed and changed as necessary    REVIEW OF SYSTEM:   No rashes or diarrhea    PHYSICAL EXAM:  No jaundice icterus or pallor.  No respiratory distress.    Vitals:    24 1100   BP: 123/64   Pulse: 63   Resp: 18   Temp: 97.1 °F (36.2 °C)   SpO2: 97%   Weight: 92.1 kg (203 lb)   Height: 170.2 cm (67\")     Vitals:    24 1100 "   PainSc: 0-No pain          ECOG score: 0           Vitals reviewed.    ECOG: (0) Fully Active - Able to Carry On All Pre-disease Performance Without Restriction    Lab Results   Component Value Date    HGB 12.9 11/25/2024    HCT 38.6 11/25/2024    .2 (H) 11/25/2024     11/25/2024    WBC 5.23 11/25/2024    NEUTROABS 3.69 11/25/2024    LYMPHSABS 1.14 11/25/2024    MONOSABS 0.24 11/25/2024    EOSABS 0.06 11/25/2024    BASOSABS 0.07 11/25/2024       Lab Results   Component Value Date    GLUCOSE 104 (H) 06/26/2024    BUN 23 06/26/2024    CREATININE 0.85 06/26/2024     06/26/2024    K 4.4 06/26/2024     06/26/2024    CO2 23.4 06/26/2024    CALCIUM 9.9 06/26/2024    PROTEINTOT 7.1 06/26/2024    ALBUMIN 4.6 06/26/2024    BILITOT 0.5 06/26/2024    ALKPHOS 79 06/26/2024    AST 35 (H) 06/26/2024    ALT 27 06/26/2024             ASSESSMENT & PLAN:  1.  Collin 2+ polycythemia rubra vera on Hydrea and aspirin  2.  Recurrent DVT and PE due to problem #2 with low titer positive lupus anticoagulant per prior testing by Dr. Major apparently on 2 separate occasions confirmatory 3 months apart  3.  May Thurner  4.  Ascending aortic aneurysm  5.  History of SOLOMON  6.  Obstructive sleep apnea  7.  Contrast-induced nephropathy  8.  Reflux with nausea  9.  Pancreatitis, hospitalized May 2023, treated with pain medications and IV fluids.    -8/2/2019 initial [facility] medical oncology consultation: May 2016 developed unprovoked left lower extremity deep vein thrombosis treated with left common iliac stent, thrombolyzes, temporary vena cava filter placement and retrieval.  Saw hematologist who told her she might have a low level lupus anticoagulant but she does not have that data and they were told that they did not think that was significant enough to warrant lifetime anticoagulation.  She stopped Xarelto after 8 months.  Starting the end of June she was developing pain in the left lower extremity again with  progressive swelling and shortness of breath.  Went to [facility] ER on 4 July.  Was found to have left lower extremity extensive deep vein thrombosis and right lower lobe pulmonary embolus.  She was treated with thrombectomy and thrombolysis by Rigoberto and Pavel.  Previously she could not afford her Xarelto and they have switched her from heparin to Coumadin but before discharge switched her to Xarelto.  Her creatinine during her hospitalization increased to 1.38 from a baseline of 1.2.  She comes to hematology at [facility] on 8/2/2019 with labs showing creatinine of 1.23 on 7/11/2019, 1.69 on 7/17/2019, and she says that it was repeated again but she does not have that lab and was told that it was back down but not at normal.  Her CBC reveals platelets of 697,000 on 7/17/2019 and 589,000 on 7/10/2019.Labs from 7/10/2019 revealed platelet count of 490,000 with hematocrit 54.7%.  Creatinine on 7/3/2019 was 0.92.  She understandably had a d-dimer elevation to 1.22 on 7/3/2019 with her clot.  Given the magnitude of her clot and the likelihood of may Thurner syndrome, she needs lifetime anticoagulation.  On patients with deep vein thrombosis and/or PE and indefinite anticoagulation, use of Xarelto in patients with GFR less than 30 is recommended to be avoided varied will need to watch that.  With her prior recent elevation of hematocrit and persistent elevation of platelets, we will continue to watch that and if they remain persistent suggesting this being more than just reactive to her PE and clots, then we need to look for myeloproliferative disorder contributing to her clotting.  She is going to get her records from her other hematologist she saw back in 2016 just for completeness sake but I would not repeat, nor would I do for the first time now, her hypercoagulable panel as any hypercoagulable disorder we stumbled upon would not alter her need for lifetime full dose anticoagulation.  The only question is  which drug, and whether her creatinine will allow DOAC's.  I will check her BMP weekly for the next month to make sure her creatinine is doing adequate relative to her use of Xarelto and will get her blood count now and again prior to return in a month and if the platelets are remaining elevated we will do Collin 2 with reflex testing.  Since her discharge from the hospital she had significant nausea for which she had an ultrasound that showed fatty liver but normal gallbladder.  She had an EGD in the past few days with Dr. Heath that showed significant reflux and on Protonix her nausea has improved.  She will need close follow-up of her ascending aortic aneurysm as well with her thoracic surgeons.    -9/9/2019 medical oncology follow-up: Platelets persistently elevated 807,000 with hematocrit 52.7% and white count 8320 on 8/30/2019.  GFR staying over 59 on Xarelto.  We will check Collin 2 with reflex testing and treat with Hydrea plus or minus phlebotomy if we confirm myeloproliferative disorder.  Will need bone marrow before start of that to assess for myelofibrosis if this is confirmed.  Records from Dr.Scott Major of 6/7/2016 mention that she had been anemic during her hospitalization.  Factor V Leiden, prothrombin mutation, Antithrombin III, protein C&S levels were all normal and the anticardiolipin and beta-2 microglobulin were all negative according to Dr. Major.  The lupus anticoagulant was borderline positive at 10.5 with him and he recommended repeat of that done 3 months apart to prove the diagnosis but this came back low positive according to his notes and he suggested switching to aspirin after a full 6 months of anticoagulation.  This was of course before the low-dose DOAC trials have been published she had had thrombolytics and stenting of her May Thurner according to his records.  Given her presence of May Thurner despite the stenting, I would be inclined towards keeping her on anticoagulants  regardless.  She brings to me records that includes labs back to 2016 where her white count, hemoglobin and platelets were normal but as recently as June 2019 her hematocrit was 52% whereas back in May 2016 her hemoglobin was actually down in the eights with normochromic normocytic indices.  On Xarelto, there is increased risk with aspirin I will hold on that for present but we may have to make such a decision as Xarelto may not be sufficient for viscosity issues related to her elevated platelets and hematocrit if this is a myeloproliferative disorder.    -9/30/2019 [facility] hematology oncology follow-up visit: 9/9/2019 [place]lin 2 [id number] mutation positive with erythropoietin level low at 1 commensurate with polycythemia rubra vera and a hematocrit of 53% with platelets of 807,000 and normal white count.  Normally would do bone marrow to look for myelofibrosis but would not risk taking her off Xarelto at the moment.  We will start her on Hydrea 500 mg daily with CBC weekly and if we get her hematocrit below 45% platelets in the 400,000 range without significant leukopenia that would be my goal.  No phlebotomies for now until we see what Hydrea does.  After we see her back, if her platelets are down and tolerating Hydrea, I will consider stopping the Xarelto and switching her to full dose aspirin for life for the PRV.  This PRV did not cause her original venous thromboembolic phenomenon in fact was anemic when Dr. Major saw her in 2016.  We talked about the potential natural history of PRV including spent phase, leukemic transformation, myelofibrosis.  She will need AAA follow-up with surgeons.    -1/15/2021 [facility] hematology follow-up: 1/8/2021 white count 5790 with hemoglobin 12.6 and platelets 392,000 stable over the last 5 months but her last CBC prior to this was 4 months ago.  CMP unremarkable.  With chronic left lower extremity swelling and ultrasound showing left iliac venous stent obstruction/thrombosis with  venous stent and angioplasty by vascular surgery 5/4/2020 as well as a grade 4 left greater saphenous vein reflux for which saphenous vein ablation and phlebectomy performed 6/26/2020, she will continue chronic Xarelto for this and baby aspirin for the JAK2 positive polycythemia rubra vera.  There is increased risk of bleeding but the DOAC does not necessarily mitigate the risk of thrombosis from the JAK2 positive myeloproliferative disorder and the aspirin does not necessarily obviate the need for Xarelto for her May Thurner syndrome venous obstructions occurring despite good control of her blood counts on Hydrea.  On dual DOAC and antiplatelet therapy, it is all the more important to watch her blood counts monthly and I will have her do a video visit with my nurse practitioner in about 3 months.  I would also ask her to be sure her vascular surgeons are following her history of abdominal aortic aneurysm with serial imaging which I would not do routinely for her myeloproliferative disorder which I am managing.  As stated previously, I do not think myeloproliferative disorder caused her venous thromboembolic phenomena and in fact her original thromboembolic event occurred when she was anemic under the care of Dr. Major in 2016.    -7/29/2021 [facility] hematology telemedicine clinic follow-up: Gisell continues to do well, tolerating Hydrea 1000 mg daily along with Xarelto and aspirin 81 mg.  She has had no bleeding events.  We will continue therapy unchanged.  CBC for the past 2 months looks good with no anemia, WBC normal, platelet count normal.  I reminded her to get her CBC monthly as we wanted to watch her more closely as she is on not only Hydrea but blood thinner with Xarelto along with aspirin putting her more at risk.  She states understanding and will do her CBC monthly, new order entered.    -2/7/2022 [facility] hematology oncology follow-up visit: Today's platelets are 588,000 with hemoglobin 13.5 hematocrit  41.3% with white count 5800.  CMP is pending.  She did not get her CBC monthly as ordered last time and as she is on Xarelto and aspirin that is important.  I will not increase her Hydrea from the 1000 mg dose despite her platelets running a little higher but if they continue to rise we will have to be cautious of acquired von Willebrand's disease which actually further increases her bleeding risk.    -8/15/2022 [facility] Hematology clinic follow-up: Gisell continues to do well on Hydrea 1000 mg daily, I have reviewed her CBCs for the past 6 months and her platelet count overall looks good, most recent platelet count on 8/2/2022 was 455,000, in April her platelet count was normal at 444,000, it has never been higher than 530,000 since February.  She has been taking turmeric and whether or not this is helping it is hard to say but there is no contraindication to her taking turmeric.  She will continue Hydrea 1000 mg daily unchanged along with aspirin 81 mg daily, she also continues on Xarelto 20 mg daily.  She has had no unusual bleeding, no further clotting events.  We will continue to monitor CBC monthly.  We will see her back in 6 months for follow-up.    -2/20/2023 [facility] hematology oncology follow-up: 1/30/2023 hemoglobin 13 white count 5240 with platelets 436,000 with .7.  We will get her CBC today but her counts have been stable 1000 mg Hydrea, aspirin, and Xarelto for the better part of a year now and we will see her twice a year with CBC once a quarter.    -8/28/2023 [facility] Hematology/Oncology clinic follow-up: Gisell is doing well on Hydrea 1000 mg daily along with aspirin 81 mg daily.  CBC from 8/21/2023 with normal WBC 5.91, hemoglobin 14, hematocrit 43.2%, platelet count 532,000.  Would not recommend any adjustments in her Hydrea at this time.  We will continue to monitor CBC quarterly and I have ordered that today.  She remains on Xarelto for lifetime, she has had no further clotting events, no  abnormal bleeding.  We did discuss the pros and cons of Mobic today, she takes this occasionally for her chronic back pain.  She understands to avoid any other NSAID, she can take Tylenol as needed.  She takes over-the-counter reflux medication.  She understands there is slight increased risk of bleeding with Mobic since she is on Xarelto but currently she is just taking it as needed and benefit with relief of her pain is worth it.    - 12/20/2023 saw gastroenterology for elevated AST with hepatosplenomegaly with ultrasound showing hepatic steatosis with history of pancreatitis.  Asked her to stop her turmeric that can increase liver enzymes.  Mildly elevated antimitochondrial antibody.  They suspect CLARKE.  Follow-up planned end of March 2024.    -2/20/2024 fibrosis score 0.26 fibrosis stage 0-F1.  Steatosis score 0.63 steatosis grade S2-S3 moderate to severe.  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  ALT 59 AST 64.  Hemoglobin 14.3 hematocrit 44.1 platelets 577,000    -2/26/2024 [facility] hematology oncology follow-up: She did not get quarterly labs as previously ordered.  We will need to push harder on the Hydrea with a goal to get her platelets consistently in the 400,000's.  In August the platelets were 532,000 and in February they were 577,000.  Will get labs today and monthly and have her see my nurse practitioner back in 3 months to make sure that the platelets are trending downward..  If with monthly CBCs being 1000 mg dose that she has been taking for quite some time is not throwing her platelets consistently below 500,000 into the 400,000's or lower, then I would increase her to 1000 mg alternating with 1500 mg.  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  She will need to follow with gastroenterology  regarding her hepatic steatosis.  Certainly a fair portion of hersplenomegaly may be related to her myeloproliferative disorder and not just the hepatic steatosis and it is hard to sort out the proportions of each contribution.  November CT chest without contrast done at [facility] showed 40 mm ascending aorta unchanged from August 2022 done by Dr. Muniz..  That needs to continue to be monitored as well by by Dr. Muniz.  I will add a sedimentation rate to her labs today as well.  If that is significantly elevated then some of her platelet elevation could be an acute phase reactant but I suspect it is mostly for myeloproliferative JAK2 positive disease.I do not palpate any hepatosplenomegaly on exam today and her CAT scan in May did not corroborate any significant hepatosplenomegaly.  Continues lifetime Xarelto for May Thurner.    -6/3/2024 [facility] hematology oncology follow-up:3/28/2024 with 556,000.  C-reactive protein in February 0.31 with sedimentation rate normal 15.  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  April 26 platelets 570,000 .9 otherwise unremarkable CBC.  5/21/2024 platelets 562,000 otherwise unremarkable CBC.  ALT 34 AST 48.  She had only been taking the Hydrea 2 tablets daily.  We will go to 2 tablets alternating with 3 as her white count and hemoglobin are doing well and the platelets are still staying in the 500,000's and our goal is to 400,000's or lower.  We will check her CBC monthly and see her back in 6 months.  This is not likely a reactive thrombocytosis given her lack of elevation of inflammatory markers and no sign of iron deficiency.  She does have CLARKE and follows with gastroenterology [facility] post colonoscopy.  Encouraged her to stay vigilant with Dr. Muniz re AAA.  Continues lifetime Xarelto for May Thurner.    - 12/6/2024 [facility] hematology oncology follow-up: Platelets now for 9/20/2000 with hemoglobin  12.9 and normal white count and differential.  Continue 3 alternating with 2 tablets of the Hydrea and see my nurse practitioner back in 6 months with CBC prior to return to my nurse practitioner.  Liver enzymes improving under watch of gastroenterology.  Dr. Muniz following AAA.  On lifetime Xarelto for May-Thurner.    Total time of care today inclusive of time spent today prior to patient's arrival reviewing interval data and during visit translating patient and after visit instituting plan as outlined above took 32 minutes patient care time throughout the day today  Yogesh Damon MD    12/06/2024

## 2024-12-06 NOTE — LETTER
December 6, 2024     Zainab Childs MD  [address]  [address]  [facility] [address]    Patient: Gisell Wylie   YOB: 1960   Date of Visit: 12/6/2024     Dear Zainab Childs MD:       Thank you for referring Gisell Wylie to me for evaluation. Below are the relevant portions of my assessment and plan of care.    If you have questions, please do not hesitate to call me. I look forward to following Gisell along with you.         Sincerely,        Yogesh Damon MD        CC: MD Lilo Wolff, ASHISH Damon, Yogesh WEST MD  12/06/24 1118  Sign when Signing Visit  CHIEF COMPLAINT: No new somatic complaints without any stigmata from her Hydrea that she is aware of    Problem List:  Oncology/Hematology History Overview Note   1.  Collin 2+ polycythemia rubra vera on Hydrea and aspirin  2.  Recurrent DVT and PE due to problem #2 with low titer positive lupus anticoagulant per prior testing by Dr. Major apparently on 2 separate occasions confirmatory 3 months apart  3.  May Thurner  4.  Ascending aortic aneurysm  5.  History of SOLOMON  6.  Obstructive sleep apnea  7.  Contrast-induced nephropathy  8.  Reflux with nausea  9.  Pancreatitis, hospitalized May 2023, treated with pain medications and IV fluids.    -8/2/2019 initial [facility] medical oncology consultation: May 2016 developed unprovoked left lower extremity deep vein thrombosis treated with left common iliac stent, thrombolyzes, temporary vena cava filter placement and retrieval.  Saw hematologist who told her she might have a low level lupus anticoagulant but she does not have that data and they were told that they did not think that was significant enough to warrant lifetime anticoagulation.  She stopped Xarelto after 8 months.  Starting the end of June she was developing pain in the left lower extremity again with progressive swelling and shortness of breath.  Went to [facility] ER on 4 July.  Was found to have left lower  extremity extensive deep vein thrombosis and right lower lobe pulmonary embolus.  She was treated with thrombectomy and thrombolysis by Rigoberto and Pavel.  Previously she could not afford her Xarelto and they have switched her from heparin to Coumadin but before discharge switched her to Xarelto.  Her creatinine during her hospitalization increased to 1.38 from a baseline of 1.2.  She comes to hematology at [facility] [place] on 8/2/2019 with labs showing creatinine of 1.23 on 7/11/2019, 1.69 on 7/17/2019, and she says that it was repeated again but she does not have that lab and was told that it was back down but not at normal.  Her CBC reveals platelets of 697,000 on 7/17/2019 and 589,000 on 7/10/2019.Labs from 7/10/2019 revealed platelet count of 490,000 with hematocrit 54.7%.  Creatinine on 7/3/2019 was 0.92.  She understandably had a d-dimer elevation to 1.22 on 7/3/2019 with her clot.  Given the magnitude of her clot and the likelihood of may Thurner syndrome, she needs lifetime anticoagulation.  On patients with deep vein thrombosis and/or PE and indefinite anticoagulation, use of Xarelto in patients with GFR less than 30 is recommended to be avoided varied will need to watch that.  With her prior recent elevation of hematocrit and persistent elevation of platelets, we will continue to watch that and if they remain persistent suggesting this being more than just reactive to her PE and clots, then we need to look for myeloproliferative disorder contributing to her clotting.  She is going to get her records from her other hematologist she saw back in 2016 just for completeness sake but I would not repeat, nor would I do for the first time now, her hypercoagulable panel as any hypercoagulable disorder we stumbled upon would not alter her need for lifetime full dose anticoagulation.  The only question is which drug, and whether her creatinine will allow DOAC's.  I will check her BMP weekly for the next month to  make sure her creatinine is doing adequate relative to her use of Xarelto and will get her blood count now and again prior to return in a month and if the platelets are remaining elevated we will do Collin 2 with reflex testing.  Since her discharge from the hospital she had significant nausea for which she had an ultrasound that showed fatty liver but normal gallbladder.  She had an EGD in the past few days with Dr. Heath that showed significant reflux and on Protonix her nausea has improved.  She will need close follow-up of her ascending aortic aneurysm as well with her thoracic surgeons.    -9/9/2019 medical oncology follow-up: Platelets persistently elevated 807,000 with hematocrit 52.7% and white count 8320 on 8/30/2019.  GFR staying over 59 on Xarelto.  We will check Collin 2 with reflex testing and treat with Hydrea plus or minus phlebotomy if we confirm myeloproliferative disorder.  Will need bone marrow before start of that to assess for myelofibrosis if this is confirmed.  Records from Dr.Scott Major of 6/7/2016 mention that she had been anemic during her hospitalization.  Factor V Leiden, prothrombin mutation, Antithrombin III, protein C&S levels were all normal and the anticardiolipin and beta-2 microglobulin were all negative according to Dr. Major.  The lupus anticoagulant was borderline positive at 10.5 with him and he recommended repeat of that done 3 months apart to prove the diagnosis but this came back low positive according to his notes and he suggested switching to aspirin after a full 6 months of anticoagulation.  This was of course before the low-dose DOAC trials have been published she had had thrombolytics and stenting of her May Thurner according to his records.  Given her presence of May Thurner despite the stenting, I would be inclined towards keeping her on anticoagulants regardless.  She brings to me records that includes labs back to 2016 where her white count, hemoglobin and platelets  were normal but as recently as June 2019 her hematocrit was 52% whereas back in May 2016 her hemoglobin was actually down in the eights with normochromic normocytic indices.  On Xarelto, there is increased risk with aspirin I will hold on that for present but we may have to make such a decision as Xarelto may not be sufficient for viscosity issues related to her elevated platelets and hematocrit if this is a myeloproliferative disorder.    -9/30/2019 [facility] hematology oncology follow-up visit: 9/9/2019 Collin 2 [id number] mutation positive with erythropoietin level low at 1 commensurate with polycythemia rubra vera and a hematocrit of 53% with platelets of 807,000 and normal white count.  Normally would do bone marrow to look for myelofibrosis but would not risk taking her off Xarelto at the moment.  We will start her on Hydrea 500 mg daily with CBC weekly and if we get her hematocrit below 45% platelets in the 400,000 range without significant leukopenia that would be my goal.  No phlebotomies for now until we see what Hydrea does.  After we see her back, if her platelets are down and tolerating Hydrea, I will consider stopping the Xarelto and switching her to full dose aspirin for life for the PRV.  This PRV did not cause her original venous thromboembolic phenomenon in fact was anemic when Dr. Major saw her in 2016.  We talked about the potential natural history of PRV including spent phase, leukemic transformation, myelofibrosis.  She will need AAA follow-up with surgeons.    -1/15/2021 [facility] hematology follow-up: 1/8/2021 white count 5790 with hemoglobin 12.6 and platelets 392,000 stable over the last 5 months but her last CBC prior to this was 4 months ago.  CMP unremarkable.  With chronic left lower extremity swelling and ultrasound showing left iliac venous stent obstruction/thrombosis with venous stent and angioplasty by vascular surgery 5/4/2020 as well as a grade 4 left greater saphenous vein reflux for which  saphenous vein ablation and phlebectomy performed 6/26/2020, she will continue chronic Xarelto for this and baby aspirin for the JAK2 positive polycythemia rubra vera.  There is increased risk of bleeding but the DOAC does not necessarily mitigate the risk of thrombosis from the JAK2 positive myeloproliferative disorder and the aspirin does not necessarily obviate the need for Xarelto for her May Thurner syndrome venous obstructions occurring despite good control of her blood counts on Hydrea.  On dual DOAC and antiplatelet therapy, it is all the more important to watch her blood counts monthly and I will have her do a video visit with my nurse practitioner in about 3 months.  I would also ask her to be sure her vascular surgeons are following her history of abdominal aortic aneurysm with serial imaging which I would not do routinely for her myeloproliferative disorder which I am managing.  As stated previously, I do not think myeloproliferative disorder caused her venous thromboembolic phenomena and in fact her original thromboembolic event occurred when she was anemic under the care of Dr. Major in 2016.    -7/29/2021 [facility] hematology telemedicine clinic follow-up: Gisell continues to do well, tolerating Hydrea 1000 mg daily along with Xarelto and aspirin 81 mg.  She has had no bleeding events.  We will continue therapy unchanged.  CBC for the past 2 months looks good with no anemia, WBC normal, platelet count normal.  I reminded her to get her CBC monthly as we wanted to watch her more closely as she is on not only Hydrea but blood thinner with Xarelto along with aspirin putting her more at risk.  She states understanding and will do her CBC monthly, new order entered.    -2/7/2022 [facility] hematology oncology follow-up visit: Today's platelets are 588,000 with hemoglobin 13.5 hematocrit 41.3% with white count 5800.  CMP is pending.  She did not get her CBC monthly as ordered last time and as she is on Xarelto  and aspirin that is important.  I will not increase her Hydrea from the 1000 mg dose despite her platelets running a little higher but if they continue to rise we will have to be cautious of acquired von Willebrand's disease which actually further increases her bleeding risk.    -8/15/2022 [facility] Hematology clinic follow-up: Gisell continues to do well on Hydrea 1000 mg daily, I have reviewed her CBCs for the past 6 months and her platelet count overall looks good, most recent platelet count on 8/2/2022 was 455,000, in April her platelet count was normal at 444,000, it has never been higher than 530,000 since February.  She has been taking turmeric and whether or not this is helping it is hard to say but there is no contraindication to her taking turmeric.  She will continue Hydrea 1000 mg daily unchanged along with aspirin 81 mg daily, she also continues on Xarelto 20 mg daily.  She has had no unusual bleeding, no further clotting events.  We will continue to monitor CBC monthly.  We will see her back in 6 months for follow-up.    -2/20/2023 [facility] hematology oncology follow-up: 1/30/2023 hemoglobin 13 white count 5240 with platelets 436,000 with .7.  We will get her CBC today but her counts have been stable 1000 mg Hydrea, aspirin, and Xarelto for the better part of a year now and we will see her twice a year with CBC once a quarter.    -8/28/2023 [facility] Hematology/Oncology clinic follow-up: Gisell is doing well on Hydrea 1000 mg daily along with aspirin 81 mg daily.  CBC from 8/21/2023 with normal WBC 5.91, hemoglobin 14, hematocrit 43.2%, platelet count 532,000.  Would not recommend any adjustments in her Hydrea at this time.  We will continue to monitor CBC quarterly and I have ordered that today.  She remains on Xarelto for lifetime, she has had no further clotting events, no abnormal bleeding.  We did discuss the pros and cons of Mobic today, she takes this occasionally for her chronic back pain.   She understands to avoid any other NSAID, she can take Tylenol as needed.  She takes over-the-counter reflux medication.  She understands there is slight increased risk of bleeding with Mobic since she is on Xarelto but currently she is just taking it as needed and benefit with relief of her pain is worth it.    - 12/20/2023 saw gastroenterology for elevated AST with hepatosplenomegaly with ultrasound showing hepatic steatosis with history of pancreatitis.  Asked her to stop her turmeric that can increase liver enzymes.  Mildly elevated antimitochondrial antibody.  They suspect CLARKE.  Follow-up planned end of March 2024.    -2/20/2024 fibrosis score 0.26 fibrosis stage 0-F1.  Steatosis score 0.63 steatosis grade S2-S3 moderate to severe.  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  ALT 59 AST 64.  Hemoglobin 14.3 hematocrit 44.1 platelets 577,000    -2/26/2024 [facility] hematology oncology follow-up: She did not get quarterly labs as previously ordered.  We will need to push harder on the Hydrea with a goal to get her platelets consistently in the 400,000's.  In August the platelets were 532,000 and in February they were 577,000.  Will get labs today and monthly and have her see my nurse practitioner back in 3 months to make sure that the platelets are trending downward..  If with monthly CBCs being 1000 mg dose that she has been taking for quite some time is not throwing her platelets consistently below 500,000 into the 400,000's or lower, then I would increase her to 1000 mg alternating with 1500 mg.  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  She will need to follow with gastroenterology regarding her hepatic steatosis.  Certainly a fair portion of hersplenomegaly may be related to her myeloproliferative disorder  and not just the hepatic steatosis and it is hard to sort out the proportions of each contribution.  November CT chest without contrast done at [facility] showed 40 mm ascending aorta unchanged from August 2022 done by Dr. Muniz..  That needs to continue to be monitored as well by by Dr. Muniz.  I will add a sedimentation rate to her labs today as well.  If that is significantly elevated then some of her platelet elevation could be an acute phase reactant but I suspect it is mostly for myeloproliferative JAK2 positive disease.I do not palpate any hepatosplenomegaly on exam today and her CAT scan in May did not corroborate any significant hepatosplenomegaly.  Continues lifetime Xarelto for May Thurner.    -6/3/2024 [facility] hematology oncology follow-up:3/28/2024 with 556,000.  C-reactive protein in February 0.31 with sedimentation rate normal 15.  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  April 26 platelets 570,000 .9 otherwise unremarkable CBC.  5/21/2024 platelets 562,000 otherwise unremarkable CBC.  ALT 34 AST 48.  She had only been taking the Hydrea 2 tablets daily.  We will go to 2 tablets alternating with 3 as her white count and hemoglobin are doing well and the platelets are still staying in the 500,000's and our goal is to 400,000's or lower.  We will check her CBC monthly and see her back in 6 months.  This is not likely a reactive thrombocytosis given her lack of elevation of inflammatory markers and no sign of iron deficiency.  She does have CLARKE and follows with gastroenterology [facility] post colonoscopy.  Encouraged her to stay vigilant with Dr. Muniz re AAA.  Continues lifetime Xarelto for May Thurner.    - 12/6/2024 [facility] hematology oncology follow-up: Platelets now for 9/20/2000 with hemoglobin 12.9 and normal white count and differential.  Continue 3 alternating with 2 tablets of the Hydrea and see my nurse practitioner  back in 6 months with CBC prior to return to my nurse practitioner.  Liver enzymes improving under watch of gastroenterology.  Dr. Muniz following AAA.  On lifetime Xarelto for May-Thurner.     May-Thurner syndrome   2019 Initial Diagnosis    May-Thurner syndrome     Polycythemia rubra vera   2019 Initial Diagnosis    Polycythemia rubra vera (CMS/HCC)         HISTORY OF PRESENT ILLNESS:  The patient is a 64 y.o. female, here for follow up on management of PRV on Hydrea and aspirin no new somatic complaints    Past Medical History:   Diagnosis Date   • AAA (abdominal aortic aneurysm)    • Abnormal chest CT 2023    enlarged Aortic Aneurysm   • Anxiety 2023   • Arthritis 2023   • Cellulitis of lower limb 2016    From Automated Load;Provider: Radha Lopez;Status: Active   • Chronic bilateral low back pain without sciatica 2023   • Deep vein thrombophlebitis of calf, left    • Encounter for long-term (current) use of high-risk medication 08/15/2022   • Fatty liver    • GERD (gastroesophageal reflux disease)    • Hepatosplenomegaly 2023   • Hyperlipidemia    • Insomnia 2023   • Lumbar spondylosis 2023   • May-Thurner syndrome    • Neuropathy 2023   • Obstructive sleep apnea    • Pancreatitis    • Polycythemia rubra vera    • Postsurgical menopause 2023   • Pulmonary embolism    • Recurrent major depression 2016    From Automated Load;Provider: Ange Alex;Status: Active   • Small bowel obstruction 2023   • Stage 3b chronic kidney disease 2023   • Thoracic aortic aneurysm without rupture 2023    4.1 cm. 10/6/2023: Pt. has checked yearly at . Last checked 2022. Pt. to schedule f/u.     Past Surgical History:   Procedure Laterality Date   • BILATERAL BREAST REDUCTION     • CAROTID STENT     •  SECTION     • COLONOSCOPY  2024    Dr. Boyd   • ELBOW FUSION     • HYSTERECTOMY     • ILIAC ARTERY STENT Left  "05/2020    Mesh   • OOPHORECTOMY     • OTHER SURGICAL HISTORY  06/2020    Iliac vein carterization   • REDUCTION MAMMAPLASTY Bilateral 1999   • TONSILLECTOMY         Allergies   Allergen Reactions   • Clarithromycin Other (See Comments)     [place]       Family History and Social History reviewed and changed as necessary    REVIEW OF SYSTEM:   No rashes or diarrhea    PHYSICAL EXAM:  No jaundice icterus or pallor.  No respiratory distress.    Vitals:    12/06/24 1100   BP: 123/64   Pulse: 63   Resp: 18   Temp: 97.1 °F (36.2 °C)   SpO2: 97%   Weight: 92.1 kg (203 lb)   Height: 170.2 cm (67\")     Vitals:    12/06/24 1100   PainSc: 0-No pain          ECOG score: 0           Vitals reviewed.    ECOG: (0) Fully Active - Able to Carry On All Pre-disease Performance Without Restriction    Lab Results   Component Value Date    HGB 12.9 11/25/2024    HCT 38.6 11/25/2024    .2 (H) 11/25/2024     11/25/2024    WBC 5.23 11/25/2024    NEUTROABS 3.69 11/25/2024    LYMPHSABS 1.14 11/25/2024    MONOSABS 0.24 11/25/2024    EOSABS 0.06 11/25/2024    BASOSABS 0.07 11/25/2024       Lab Results   Component Value Date    GLUCOSE 104 (H) 06/26/2024    BUN 23 06/26/2024    CREATININE 0.85 06/26/2024     06/26/2024    K 4.4 06/26/2024     06/26/2024    CO2 23.4 06/26/2024    CALCIUM 9.9 06/26/2024    PROTEINTOT 7.1 06/26/2024    ALBUMIN 4.6 06/26/2024    BILITOT 0.5 06/26/2024    ALKPHOS 79 06/26/2024    AST 35 (H) 06/26/2024    ALT 27 06/26/2024             ASSESSMENT & PLAN:  1.  Collin 2+ polycythemia rubra vera on Hydrea and aspirin  2.  Recurrent DVT and PE due to problem #2 with low titer positive lupus anticoagulant per prior testing by Dr. Major apparently on 2 separate occasions confirmatory 3 months apart  3.  May Thurner  4.  Ascending aortic aneurysm  5.  History of SOLOMON  6.  Obstructive sleep apnea  7.  Contrast-induced nephropathy  8.  Reflux with nausea  9.  Pancreatitis, hospitalized May 2023, treated " with pain medications and IV fluids.    -8/2/2019 initial [facility] [place] medical oncology consultation: May 2016 developed unprovoked left lower extremity deep vein thrombosis treated with left common iliac stent, thrombolyzes, temporary vena cava filter placement and retrieval.  Saw hematologist who told her she might have a low level lupus anticoagulant but she does not have that data and they were told that they did not think that was significant enough to warrant lifetime anticoagulation.  She stopped Xarelto after 8 months.  Starting the end of June she was developing pain in the left lower extremity again with progressive swelling and shortness of breath.  Went to [facility] ER on 4 July.  Was found to have left lower extremity extensive deep vein thrombosis and right lower lobe pulmonary embolus.  She was treated with thrombectomy and thrombolysis by Rigoberto and Pavel.  Previously she could not afford her Xarelto and they have switched her from heparin to Coumadin but before discharge switched her to Xarelto.  Her creatinine during her hospitalization increased to 1.38 from a baseline of 1.2.  She comes to hematology at [facility] on 8/2/2019 with labs showing creatinine of 1.23 on 7/11/2019, 1.69 on 7/17/2019, and she says that it was repeated again but she does not have that lab and was told that it was back down but not at normal.  Her CBC reveals platelets of 697,000 on 7/17/2019 and 589,000 on 7/10/2019.Labs from 7/10/2019 revealed platelet count of 490,000 with hematocrit 54.7%.  Creatinine on 7/3/2019 was 0.92.  She understandably had a d-dimer elevation to 1.22 on 7/3/2019 with her clot.  Given the magnitude of her clot and the likelihood of may Thurner syndrome, she needs lifetime anticoagulation.  On patients with deep vein thrombosis and/or PE and indefinite anticoagulation, use of Xarelto in patients with GFR less than 30 is recommended to be avoided varied will need to watch that.  With her  prior recent elevation of hematocrit and persistent elevation of platelets, we will continue to watch that and if they remain persistent suggesting this being more than just reactive to her PE and clots, then we need to look for myeloproliferative disorder contributing to her clotting.  She is going to get her records from her other hematologist she saw back in 2016 just for completeness sake but I would not repeat, nor would I do for the first time now, her hypercoagulable panel as any hypercoagulable disorder we stumbled upon would not alter her need for lifetime full dose anticoagulation.  The only question is which drug, and whether her creatinine will allow DOAC's.  I will check her BMP weekly for the next month to make sure her creatinine is doing adequate relative to her use of Xarelto and will get her blood count now and again prior to return in a month and if the platelets are remaining elevated we will do Collin 2 with reflex testing.  Since her discharge from the hospital she had significant nausea for which she had an ultrasound that showed fatty liver but normal gallbladder.  She had an EGD in the past few days with Dr. Heath that showed significant reflux and on Protonix her nausea has improved.  She will need close follow-up of her ascending aortic aneurysm as well with her thoracic surgeons.    -9/9/2019 medical oncology follow-up: Platelets persistently elevated 807,000 with hematocrit 52.7% and white count 8320 on 8/30/2019.  GFR staying over 59 on Xarelto.  We will check Collin 2 with reflex testing and treat with Hydrea plus or minus phlebotomy if we confirm myeloproliferative disorder.  Will need bone marrow before start of that to assess for myelofibrosis if this is confirmed.  Records from Dr.Scott Major of 6/7/2016 mention that she had been anemic during her hospitalization.  Factor V Leiden, prothrombin mutation, Antithrombin III, protein C&S levels were all normal and the anticardiolipin and  beta-2 microglobulin were all negative according to Dr. Major.  The lupus anticoagulant was borderline positive at 10.5 with him and he recommended repeat of that done 3 months apart to prove the diagnosis but this came back low positive according to his notes and he suggested switching to aspirin after a full 6 months of anticoagulation.  This was of course before the low-dose DOAC trials have been published she had had thrombolytics and stenting of her May Thurner according to his records.  Given her presence of May Thurner despite the stenting, I would be inclined towards keeping her on anticoagulants regardless.  She brings to me records that includes labs back to 2016 where her white count, hemoglobin and platelets were normal but as recently as June 2019 her hematocrit was 52% whereas back in May 2016 her hemoglobin was actually down in the eights with normochromic normocytic indices.  On Xarelto, there is increased risk with aspirin I will hold on that for present but we may have to make such a decision as Xarelto may not be sufficient for viscosity issues related to her elevated platelets and hematocrit if this is a myeloproliferative disorder.    -9/30/2019 [facility] hematology oncology follow-up visit: 9/9/2019 Collin 2 [id number] mutation positive with erythropoietin level low at 1 commensurate with polycythemia rubra vera and a hematocrit of 53% with platelets of 807,000 and normal white count.  Normally would do bone marrow to look for myelofibrosis but would not risk taking her off Xarelto at the moment.  We will start her on Hydrea 500 mg daily with CBC weekly and if we get her hematocrit below 45% platelets in the 400,000 range without significant leukopenia that would be my goal.  No phlebotomies for now until we see what Hydrea does.  After we see her back, if her platelets are down and tolerating Hydrea, I will consider stopping the Xarelto and switching her to full dose aspirin for life for the PRV.  This  PRV did not cause her original venous thromboembolic phenomenon in fact was anemic when Dr. Major saw her in 2016.  We talked about the potential natural history of PRV including spent phase, leukemic transformation, myelofibrosis.  She will need AAA follow-up with surgeons.    -1/15/2021 [facility] hematology follow-up: 1/8/2021 white count 5790 with hemoglobin 12.6 and platelets 392,000 stable over the last 5 months but her last CBC prior to this was 4 months ago.  CMP unremarkable.  With chronic left lower extremity swelling and ultrasound showing left iliac venous stent obstruction/thrombosis with venous stent and angioplasty by vascular surgery 5/4/2020 as well as a grade 4 left greater saphenous vein reflux for which saphenous vein ablation and phlebectomy performed 6/26/2020, she will continue chronic Xarelto for this and baby aspirin for the JAK2 positive polycythemia rubra vera.  There is increased risk of bleeding but the DOAC does not necessarily mitigate the risk of thrombosis from the JAK2 positive myeloproliferative disorder and the aspirin does not necessarily obviate the need for Xarelto for her May Thurner syndrome venous obstructions occurring despite good control of her blood counts on Hydrea.  On dual DOAC and antiplatelet therapy, it is all the more important to watch her blood counts monthly and I will have her do a video visit with my nurse practitioner in about 3 months.  I would also ask her to be sure her vascular surgeons are following her history of abdominal aortic aneurysm with serial imaging which I would not do routinely for her myeloproliferative disorder which I am managing.  As stated previously, I do not think myeloproliferative disorder caused her venous thromboembolic phenomena and in fact her original thromboembolic event occurred when she was anemic under the care of Dr. Major in 2016.    -7/29/2021 [facility] hematology telemedicine clinic follow-up: Gisell continues to do well,  tolerating Hydrea 1000 mg daily along with Xarelto and aspirin 81 mg.  She has had no bleeding events.  We will continue therapy unchanged.  CBC for the past 2 months looks good with no anemia, WBC normal, platelet count normal.  I reminded her to get her CBC monthly as we wanted to watch her more closely as she is on not only Hydrea but blood thinner with Xarelto along with aspirin putting her more at risk.  She states understanding and will do her CBC monthly, new order entered.    -2/7/2022 [facility] hematology oncology follow-up visit: Today's platelets are 588,000 with hemoglobin 13.5 hematocrit 41.3% with white count 5800.  CMP is pending.  She did not get her CBC monthly as ordered last time and as she is on Xarelto and aspirin that is important.  I will not increase her Hydrea from the 1000 mg dose despite her platelets running a little higher but if they continue to rise we will have to be cautious of acquired von Willebrand's disease which actually further increases her bleeding risk.    -8/15/2022 [facility] Hematology clinic follow-up: Gisell continues to do well on Hydrea 1000 mg daily, I have reviewed her CBCs for the past 6 months and her platelet count overall looks good, most recent platelet count on 8/2/2022 was 455,000, in April her platelet count was normal at 444,000, it has never been higher than 530,000 since February.  She has been taking turmeric and whether or not this is helping it is hard to say but there is no contraindication to her taking turmeric.  She will continue Hydrea 1000 mg daily unchanged along with aspirin 81 mg daily, she also continues on Xarelto 20 mg daily.  She has had no unusual bleeding, no further clotting events.  We will continue to monitor CBC monthly.  We will see her back in 6 months for follow-up.    -2/20/2023 [facility] hematology oncology follow-up: 1/30/2023 hemoglobin 13 white count 5240 with platelets 436,000 with .7.  We will get her CBC today but her  counts have been stable 1000 mg Hydrea, aspirin, and Xarelto for the better part of a year now and we will see her twice a year with CBC once a quarter.    -8/28/2023 [facility] Hematology/Oncology clinic follow-up: Gisell is doing well on Hydrea 1000 mg daily along with aspirin 81 mg daily.  CBC from 8/21/2023 with normal WBC 5.91, hemoglobin 14, hematocrit 43.2%, platelet count 532,000.  Would not recommend any adjustments in her Hydrea at this time.  We will continue to monitor CBC quarterly and I have ordered that today.  She remains on Xarelto for lifetime, she has had no further clotting events, no abnormal bleeding.  We did discuss the pros and cons of Mobic today, she takes this occasionally for her chronic back pain.  She understands to avoid any other NSAID, she can take Tylenol as needed.  She takes over-the-counter reflux medication.  She understands there is slight increased risk of bleeding with Mobic since she is on Xarelto but currently she is just taking it as needed and benefit with relief of her pain is worth it.    - 12/20/2023 saw gastroenterology for elevated AST with hepatosplenomegaly with ultrasound showing hepatic steatosis with history of pancreatitis.  Asked her to stop her turmeric that can increase liver enzymes.  Mildly elevated antimitochondrial antibody.  They suspect CLARKE.  Follow-up planned end of March 2024.    -2/20/2024 fibrosis score 0.26 fibrosis stage 0-F1.  Steatosis score 0.63 steatosis grade S2-S3 moderate to severe.  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  ALT 59 AST 64.  Hemoglobin 14.3 hematocrit 44.1 platelets 577,000    -2/26/2024 [facility] hematology oncology follow-up: She did not get quarterly labs as previously ordered.  We will need to push harder on the Hydrea with a goal to get her platelets consistently in the 400,000's.  In August the platelets were 532,000 and in February they were 577,000.  Will get labs today and monthly and have her see my nurse practitioner  back in 3 months to make sure that the platelets are trending downward..  If with monthly CBCs being 1000 mg dose that she has been taking for quite some time is not throwing her platelets consistently below 500,000 into the 400,000's or lower, then I would increase her to 1000 mg alternating with 1500 mg.  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  She will need to follow with gastroenterology regarding her hepatic steatosis.  Certainly a fair portion of hersplenomegaly may be related to her myeloproliferative disorder and not just the hepatic steatosis and it is hard to sort out the proportions of each contribution.  November CT chest without contrast done at [facility] showed 40 mm ascending aorta unchanged from August 2022 done by Dr. Muniz..  That needs to continue to be monitored as well by by Dr. Muniz.  I will add a sedimentation rate to her labs today as well.  If that is significantly elevated then some of her platelet elevation could be an acute phase reactant but I suspect it is mostly for myeloproliferative JAK2 positive disease.I do not palpate any hepatosplenomegaly on exam today and her CAT scan in May did not corroborate any significant hepatosplenomegaly.  Continues lifetime Xarelto for May Thurner.    -6/3/2024 [facility] [place] hematology oncology follow-up:3/28/2024 with 556,000.  C-reactive protein in February 0.31 with sedimentation rate normal 15.  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  April 26 platelets 570,000 .9 otherwise unremarkable CBC.  5/21/2024 platelets 562,000 otherwise unremarkable CBC.  ALT 34 AST 48.  She had only been taking the Hydrea 2 tablets daily.  We will go to 2 tablets alternating with 3 as her  white count and hemoglobin are doing well and the platelets are still staying in the 500,000's and our goal is to 400,000's or lower.  We will check her CBC monthly and see her back in 6 months.  This is not likely a reactive thrombocytosis given her lack of elevation of inflammatory markers and no sign of iron deficiency.  She does have CLARKE and follows with gastroenterology [facility] post colonoscopy.  Encouraged her to stay vigilant with Dr. Muniz re AAA.  Continues lifetime Xarelto for May Thurner.    - 12/6/2024 [facility] hematology oncology follow-up: Platelets now for 9/20/2000 with hemoglobin 12.9 and normal white count and differential.  Continue 3 alternating with 2 tablets of the Hydrea and see my nurse practitioner back in 6 months with CBC prior to return to my nurse practitioner.  Liver enzymes improving under watch of gastroenterology.  Dr. Muniz following AAA.  On lifetime Xarelto for May-Thurner.    Total time of care today inclusive of time spent today prior to patient's arrival reviewing interval data and during visit translating patient and after visit instituting plan as outlined above took 32 minutes patient care time throughout the day today  Yogesh Damon MD    12/06/2024

## 2024-12-10 ENCOUNTER — SPECIALTY PHARMACY (OUTPATIENT)
Facility: HOSPITAL | Age: 64
End: 2024-12-10
Payer: MEDICARE

## 2024-12-11 ENCOUNTER — SPECIALTY PHARMACY (OUTPATIENT)
Facility: HOSPITAL | Age: 64
End: 2024-12-11
Payer: MEDICARE

## 2024-12-11 NOTE — PROGRESS NOTES
Specialty Pharmacy Refill Coordination Note     Gisell is a 64 y.o. female contacted today regarding refills of  hydrea 1000mg PO every other day alternating with 1500mg every other day of  specialty medication(s).    Reviewed and verified with patient: YES; Shipping out on 12/16/24.    Specialty medication(s) and dose(s) confirmed: yes    Refill Questions      Flowsheet Row Most Recent Value   Changes to allergies? No   Changes to medications? No   New conditions or infections since last clinic visit No   Unplanned office visit, urgent care, ED, or hospital admission in the last 4 weeks  No   How does patient/caregiver feel medication is working? Good   Financial problems or insurance changes  No   Since the previous refill, were any specialty medication doses or scheduled injections missed or delayed?  No   Does this patient require a clinical escalation to a pharmacist? No            Delivery Questions      Flowsheet Row Most Recent Value   Delivery method UPS   Delivery address verified with patient/caregiver? Yes   Delivery address Home   Number of medications in delivery 1   Medication(s) being filled and delivered Hydroxyurea (HYDREA)   Doses left of specialty medications 14 days   Copay verified? Yes   Copay amount $0   Copay form of payment No copayment ($0)   Ship Date 12/16   Delivery Date 12/17   Signature Required No              Medication Adherence    Adherence tools used: watch          Follow-up: 28 day(s)     Anita Persaud, Pharmacy Technician  Specialty Pharmacy Technician

## 2024-12-16 ENCOUNTER — SPECIALTY PHARMACY (OUTPATIENT)
Facility: HOSPITAL | Age: 64
End: 2024-12-16
Payer: MEDICARE

## 2024-12-16 NOTE — PROGRESS NOTES
Specialty Pharmacy Refill Coordination Note     Gisell is a 64 y.o. female contacted today regarding refills of  hydrea 1000mg PO every other day alternation with 1500mg PO every other day of specialty medication(s).    Reviewed and verified with patient: YES    Specialty medication(s) and dose(s) confirmed: yes    Refill Questions      Flowsheet Row Most Recent Value   Changes to allergies? No   Changes to medications? No   New conditions or infections since last clinic visit No   Unplanned office visit, urgent care, ED, or hospital admission in the last 4 weeks  No   How does patient/caregiver feel medication is working? Good   Financial problems or insurance changes  No   Since the previous refill, were any specialty medication doses or scheduled injections missed or delayed?  No   Does this patient require a clinical escalation to a pharmacist? No            Delivery Questions      Flowsheet Row Most Recent Value   Delivery method UPS   Delivery address verified with patient/caregiver? Yes   Delivery address Home   Number of medications in delivery 1   Medication(s) being filled and delivered Hydroxyurea (HYDREA)   Doses left of specialty medications 8 days   Copay verified? Yes   Copay amount $0   Copay form of payment No copayment ($0)   Ship Date 12/16   Delivery Date 12/17   Signature Required No              Medication Adherence    Adherence tools used: watch          Follow-up: 30 day(s)     Anita Persaud, Pharmacy Technician  Specialty Pharmacy Technician

## 2025-01-13 ENCOUNTER — SPECIALTY PHARMACY (OUTPATIENT)
Facility: HOSPITAL | Age: 65
End: 2025-01-13
Payer: MEDICARE

## 2025-01-13 NOTE — PROGRESS NOTES
Specialty Pharmacy Patient Management Program  Oncology 6-Month Clinical Assessment       Gisell Wylie is a 64 y.o. female with PCV seen today to assess adherence and side effects.    Reason for Outreach: Routine medication check-in .    Regimen: Hydroxyurea 1000mg PO every other day alternating with 1500mg PO every other day    Specialty Pharmacy Goal   Goals Addressed Today         Specialty Pharmacy General Goal (pt-stated)       Clinical goal/therapeutic target: disease control, per the recent oncology clinic notes and labs. And platelets less than 400,000   Latest Reference Range & Units Most Recent 04/26/24 11:01 05/21/24 08:41 06/26/24 14:20 07/24/24 14:49   Platelets 140 - 450 10*3/mm3 502 (H)  7/24/24 14:49 570 (H) 562 (H) 568 (H) 502 (H)   (H): Data is abnormally high     Latest Reference Range & Units 08/28/24 14:13 11/25/24 12:21   Platelets 140 - 450 10*3/mm3 525 (H) 422   (H): Data is abnormally high    1/13/25: I have reviewed the most recent clinic note and labs. Dr. Damon recommends continuing hydroxyurea at this time. The patient is tolerating the medication and is currently on track for goal.  Medication is preventing disease progression as evidenced by imaging or provider/clinic documentation.              Problem List   Problem list reviewed by Rose Coffey, PharmD on 1/13/2025 at 10:25 AM  Patient Active Problem List   Diagnosis Code    May-Thurner syndrome I87.1    Polycythemia rubra vera D45    Encounter for long-term (current) use of high-risk medication Z79.899    Other hyperlipidemia E78.49    History of DVT of lower extremity Z86.718    Abnormal chest CT R93.89    Abnormal kidney function N28.9    Anxiety F41.9    Cellulitis of lower limb L03.119    Chronic bilateral low back pain without sciatica M54.50, G89.29    Elevated liver function tests R79.89    Hepatosplenomegaly R16.2    Insomnia G47.00    Lumbar spondylosis M47.816    Neuropathy G62.9    Arthritis M19.90     Postsurgical menopause E89.40    Recurrent major depression F33.9    Small bowel obstruction K56.609    Stage 3b chronic kidney disease N18.32    Thoracic aortic aneurysm without rupture I71.20    Diarrhea R19.7    Irritant contact dermatitis L24.9    Acute bronchitis J20.9    Idiopathic acute pancreatitis without infection or necrosis K85.00    Abnormal results of liver function studies R94.5    Screening for osteoporosis Z13.820    Hyperlipidemia E78.5       Medication Assessment for Specialty Medication(s):  Medication Assessment  Follow Up Clinical Assessment  Linked Medication(s) Assessed: Hydroxyurea (HYDREA)  Therapeutic appropriateness: Appropriate  Medication tolerability: Tolerating with no to minimal ADRs  Medication plan: Continue therapy with normal follow-up  Quality of Life Improvement Scale: 10-Significantly better  Administration: Patient is taking every day at the same time  and as prescribed .   Patient can self administer medications: yes  Medication Follow-Up Plan: Next clinical assessment and Refill coordination  Lab Review: The labs listed below have been reviewed. No dose adjustments are needed for the oral specialty medication(s) based on the labs.    Lab Results   Component Value Date    GLUCOSE 104 (H) 06/26/2024    CALCIUM 9.9 06/26/2024     06/26/2024    K 4.4 06/26/2024    CO2 23.4 06/26/2024     06/26/2024    BUN 23 06/26/2024    CREATININE 0.85 06/26/2024    EGFRIFAFRI 79 09/16/2020    EGFRIFNONA 66 02/07/2022    BCR 27.1 (H) 06/26/2024    ANIONGAP 11.6 06/26/2024     Lab Results   Component Value Date    WBC 5.23 11/25/2024    RBC 3.67 (L) 11/25/2024    HGB 12.9 11/25/2024    HCT 38.6 11/25/2024    .2 (H) 11/25/2024    MCH 35.1 (H) 11/25/2024    MCHC 33.4 11/25/2024    RDW 13.5 11/25/2024    RDWSD 52.1 11/25/2024    MPV 8.8 11/25/2024     11/25/2024    NEUTRORELPCT 70.6 11/25/2024    LYMPHORELPCT 21.8 11/25/2024    MONORELPCT 4.6 (L) 11/25/2024    EOSRELT  1.1 11/25/2024    BASORELPCT 1.3 11/25/2024    AUTOIGPER 0.6 (H) 11/25/2024    NEUTROABS 3.69 11/25/2024    LYMPHSABS 1.14 11/25/2024    MONOSABS 0.24 11/25/2024    EOSABS 0.06 11/25/2024    BASOSABS 0.07 11/25/2024    AUTOIGNUM 0.03 11/25/2024    NRBC 0.0 11/25/2024     Drug-drug interactions  Completed medication reconciliation today to assess for drug interactions. Patient denies starting or stopping any medications.    Assessed medication list for interactions, no significant drug interactions noted.   Advised patient to call the clinic if any new medications are started so we can assess for drug-drug interactions.  Drug-food interactions discussed  Vaccines are coordinated by the patient's oncologist and primary care provider.    Medications   Medicines reviewed by Rose Coffey PharmD on 1/13/2025 at 10:25 AM  Prior to Admission medications    Medication Sig Start Date End Date Taking? Authorizing Provider   aspirin 81 MG EC tablet Take 1 tablet by mouth Daily.    Neelima Witt MD   Cholecalciferol 50 MCG (2000 UT) tablet Take 1 tablet by mouth Daily.    Neelima Witt MD   fluticasone (FLONASE) 50 MCG/ACT nasal spray 1 spray by Each Nare route Daily.    Neelima Witt MD   gabapentin (NEURONTIN) 400 MG capsule Take 1 capsule by mouth 2 (Two) Times a Day. 1/2/24   Neelima Witt MD   hydroxyurea (HYDREA) 500 MG capsule Take 2 capsules by mouth every other day alternating with 3 capsules by mouth every other day. 6/3/24   Yogesh Damon MD   meloxicam (MOBIC) 7.5 MG tablet Take 1 tablet by mouth Daily.    Neelima Witt MD   omeprazole (priLOSEC) 20 MG capsule Take 1 capsule by mouth Daily. 30 minutes prior to a meal 12/4/24   Lilo Townsend APRN   pravastatin (PRAVACHOL) 40 MG tablet Take 1 tablet by mouth Daily. 6/4/19   Neelima Witt MD   sertraline (ZOLOFT) 25 MG tablet Take 1 tablet by mouth Daily. 8/21/20   Neelima Witt MD   XARELTO 20 MG tablet Take 1  tablet by mouth Every Evening. 8/12/19   Provider, MD Neelima       Allergies  Known allergies and reactions were discussed with the patient. The patient's chart has been reviewed for allergy information and updated as necessary.   Allergies   Allergen Reactions    Clarithromycin Other (See Comments)     [place]         Allergies reviewed by Rose Coffey PharmD on 1/13/2025 at 10:25 AM    Hospitalizations and Urgent Care Visits Since Last Assessment:  Unplanned hospitalizations or inpatient admissions: 0  ED Visits: 0  Urgent Office Visits: 0    Adherence Assessment:  Adherence Questions  Linked Medication(s) Assessed: Hydroxyurea (HYDREA)  On average, how many doses/injections does the patient miss per month?: 0  What are the identified reasons for non-adherence or missed doses? : no problems identified  What is the estimated medication adherence level?: %  Based on the patient/caregiver response and refill history, does this patient require an MTP to track adherence improvements?: no    Quality of Life Assessment:  Quality of Life Assessment  Quality of Life Improvement Scale: 10-Significantly better  -- Quality of Life: 10/10    Financial Assessment:  Medication availability/affordability: Patient has had no issues obtaining medication from pharmacy.    Attestation:  I attest the patient was actively involved in and has agreed to the above plan of care. If the prescribed therapy is at any point deemed not appropriate based on the current or future assessments, a consultation will be initiated with the patient's specialty care provider to determine the best course of action. The revised plan of therapy will be documented along with any required assessments and/or additional patient education provided.       All questions addressed and patient had no additional concerns. Patient has pharmacy contact information.    Rose Coffey PharmD, [facility]  Clinical Oncology Pharmacy Specialist  Phone: [phone]      1/13/2025  10:26 EST

## 2025-01-14 ENCOUNTER — SPECIALTY PHARMACY (OUTPATIENT)
Facility: HOSPITAL | Age: 65
End: 2025-01-14
Payer: MEDICARE

## 2025-01-14 NOTE — PROGRESS NOTES
Specialty Pharmacy Refill Coordination Note     Gisell is a 64 y.o. female contacted today regarding refills of  Hydrea 1000mg PO every other day alternating with 1500mg PO every other day of specialty medication(s).    Reviewed and verified with patient: YES    Specialty medication(s) and dose(s) confirmed: yes    Refill Questions      Flowsheet Row Most Recent Value   Changes to allergies? No   Changes to medications? No   New conditions or infections since last clinic visit No   Unplanned office visit, urgent care, ED, or hospital admission in the last 4 weeks  No   How does patient/caregiver feel medication is working? Good   Financial problems or insurance changes  No   Since the previous refill, were any specialty medication doses or scheduled injections missed or delayed?  No   Does this patient require a clinical escalation to a pharmacist? No            Delivery Questions      Flowsheet Row Most Recent Value   Delivery method UPS   Delivery address verified with patient/caregiver? Yes   Delivery address Home   Number of medications in delivery 1   Medication(s) being filled and delivered Hydroxyurea (HYDREA)   Doses left of specialty medications 8 days   Copay verified? Yes   Copay amount $36.23   Copay form of payment Credit/debit on file   Ship Date 1/14   Delivery Date 1/15   Signature Required No              Medication Adherence    Adherence tools used: watch          Follow-up: 28 day(s)     Anita Persaud, Pharmacy Technician  Specialty Pharmacy Technician

## 2025-01-15 ENCOUNTER — TRANSCRIBE ORDERS (OUTPATIENT)
Dept: ADMINISTRATIVE | Facility: HOSPITAL | Age: 65
End: 2025-01-15
Payer: MEDICARE

## 2025-01-15 DIAGNOSIS — R10.9 ABDOMINAL PAIN, UNSPECIFIED ABDOMINAL LOCATION: Primary | ICD-10-CM

## 2025-01-19 LAB
NCCN CRITERIA FLAG: NORMAL
TYRER CUZICK SCORE: 4

## 2025-01-20 ENCOUNTER — HOSPITAL ENCOUNTER (OUTPATIENT)
Dept: MAMMOGRAPHY | Facility: HOSPITAL | Age: 65
Discharge: HOME | End: 2025-01-20
Admitting: INTERNAL MEDICINE
Payer: MEDICARE

## 2025-01-20 DIAGNOSIS — Z12.31 VISIT FOR SCREENING MAMMOGRAM: ICD-10-CM

## 2025-01-20 PROCEDURE — 77067 SCR MAMMO BI INCL CAD: CPT

## 2025-01-20 PROCEDURE — 77063 BREAST TOMOSYNTHESIS BI: CPT

## 2025-01-22 PROCEDURE — 77063 BREAST TOMOSYNTHESIS BI: CPT | Performed by: RADIOLOGY

## 2025-01-22 PROCEDURE — 77067 SCR MAMMO BI INCL CAD: CPT | Performed by: RADIOLOGY

## 2025-01-27 ENCOUNTER — HOSPITAL ENCOUNTER (OUTPATIENT)
Dept: GENERAL RADIOLOGY | Facility: HOSPITAL | Age: 65
Discharge: HOME | End: 2025-01-27
Admitting: FAMILY MEDICINE
Payer: MEDICARE

## 2025-01-27 DIAGNOSIS — R10.9 ABDOMINAL PAIN, UNSPECIFIED ABDOMINAL LOCATION: ICD-10-CM

## 2025-01-27 PROCEDURE — 74240 X-RAY XM UPR GI TRC 1CNTRST: CPT

## 2025-01-27 RX ADMIN — BARIUM SULFATE 183 ML: 960 POWDER, FOR SUSPENSION ORAL at 08:51

## 2025-02-06 ENCOUNTER — TRANSCRIBE ORDERS (OUTPATIENT)
Dept: ADMINISTRATIVE | Facility: HOSPITAL | Age: 65
End: 2025-02-06
Payer: MEDICARE

## 2025-02-06 DIAGNOSIS — I87.1 MAY-THURNER SYNDROME: Primary | ICD-10-CM

## 2025-02-10 ENCOUNTER — SPECIALTY PHARMACY (OUTPATIENT)
Facility: HOSPITAL | Age: 65
End: 2025-02-10
Payer: MEDICARE

## 2025-02-10 NOTE — PROGRESS NOTES
Specialty Pharmacy Refill Coordination Note     Gisell is a 64 y.o. female contacted today regarding refills of  hydrea 1000mg PO every other day alternating with 1500mg PO every other day of specialty medication(s).    Reviewed and verified with patient: YES    Specialty medication(s) and dose(s) confirmed: yes    Refill Questions      Flowsheet Row Most Recent Value   Changes to allergies? No   Changes to medications? No   New conditions or infections since last clinic visit No   Unplanned office visit, urgent care, ED, or hospital admission in the last 4 weeks  No   How does patient/caregiver feel medication is working? Good   Financial problems or insurance changes  No   Since the previous refill, were any specialty medication doses or scheduled injections missed or delayed?  No   Does this patient require a clinical escalation to a pharmacist? No            Delivery Questions      Flowsheet Row Most Recent Value   Delivery method UPS   Delivery address verified with patient/caregiver? Yes   Delivery address Home   Number of medications in delivery 1   Medication(s) being filled and delivered Hydroxyurea (HYDREA)   Doses left of specialty medications 8 days   Copay verified? Yes   Copay amount $36.23   Copay form of payment Credit/debit on file   Ship Date 2/10/25   Delivery Date Selection 02/11/25   Signature Required No              Medication Adherence    Adherence tools used: watch          Follow-up: 30 day(s)     Anita Persaud, Pharmacy Technician  Specialty Pharmacy Technician

## 2025-02-11 ENCOUNTER — HOSPITAL ENCOUNTER (OUTPATIENT)
Dept: ULTRASOUND IMAGING | Facility: HOSPITAL | Age: 65
Discharge: HOME | End: 2025-02-11
Admitting: FAMILY MEDICINE
Payer: MEDICARE

## 2025-02-11 DIAGNOSIS — R10.9 ABDOMINAL PAIN, UNSPECIFIED ABDOMINAL LOCATION: ICD-10-CM

## 2025-02-11 PROCEDURE — 76705 ECHO EXAM OF ABDOMEN: CPT

## 2025-02-19 ENCOUNTER — HOSPITAL ENCOUNTER (INPATIENT)
Facility: HOSPITAL | Age: 65
LOS: 2 days | Discharge: HOME | DRG: 389 | End: 2025-02-21
Attending: EMERGENCY MEDICINE | Admitting: INTERNAL MEDICINE
Payer: MEDICARE

## 2025-02-19 ENCOUNTER — APPOINTMENT (OUTPATIENT)
Dept: GENERAL RADIOLOGY | Facility: HOSPITAL | Age: 65
DRG: 389 | End: 2025-02-19
Payer: MEDICARE

## 2025-02-19 ENCOUNTER — HOSPITAL ENCOUNTER (OUTPATIENT)
Dept: CT IMAGING | Facility: HOSPITAL | Age: 65
Discharge: HOME | End: 2025-02-19
Admitting: INTERNAL MEDICINE
Payer: MEDICARE

## 2025-02-19 ENCOUNTER — TRANSCRIBE ORDERS (OUTPATIENT)
Dept: ADMINISTRATIVE | Facility: HOSPITAL | Age: 65
End: 2025-02-19
Payer: MEDICARE

## 2025-02-19 DIAGNOSIS — I87.1 MAY-THURNER SYNDROME: ICD-10-CM

## 2025-02-19 DIAGNOSIS — Z86.59 HISTORY OF ANXIETY: ICD-10-CM

## 2025-02-19 DIAGNOSIS — D45 POLYCYTHEMIA RUBRA VERA: Chronic | ICD-10-CM

## 2025-02-19 DIAGNOSIS — Z86.39 HISTORY OF HYPERLIPIDEMIA: ICD-10-CM

## 2025-02-19 DIAGNOSIS — Z86.718 HISTORY OF DVT (DEEP VEIN THROMBOSIS): ICD-10-CM

## 2025-02-19 DIAGNOSIS — Z86.711 HISTORY OF PULMONARY EMBOLISM: ICD-10-CM

## 2025-02-19 DIAGNOSIS — I87.1 MAY-THURNER SYNDROME: Chronic | ICD-10-CM

## 2025-02-19 DIAGNOSIS — Z87.448 HISTORY OF CHRONIC KIDNEY DISEASE: ICD-10-CM

## 2025-02-19 DIAGNOSIS — K56.609 SMALL BOWEL OBSTRUCTION: Primary | ICD-10-CM

## 2025-02-19 DIAGNOSIS — R10.13 EPIGASTRIC PAIN: Primary | ICD-10-CM

## 2025-02-19 LAB
ALBUMIN SERPL-MCNC: 4.3 G/DL (ref 3.5–5.2)
ALBUMIN/GLOB SERPL: 1.6 G/DL
ALP SERPL-CCNC: 72 U/L (ref 39–117)
ALT SERPL W P-5'-P-CCNC: 21 U/L (ref 1–33)
ANION GAP SERPL CALCULATED.3IONS-SCNC: 12 MMOL/L (ref 5–15)
AST SERPL-CCNC: 28 U/L (ref 1–32)
BASOPHILS # BLD AUTO: 0.01 10*3/MM3 (ref 0–0.2)
BASOPHILS NFR BLD AUTO: 0.2 % (ref 0–1.5)
BILIRUB SERPL-MCNC: 1.1 MG/DL (ref 0–1.2)
BUN SERPL-MCNC: 16 MG/DL (ref 8–23)
BUN/CREAT SERPL: 20 (ref 7–25)
CALCIUM SPEC-SCNC: 9.9 MG/DL (ref 8.6–10.5)
CHLORIDE SERPL-SCNC: 109 MMOL/L (ref 98–107)
CO2 SERPL-SCNC: 23 MMOL/L (ref 22–29)
CREAT SERPL-MCNC: 0.8 MG/DL (ref 0.57–1)
D-LACTATE SERPL-SCNC: 1.1 MMOL/L (ref 0.5–2)
DEPRECATED RDW RBC AUTO: 55.4 FL (ref 37–54)
EGFRCR SERPLBLD CKD-EPI 2021: 81.4 ML/MIN/1.73
EOSINOPHIL # BLD AUTO: 0.03 10*3/MM3 (ref 0–0.4)
EOSINOPHIL NFR BLD AUTO: 0.6 % (ref 0.3–6.2)
ERYTHROCYTE [DISTWIDTH] IN BLOOD BY AUTOMATED COUNT: 14.3 % (ref 12.3–15.4)
GLOBULIN UR ELPH-MCNC: 2.7 GM/DL
GLUCOSE SERPL-MCNC: 122 MG/DL (ref 65–99)
HCT VFR BLD AUTO: 37.2 % (ref 34–46.6)
HGB BLD-MCNC: 12.3 G/DL (ref 12–15.9)
IMM GRANULOCYTES # BLD AUTO: 0.02 10*3/MM3 (ref 0–0.05)
IMM GRANULOCYTES NFR BLD AUTO: 0.4 % (ref 0–0.5)
LIPASE SERPL-CCNC: 24 U/L (ref 13–60)
LYMPHOCYTES # BLD AUTO: 0.93 10*3/MM3 (ref 0.7–3.1)
LYMPHOCYTES NFR BLD AUTO: 17.9 % (ref 19.6–45.3)
MCH RBC QN AUTO: 34.9 PG (ref 26.6–33)
MCHC RBC AUTO-ENTMCNC: 33.1 G/DL (ref 31.5–35.7)
MCV RBC AUTO: 105.7 FL (ref 79–97)
MONOCYTES # BLD AUTO: 0.28 10*3/MM3 (ref 0.1–0.9)
MONOCYTES NFR BLD AUTO: 5.4 % (ref 5–12)
NEUTROPHILS NFR BLD AUTO: 3.93 10*3/MM3 (ref 1.7–7)
NEUTROPHILS NFR BLD AUTO: 75.5 % (ref 42.7–76)
NRBC BLD AUTO-RTO: 0 /100 WBC (ref 0–0.2)
PLATELET # BLD AUTO: 416 10*3/MM3 (ref 140–450)
PMV BLD AUTO: 8.6 FL (ref 6–12)
POTASSIUM SERPL-SCNC: 3.9 MMOL/L (ref 3.5–5.2)
PROT SERPL-MCNC: 7 G/DL (ref 6–8.5)
RBC # BLD AUTO: 3.52 10*6/MM3 (ref 3.77–5.28)
SODIUM SERPL-SCNC: 144 MMOL/L (ref 136–145)
WBC NRBC COR # BLD AUTO: 5.2 10*3/MM3 (ref 3.4–10.8)

## 2025-02-19 PROCEDURE — 85025 COMPLETE CBC W/AUTO DIFF WBC: CPT | Performed by: PHYSICIAN ASSISTANT

## 2025-02-19 PROCEDURE — 99285 EMERGENCY DEPT VISIT HI MDM: CPT

## 2025-02-19 PROCEDURE — 25510000002 DIATRIZOATE MEGLUMINE & SODIUM PER 1 ML: Performed by: INTERNAL MEDICINE

## 2025-02-19 PROCEDURE — 99222 1ST HOSP IP/OBS MODERATE 55: CPT | Performed by: NURSE PRACTITIONER

## 2025-02-19 PROCEDURE — 83690 ASSAY OF LIPASE: CPT | Performed by: PHYSICIAN ASSISTANT

## 2025-02-19 PROCEDURE — 83605 ASSAY OF LACTIC ACID: CPT | Performed by: PHYSICIAN ASSISTANT

## 2025-02-19 PROCEDURE — 25010000002 ONDANSETRON PER 1 MG: Performed by: PHYSICIAN ASSISTANT

## 2025-02-19 PROCEDURE — 93005 ELECTROCARDIOGRAM TRACING: CPT

## 2025-02-19 PROCEDURE — 74176 CT ABD & PELVIS W/O CONTRAST: CPT

## 2025-02-19 PROCEDURE — 80053 COMPREHEN METABOLIC PANEL: CPT | Performed by: PHYSICIAN ASSISTANT

## 2025-02-19 PROCEDURE — 74018 RADEX ABDOMEN 1 VIEW: CPT

## 2025-02-19 PROCEDURE — 25010000002 MORPHINE PER 10 MG: Performed by: EMERGENCY MEDICINE

## 2025-02-19 PROCEDURE — 25810000003 SODIUM CHLORIDE 0.9 % SOLUTION: Performed by: PHYSICIAN ASSISTANT

## 2025-02-19 RX ORDER — DIATRIZOATE MEGLUMINE AND DIATRIZOATE SODIUM 660; 100 MG/ML; MG/ML
15 SOLUTION ORAL; RECTAL ONCE
Status: COMPLETED | OUTPATIENT
Start: 2025-02-19 | End: 2025-02-19

## 2025-02-19 RX ORDER — ONDANSETRON 2 MG/ML
4 INJECTION INTRAMUSCULAR; INTRAVENOUS ONCE
Status: COMPLETED | OUTPATIENT
Start: 2025-02-19 | End: 2025-02-19

## 2025-02-19 RX ORDER — MORPHINE SULFATE 2 MG/ML
2 INJECTION, SOLUTION INTRAMUSCULAR; INTRAVENOUS ONCE
Status: COMPLETED | OUTPATIENT
Start: 2025-02-19 | End: 2025-02-19

## 2025-02-19 RX ADMIN — Medication 3 ML: at 21:02

## 2025-02-19 RX ADMIN — ONDANSETRON 4 MG: 2 INJECTION INTRAMUSCULAR; INTRAVENOUS at 20:39

## 2025-02-19 RX ADMIN — MORPHINE SULFATE 2 MG: 2 INJECTION, SOLUTION INTRAMUSCULAR; INTRAVENOUS at 20:40

## 2025-02-19 RX ADMIN — DIATRIZOATE MEGLUMINE AND DIATRIZOATE SODIUM 15 ML: 660; 100 LIQUID ORAL; RECTAL at 16:05

## 2025-02-19 RX ADMIN — SODIUM CHLORIDE 1000 ML: 9 INJECTION, SOLUTION INTRAVENOUS at 20:40

## 2025-02-19 NOTE — Clinical Note
Level of Care: Med/Surg [1]   Diagnosis: SBO (small bowel obstruction) [616519]   Admitting Physician: MIKE SCHAFER [[id number]]   Attending Physician: MIKE SCHAFER [[id number]]   Certification: I Certify That Inpatient Hospital Services Are Medically Necessary For Greater Than 2 Midnights

## 2025-02-20 ENCOUNTER — APPOINTMENT (OUTPATIENT)
Dept: GENERAL RADIOLOGY | Facility: HOSPITAL | Age: 65
DRG: 389 | End: 2025-02-20
Payer: MEDICARE

## 2025-02-20 LAB
ANION GAP SERPL CALCULATED.3IONS-SCNC: 9 MMOL/L (ref 5–15)
BASOPHILS # BLD AUTO: 0 10*3/MM3 (ref 0–0.2)
BASOPHILS NFR BLD AUTO: 0 % (ref 0–1.5)
BUN SERPL-MCNC: 13 MG/DL (ref 8–23)
BUN/CREAT SERPL: 18.3 (ref 7–25)
CALCIUM SPEC-SCNC: 9 MG/DL (ref 8.6–10.5)
CHLORIDE SERPL-SCNC: 109 MMOL/L (ref 98–107)
CO2 SERPL-SCNC: 23 MMOL/L (ref 22–29)
CREAT SERPL-MCNC: 0.71 MG/DL (ref 0.57–1)
DEPRECATED RDW RBC AUTO: 55.8 FL (ref 37–54)
EGFRCR SERPLBLD CKD-EPI 2021: 93.9 ML/MIN/1.73
EOSINOPHIL # BLD AUTO: 0.03 10*3/MM3 (ref 0–0.4)
EOSINOPHIL NFR BLD AUTO: 0.7 % (ref 0.3–6.2)
ERYTHROCYTE [DISTWIDTH] IN BLOOD BY AUTOMATED COUNT: 14.1 % (ref 12.3–15.4)
GLUCOSE SERPL-MCNC: 126 MG/DL (ref 65–99)
HCT VFR BLD AUTO: 33.7 % (ref 34–46.6)
HGB BLD-MCNC: 11.1 G/DL (ref 12–15.9)
IMM GRANULOCYTES # BLD AUTO: 0.03 10*3/MM3 (ref 0–0.05)
IMM GRANULOCYTES NFR BLD AUTO: 0.7 % (ref 0–0.5)
INR PPP: 1.61 (ref 0.89–1.12)
LYMPHOCYTES # BLD AUTO: 0.55 10*3/MM3 (ref 0.7–3.1)
LYMPHOCYTES NFR BLD AUTO: 13 % (ref 19.6–45.3)
MAGNESIUM SERPL-MCNC: 1.9 MG/DL (ref 1.6–2.4)
MCH RBC QN AUTO: 35.4 PG (ref 26.6–33)
MCHC RBC AUTO-ENTMCNC: 32.9 G/DL (ref 31.5–35.7)
MCV RBC AUTO: 107.3 FL (ref 79–97)
MONOCYTES # BLD AUTO: 0.24 10*3/MM3 (ref 0.1–0.9)
MONOCYTES NFR BLD AUTO: 5.7 % (ref 5–12)
NEUTROPHILS NFR BLD AUTO: 3.39 10*3/MM3 (ref 1.7–7)
NEUTROPHILS NFR BLD AUTO: 79.9 % (ref 42.7–76)
NRBC BLD AUTO-RTO: 0 /100 WBC (ref 0–0.2)
PHOSPHATE SERPL-MCNC: 3.5 MG/DL (ref 2.5–4.5)
PLATELET # BLD AUTO: 350 10*3/MM3 (ref 140–450)
PMV BLD AUTO: 8.9 FL (ref 6–12)
POTASSIUM SERPL-SCNC: 3.6 MMOL/L (ref 3.5–5.2)
PROTHROMBIN TIME: 19.3 SECONDS (ref 12.2–14.5)
QT INTERVAL: 426 MS
QTC INTERVAL: 460 MS
RBC # BLD AUTO: 3.14 10*6/MM3 (ref 3.77–5.28)
SODIUM SERPL-SCNC: 141 MMOL/L (ref 136–145)
WBC NRBC COR # BLD AUTO: 4.24 10*3/MM3 (ref 3.4–10.8)

## 2025-02-20 PROCEDURE — 97161 PT EVAL LOW COMPLEX 20 MIN: CPT

## 2025-02-20 PROCEDURE — 83735 ASSAY OF MAGNESIUM: CPT | Performed by: NURSE PRACTITIONER

## 2025-02-20 PROCEDURE — 25810000003 SODIUM CHLORIDE 0.9 % SOLUTION: Performed by: NURSE PRACTITIONER

## 2025-02-20 PROCEDURE — 84100 ASSAY OF PHOSPHORUS: CPT | Performed by: NURSE PRACTITIONER

## 2025-02-20 PROCEDURE — 85025 COMPLETE CBC W/AUTO DIFF WBC: CPT | Performed by: NURSE PRACTITIONER

## 2025-02-20 PROCEDURE — 74250 X-RAY XM SM INT 1CNTRST STD: CPT

## 2025-02-20 PROCEDURE — 93005 ELECTROCARDIOGRAM TRACING: CPT | Performed by: NURSE PRACTITIONER

## 2025-02-20 PROCEDURE — 85610 PROTHROMBIN TIME: CPT | Performed by: NURSE PRACTITIONER

## 2025-02-20 PROCEDURE — 80048 BASIC METABOLIC PNL TOTAL CA: CPT | Performed by: NURSE PRACTITIONER

## 2025-02-20 PROCEDURE — 93010 ELECTROCARDIOGRAM REPORT: CPT | Performed by: INTERNAL MEDICINE

## 2025-02-20 PROCEDURE — 99232 SBSQ HOSP IP/OBS MODERATE 35: CPT | Performed by: INTERNAL MEDICINE

## 2025-02-20 PROCEDURE — 25510000002 DIATRIZOATE MEGLUMINE & SODIUM PER 1 ML: Performed by: INTERNAL MEDICINE

## 2025-02-20 RX ORDER — DIATRIZOATE MEGLUMINE AND DIATRIZOATE SODIUM 660; 100 MG/ML; MG/ML
240 SOLUTION ORAL; RECTAL
Status: COMPLETED | OUTPATIENT
Start: 2025-02-20 | End: 2025-02-20

## 2025-02-20 RX ORDER — SODIUM CHLORIDE 9 MG/ML
40 INJECTION, SOLUTION INTRAVENOUS AS NEEDED
Status: DISCONTINUED | OUTPATIENT
Start: 2025-02-20 | End: 2025-02-21 | Stop reason: HOSPADM

## 2025-02-20 RX ORDER — SODIUM CHLORIDE 9 MG/ML
75 INJECTION, SOLUTION INTRAVENOUS CONTINUOUS
Status: DISCONTINUED | OUTPATIENT
Start: 2025-02-20 | End: 2025-02-21

## 2025-02-20 RX ORDER — ONDANSETRON 2 MG/ML
4 INJECTION INTRAMUSCULAR; INTRAVENOUS EVERY 6 HOURS PRN
Status: DISCONTINUED | OUTPATIENT
Start: 2025-02-20 | End: 2025-02-21 | Stop reason: HOSPADM

## 2025-02-20 RX ORDER — SODIUM CHLORIDE 0.9 % (FLUSH) 0.9 %
10 SYRINGE (ML) INJECTION EVERY 12 HOURS SCHEDULED
Status: DISCONTINUED | OUTPATIENT
Start: 2025-02-20 | End: 2025-02-21 | Stop reason: HOSPADM

## 2025-02-20 RX ORDER — PANTOPRAZOLE SODIUM 40 MG/10ML
40 INJECTION, POWDER, LYOPHILIZED, FOR SOLUTION INTRAVENOUS
Status: DISCONTINUED | OUTPATIENT
Start: 2025-02-20 | End: 2025-02-21 | Stop reason: HOSPADM

## 2025-02-20 RX ORDER — SODIUM CHLORIDE 0.9 % (FLUSH) 0.9 %
10 SYRINGE (ML) INJECTION AS NEEDED
Status: DISCONTINUED | OUTPATIENT
Start: 2025-02-20 | End: 2025-02-21 | Stop reason: HOSPADM

## 2025-02-20 RX ADMIN — Medication 10 ML: at 10:17

## 2025-02-20 RX ADMIN — Medication 10 MG: at 04:41

## 2025-02-20 RX ADMIN — PANTOPRAZOLE SODIUM 40 MG: 40 INJECTION, POWDER, FOR SOLUTION INTRAVENOUS at 06:35

## 2025-02-20 RX ADMIN — SODIUM CHLORIDE 75 ML/HR: 9 INJECTION, SOLUTION INTRAVENOUS at 01:39

## 2025-02-20 RX ADMIN — Medication 10 ML: at 06:35

## 2025-02-20 RX ADMIN — DIATRIZOATE MEGLUMINE AND DIATRIZOATE SODIUM 240 ML: 660; 100 LIQUID ORAL; RECTAL at 08:46

## 2025-02-20 RX ADMIN — Medication 10 ML: at 01:39

## 2025-02-20 RX ADMIN — Medication 10 MG: at 21:58

## 2025-02-20 NOTE — PLAN OF CARE
Goal Outcome Evaluation:  Plan of Care Reviewed With: patient        Progress: no change  Outcome Evaluation: PT eval complete. Patient demonstrating complete independence with bed mobility, transfers and gait. Patient demonstrates adequate balance and activity tolerance. Patient does not demonstrate need for skilled PT services at this time as he is completely independent. PT will sign off at this time. Patient provided education about importance of continued mobility to prevent deconditioning. If any changes noted please re-consult PT.    Anticipated Discharge Disposition (PT): home

## 2025-02-20 NOTE — ED NOTES
Gisell Wylie    Nursing Report ED to Floor:  Mental status: A&Ox4  Ambulatory status: independent  Oxygen Therapy:  RA  Cardiac Rhythm: NS  Admitted from: Home  Safety Concerns:  SBO  Precautions: none  Social Issues: none  ED Room #:  31    ED Nurse Phone Extension - [phone] or may call [phone].      HPI:   Chief Complaint   Patient presents with    Abdominal Pain       Past Medical History:  Past Medical History:   Diagnosis Date    AAA (abdominal aortic aneurysm)     Abnormal chest CT 2023    enlarged Aortic Aneurysm    Anxiety 2023    Arthritis 2023    Cellulitis of lower limb 2016    From Automated Load;Provider: Radha Lopez;Status: Active    Chronic bilateral low back pain without sciatica 2023    Deep vein thrombophlebitis of calf, left     Encounter for long-term (current) use of high-risk medication 08/15/2022    Fatty liver     GERD (gastroesophageal reflux disease)     Hepatosplenomegaly 2023    Hyperlipidemia     Insomnia 2023    Lumbar spondylosis 2023    May-Thurner syndrome     Neuropathy 2023    Obstructive sleep apnea     Pancreatitis     Polycythemia rubra vera     Postsurgical menopause 2023    Pulmonary embolism     Recurrent major depression 2016    From Automated Load;Provider: Ange Alex;Status: Active    Small bowel obstruction 2023    Stage 3b chronic kidney disease 2023    Thoracic aortic aneurysm without rupture 2023    4.1 cm. 10/6/2023: Pt. has checked yearly at . Last checked 2022. Pt. to schedule f/u.        Past Surgical History:  Past Surgical History:   Procedure Laterality Date    BILATERAL BREAST REDUCTION      CAROTID STENT       SECTION      COLONOSCOPY  2024    Dr. Boyd    ELBOW FUSION      HYSTERECTOMY      ILIAC ARTERY STENT Left 2020    Mesh    OOPHORECTOMY      OTHER SURGICAL HISTORY  2020    Iliac vein carterization    REDUCTION MAMMAPLASTY Bilateral   "   TONSILLECTOMY          Admitting Doctor:   Garrett Richards MD    Consulting Provider(s):  Consults       No orders found from 1/21/2025 to 2/20/2025.             Admitting Diagnosis:   The encounter diagnosis was Small bowel obstruction.    Most Recent Vitals:   Vitals:    02/19/25 1939 02/19/25 1948 02/19/25 2043   BP: 137/73 138/71 133/73   BP Location: Left arm     Patient Position: Sitting     Pulse: 84 82 73   Resp: 18     Temp: 99.5 °F (37.5 °C)     TempSrc: Oral     SpO2: 96% 95% 94%   Weight: 89.4 kg (197 lb)     Height: 170.2 cm (67\")         Active LDAs/IV Access:   Lines, Drains & Airways       Active LDAs       Name Placement date Placement time Site Days    Peripheral IV 02/19/25 2001 Left Antecubital 02/19/25 2001  Antecubital  less than 1    NG/OG Tube Nasogastric 16 Fr Right nostril 02/19/25 2109  Right nostril  less than 1                    Labs (abnormal labs have a star):   Labs Reviewed   COMPREHENSIVE METABOLIC PANEL - Abnormal; Notable for the following components:       Result Value    Glucose 122 (*)     Chloride 109 (*)     All other components within normal limits    Narrative:     GFR Categories in Chronic Kidney Disease (CKD)      GFR Category          GFR (mL/min/1.73)    Interpretation  G1                     90 or greater         Normal or high (1)  G2                      60-89                Mild decrease (1)  G3a                   45-59                Mild to moderate decrease  G3b                   30-44                Moderate to severe decrease  G4                    15-29                Severe decrease  G5                    14 or less           Kidney failure          (1)In the absence of evidence of kidney disease, neither GFR category G1 or G2 fulfill the criteria for CKD.    eGFR calculation 2021 CKD-EPI creatinine equation, which does not include race as a factor   CBC WITH AUTO DIFFERENTIAL - Abnormal; Notable for the following components:    RBC 3.52 (*)     MCV " 105.7 (*)     MCH 34.9 (*)     RDW-SD 55.4 (*)     Lymphocyte % 17.9 (*)     All other components within normal limits   LIPASE - Normal   LACTIC ACID, PLASMA - Normal   CBC AND DIFFERENTIAL    Narrative:     The following orders were created for panel order CBC & Differential.  Procedure                               Abnormality         Status                     ---------                               -----------         ------                     CBC Auto Differential[[id number]]        Abnormal            Final result                 Please view results for these tests on the individual orders.       Meds Given in ED:   Medications   morphine injection 2 mg (2 mg Intravenous Given 2/19/25 2040)   ondansetron (ZOFRAN) injection 4 mg (4 mg Intravenous Given 2/19/25 2039)   sodium chloride 0.9 % bolus 1,000 mL (1,000 mL Intravenous New Bag 2/19/25 2040)   Lido-EPINEPHrine-Tetracaine 4-0.18-0.5 % gel 3 mL (3 mL Topical Given 2/19/25 2102)     No current facility-administered medications for this encounter.       Last NIH score:                                                          Dysphagia screening results:  Patient Factors Component (Dysphagia:Stroke or Rule-out)  Best Eye Response: 4-->(E4) spontaneous (02/19/25 2013)  Best Motor Response: 6-->(M6) obeys commands (02/19/25 2013)  Best Verbal Response: 5-->(V5) oriented (02/19/25 2013)  Edison Coma Scale Score: 15 (02/19/25 2013)     Mei Coma Scale:  No data recorded     CIWA:        Restraint Type:            Isolation Status:  No active isolations

## 2025-02-20 NOTE — PROGRESS NOTES
[facility] Medicine Services  PROGRESS NOTE    Patient Name: Gisell Wylie  : 1960  MRN: [id number]    Date of Admission: 2025  Primary Care Physician: Zainab Childs MD    Subjective   Subjective     CC: Follow-up SBO    HPI: No acute events overnight, patient uncomfortable with NG tube, but denies abdominal pain    Objective   Objective     Vital Signs:   Temp:  [98.3 °F (36.8 °C)-99.5 °F (37.5 °C)] 98.3 °F (36.8 °C)  Heart Rate:  [65-84] 65  Resp:  [18] 18  BP: (112-138)/(61-73) 112/61  Flow (L/min) (Oxygen Therapy):  [2] 2     Physical Exam:  Constitutional: No acute distress, awake, alert  HENT: NCAT, mucous membranes moist, NG tube in place  Respiratory: Clear to auscultation bilaterally, respiratory effort normal   Cardiovascular: RRR, no murmurs, rubs, or gallops  Gastrointestinal: Hyperactive bowel sounds, soft, nontender, nondistended  Musculoskeletal: No bilateral ankle edema  Psychiatric: Appropriate affect, cooperative  Neurologic: Oriented x 3, nonfocal    Results Reviewed:  LAB RESULTS:      Lab 25  0433 25   WBC 4.24 5.20   HEMOGLOBIN 11.1* 12.3   HEMATOCRIT 33.7* 37.2   PLATELETS 350 416   NEUTROS ABS 3.39 3.93   IMMATURE GRANS (ABS) 0.03 0.02   LYMPHS ABS 0.55* 0.93   MONOS ABS 0.24 0.28   EOS ABS 0.03 0.03   .3* 105.7*   LACTATE  --  1.1   PROTIME 19.3*  --          Lab 25  0433 25   SODIUM 141 144   POTASSIUM 3.6 3.9   CHLORIDE 109* 109*   CO2 23.0 23.0   ANION GAP 9.0 12.0   BUN 13 16   CREATININE 0.71 0.80   EGFR 93.9 81.4   GLUCOSE 126* 122*   CALCIUM 9.0 9.9   MAGNESIUM 1.9  --    PHOSPHORUS 3.5  --          Lab 25   TOTAL PROTEIN 7.0   ALBUMIN 4.3   GLOBULIN 2.7   ALT (SGPT) 21   AST (SGOT) 28   BILIRUBIN 1.1   ALK PHOS 72   LIPASE 24         Lab 25  0433   PROTIME 19.3*   INR 1.61*                 Brief Urine Lab Results       None            Microbiology Results Abnormal       None             XR Abdomen KUB    Result Date: 2/19/2025  XR ABDOMEN KUB Date of Exam: 2/19/2025 9:15 PM EST Indication: NG placement confirmation Comparison: None available. Findings: See impression     Impression: Impression: Antegrade oriented gastric tube terminates along the greater curvature of the mid stomach. Electronically Signed: Brandon Mckenzie MD  2/19/2025 9:31 PM EST  Workstation ID: [id number]    CT Abdomen Pelvis Without Contrast    Result Date: 2/19/2025  CT ABDOMEN PELVIS WO CONTRAST Date of Exam: 2/19/2025 4:01 PM EST Indication: I87.1. Comparison: 5/14/2023 Technique: Axial CT images were obtained of the abdomen and pelvis without the administration of contrast. Reconstructed coronal and sagittal images were also obtained. Automated exposure control and iterative construction methods were used. Findings: LUNG BASES:  Unremarkable without mass or infiltrate. LIVER:  Unremarkable parenchyma without focal lesion. BILIARY/GALLBLADDER:  Unremarkable SPLEEN: Enlarged, measuring 14.5 cm in length. PANCREAS:  Unremarkable ADRENAL:  Unremarkable KIDNEYS:  Unremarkable parenchyma with no solid mass identified. No obstruction.  No calculus identified. GASTROINTESTINAL/MESENTERY: There are mildly loops of small intestine involving the proximal and mid small intestine measuring up to 3.2 cm diameter. There is decompressed distal small intestine. Probable transition point in the right central lower abdomen/upper pelvis. Imaging features overall are suggestive of a least partial mechanical small bowel obstruction. AORTA/IVC:  Normal caliber. There is a left common iliac venous stent. RETROPERITONEUM/LYMPH NODES:  Unremarkable REPRODUCTIVE:  Unremarkable BLADDER:  Unremarkable OSSEUS STRUCTURES:  Typical for age with no acute process identified.     Impression: Impression: 1.Imaging features are suggestive of at least a partial mid-distal small bowel obstruction. Electronically Signed: Ravi Stratton MD  2/19/2025 4:19  PM EST  Workstation ID: [id number]     Results for orders placed during the hospital encounter of 06/16/20    Adult Stress Echo W/ Cont or Stress Agent if Necessary Per Protocol    Interpretation Summary  · The rest EKG is normal. IRBBB is noted.  · The rest transthoracic echocardiogram reveals no diagnostic abnormalities, noting that the right heart was not assessed.  · The patient exercised for 6 minutes, completing stage II of a standard Matt protocol and attaining her 89% p.m. HR of 141 bpm before stopping because of fatigue and shortness of air.  · The patient had no exercise-induced angina or arrhythmias and EKG changes were not seen. The blood pressure response to exercise was normal.  · Transthoracic echocardiography done before and following exercise reveals an increase in rest EF from 63% to greater than 80% (estimated).  · There is exercised-induced augmentation of shortening in all LV segments.  · This is a NORMAL stress EKG/echocardiogram.      Current medications:  Scheduled Meds:melatonin, 10 mg, Oral, Nightly  pantoprazole, 40 mg, Intravenous, Q AM  sodium chloride, 10 mL, Intravenous, Q12H      Continuous Infusions:sodium chloride, 75 mL/hr, Last Rate: 75 mL/hr (02/20/25 0400)      PRN Meds:.  ondansetron    sodium chloride    sodium chloride    Assessment & Plan   Assessment & Plan     Active Hospital Problems    Diagnosis  POA    **SBO (small bowel obstruction) [K56.609]  Yes    Thoracic aortic aneurysm without rupture [I71.20]  Yes    History of DVT of lower extremity [Z86.718]  Not Applicable    Polycythemia rubra vera [D45]  Yes    May-Thurner syndrome [I87.1]  Yes      Resolved Hospital Problems   No resolved problems to display.        Brief Hospital Course to date:  64 y.o. female with PMH significant for thoracic aortic aneurysm, May Thurner syndrome, DVT/PE on Xarelto, polycythemia rubra vera, HLD, ELLIS on CPAP, GERD, CKD, who presents to the ED with complaint of abdominal pain, nausea,  diarrhea, abnormal CT abdomen/pelvis who was found to have concern for small bowel obstruction.     This patient's problems and plans were partially entered by my partner and updated as appropriate by me 02/20/25.     Small bowel obstruction  --General Surgery aware, continue NG tube, keep n.p.o.  --Continue supportive therapy with IV fluids, pain control, antiemetics  --Follow-up small bowel follow-through     May Thurner syndrome  History DVT/PE  Polycythemia rubra vera  - Follows outpatient with heme-onc  - Hold Xarelto secondary to being n.p.o., will need to consider heparin  drip. if NG tube needs to remain in place, will await general surgery evaluation  - Last dose of Xarelto was evening of 2/18/2025  - Hold Hydrea while n.p.o.     Dyslipidemia  - Hold statin while n.p.o.     GERD  - Protonix IV daily    Expected Discharge Location and Transportation: TBD  Expected Discharge   Expected Discharge Date: 2/21/2025; Expected Discharge Time:      VTE Prophylaxis:  Mechanical VTE prophylaxis orders are present.         AM-PAC 6 Clicks Score (PT): 22 (02/20/25 0400)    CODE STATUS:   Code Status and Medical Interventions: CPR (Attempt to Resuscitate); Full Support   Ordered at: 02/19/25 9403     Code Status (Patient has no pulse and is not breathing):    CPR (Attempt to Resuscitate)     Medical Interventions (Patient has pulse or is breathing):    Full Support       Jeferson Cook MD  02/20/25

## 2025-02-20 NOTE — CASE MANAGEMENT/SOCIAL WORK
Discharge Planning Assessment  [facility]     Patient Name: Gisell Wylie  MRN: [id number]  Today's Date: 2/20/2025    Admit Date: 2/19/2025    Plan: home   Discharge Needs Assessment       Row Name 02/20/25 1528       Living Environment    People in Home spouse    Current Living Arrangements home    Potentially Unsafe Housing Conditions none    In the past 12 months has the electric, gas, oil, or water company threatened to shut off services in your home? No    Primary Care Provided by self    Provides Primary Care For no one    Family Caregiver if Needed spouse    Family Caregiver Names TIARA WYLIE Spouse [phone]  [phone]    Quality of Family Relationships helpful;involved;supportive    Able to Return to Prior Arrangements yes       Resource/Environmental Concerns    Resource/Environmental Concerns none    Transportation Concerns none       Transportation Needs    In the past 12 months, has lack of transportation kept you from medical appointments or from getting medications? no    In the past 12 months, has lack of transportation kept you from meetings, work, or from getting things needed for daily living? No       Food Insecurity    Within the past 12 months, you worried that your food would run out before you got the money to buy more. Never true    Within the past 12 months, the food you bought just didn't last and you didn't have money to get more. Never true       Transition Planning    Patient/Family Anticipates Transition to home with family    Patient/Family Anticipated Services at Transition none    Transportation Anticipated family or friend will provide       Discharge Needs Assessment    Readmission Within the Last 30 Days no previous admission in last 30 days    Equipment Currently Used at Home none    Concerns to be Addressed discharge planning;no discharge needs identified    Do you want help finding or keeping work or a job? I do not need or want help    Do you want help with  school or training? For example, starting or completing job training or getting a high school diploma, GED or equivalent No    Anticipated Changes Related to Illness none    Equipment Needed After Discharge none    Provided Post Acute Provider List? N/A    Provided Post Acute Provider Quality & Resource List? N/A    Offered/Gave Vendor List no    Patient's Choice of Community Agency(s) Plan is home. No discharge needs identified.                   Discharge Plan       Row Name 02/20/25 3514       Plan    Plan home    Patient/Family in Agreement with Plan yes    Plan Comments Met with Mrs. Wylie at the bedside to initiate discharge planning. She shares a home in [place] with her . She is independent with activities of daily living and does not use any DME. She denies the receipt of home health or outpatient services. Her primary care provider is Dr. Zainab Childs. She denies obstacles to getting medical care or obtaining medications. No discharge needs were identified today. Her plan is home with her spouse, and he will transport.  will continue to follow plan of care and assist with discharge planning needs as indicated.    Final Discharge Disposition Code 01 - home or self-care                  Continued Care and Services - Admitted Since 2/19/2025    No active coordination exists for this encounter.       Selected Continued Care - Episodes Includes continued care and service providers with selected services from the active episodes listed below      Oncology Episode start date: 11/17/2021   There are no active outsourced providers for this episode.                 Expected Discharge Date and Time       Expected Discharge Date Expected Discharge Time    Feb 21, 2025            Demographic Summary       Row Name 02/20/25 1527       General Information    Admission Type inpatient    Arrived From emergency department    Referral Source admission list    Reason for Consult discharge  planning    Preferred Language English       Contact Information    Permission Granted to Share Info With family/designee    Contact Information Obtained for     Contact Information Comments TIARA OLGUIN Spouse [phone]                   Functional Status       Row Name 02/20/25 1528       Functional Status    Usual Activity Tolerance good    Current Activity Tolerance good       Physical Activity    On average, how many days per week do you engage in moderate to strenuous exercise (like a brisk walk)? 0 days    On average, how many minutes do you engage in exercise at this level? 0 min    Number of minutes of exercise per week 0       Assessment of Health Literacy    How often do you have someone help you read hospital materials? Never    How often do you have problems learning about your medical condition because of difficulty understanding written information? Never    How often do you have a problem understanding what is told to you about your medical condition? Never    How confident are you filling out medical forms by yourself? Extremely    Health Literacy Good       Functional Status, IADL    Medications independent    Meal Preparation independent    Housekeeping independent    Laundry independent    Shopping independent    If for any reason you need help with day-to-day activities such as bathing, preparing meals, shopping, managing finances, etc., do you get the help you need? I don't need any help       Mental Status    General Appearance WDL WDL       Mental Status Summary    Recent Changes in Mental Status/Cognitive Functioning no changes                   Psychosocial    No documentation.                  Abuse/Neglect    No documentation.                  Legal    No documentation.                  Substance Abuse    No documentation.                  Patient Forms    No documentation.                     Doreen Doran RN

## 2025-02-20 NOTE — THERAPY DISCHARGE NOTE
Patient Name: Gisell Wylie  : 1960    MRN: [id number]                              Today's Date: 2025       Admit Date: 2025    Visit Dx:     ICD-10-CM ICD-9-CM   1. Small bowel obstruction  K56.609 560.9   2. History of anxiety  Z86.59 V11.8   3. History of DVT (deep vein thrombosis)  Z86.718 V12.51   4. History of hyperlipidemia  Z86.39 V12.29   5. History of pulmonary embolism  Z86.711 V12.55   6. May-Thurner syndrome  I87.1 459.2   7. Polycythemia rubra vera  D45 238.4   8. History of chronic kidney disease  Z87.448 V13.09     Patient Active Problem List   Diagnosis    May-Thurner syndrome    Polycythemia rubra vera    Encounter for long-term (current) use of high-risk medication    Other hyperlipidemia    History of DVT of lower extremity    Abnormal chest CT    Abnormal kidney function    Anxiety    Cellulitis of lower limb    Chronic bilateral low back pain without sciatica    Elevated liver function tests    Hepatosplenomegaly    Insomnia    Lumbar spondylosis    Neuropathy    Arthritis    Postsurgical menopause    Recurrent major depression    Small bowel obstruction    Stage 3b chronic kidney disease    Thoracic aortic aneurysm without rupture    Diarrhea    Irritant contact dermatitis    Acute bronchitis    Idiopathic acute pancreatitis without infection or necrosis    Abnormal results of liver function studies    Screening for osteoporosis    Hyperlipidemia    SBO (small bowel obstruction)     Past Medical History:   Diagnosis Date    AAA (abdominal aortic aneurysm)     Abnormal chest CT 2023    enlarged Aortic Aneurysm    Anxiety 2023    Arthritis 2023    Cellulitis of lower limb 2016    From Automated Load;Provider: Radha Lopez;Status: Active    Chronic bilateral low back pain without sciatica 2023    Deep vein thrombophlebitis of calf, left     Encounter for long-term (current) use of high-risk medication 08/15/2022    Fatty liver     GERD  (gastroesophageal reflux disease)     Hepatosplenomegaly 2023    Hyperlipidemia     Insomnia 2023    Lumbar spondylosis 2023    May-Thurner syndrome     Neuropathy 2023    Obstructive sleep apnea     Pancreatitis     Polycythemia rubra vera     Postsurgical menopause 2023    Pulmonary embolism     Recurrent major depression 2016    From Automated Load;Provider: Ange Alex;Status: Active    Small bowel obstruction 2023    Stage 3b chronic kidney disease 2023    Thoracic aortic aneurysm without rupture 2023    4.1 cm. 10/6/2023: Pt. has checked yearly at [facility]. Last checked 2022. Pt. to schedule f/u.     Past Surgical History:   Procedure Laterality Date    BILATERAL BREAST REDUCTION      CAROTID STENT       SECTION      COLONOSCOPY  2024    Dr. Boyd    ELBOW FUSION      HYSTERECTOMY      ILIAC ARTERY STENT Left 2020    Mesh    OOPHORECTOMY      OTHER SURGICAL HISTORY  2020    Iliac vein carterization    REDUCTION MAMMAPLASTY Bilateral 1999    TONSILLECTOMY        General Information       Row Name 25 1140          Physical Therapy Time and Intention    Document Type evaluation  -KW     Mode of Treatment individual therapy;physical therapy  -KW       Row Name 25 1140          General Information    Patient Profile Reviewed yes  -KW     Prior Level of Function independent:;all household mobility;community mobility;transfer;gait;bed mobility  -KW     Barriers to Rehab none identified  -KW       Row Name 25 1140          Living Environment    People in Home spouse  -KW       Row Name 25 1140          Home Main Entrance    Number of Stairs, Main Entrance none  -KW       Row Name 25 1140          Stairs Within Home, Primary    Number of Stairs, Within Home, Primary three  -KW     Stair Railings, Within Home, Primary railing on right side (ascending)  -KW       Row Name 25 1140          Cognition     Orientation Status (Cognition) oriented x 4  -KW               User Key  (r) = Recorded By, (t) = Taken By, (c) = Cosigned By      Initials Name Provider Type    Nina Valladares PT Physical Therapist                   Mobility       Row Name 02/20/25 1140          Bed Mobility    Bed Mobility supine-sit-supine  -KW     Supine-Sit-Supine [place] (Bed Mobility) independent  -KW       Row Name 02/20/25 1140          Bed-Chair Transfer    Bed-Chair [place] (Transfers) independent  -KW       Row Name 02/20/25 1140          Sit-Stand Transfer    Sit-Stand [place] (Transfers) independent  -KW       Row Name 02/20/25 1140          Gait/Stairs (Locomotion)    [place] Level (Gait) independent  -KW     Distance in Feet (Gait) 350  -KW               User Key  (r) = Recorded By, (t) = Taken By, (c) = Cosigned By      Initials Name Provider Type    Nina Valladares PT Physical Therapist                   Obj/Interventions       Row Name 02/20/25 1140          Strength Comprehensive (MMT)    General Manual Muscle Testing (MMT) Assessment no strength deficits identified  -KW       Row Name 02/20/25 1140          Balance    Balance Assessment sitting static balance;sitting dynamic balance;standing static balance;standing dynamic balance  -KW     Static Sitting Balance independent  -KW     Dynamic Sitting Balance independent  -KW     Position, Sitting Balance unsupported  -KW     Static Standing Balance independent  -KW     Dynamic Standing Balance independent  -KW     Position/Device Used, Standing Balance unsupported  -KW     Balance Interventions sitting;standing;sit to stand;narrowed base of support;single limb stance;tandem standing;eyes closed during activity  -KW               User Key  (r) = Recorded By, (t) = Taken By, (c) = Cosigned By      Initials Name Provider Type    Nina Valladares PT Physical Therapist                   Goals/Plan    No documentation.                   Clinical Impression       Row Name 02/20/25 1140          Pain    Pretreatment Pain Rating 0/10 - no pain  -KW       Row Name 02/20/25 1140          Plan of Care Review    Plan of Care Reviewed With patient  -KW     Progress no change  -KW     Outcome Evaluation PT eval complete. Patient demonstrating complete independence with bed mobility, transfers and gait. Patient demonstrates adequate balance and activity tolerance. Patient does not demonstrate need for skilled PT services at this time as he is completely independent. PT will sign off at this time. Patient provided education about importance of continued mobility to prevent deconditioning. If any changes noted please re-consult PT.  -       Row Name 02/20/25 1140          Therapy Assessment/Plan (PT)    Criteria for Skilled Interventions Met (PT) no;no problems identified which require skilled intervention  -KW     Therapy Frequency (PT) evaluation only  -       Row Name 02/20/25 1140          Vital Signs    Pre Systolic BP Rehab 110  -KW     Pre Treatment Diastolic BP 71  -KW     Pretreatment Heart Rate (beats/min) 96  -KW               User Key  (r) = Recorded By, (t) = Taken By, (c) = Cosigned By      Initials Name Provider Type    Nina Valladares, PT Physical Therapist                   Outcome Measures       Row Name 02/20/25 1140 02/20/25 0800       How much help from another person do you currently need...    Turning from your back to your side while in flat bed without using bedrails? 4  -KW 4  -ZF    Moving from lying on back to sitting on the side of a flat bed without bedrails? 4  -KW 4  -ZF    Moving to and from a bed to a chair (including a wheelchair)? 4  -KW 4  -ZF    Standing up from a chair using your arms (e.g., wheelchair, bedside chair)? 4  -KW 4  -ZF    Climbing 3-5 steps with a railing? 4  -KW 3  -ZF    To walk in hospital room? 4  -KW 3  -ZF    AM-PAC 6 Clicks Score (PT) 24  -KW 22  -ZF    Highest Level of Mobility Goal 8 -->  Walked 250 feet or more  -KW 7 --> Walk 25 feet or more  -ZF      Row Name 02/20/25 1140          Functional Assessment    Outcome Measure Options AM-PAC 6 Clicks Basic Mobility (PT)  -KW       Row Name 02/20/25 1140          Melgar Balance Scale    Sitting to Standing 4  -KW     Standing Unsupported 4  -KW     Sitting with Back Unsupported but Feet Supported on Floor or on Stool 4  -KW     Standing to Sitting 4  -KW     Transfers 4  -KW     Standing Unsupported with Eyes Closed 4  -KW     Standing Unsupported with Feet Together 4  -KW     Reaching Forward with Outstretched Arm While Standing 3  -KW      Object From the Floor From a Standing Position 4  -KW     Turning to Look Behind Over Left and Right Shoulders While Standing 4  -KW     Turn 360 Degrees 4  -KW     Place Alternate Foot on Step or Stool While Standing Unsupported 4  -KW     Standing Unsupported with One Foot in Front 4  -KW     Standing on One Leg 4  -KW     Melgar Total Score 55  -KW               User Key  (r) = Recorded By, (t) = Taken By, (c) = Cosigned By      Initials Name Provider Type    Nina Valladares, PT Physical Therapist    Susana Tapia RN Registered Nurse                  Physical Therapy Education        No education to display                  PT Recommendation and Plan     Progress: no change  Outcome Evaluation: PT eval complete. Patient demonstrating complete independence with bed mobility, transfers and gait. Patient demonstrates adequate balance and activity tolerance. Patient does not demonstrate need for skilled PT services at this time as he is completely independent. PT will sign off at this time. Patient provided education about importance of continued mobility to prevent deconditioning. If any changes noted please re-consult PT.     Time Calculation:   PT Evaluation Complexity  History, PT Evaluation Complexity: 3 or more personal factors and/or comorbidities  Examination of Body Systems (PT Eval  Complexity): total of 3 or more elements  Clinical Presentation (PT Evaluation Complexity): stable  Clinical Decision Making (PT Evaluation Complexity): low complexity  Overall Complexity (PT Evaluation Complexity): low complexity     PT Charges       Row Name 02/20/25 1140             Time Calculation    Start Time 1140  -KW      PT Received On 02/20/25  -KW         Untimed Charges    PT Eval/Re-eval Minutes 52  -KW         Total Minutes    Untimed Charges Total Minutes 52  -KW       Total Minutes 52  -KW                User Key  (r) = Recorded By, (t) = Taken By, (c) = Cosigned By      Initials Name Provider Type    Nina Valladares, MIL Physical Therapist                  Therapy Charges for Today       Code Description Service Date Service Provider Modifiers Qty    20757697978 HC PT EVAL LOW COMPLEXITY 4 2/20/2025 Nina Biswas PT GP 1            PT G-Codes  Outcome Measure Options: AM-PAC 6 Clicks Basic Mobility (PT)  AM-PAC 6 Clicks Score (PT): 24  Melgar Total Score: 55    PT Discharge Summary  Anticipated Discharge Disposition (PT): home    Nina Biswas PT  2/20/2025

## 2025-02-20 NOTE — CONSULTS
Patient Name:  Gisell Wylie  YOB: 1960  [id number]       Patient Care Team:  Zainab Childs MD as PCP - General (Internal Medicine & Pediatrics)  Jaxson Alcantara MD as Surgeon (General Surgery)  Lilo Townsend APRN as Nurse Practitioner (Gastroenterology)      General Surgery Consult Note     Date of Consultation: 02/20/25    Referring Physician - Jeferson Cook MD    Reason for Consult -small bowel obstruction    Subjective     I have been asked to see  Gisell Wylie , a 64 y.o. female in consultation for small bowel obstruction from Dr. Cook.  On 2/18/2025 the patient had KFC.  After this the patient developed nausea, mild abdominal pain, and diarrhea.  Due to persistence of the symptoms the patient presented to her primary care provider on 2/19/2025.  A CT scan was performed reading possible small bowel obstruction.  She was then sent to the emergency room for further evaluation.  Here, an NG tube was placed and the patient was admitted for further evaluation.  No abdominal pain currently.  Continues to have diarrhea.      Allergy:   Allergies   Allergen Reactions    Clarithromycin Other (See Comments)     [place]       Medications:  melatonin, 10 mg, Oral, Nightly  pantoprazole, 40 mg, Intravenous, Q AM  sodium chloride, 10 mL, Intravenous, Q12H      sodium chloride, 75 mL/hr, Last Rate: 75 mL/hr (02/20/25 0400)      Current Facility-Administered Medications on File Prior to Encounter   Medication    [COMPLETED] diatrizoate meglumine-sodium (GASTROGRAFIN) 66-10 % oral solution 15 mL     Current Outpatient Medications on File Prior to Encounter   Medication Sig    aspirin 81 MG EC tablet Take 1 tablet by mouth Daily.    Cholecalciferol 50 MCG (2000 UT) tablet Take 1 tablet by mouth Daily.    fluticasone (FLONASE) 50 MCG/ACT nasal spray 1 spray by Each Nare route Daily.    gabapentin (NEURONTIN) 400 MG capsule Take 300 mg by mouth Every Night.    hydroxyurea (HYDREA) 500 MG  capsule Take 2 capsules by mouth every other day alternating with 3 capsules by mouth every other day.    meloxicam (MOBIC) 7.5 MG tablet Take 1 tablet by mouth As Needed for Moderate Pain or Mild Pain.    omeprazole (priLOSEC) 20 MG capsule Take 1 capsule by mouth Daily. 30 minutes prior to a meal    pravastatin (PRAVACHOL) 40 MG tablet Take 1 tablet by mouth Daily.    sertraline (ZOLOFT) 25 MG tablet Take 1 tablet by mouth Daily.    XARELTO 20 MG tablet Take 1 tablet by mouth Every Evening.       PMHx:   Past Medical History:   Diagnosis Date    AAA (abdominal aortic aneurysm)     Abnormal chest CT 2023    enlarged Aortic Aneurysm    Anxiety 2023    Arthritis 2023    Cellulitis of lower limb 2016    From Automated Load;Provider: Radha Lopez;Status: Active    Chronic bilateral low back pain without sciatica 2023    Deep vein thrombophlebitis of calf, left     Encounter for long-term (current) use of high-risk medication 08/15/2022    Fatty liver     GERD (gastroesophageal reflux disease)     Hepatosplenomegaly 2023    Hyperlipidemia     Insomnia 2023    Lumbar spondylosis 2023    May-Thurner syndrome     Neuropathy 2023    Obstructive sleep apnea     Pancreatitis     Polycythemia rubra vera     Postsurgical menopause 2023    Pulmonary embolism     Recurrent major depression 2016    From Automated Load;Provider: Ange Alex;Status: Active    Small bowel obstruction 2023    Stage 3b chronic kidney disease 2023    Thoracic aortic aneurysm without rupture 2023    4.1 cm. 10/6/2023: Pt. has checked yearly at . Last checked 2022. Pt. to schedule f/u.       Past Surgical History:  Past Surgical History:   Procedure Laterality Date    BILATERAL BREAST REDUCTION      CAROTID STENT       SECTION      COLONOSCOPY  2024    Dr. Boyd    ELBOW FUSION      HYSTERECTOMY      ILIAC ARTERY STENT Left 2020    Mesh     "OOPHORECTOMY      OTHER SURGICAL HISTORY  06/2020    Iliac vein carterization    REDUCTION MAMMAPLASTY Bilateral 1999    TONSILLECTOMY          Family History:   Family History   Problem Relation Age of Onset    Atrial fibrillation Mother     Dementia Mother     Diabetes Mother     Heart disease Mother     Leukemia Paternal Grandmother     Breast cancer Paternal Grandmother     Colon cancer Maternal Aunt     Colon cancer Cousin     Lung cancer Cousin     Breast cancer Cousin     Ovarian cancer Neg Hx         Social History:   Social History     Socioeconomic History    Marital status:    Tobacco Use    Smoking status: Never    Smokeless tobacco: Never   Vaping Use    Vaping status: Never Used   Substance and Sexual Activity    Alcohol use: No    Drug use: No    Sexual activity: Defer         Review of Systems       Constitutional: No fevers, chills or malaise   Eyes: Denies visual changes    Cardiovascular: Denies chest pain, palpitations   Pulmonary: Denies cough or shortness of breath   Abdominal/ GI: See HPI    Genitourinary: Denies dysuria or hematuria   Musculoskeletal: Denies any but chronic joint aches, pains or deformities   Psychiatric: No recent mood changes   Neurologic: No paresthesias or loss of function          Objective     Physical Exam:      Vital Signs  /61 (BP Location: Right arm, Patient Position: Lying)   Pulse 65   Temp 98.3 °F (36.8 °C) (Oral)   Resp 18   Ht 170.2 cm (67\")   Wt 89.4 kg (197 lb)   SpO2 94%   BMI 30.85 kg/m²     Intake/Output Summary (Last 24 hours) at 2/20/2025 0730  Last data filed at 2/20/2025 0600  Gross per 24 hour   Intake 0 ml   Output 80 ml   Net -80 ml         Physical Exam:    Head: Normocephalic, atraumatic.   Eyes: Pupils equal, round, react to light and accommodation.   Mouth: Oral mucosa without lesions  Neck: No masses, lymphadenopathy or carotid bruits bilaterally  CV: Rhythm and rate regular, no murmurs, rubs or gallops  Lungs: Clear to " auscultation bilaterally  Abdomen: Bowel sounds positive, soft, obese, nontender  Groin : No obvious hernias bilaterally  Extremities:  No cyanosis, clubbing or edema bilaterally  Lymphatics: No abnormal lymphadenopathy appreciated  Neurologic: No gross deficits      Results Review: I have personally reviewed all of the recent lab and imaging results available at this time.   Creatinine 0.71  INR 1.61  Lactate 1.1  WBC 4.2  Hemoglobin 11.1       Assessment & Plan     Assessment and Plan:    Patient with partial small bowel obstruction versus ileus.  Small bowel follow-through for today.  N.p.o., NG tube, IV fluids for now.  Will follow on results of today's study to determine next steps in treatment.      I discussed the patient's findings and my recommendations with the patient and/or family, as well as the primary team     Aramis Luis MD  02/20/25  07:30 EST

## 2025-02-20 NOTE — H&P
[facility]  HISTORY AND PHYSICAL    Patient Name: Gisell Wylie  : 1960  MRN: [id number]  Primary Care Physician: Zainab Childs MD  Date of admission: 2025    Subjective   Subjective     Chief Complaint:  Abdominal pain, nausea, diarrhea, abnormal CT abdomen     HPI:  Gisell Wylie is a 64 y.o. female with PMH significant for thoracic aortic aneurysm, May Thurner syndrome, DVT/PE on Xarelto, polycythemia rubra vera, HLD, ELLIS on CPAP, GERD, CKD, who presents to the ED with complaint of abdominal pain, nausea, diarrhea, abnormal CT abdomen/pelvis.  She states that she began having diarrhea last night with associated abdominal pain.  It lasted through the day today and she was evaluated per PCP who sent her for evaluation with CT abdomen/pelvis that noted concern for small bowel obstruction.  She does note history of prior SBO.  She denies any abdominal surgeries.  Her last colonoscopy was ~ 1 year ago.  Upon arrival to the ED, labs are benign.  Outpatient CT abdomen/pelvis without contrast notes partial mid distal small bowel obstruction.  NG tube placed while in ED.  She will be admitted to hospital medicine for further evaluation.    Review of Systems   Constitutional:  Positive for appetite change and fever. Negative for activity change, chills, diaphoresis, fatigue and unexpected weight change.   HENT: Negative.  Negative for congestion, sinus pressure, sore throat and trouble swallowing.    Eyes: Negative.  Negative for visual disturbance.   Respiratory: Negative.  Negative for cough, choking, chest tightness, shortness of breath and wheezing.    Cardiovascular: Negative.  Negative for chest pain, palpitations and leg swelling.   Gastrointestinal:  Positive for abdominal distention, abdominal pain, diarrhea and nausea. Negative for blood in stool, constipation and vomiting.   Endocrine: Negative.  Negative for polydipsia and polyphagia.    Genitourinary: Negative.  Negative for difficulty urinating, dysuria, frequency and urgency.   Musculoskeletal: Negative.  Negative for arthralgias, back pain, gait problem, myalgias and neck pain.   Skin: Negative.  Negative for color change, pallor, rash and wound.   Allergic/Immunologic: Negative.  Negative for immunocompromised state.   Neurological: Negative.  Negative for dizziness, tremors, seizures, facial asymmetry, speech difficulty, weakness, light-headedness, numbness and headaches.   Hematological: Negative.  Does not bruise/bleed easily.   Psychiatric/Behavioral: Negative.  Negative for confusion. The patient is not nervous/anxious.           Personal History     Past Medical History:   Diagnosis Date    AAA (abdominal aortic aneurysm)     Abnormal chest CT 12/22/2023    enlarged Aortic Aneurysm    Anxiety 12/22/2023    Arthritis 12/22/2023    Cellulitis of lower limb 05/03/2016    From Automated Load;Provider: Radha Lopez;Status: Active    Chronic bilateral low back pain without sciatica 12/22/2023    Deep vein thrombophlebitis of calf, left     Encounter for long-term (current) use of high-risk medication 08/15/2022    Fatty liver     GERD (gastroesophageal reflux disease)     Hepatosplenomegaly 12/22/2023    Hyperlipidemia     Insomnia 12/22/2023    Lumbar spondylosis 02/02/2023    May-Thurner syndrome     Neuropathy 12/22/2023    Obstructive sleep apnea     Pancreatitis     Polycythemia rubra vera     Postsurgical menopause 12/22/2023    Pulmonary embolism     Recurrent major depression 05/17/2016    From Automated Load;Provider: Ange Alex;Status: Active    Small bowel obstruction 12/22/2023    Stage 3b chronic kidney disease 12/22/2023    Thoracic aortic aneurysm without rupture 11/06/2023    4.1 cm. 10/6/2023: Pt. has checked yearly at [facility]. Last checked 9/2022. Pt. to schedule f/u.         Past Surgical History:   Procedure Laterality Date    BILATERAL BREAST REDUCTION      CAROTID STENT        SECTION      COLONOSCOPY  2024    Dr. Boyd    ELBOW FUSION      HYSTERECTOMY      ILIAC ARTERY STENT Left 2020    Mesh    OOPHORECTOMY      OTHER SURGICAL HISTORY  2020    Iliac vein carterization    REDUCTION MAMMAPLASTY Bilateral 1999    TONSILLECTOMY         Family History:  family history includes Atrial fibrillation in her mother; Breast cancer in her cousin and paternal grandmother; Colon cancer in her cousin and maternal aunt; Dementia in her mother; Diabetes in her mother; Heart disease in her mother; Leukemia in her paternal grandmother; Lung cancer in her cousin.     Social History:  reports that she has never smoked. She has never used smokeless tobacco. She reports that she does not drink alcohol and does not use drugs.  Social History     Social History Narrative    Not on file       Medications:  Cholecalciferol, aspirin, fluticasone, gabapentin, hydroxyurea, meloxicam, omeprazole, pravastatin, rivaroxaban, and sertraline    Allergies   Allergen Reactions    Clarithromycin Other (See Comments)     [place]       Objective   Objective     Vital Signs:   Temp:  [99.5 °F (37.5 °C)] 99.5 °F (37.5 °C)  Heart Rate:  [73-84] 73  Resp:  [18] 18  BP: (129-138)/(68-73) 129/68    Physical Exam   Constitutional: Awake, alert, no acute distress   Eyes: PERRLA, sclerae anicteric, no conjunctival injection  HENT: NCAT, mucous membranes moist  Neck: Supple, no thyromegaly, no lymphadenopathy, trachea midline  Respiratory: Clear to auscultation bilaterally, nonlabored respirations   Cardiovascular: RRR, no murmurs, rubs, or gallops, palpable pedal pulses bilaterally  Gastrointestinal: Positive tinkling bowel sounds, soft, mild diffuse tenderness. Mild distention  Musculoskeletal: No bilateral ankle edema, no clubbing or cyanosis to extremities  Psychiatric: Appropriate affect, cooperative  Neurologic: Oriented x 3, strength symmetric in all extremities, Cranial Nerves grossly intact to  confrontation, speech clear  Skin: No rashes      Result Review:  I have personally reviewed the results from the time of this admission to 2/19/2025 22:16 EST and agree with these findings:  [x]  Laboratory list / accordion  [x]  Microbiology  [x]  Radiology  []  EKG/Telemetry   []  Cardiology/Vascular   []  Pathology  [x]  Old records  []  Other:  Most notable findings include:     LAB RESULTS:      Lab 02/19/25 1959   WBC 5.20   HEMOGLOBIN 12.3   HEMATOCRIT 37.2   PLATELETS 416   NEUTROS ABS 3.93   IMMATURE GRANS (ABS) 0.02   LYMPHS ABS 0.93   MONOS ABS 0.28   EOS ABS 0.03   .7*   LACTATE 1.1         Lab 02/19/25 1959   SODIUM 144   POTASSIUM 3.9   CHLORIDE 109*   CO2 23.0   ANION GAP 12.0   BUN 16   CREATININE 0.80   EGFR 81.4   GLUCOSE 122*   CALCIUM 9.9         Lab 02/19/25 1959   TOTAL PROTEIN 7.0   ALBUMIN 4.3   GLOBULIN 2.7   ALT (SGPT) 21   AST (SGOT) 28   BILIRUBIN 1.1   ALK PHOS 72   LIPASE 24                     Brief Urine Lab Results       None          Microbiology Results (last 10 days)       ** No results found for the last 240 hours. **            XR Abdomen KUB    Result Date: 2/19/2025  XR ABDOMEN KUB Date of Exam: 2/19/2025 9:15 PM EST Indication: NG placement confirmation Comparison: None available. Findings: See impression     Impression: Impression: Antegrade oriented gastric tube terminates along the greater curvature of the mid stomach. Electronically Signed: Brandon Mckenzie MD  2/19/2025 9:31 PM EST  Workstation ID: [id number]    CT Abdomen Pelvis Without Contrast    Result Date: 2/19/2025  CT ABDOMEN PELVIS WO CONTRAST Date of Exam: 2/19/2025 4:01 PM EST Indication: I87.1. Comparison: 5/14/2023 Technique: Axial CT images were obtained of the abdomen and pelvis without the administration of contrast. Reconstructed coronal and sagittal images were also obtained. Automated exposure control and iterative construction methods were used. Findings: LUNG BASES:  Unremarkable without mass  or infiltrate. LIVER:  Unremarkable parenchyma without focal lesion. BILIARY/GALLBLADDER:  Unremarkable SPLEEN: Enlarged, measuring 14.5 cm in length. PANCREAS:  Unremarkable ADRENAL:  Unremarkable KIDNEYS:  Unremarkable parenchyma with no solid mass identified. No obstruction.  No calculus identified. GASTROINTESTINAL/MESENTERY: There are mildly loops of small intestine involving the proximal and mid small intestine measuring up to 3.2 cm diameter. There is decompressed distal small intestine. Probable transition point in the right central lower abdomen/upper pelvis. Imaging features overall are suggestive of a least partial mechanical small bowel obstruction. AORTA/IVC:  Normal caliber. There is a left common iliac venous stent. RETROPERITONEUM/LYMPH NODES:  Unremarkable REPRODUCTIVE:  Unremarkable BLADDER:  Unremarkable OSSEUS STRUCTURES:  Typical for age with no acute process identified.     Impression: Impression: 1.Imaging features are suggestive of at least a partial mid-distal small bowel obstruction. Electronically Signed: Ravi Stratton MD  2/19/2025 4:19 PM EST  Workstation ID: [id number]     Results for orders placed during the hospital encounter of 06/16/20    Adult Stress Echo W/ Cont or Stress Agent if Necessary Per Protocol    Interpretation Summary  · The rest EKG is normal. IRBBB is noted.  · The rest transthoracic echocardiogram reveals no diagnostic abnormalities, noting that the right heart was not assessed.  · The patient exercised for 6 minutes, completing stage II of a standard Matt protocol and attaining her 89% p.m. HR of 141 bpm before stopping because of fatigue and shortness of air.  · The patient had no exercise-induced angina or arrhythmias and EKG changes were not seen. The blood pressure response to exercise was normal.  · Transthoracic echocardiography done before and following exercise reveals an increase in rest EF from 63% to greater than 80% (estimated).  · There is  exercised-induced augmentation of shortening in all LV segments.  · This is a NORMAL stress EKG/echocardiogram.      Assessment & Plan   Assessment & Plan       SBO (small bowel obstruction)    May-Thurner syndrome    Polycythemia rubra vera    History of DVT of lower extremity    Thoracic aortic aneurysm without rupture    64 y.o. female with PMH significant for thoracic aortic aneurysm, May Thurner syndrome, DVT/PE on Xarelto, polycythemia rubra vera, HLD, ELLIS on CPAP, GERD, CKD, who presents to the ED with complaint of abdominal pain, nausea, diarrhea, abnormal CT abdomen/pelvis who was found to have concern for small bowel obstruction.    Small bowel obstruction  - N.p.o.  - General Surgery consult in the a.m., Dr. Luis contacted per ED provider  - Gentle IV fluids  - NG tube to low wall suction  - CBC, BMP, magnesium in the a.m.    May Thurner syndrome  History DVT/PE  Polycythemia rubra vera  - Follows outpatient with heme-onc  - Hold Xarelto secondary to being n.p.o., will need to consider heparin  drip in a.m. if NG tube needs to remain in place  - Last dose of Xarelto was evening of 2/18/2025  - Hold Hydrea while n.p.o.    Dyslipidemia  - Hold statin while n.p.o.    GERD  - Protonix IV daily    DVT prophylaxis: previously on Xarelto, holding while NPO     CODE STATUS:    Code Status (Patient has no pulse and is not breathing): CPR (Attempt to Resuscitate)  Medical Interventions (Patient has pulse or is breathing): Full Support      Expected Discharge  Expected Discharge Date: 2/21/2025; Expected Discharge Time:     Signature: Electronically signed by ASHISH Godoy, 02/19/25, 10:16 PM EST.

## 2025-02-20 NOTE — ED PROVIDER NOTES
EMERGENCY DEPARTMENT ENCOUNTER    Pt Name: Gisell Wylie  MRN: [id number]  Pt :   1960  Room Number:    Date of encounter:  2025  PCP: Zainab Childs MD  ED Provider: ANIYA Almaguer    Historian: Patient    HPI:  Chief Complaint: abdominal pain    Context: Gisell Wylie is a 64 y.o. female who presents to the ED c/o abdominal pain. Initially, she reports having diarrhea intermittently for the past month. Recently, the symptoms worsened, becoming watery, starting around 5 or 6 PM the previous night. She experiences occasional nausea but does not vomit. Her past medical history includes a previous small bowel obstruction approximately three years ago and a  section 35 years ago. Patient was seen and evaluated by her primary care provider today with outpatient imaging performed that demonstrated a small bowel obstruction.  HPI     REVIEW OF SYSTEMS  A chief complaint appropriate review of systems was completed and is negative except as noted in the HPI.     PAST MEDICAL HISTORY  Past Medical History:   Diagnosis Date    AAA (abdominal aortic aneurysm)     Abnormal chest CT 2023    enlarged Aortic Aneurysm    Anxiety 2023    Arthritis 2023    Cellulitis of lower limb 2016    From Automated Load;Provider: Radha Lopez;Status: Active    Chronic bilateral low back pain without sciatica 2023    Deep vein thrombophlebitis of calf, left     Encounter for long-term (current) use of high-risk medication 08/15/2022    Fatty liver     GERD (gastroesophageal reflux disease)     Hepatosplenomegaly 2023    Hyperlipidemia     Insomnia 2023    Lumbar spondylosis 2023    May-Thurner syndrome     Neuropathy 2023    Obstructive sleep apnea     Pancreatitis     Polycythemia rubra vera     Postsurgical menopause 2023    Pulmonary embolism     Recurrent major depression 2016    From Automated Load;Provider: Isai  Ange;Status: Active    Small bowel obstruction 2023    Stage 3b chronic kidney disease 2023    Thoracic aortic aneurysm without rupture 2023    4.1 cm. 10/6/2023: Pt. has checked yearly at . Last checked 2022. Pt. to schedule f/u.       PAST SURGICAL HISTORY  Past Surgical History:   Procedure Laterality Date    BILATERAL BREAST REDUCTION      CAROTID STENT       SECTION      COLONOSCOPY  2024    Dr. Boyd    ELBOW FUSION      HYSTERECTOMY      ILIAC ARTERY STENT Left 2020    Mesh    OOPHORECTOMY      OTHER SURGICAL HISTORY  2020    Iliac vein carterization    REDUCTION MAMMAPLASTY Bilateral 1999    TONSILLECTOMY         FAMILY HISTORY  Family History   Problem Relation Age of Onset    Atrial fibrillation Mother     Dementia Mother     Diabetes Mother     Heart disease Mother     Leukemia Paternal Grandmother     Breast cancer Paternal Grandmother     Colon cancer Maternal Aunt     Colon cancer Cousin     Lung cancer Cousin     Breast cancer Cousin     Ovarian cancer Neg Hx        SOCIAL HISTORY  Social History     Socioeconomic History    Marital status:    Tobacco Use    Smoking status: Never    Smokeless tobacco: Never   Vaping Use    Vaping status: Never Used   Substance and Sexual Activity    Alcohol use: No    Drug use: No    Sexual activity: Defer       ALLERGIES  Clarithromycin    PHYSICAL EXAM  Physical Exam  Vitals and nursing note reviewed.   Constitutional:       General: She is not in acute distress.     Appearance: Normal appearance. She is obese. She is not ill-appearing or toxic-appearing.   HENT:      Head: Normocephalic.      Nose: Nose normal.      Mouth/Throat:      Mouth: Mucous membranes are moist.   Eyes:      Extraocular Movements: Extraocular movements intact.   Cardiovascular:      Rate and Rhythm: Normal rate and regular rhythm.   Pulmonary:      Effort: Pulmonary effort is normal.   Abdominal:      General: Bowel sounds are decreased.  There is distension.      Palpations: Abdomen is soft.      Tenderness: There is no abdominal tenderness.   Musculoskeletal:         General: Normal range of motion.      Cervical back: Normal range of motion and neck supple.   Skin:     General: Skin is warm and dry.   Neurological:      General: No focal deficit present.      Mental Status: She is alert.   Psychiatric:         Mood and Affect: Mood normal.         Behavior: Behavior normal.       LAB RESULTS  Results for orders placed or performed during the hospital encounter of 02/19/25   Comprehensive Metabolic Panel    Collection Time: 02/19/25  7:59 PM    Specimen: Blood   Result Value Ref Range    Glucose 122 (H) 65 - 99 mg/dL    BUN 16 8 - 23 mg/dL    Creatinine 0.80 0.57 - 1.00 mg/dL    Sodium 144 136 - 145 mmol/L    Potassium 3.9 3.5 - 5.2 mmol/L    Chloride 109 (H) 98 - 107 mmol/L    CO2 23.0 22.0 - 29.0 mmol/L    Calcium 9.9 8.6 - 10.5 mg/dL    Total Protein 7.0 6.0 - 8.5 g/dL    Albumin 4.3 3.5 - 5.2 g/dL    ALT (SGPT) 21 1 - 33 U/L    AST (SGOT) 28 1 - 32 U/L    Alkaline Phosphatase 72 39 - 117 U/L    Total Bilirubin 1.1 0.0 - 1.2 mg/dL    Globulin 2.7 gm/dL    A/G Ratio 1.6 g/dL    BUN/Creatinine Ratio 20.0 7.0 - 25.0    Anion Gap 12.0 5.0 - 15.0 mmol/L    eGFR 81.4 >60.0 mL/min/1.73   Lipase    Collection Time: 02/19/25  7:59 PM    Specimen: Blood   Result Value Ref Range    Lipase 24 13 - 60 U/L   Lactic Acid, Plasma    Collection Time: 02/19/25  7:59 PM    Specimen: Blood   Result Value Ref Range    Lactate 1.1 0.5 - 2.0 mmol/L   CBC Auto Differential    Collection Time: 02/19/25  7:59 PM    Specimen: Blood   Result Value Ref Range    WBC 5.20 3.40 - 10.80 10*3/mm3    RBC 3.52 (L) 3.77 - 5.28 10*6/mm3    Hemoglobin 12.3 12.0 - 15.9 g/dL    Hematocrit 37.2 34.0 - 46.6 %    .7 (H) 79.0 - 97.0 fL    MCH 34.9 (H) 26.6 - 33.0 pg    MCHC 33.1 31.5 - 35.7 g/dL    RDW 14.3 12.3 - 15.4 %    RDW-SD 55.4 (H) 37.0 - 54.0 fl    MPV 8.6 6.0 - 12.0 fL     Platelets 416 140 - 450 10*3/mm3    Neutrophil % 75.5 42.7 - 76.0 %    Lymphocyte % 17.9 (L) 19.6 - 45.3 %    Monocyte % 5.4 5.0 - 12.0 %    Eosinophil % 0.6 0.3 - 6.2 %    Basophil % 0.2 0.0 - 1.5 %    Immature Grans % 0.4 0.0 - 0.5 %    Neutrophils, Absolute 3.93 1.70 - 7.00 10*3/mm3    Lymphocytes, Absolute 0.93 0.70 - 3.10 10*3/mm3    Monocytes, Absolute 0.28 0.10 - 0.90 10*3/mm3    Eosinophils, Absolute 0.03 0.00 - 0.40 10*3/mm3    Basophils, Absolute 0.01 0.00 - 0.20 10*3/mm3    Immature Grans, Absolute 0.02 0.00 - 0.05 10*3/mm3    nRBC 0.0 0.0 - 0.2 /100 WBC       If labs were ordered, I independently reviewed the results and considered them in treating the patient.    RADIOLOGY  XR Abdomen KUB   Final Result   Impression:   Antegrade oriented gastric tube terminates along the greater curvature of the mid stomach.         Electronically Signed: Brandon Mckenzie MD     2/19/2025 9:31 PM EST     Workstation ID: [id number]        [x] Radiologist's Report Reviewed:  I ordered and independently interpreted the above noted radiographic studies.  See radiologist's dictation for official interpretation.      PROCEDURES    Procedures    ECG 12 Lead QT Measurement    (Results Pending)       MEDICATIONS GIVEN IN ER    Medications   morphine injection 2 mg (2 mg Intravenous Given 2/19/25 2040)   ondansetron (ZOFRAN) injection 4 mg (4 mg Intravenous Given 2/19/25 2039)   sodium chloride 0.9 % bolus 1,000 mL (1,000 mL Intravenous New Bag 2/19/25 2040)   Lido-EPINEPHrine-Tetracaine 4-0.18-0.5 % gel 3 mL (3 mL Topical Given 2/19/25 2102)       MEDICAL DECISION MAKING, PROGRESS, and CONSULTS   Medical Decision Making  This is a 64-year-old nontoxic-appearing female presents ED for evaluation of nausea and abdominal distention.  Patient with history of small bowel obstruction and had imaging performed today that demonstrated again a small bowel obstruction.  No acute emergent findings on labs.  Treated symptomatically in the ED  and nasogastric tube placed.  Consult placed to general surgery who will follow patient on admission.  Patient admitted to hospital medicine for further evaluation and treatment. At time of admission patient is afebrile, nontoxic appearing, vital signs stable and able to maintain O2 sats of 94% on room air.     Problems Addressed:  Small bowel obstruction: complicated acute illness or injury    Amount and/or Complexity of Data Reviewed  Labs: ordered. Decision-making details documented in ED Course.  Radiology: ordered and independent interpretation performed. Decision-making details documented in ED Course.  Discussion of management or test interpretation with external provider(s): I reviewed patient with her primary care provider who contacted the ER for report as well as general surgery, Dr. Luis    Risk  Prescription drug management.  Decision regarding hospitalization.      Discussion below represents my analysis of pertinent findings related to patient's condition, differential diagnosis, treatment plan and final disposition.    Differential diagnosis: Constipation, irritable bowel syndrome, abdominal wall pain, gallbladder disease, pancreatitis, diverticulitis, appendicitis, kidney stone, UTI, hernia, gastroparesis, bowel obstruction, colitis and others.      Additional sources  Discussed/ obtained information from independent historians:   [] Spouse  [] Parent  [] Family member  [] Friend  [] EMS   [] Other:  External (non-ED) record review:   [] Inpatient record:   [] Office record:   [] Outpatient record:   [] Prior Outpatient labs:   [] Prior Outpatient radiology:   [] Primary Care record:   [] Outside ED record:   [] Other:   Patient's care impacted by:   [] Diabetes  [] Hypertension  [x] Hyperlipidemia  [] Hypothyroidism   [] Coronary Artery Disease  [] Congestive Heart Failure   [] COPD   [] Cancer   [] Obesity  [] GERD   [] Tobacco Abuse   [] Substance Abuse    [x] Anxiety   [] Depression   [x]  Other: May-Thurner syndrome, polycythemia rubra vera, stage III chronic kidney disease  Care significantly affected by Social Determinants of Health (housing and economic circumstances, unemployment)    [] Yes     [x] No   If yes, Patient's care significantly limited by  Social Determinants of Health including:   [] Inadequate housing   [] Low income   [] Alcoholism and drug addiction in family   [] Problems related to primary support group   [] Unemployment   [] Problems related to employment   [] Other Social Determinants of Health:   Shared decision making: I reviewed workup performed in ED including labs and imaging. Based on findings, recommendation made for admission. Patient is agreeable to plan of care and hospital admission.      Orders placed during this visit:  Orders Placed This Encounter   Procedures    XR Abdomen KUB    Comprehensive Metabolic Panel    Lipase    Lactic Acid, Plasma    CBC Auto Differential    Nasogastric tube insertion    ECG 12 Lead QT Measurement    Inpatient Admission    ED IP Bed Request    CBC & Differential     ED Course:    ED Course as of 02/19/25 2142 Wed Feb 19, 2025 1944 I spoke with patient's primary care provider Dr. Bautista from [facility] [place] who gave report on patient with imaging from today suggesting small bowel obstruction. [JG]   1944 I have paged Dr. Luis on patient's arrival to the ED [JG]   1946 Dr. Luis agreeable to plan of care of nasogastric Tube, bowel rest and he will follow patient on admission [JG]   1947 CT abdomen/pelvis personally interpreted by myself and with official read provided by radiology demonstrates mildly loops of small intestine involving the proximal and mid small intestine measuring up to 3.2 cm diameter. There is decompressed distal small intestine. Probable transition point in the right central lower abdomen/upper pelvis suggestive of a least partial mechanical small bowel obstruction. [JG]   2003 Vitals and  Telemetry tracing was reviewed and directly interpreted by myself demonstrating blood pressure 137/73, temperature 99.5 °F, heart rate of 84, respirations 18 breaths/min and oxygen saturation 96% on room air [JG]   2003 BP: 137/73 [JG]   2003 Temp: 99.5 °F (37.5 °C) [JG]   2003 Heart Rate: 84 [JG]   2003 Resp: 18 [JG]   2003 SpO2: 96 % [JG]   2026 Labs studies were reviewed and directly interpreted by myself and demonstrated no acute findings [JG]   2122 On reassessment at bedside patient resting in a position of comfort, no acute distress.  Nasogastric tube is in place with gastric content return. [JG]   2125 Hospital medicine consulted for admission, Dr. Richards who is agreeable to accept patient for admission [JG]   2135 XR Abdomen KUB  XR abdomen personally interpreted by myself and with official read provided by radiology demonstrates an antegrade oriented gastric tube terminates along the greater curvature of the mid stomach. [JG]      ED Course User Index  [JG] Rodriguez Garzon PA            DIAGNOSIS  Final diagnoses:   Small bowel obstruction   History of anxiety   History of DVT (deep vein thrombosis)   History of hyperlipidemia   History of pulmonary embolism   May-Thurner syndrome   Polycythemia rubra vera   History of chronic kidney disease       DISPOSITION    ED Disposition       ED Disposition   Decision to Admit    Condition   --    Comment   Level of Care: Med/Surg [1]   Diagnosis: SBO (small bowel obstruction) [355250]                 Please note that portions of this document were completed with voice recognition software.        Rodriguez Garzon PA  02/19/25 4321

## 2025-02-21 ENCOUNTER — READMISSION MANAGEMENT (OUTPATIENT)
Dept: CALL CENTER | Facility: HOSPITAL | Age: 65
End: 2025-02-21
Payer: MEDICARE

## 2025-02-21 VITALS
WEIGHT: 208.5 LBS | RESPIRATION RATE: 16 BRPM | TEMPERATURE: 98.2 F | BODY MASS INDEX: 32.72 KG/M2 | HEIGHT: 67 IN | OXYGEN SATURATION: 98 % | SYSTOLIC BLOOD PRESSURE: 102 MMHG | HEART RATE: 61 BPM | DIASTOLIC BLOOD PRESSURE: 58 MMHG

## 2025-02-21 PROCEDURE — 99239 HOSP IP/OBS DSCHRG MGMT >30: CPT | Performed by: INTERNAL MEDICINE

## 2025-02-21 NOTE — PROGRESS NOTES
"Patient Name:  Gisell Wylie  YOB: 1960  [id number]    Surgery Progress Note    Date of visit: 2/21/2025    Subjective   No abdominal pain.  No nausea or vomiting.  Multiple loose bowel movements after small bowel follow-through.         Objective       BP 90/51 (BP Location: Right arm, Patient Position: Lying)   Pulse 67   Temp 98 °F (36.7 °C) (Oral)   Resp 17   Ht 170.2 cm (67\")   Wt 94.6 kg (208 lb 8 oz)   SpO2 98%   BMI 32.66 kg/m²     Intake/Output Summary (Last 24 hours) at 2/21/2025 0750  Last data filed at 2/20/2025 1900  Gross per 24 hour   Intake 370 ml   Output --   Net 370 ml       CV:  Rhythm regular and rate regular  L:  Clear to auscultation bilaterally  Abd:  Bowel sounds positive, soft, nontender, nondistended  Ext:  No cyanosis, clubbing, edema    Recent labs and imaging that are back at this time have been reviewed.   Small bowel follow-through 2/20/2025:  Impression:  Small bowel series appeared within normal limits. There was no evidence of small bowel obstruction.       Assessment & Plan     Patient without evidence of small bowel obstruction.  Advance diet as tolerated.  Will sign off.  Please call with any questions or concerns.        Aramis Luis MD  2/21/2025  07:50 EST      "

## 2025-02-21 NOTE — CASE MANAGEMENT/SOCIAL WORK
Continued Stay Note  [facility]     Patient Name: Gisell Wylie  MRN: [id number]  Today's Date: 2/21/2025    Admit Date: 2/19/2025    Plan: home   Discharge Plan       Row Name 02/21/25 1346       Plan    Plan home    Patient/Family in Agreement with Plan yes    Plan Comments Met with Mrs. Wylie at the bedside to finalize discharge plan. Her plan is home, and her  will transport. No discharge needs were identifed.    Final Discharge Disposition Code 01 - home or self-care                   Discharge Codes    No documentation.                 Expected Discharge Date and Time       Expected Discharge Date Expected Discharge Time    Feb 21, 2025               Doreen Doran RN

## 2025-02-22 LAB
QT INTERVAL: 436 MS
QTC INTERVAL: 480 MS

## 2025-02-22 NOTE — OUTREACH NOTE
Prep Survey      Flowsheet Row Responses   [facility] patient discharged from? [place]   Is LACE score < 7 ? No   Eligibility Readm Mgmt   Discharge diagnosis Small bowel obstruction   Does the patient have one of the following disease processes/diagnoses(primary or secondary)? Other   Does the patient have Home health ordered? No   Is there a DME ordered? No   Prep survey completed? Yes            AURA BAHENA - Registered Nurse

## 2025-02-26 ENCOUNTER — READMISSION MANAGEMENT (OUTPATIENT)
Dept: CALL CENTER | Facility: HOSPITAL | Age: 65
End: 2025-02-26
Payer: MEDICARE

## 2025-02-26 NOTE — OUTREACH NOTE
Medical Week 1 Survey      Flowsheet Row Responses   [facility] patient discharged from? [place]   Does the patient have one of the following disease processes/diagnoses(primary or secondary)? Other   Week 1 attempt successful? Yes   Call start time 1408   Call end time 1410   Discharge diagnosis Small bowel obstruction   Person spoke with today (if not patient) and relationship Gisell   Kristi reviewed with patient/caregiver? Yes   Is the patient having any side effects they believe may be caused by any medication additions or changes? No   Does the patient have all medications ordered at discharge? Yes   Is the patient taking all medications as directed (includes completed medication regime)? Yes   Does the patient have a primary care provider?  Yes   Does the patient have an appointment with their PCP within 7 days of discharge? Yes   Comments regarding PCP Childs, patient is seeing today   Has the patient kept scheduled appointments due by today? Yes   Psychosocial issues? No   Did the patient receive a copy of their discharge instructions? Yes   Nursing interventions Reviewed instructions with patient   What is the patient's perception of their health status since discharge? Improving   Is the patient/caregiver able to teach back the hierarchy of who to call/visit for symptoms/problems? PCP, Specialist, Home health nurse, Urgent Care, ED, 911 Yes   Week 1 call completed? Yes   Graduated Yes   Did the patient feel the follow up calls were helpful during their recovery period? Yes   Would this patient benefit from a Referral to Amb Social Work? No   Is the patient interested in additional calls from an ambulatory ? No   Wrap up additional comments Spouse reports patient is doing well, denies needs.   Call end time 1410            AURA BAHENA - Registered Nurse

## 2025-03-10 ENCOUNTER — SPECIALTY PHARMACY (OUTPATIENT)
Dept: ONCOLOGY | Facility: HOSPITAL | Age: 65
End: 2025-03-10
Payer: MEDICARE

## 2025-03-10 ENCOUNTER — OFFICE VISIT (OUTPATIENT)
Dept: GASTROENTEROLOGY | Facility: CLINIC | Age: 65
End: 2025-03-10
Payer: MEDICARE

## 2025-03-10 VITALS
HEART RATE: 58 BPM | WEIGHT: 196.4 LBS | HEIGHT: 67 IN | OXYGEN SATURATION: 97 % | SYSTOLIC BLOOD PRESSURE: 120 MMHG | DIASTOLIC BLOOD PRESSURE: 70 MMHG | BODY MASS INDEX: 30.83 KG/M2

## 2025-03-10 DIAGNOSIS — R89.9 ABNORMAL LABORATORY TEST: ICD-10-CM

## 2025-03-10 DIAGNOSIS — Z87.19 HISTORY OF ILEUS: ICD-10-CM

## 2025-03-10 DIAGNOSIS — K75.81 METABOLIC DYSFUNCTION-ASSOCIATED STEATOHEPATITIS (MASH): Primary | ICD-10-CM

## 2025-03-10 DIAGNOSIS — Q85.9: ICD-10-CM

## 2025-03-10 DIAGNOSIS — R10.9 ABDOMINAL DISCOMFORT: ICD-10-CM

## 2025-03-10 DIAGNOSIS — K44.9 HIATAL HERNIA: ICD-10-CM

## 2025-03-10 DIAGNOSIS — R93.5 ABNORMAL ABDOMINAL CT SCAN: ICD-10-CM

## 2025-03-10 DIAGNOSIS — Z87.19 HISTORY OF PANCREATITIS: ICD-10-CM

## 2025-03-10 DIAGNOSIS — K21.9 GASTROESOPHAGEAL REFLUX DISEASE, UNSPECIFIED WHETHER ESOPHAGITIS PRESENT: ICD-10-CM

## 2025-03-10 DIAGNOSIS — E66.811 CLASS 1 OBESITY WITH SERIOUS COMORBIDITY AND BODY MASS INDEX (BMI) OF 30.0 TO 30.9 IN ADULT, UNSPECIFIED OBESITY TYPE: ICD-10-CM

## 2025-03-10 DIAGNOSIS — R16.2 HEPATOSPLENOMEGALY: ICD-10-CM

## 2025-03-10 DIAGNOSIS — K56.600 SMALL BOWEL OBSTRUCTION, PARTIAL: ICD-10-CM

## 2025-03-10 PROBLEM — I87.1 MAY-THURNER SYNDROME: Status: ACTIVE | Noted: 2025-03-10

## 2025-03-10 PROBLEM — D75.89 MACROCYTOSIS: Status: ACTIVE | Noted: 2025-03-10

## 2025-03-10 PROBLEM — Z92.89 HOSPITALIZATION WITHIN LAST 30 DAYS: Status: ACTIVE | Noted: 2025-02-19

## 2025-03-10 PROBLEM — E65 CENTRAL ADIPOSITY: Status: ACTIVE | Noted: 2025-03-10

## 2025-03-10 PROBLEM — L98.9 SCALP LESION: Status: ACTIVE | Noted: 2025-03-10

## 2025-03-10 PROBLEM — N30.00 ACUTE CYSTITIS WITHOUT HEMATURIA: Status: ACTIVE | Noted: 2025-03-10

## 2025-03-10 PROBLEM — I71.20 THORACIC AORTIC ANEURYSM WITHOUT RUPTURE: Status: ACTIVE | Noted: 2025-03-10

## 2025-03-10 PROCEDURE — 99214 OFFICE O/P EST MOD 30 MIN: CPT | Performed by: NURSE PRACTITIONER

## 2025-03-10 PROCEDURE — 1159F MED LIST DOCD IN RCRD: CPT | Performed by: NURSE PRACTITIONER

## 2025-03-10 PROCEDURE — G2211 COMPLEX E/M VISIT ADD ON: HCPCS | Performed by: NURSE PRACTITIONER

## 2025-03-10 PROCEDURE — 1160F RVW MEDS BY RX/DR IN RCRD: CPT | Performed by: NURSE PRACTITIONER

## 2025-03-10 RX ORDER — AMMONIUM LACTATE 12 G/100G
CREAM TOPICAL
COMMUNITY
Start: 2025-03-04

## 2025-03-10 RX ORDER — ALUMINUM ZIRCONIUM OCTACHLOROHYDREX GLY 16 G/100G
1 GEL TOPICAL DAILY
COMMUNITY
Start: 2025-02-26

## 2025-03-10 NOTE — PROGRESS NOTES
Specialty Pharmacy Patient Management Program  Refill Outreach     Gisell was contacted today regarding refills of their medication(s).    Refill Questions      Flowsheet Row Most Recent Value   Changes to allergies? No   Changes to medications? No   New conditions or infections since last clinic visit Yes   If yes, please describe  Small bowel obstruction   Unplanned office visit, urgent care, ED, or hospital admission in the last 4 weeks  Yes  [1 ER visit for small bowel obstruction]   How does patient/caregiver feel medication is working? Good   Financial problems or insurance changes  No   Since the previous refill, were any specialty medication doses or scheduled injections missed or delayed?  Yes   If yes, please provide the amount 2 doses   Why were doses missed? Patient was in the hospital   Does this patient require a clinical escalation to a pharmacist? Yes            Delivery Questions      Flowsheet Row Most Recent Value   Delivery method UPS   Delivery address verified with patient/caregiver? Yes   Delivery address Home   Other address preferred n/a   Number of medications in delivery 1   Medication(s) being filled and delivered Hydroxyurea (HYDREA)   Doses left of specialty medications 12 days left   Copay verified? Yes   Copay amount $0   Copay form of payment No copayment ($0)   Delivery Date Selection 03/11/25   Signature Required No            Medication Adherence    Adherence tools used: watch          Follow-up: 30 day(s)     Jennifer Hylton, Pharmacy Care Coordinator  3/10/2025  11:36 EDT

## 2025-03-10 NOTE — PROGRESS NOTES
GASTROENTEROLOGY OFFICE NOTE    Gisell Wylie  [id number]  1960    CARE TEAM  Patient Care Team:  Zainab Childs MD as PCP - General (Internal Medicine & Pediatrics)  Jaxson Alcantara MD as Surgeon (General Surgery)  Lilo Townsend APRN as Nurse Practitioner (Gastroenterology)    Referring Provider: No ref. provider found    Chief Complaint   Patient presents with    Hospital Followup     Follow up. SBO.         HISTORY OF PRESENT ILLNESS:   Gisell Wylie is a 64 y.o. female who returns for 3 month follow up, prior follow up rescheduled due to recent hospital admission.  She has history of elevated AST, hepatosplenomegaly on imaging for which weight loss has been recommended for suspected fatty liver, mildly elevated mitochondrial antibody around 21; history of polycythemia vera, prescribed Xarelto, established with Dr. Damon; prior ultrasound gallbladder in 2019 that revealed contracted gallbladder without stones, increased echogenicity throughout the liver suggestive hepatic steatosis, CT scan 5/2023 with findings suggestive of pancreatitis, mild mesenteric fat stranding that may represent mild mesenteric panniculitis, no clear etiology of pancreatitis established, lipase elevated, liver enzymes normal, triglycerides 80.      She also has history of abdominal discomfort, intermittent chest discomfort    Her weight at last office visit on 12/2/2024 was 201 pounds.  Today's weight 196 pounds 6.4 ounces.  At last office visit she reported experiencing nausea daily.    At last office visit recommended she restart proton pump inhibitor, omeprazole 20 mg daily prescribed, recommended she try MiraLAX daily in the event she had abdominal discomfort due to irritable bowel syndrome    She was admitted 2/19/2025 to 2/21/2025 at with concern for small bowel obstruction with review of documentation that patient had NG tube, small bowel follow-through that appeared normal, general surgery consultation.   Reviewed general surgery consultation note reveals partial small bowel obstruction versus ileus with improved symptoms.     History of Present Illness  The patient is a 64-year-old female who returns for follow up with history as above    It was recommended by her primary care physician following an episode of partial small bowel obstruction vs ileus that she return to our office. This was her second episode of partial small bowel obstruction, the first occurring 2 to 3 years prior, which resolved spontaneously without the need for hospitalization. The initial episode was characterized by vomiting and diarrhea, and an x-ray was abnormal. She has not undergone any abdominal surgery.     She continue to have intermittent abdominal discomfort, described on left mid to low abdomen.     She expresses concern about potential dietary triggers for her condition and seeks advice on appropriate food choices. She has never had an NG tube before and does not want to do that again. She expresses concern regarding irregular bowel movements. She has been taking MiraLAX almost daily since her hospital discharge, resulting in bowel movements every other day.     She has a ventral hernia, which becomes more pronounced when she sits up, leading her primary care physician to question its potential contribution to her gastrointestinal issues.    She has a long-standing history of acid reflux, which seems controlled with omeprazole 20 mg daily. She was advised to continue this medication due to its efficacy.       She had upper GI series 1/27/2025 due to abdominal pain and/or diarrhea.  Imaging revealed prominent cricopharyngeus muscle that does not appear to impede swallowing of thin barium, moderate reflux to the level of thoracic inlet, mild esophageal dysmotility, small hiatal hernia.    She has a family history of esophageal cancer in her brother, although she was informed that it is not hereditary. Her younger son had severe acid  reflux at the age of 8 and required prescription medication, which is now available over the counter. She has not experienced heartburn or other symptoms in recent years and had stopped taking her medication. However, she restarted omeprazole and continues to take omeprazole without symptoms of reflux or heartburn.         Past Medical History:   Diagnosis Date    AAA (abdominal aortic aneurysm)     Abnormal chest CT 2023    enlarged Aortic Aneurysm    Anxiety 2023    Arthritis 2023    Cellulitis of lower limb 2016    From Automated Load;Provider: Radha Lopez;Status: Active    Chronic bilateral low back pain without sciatica 2023    Deep vein thrombophlebitis of calf, left     Encounter for long-term (current) use of high-risk medication 08/15/2022    Fatty liver     GERD (gastroesophageal reflux disease)     Hepatosplenomegaly 2023    Hyperlipidemia     Insomnia 2023    Lumbar spondylosis 2023    May-Thurner syndrome     Neuropathy 2023    Obstructive sleep apnea     Pancreatitis     Polycythemia rubra vera     Postsurgical menopause 2023    Pulmonary embolism     Recurrent major depression 2016    From Automated Load;Provider: Ange Alex;Status: Active    Small bowel obstruction 2023    Stage 3b chronic kidney disease 2023    Thoracic aortic aneurysm without rupture 2023    4.1 cm. 10/6/2023: Pt. has checked yearly at . Last checked 2022. Pt. to schedule f/u.        Past Surgical History:   Procedure Laterality Date    BILATERAL BREAST REDUCTION      CAROTID STENT       SECTION      COLONOSCOPY  2024    Dr. Boyd    ELBOW FUSION      HYSTERECTOMY      ILIAC ARTERY STENT Left 2020    Mesh    OOPHORECTOMY      OTHER SURGICAL HISTORY  2020    Iliac vein carterization    REDUCTION MAMMAPLASTY Bilateral 1999    TONSILLECTOMY          Current Outpatient Medications on File Prior to Visit   Medication Sig     "ammonium lactate (AMLACTIN) 12 % cream     aspirin 81 MG EC tablet Take 1 tablet by mouth Daily.    Cholecalciferol 50 MCG (2000 UT) tablet Take 1 tablet by mouth Daily.    fluticasone (FLONASE) 50 MCG/ACT nasal spray 1 spray by Each Nare route Daily.    gabapentin (NEURONTIN) 400 MG capsule Take 300 mg by mouth Every Night.    hydroxyurea (HYDREA) 500 MG capsule Take 2 capsules by mouth every other day alternating with 3 capsules by mouth every other day.    meloxicam (MOBIC) 7.5 MG tablet Take 1 tablet by mouth As Needed for Moderate Pain or Mild Pain.    omeprazole (priLOSEC) 20 MG capsule Take 1 capsule by mouth Daily. 30 minutes prior to a meal    pravastatin (PRAVACHOL) 40 MG tablet Take 1 tablet by mouth Daily.    Probiotic Product (Align) 4 MG capsule Take 1 capsule by mouth Daily.    sertraline (ZOLOFT) 25 MG tablet Take 1 tablet by mouth Daily.    XARELTO 20 MG tablet Take 1 tablet by mouth Every Evening.     No current facility-administered medications on file prior to visit.       Allergies   Allergen Reactions    Clarithromycin Other (See Comments)     [place]       Family History   Problem Relation Age of Onset    Atrial fibrillation Mother     Dementia Mother     Diabetes Mother     Heart disease Mother     Leukemia Paternal Grandmother     Breast cancer Paternal Grandmother     Colon cancer Maternal Aunt     Colon cancer Cousin     Lung cancer Cousin     Breast cancer Cousin     Ovarian cancer Neg Hx        Social History     Socioeconomic History    Marital status:    Tobacco Use    Smoking status: Never    Smokeless tobacco: Never   Vaping Use    Vaping status: Never Used   Substance and Sexual Activity    Alcohol use: No    Drug use: No    Sexual activity: Defer       PHYSICAL EXAM   /70 (BP Location: Left arm, Patient Position: Sitting, Cuff Size: Adult)   Pulse 58   Ht 170.2 cm (67\")   Wt 89.1 kg (196 lb 6.4 oz)   SpO2 97%   BMI 30.76 kg/m²   Physical Exam  Constitutional:      "  General: She is not in acute distress.     Appearance: She is not toxic-appearing.   HENT:      Head: Normocephalic and atraumatic. No contusion.      Right Ear: External ear normal.      Left Ear: External ear normal.   Eyes:      General: Lids are normal. No scleral icterus.        Right eye: No discharge.         Left eye: No discharge.      Extraocular Movements: Extraocular movements intact.   Neck:      Trachea: Trachea normal.      Comments: No visible mass  No visible adenopathy  Cardiovascular:      Rate and Rhythm: Bradycardia present.   Pulmonary:      Effort: No respiratory distress.      Comments: Symmetrical expansion    Abdominal:      Palpations: Abdomen is soft. There is no mass.      Tenderness: There is no abdominal tenderness.   Musculoskeletal:      Comments: Symmetrical movement of upper extremities  Symmetrical movement of lower extremities  No visible deformities   Skin:     General: Skin is warm and dry.      Coloration: Skin is not jaundiced.   Neurological:      General: No focal deficit present.      Mental Status: She is alert and oriented to person, place, and time.   Psychiatric:         Mood and Affect: Mood normal.         Behavior: Behavior normal.         Thought Content: Thought content normal.     Results Review:  US GB in 2019 revealed contracted gallbladder without stones, increased echogenicity throughout liver suggestive of hepatic steatosis.       7/22/2021 comprehensive metabolic panel revealed elevated AST of 33.     CT scan 5/2023 with findings suggestive of pancreatitis, mild mesenteric fat stranding that may represent mild mesenteric panniculitis. No clear etiology of pancreatitis was established. Lipase was elevated. liver enzymes were normal. Trigylcerides were 80.       10/30/2023 hepatitis C antibody nonreactive, CMP with elevated glucose 157, elevated calcium 10.7, normal alkaline phosphatase 75, normal ALT 45 (lab range of 7-52), AST elevated at 57 (lab range of  13-39), bilirubin normal 0.7.  Lipase normal 24.  OMAR negative.  RF latex less than 10.  CBC with normal white blood cell count 5.8, normal hemoglobin 14.1, normal hematocrit 42.5 and elevated platelet count 514.     11/14/2023 ultrasound of the abdomen revealed liver measures 22.5 cm, gallbladder is present with normal wall thickness, spleen measures 15.2 cm and is enlarged, overall impression changes of hepatosplenomegaly with hepatosteatosis.     11/27/2023 CMP revealed elevated chloride 108, elevated glucose 105, alkaline phosphatase normal at 57, ALT normal per above lab reference range at 41, AST elevated but improved at 43, bilirubin normal 0.8.  INR 1.4.  Hepatitis A IgM nonreactive, hepatitis B surface antigen nonreactive, hepatitis B core antibody nonreactive, hepatitis C antibody nonreactive.  Ceruloplasmin 25.  Smooth muscle antibody negative.  Ferritin 125.7.     She reports she was notified by her primary care provider's office that mitochondrial antibody was mildly elevated around 21.     12/12/2023 abdominal duplex ultrasound at [facility] due to hepatosplenomegaly with prior ultrasound from 11/14/2023 that revealed changes of hepatosteatosis, mild splenomegaly     Prior colonoscopy in 2021 at [facility].  Repeat colonoscopy recommended in 10 years.     2/20/2024 CLARKE FibroSure revealed fibrosis stage F0 to F1, S2-S3 moderate to severe steatosis and N3 severe CLARKE     3/28/2024 Celiac comprehensive panel normal/negative     4/11/2024 colonoscopy per Dr. Boyd with excellent bowel prep revealed 2 polyps measuring 2 to 3 mm in size at 35 cm proximal to anus and in sigmoid colon.  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 These are rare, benign and intermucosal tumors and are most frequently in observed in women in their 60s.  You need no special follow-up from that standpoint as this is a benign lesion.     4/26/2024 IgA normal.      5/21/2024 CMP as below with elevated ALT and  AST but improved over the past 4 months.    10/28/2024 CBC revealed normal white blood cell count, normal hemoglobin 13.3, normal hematocrit 40.5 and normal platelet count 431. Comprehensive metabolic panel revealed elevated glucose of 120 and otherwise appeared normal with normal alkaline phosphatase 66, normal ALT 26, normal AST 32 and normal bilirubin 0.8.       Upper GI series 1/27/2025 revealed prominent cricopharyngeus, moderate reflux (this does not differentiate between physiologic reflux, acid reflux, nonacid reflux), mild esophageal dysmotility, small sliding hiatal hernia    2/11/2025 ultrasound abdomen due to abdominal pain, diarrhea revealed fatty appearance of liver    2/19/2025 CT abdomen pelvis without contrast revealed splenomegaly, mildly dilated loops of small intestine involving proximal and mid small intestine measuring up to 3.2 cm in diameter, decompressed distal small intestine with probable transition point in right central lower abdomen/upper pelvis, features suggestive of at least partial mechanical small bowel obstruction    2/20/2025 small bowel follow-through or was normal without evidence of small bowel obstruction.    CMP          6/26/2024    14:20 2/19/2025    19:59 2/20/2025    04:33   CMP   Glucose 104  122  126    BUN 23  16  13    Creatinine 0.85  0.80  0.71    EGFR 76.1  81.4  93.9    Sodium 142  144  141    Potassium 4.4  3.9  3.6    Chloride 107  109  109    Calcium 9.9  9.9  9.0    Total Protein 7.1  7.0     Albumin 4.6  4.3     Globulin 2.5  2.7     Total Bilirubin 0.5  1.1     Alkaline Phosphatase 79  72     AST (SGOT) 35  28     ALT (SGPT) 27  21     Albumin/Globulin Ratio 1.8  1.6     BUN/Creatinine Ratio 27.1  20.0  18.3    Anion Gap 11.6  12.0  9.0      CBC          11/25/2024    12:21 2/19/2025    19:59 2/20/2025    04:33   CBC   WBC 5.23  5.20  4.24    RBC 3.67  3.52  3.14    Hemoglobin 12.9  12.3  11.1    Hematocrit 38.6  37.2  33.7    .2  105.7  107.3    MCH  35.1  34.9  35.4    MCHC 33.4  33.1  32.9    RDW 13.5  14.3  14.1    Platelets 422  416  350        ASSESSMENT / PLAN    Assessment & Plan      1. Metabolic dysfunction-associated steatohepatitis (MASH)  2. Hepatosplenomegaly  3. Class 1 obesity with serious comorbidity and body mass index (BMI) of 30.0 to 30.9 in adult, unspecified obesity type  4. Abnormal laboratory test  2/20/2024 CLARKE fibrosure concerning for CLARKE for which I recommend continued weight loss.   - liver enzymes normal 10/2024 and 2/2025  - monitor CBC to evaluate platelet count (platelet count less than 120 is concerning for advanced liver disease); monitor CMP to evaluate liver enzymes and albumin (hypoalbuminemia is concerning for advanced liver disease); monitor INR (if INR is prolonged this may raise suspicion for advancing liver disease/fibrosis)  - She has done well with weight loss since December 2023   - She has history of mildly elevated mitochondrial antibody.   - I previously expressed concern that turmeric may contribute to elevated liver enzymes but she had elevated liver enzymes off turmeric     -Meloxicam reported as new medication since liver enzymes were normal May 2023.  The following was reviewed on NIH liver tox:  Meloxicam has been linked to rare instances of acute, clinically apparent liver injury.  It did not seem as though she uses meloxicam on a regular basis lowering suspicion that meloxicam is reason or contributing to elevated liver enzymes  - she had negative celiac serology and prior EGD per Dr. Heath that I do not believe mentioned elevated liver enzymes.   - due to suspected fatty liver, recommend she prioritize protein intake with goal of 100 grams per day. Unknown etiology for ileus vs partial small bowel obstruction but unless symptomatic, she does not have to make diet modification for history of ileus vs small bowel obstruction.   5. History of pancreatitis  admitted 5/2023 with diagnosis of pancreatitis (CT  abnormal, lipase elevated) of uncertain etiology. She rarely drinks alcohol. Triglycerides normal. No gallstones on prior imaging. Another etiology to consider is medication induced pancreatitis. The following was previously reviewed on up to date: Pravastatin is a class Ia medication that can cause drug induced pancreatitis.Sertraline is a class IV medications that can cause medication induced pancreatitis.  Consider patient discussing different medications with provider(s) that prescribe the medication if she has recurrent pancreatitis in the future   6. Abdominal discomfort  7. Abnormal abdominal CT scan  CT scan 5/2023 mild mesenteric fat stranding that may represent mild mesenteric panniculitis, thrombocytosis and left abdominal discomfort . 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 Try to take Miralax daily. Take probiotic as needed for diarrhea as probiotic was previously helpful for 2 recent episodes of diarrhea.  8. Hamartoma determined by colorectal biopsy  - review of letter to patient from Dr. 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 These are rare, benign and intermucosal tumors and are most frequently in observed in women in their 60s.   no special follow-up from that standpoint recommended as this is a benign lesion. Repeat colonoscopy 4/11/2034   9. Gastroesophageal reflux disease, unspecified whether esophagitis present  10. Hiatal hernia  - continue omeprazole daily.   -Upper GI series 1/27/2025 revealed prominent cricopharyngeus, moderate reflux (this does not differentiate between physiologic reflux, acid reflux, nonacid reflux), mild esophageal  dysmotility, small sliding hiatal hernia    11. History of ileus  12. History of Small bowel obstruction, partial  - unknown etiology for recent ileus vs small bowel obstruction. Recommend follow up with general surgery  - if recurrent symptoms, consider liquid only diet for up to 48 hours.  If worsening symptoms return to hospital for evaluation.  - continue Miralax   - Ambulatory Referral to General Surgery      Return in about 3 months (around 6/10/2025).    Patient or patient representative verbalized consent for the use of Ambient Listening during the visit with  ASHISH Cavazos for chart documentation. 3/10/2025  08:56 EDT    ASHISH Cavazos  03/10/2025

## 2025-03-11 ENCOUNTER — TELEPHONE (OUTPATIENT)
Dept: GASTROENTEROLOGY | Facility: CLINIC | Age: 65
End: 2025-03-11
Payer: MEDICARE

## 2025-03-11 NOTE — TELEPHONE ENCOUNTER
Catrachita @ Dr. Luis office notified, patient is following up from hospital stay, still has concerns.   Per Catrachita will contact patient to get scheduled.

## 2025-03-11 NOTE — TELEPHONE ENCOUNTER
"----- Message from Lilo Townsend sent at 3/10/2025  4:03 PM EDT -----  Regarding: RE: Referral  It is a follow up from when he saw her in the hospital when Dr. Luis documented \"Patient with partial small bowel obstruction versus ileus\" Patient has some questions with concern for possible recurrent small bowel obstruction and concern for possible hernia that I would like for her to discuss with Dr. Wright!  ----- Message -----  From: Renetta Roach MA  Sent: 3/10/2025   3:56 PM EDT  To: Lilo Townsend, ASHISH  Subject: Referral                                         Beau from [facility] called, they received referral & records sent over for patient to see Dr. Luis, referral order says history of ileus & SBO, partial. Says the, the small bowel follow through scan shows, \"Small bowel series appeared within normal limits. There was no evidence of small bowel obstruction.\"They are confused on what she needs to be seen for. Advise please.  "

## 2025-03-11 NOTE — PROGRESS NOTES
Hospital admission noted, patient advised to resume Xarelto and Hydrea and follow up with general surgery. No drug-drug interactions identified with Hydrea.    Ann Cowden Mayer, PharmD, [facility]  Oncology Clinical Pharmacist  Phone [phone]    3/11/2025  09:55 EDT

## 2025-03-12 ENCOUNTER — TRANSCRIBE ORDERS (OUTPATIENT)
Dept: GENERAL RADIOLOGY | Facility: HOSPITAL | Age: 65
End: 2025-03-12
Payer: MEDICARE

## 2025-03-12 ENCOUNTER — TRANSCRIBE ORDERS (OUTPATIENT)
Dept: ADMINISTRATIVE | Facility: HOSPITAL | Age: 65
End: 2025-03-12
Payer: MEDICARE

## 2025-03-12 ENCOUNTER — HOSPITAL ENCOUNTER (OUTPATIENT)
Dept: GENERAL RADIOLOGY | Facility: HOSPITAL | Age: 65
Discharge: HOME | End: 2025-03-12
Admitting: INTERNAL MEDICINE
Payer: MEDICARE

## 2025-03-12 DIAGNOSIS — R19.7 DIARRHEA, UNSPECIFIED TYPE: Primary | ICD-10-CM

## 2025-03-12 DIAGNOSIS — K56.609 SMALL BOWEL OBSTRUCTION: Primary | ICD-10-CM

## 2025-03-12 DIAGNOSIS — R19.7 DIARRHEA, UNSPECIFIED TYPE: ICD-10-CM

## 2025-03-12 PROCEDURE — 74022 RADEX COMPL AQT ABD SERIES: CPT

## 2025-03-13 ENCOUNTER — HOSPITAL ENCOUNTER (OUTPATIENT)
Dept: CT IMAGING | Facility: HOSPITAL | Age: 65
Discharge: HOME | End: 2025-03-13
Admitting: INTERNAL MEDICINE
Payer: MEDICARE

## 2025-03-13 DIAGNOSIS — K56.609 SMALL BOWEL OBSTRUCTION: ICD-10-CM

## 2025-03-13 PROCEDURE — 25510000001 IOPAMIDOL 61 % SOLUTION: Performed by: INTERNAL MEDICINE

## 2025-03-13 PROCEDURE — 74177 CT ABD & PELVIS W/CONTRAST: CPT

## 2025-03-13 RX ORDER — IOPAMIDOL 612 MG/ML
100 INJECTION, SOLUTION INTRAVASCULAR
Status: COMPLETED | OUTPATIENT
Start: 2025-03-13 | End: 2025-03-13

## 2025-03-13 RX ADMIN — IOPAMIDOL 90 ML: 612 INJECTION, SOLUTION INTRAVENOUS at 10:12

## 2025-04-04 ENCOUNTER — SPECIALTY PHARMACY (OUTPATIENT)
Dept: ONCOLOGY | Facility: HOSPITAL | Age: 65
End: 2025-04-04
Payer: MEDICARE

## 2025-04-04 NOTE — PROGRESS NOTES
Specialty Pharmacy Patient Management Program  Refill Outreach     Gisell was contacted today regarding refills of their medication(s).    Refill Questions      Flowsheet Row Most Recent Value   Changes to allergies? No   Changes to medications? No   New conditions or infections since last clinic visit No   If yes, please describe  n/a   Unplanned office visit, urgent care, ED, or hospital admission in the last 4 weeks  No   How does patient/caregiver feel medication is working? Good   Financial problems or insurance changes  No   Since the previous refill, were any specialty medication doses or scheduled injections missed or delayed?  No   If yes, please provide the amount n/a   Why were doses missed? n/a   Does this patient require a clinical escalation to a pharmacist? No            Delivery Questions      Flowsheet Row Most Recent Value   Delivery method UPS   Delivery address verified with patient/caregiver? Yes   Delivery address Home   Other address preferred n/a   Number of medications in delivery 1   Medication(s) being filled and delivered Hydroxyurea (HYDREA)   Doses left of specialty medications 6 days   Copay verified? Yes   Copay amount $0   Copay form of payment No copayment ($0)   Delivery Date Selection 04/08/25   Signature Required No   Do you consent to receive electronic handouts?  --  [didn't ask]            Medication Adherence    Adherence tools used: watch          Follow-up: 30 day(s)     Clarisa Israel, Pharmacy Care Coordinator  4/4/2025  13:53 EDT

## 2025-05-02 ENCOUNTER — SPECIALTY PHARMACY (OUTPATIENT)
Dept: ONCOLOGY | Facility: HOSPITAL | Age: 65
End: 2025-05-02
Payer: MEDICARE

## 2025-05-02 NOTE — PROGRESS NOTES
Specialty Pharmacy Patient Management Program  Refill Outreach     Gisell was contacted today regarding refills of their medication(s).    Refill Questions      Flowsheet Row Most Recent Value   Changes to allergies? No   Changes to medications? No   New conditions or infections since last clinic visit No   If yes, please describe  n/a   Unplanned office visit, urgent care, ED, or hospital admission in the last 4 weeks  No   How does patient/caregiver feel medication is working? Good   Financial problems or insurance changes  No   Since the previous refill, were any specialty medication doses or scheduled injections missed or delayed?  Yes   If yes, please provide the amount 1   Why were doses missed? Patient forgot to take   Does this patient require a clinical escalation to a pharmacist? No            Delivery Questions      Flowsheet Row Most Recent Value   Delivery method UPS   Delivery address verified with patient/caregiver? Yes   Delivery address Home   Other address preferred n/a   Number of medications in delivery 1   Medication(s) being filled and delivered Hydroxyurea (HYDREA)   Doses left of specialty medications 7 days   Copay verified? Yes   Copay amount $0   Copay form of payment No copayment ($0)   Delivery Date Selection 05/06/25   Signature Required No            Medication Adherence    Adherence tools used: watch          Follow-up: 30 day(s)     Clarisa Israel, Pharmacy Technician  5/2/2025  10:12 EDT

## 2025-05-28 ENCOUNTER — SPECIALTY PHARMACY (OUTPATIENT)
Dept: ONCOLOGY | Facility: HOSPITAL | Age: 65
End: 2025-05-28
Payer: MEDICARE

## 2025-05-28 DIAGNOSIS — D45 POLYCYTHEMIA RUBRA VERA: ICD-10-CM

## 2025-05-28 RX ORDER — HYDROXYUREA 500 MG/1
1000 CAPSULE ORAL TAKE AS DIRECTED
Qty: 75 CAPSULE | Refills: 11 | Status: SHIPPED | OUTPATIENT
Start: 2025-05-28

## 2025-05-28 NOTE — PROGRESS NOTES
Re: Refills of Oral Specialty Medication - Hydrea (hydroxyurea)    Drug-Drug Interactions: The current medication list was reviewed and there are no relevant drug-drug interactions with the specialty medication.  Medication Allergies: The patient has no relevant allergies as it relates to their oral specialty medication  Review of Labs/Dose Adjustments: NO DOSE CHANGE - I reviewed the most recent note and labs and the patient will continue without any dose changes.  I sent refills as described below.    Drug: Hydrea (hydroxyurea)  Strength: 500 mg  Directions: Take 2 capsules by mouth every other day alternating with 3 capsules by mouth every other day.   Quantity: 75  Refills: 11  Pharmacy prescription sent to: [facility]    Sandra Nesbitt, PharmD, [facility]  Clinic Specialty Oncology Pharmacist  Phone [phone]      5/28/2025  07:56 EDT

## 2025-06-09 ENCOUNTER — SPECIALTY PHARMACY (OUTPATIENT)
Dept: ONCOLOGY | Facility: HOSPITAL | Age: 65
End: 2025-06-09
Payer: MEDICARE

## 2025-06-09 NOTE — PROGRESS NOTES
Specialty Pharmacy Patient Management Program  Refill Outreach     Gisell was contacted today regarding refills of their medication(s) Hydrea. Patient states she has a full bottle of medication due to a recent dose change. Patient wants to wait until the second week of July to do a refill.     Refill Questions      Flowsheet Row Most Recent Value   Changes to allergies? No   Changes to medications? No   New conditions or infections since last clinic visit No   If yes, please describe  n/a   Unplanned office visit, urgent care, ED, or hospital admission in the last 4 weeks  No   How does patient/caregiver feel medication is working? Good   Financial problems or insurance changes  No   Since the previous refill, were any specialty medication doses or scheduled injections missed or delayed?  No   If yes, please provide the amount n/a   Why were doses missed? n/a   Does this patient require a clinical escalation to a pharmacist? No                Medication Adherence    Adherence tools used: patrice Israel, Pharmacy Technician  6/9/2025  09:22 EDT

## 2025-06-16 ENCOUNTER — OFFICE VISIT (OUTPATIENT)
Dept: ONCOLOGY | Facility: CLINIC | Age: 65
End: 2025-06-16
Payer: MEDICARE

## 2025-06-16 ENCOUNTER — SPECIALTY PHARMACY (OUTPATIENT)
Dept: ONCOLOGY | Facility: HOSPITAL | Age: 65
End: 2025-06-16
Payer: MEDICARE

## 2025-06-16 ENCOUNTER — LAB (OUTPATIENT)
Dept: LAB | Facility: HOSPITAL | Age: 65
End: 2025-06-16
Payer: MEDICARE

## 2025-06-16 VITALS
HEART RATE: 66 BPM | SYSTOLIC BLOOD PRESSURE: 113 MMHG | BODY MASS INDEX: 31.92 KG/M2 | TEMPERATURE: 96.5 F | RESPIRATION RATE: 16 BRPM | DIASTOLIC BLOOD PRESSURE: 65 MMHG | HEIGHT: 67 IN | WEIGHT: 203.4 LBS | OXYGEN SATURATION: 96 %

## 2025-06-16 DIAGNOSIS — D45 POLYCYTHEMIA RUBRA VERA: ICD-10-CM

## 2025-06-16 DIAGNOSIS — D45 POLYCYTHEMIA RUBRA VERA: Primary | Chronic | ICD-10-CM

## 2025-06-16 DIAGNOSIS — I87.1 MAY-THURNER SYNDROME: ICD-10-CM

## 2025-06-16 DIAGNOSIS — Z79.899 ENCOUNTER FOR LONG-TERM CURRENT USE OF HIGH RISK MEDICATION: ICD-10-CM

## 2025-06-16 LAB
ALBUMIN SERPL-MCNC: 4.3 G/DL (ref 3.5–5.2)
ALBUMIN/GLOB SERPL: 1.5 G/DL
ALP SERPL-CCNC: 83 U/L (ref 39–117)
ALT SERPL W P-5'-P-CCNC: 41 U/L (ref 1–33)
ANION GAP SERPL CALCULATED.3IONS-SCNC: 9 MMOL/L (ref 5–15)
AST SERPL-CCNC: 55 U/L (ref 1–32)
BASOPHILS # BLD AUTO: 0.04 10*3/MM3 (ref 0–0.2)
BASOPHILS NFR BLD AUTO: 0.8 % (ref 0–1.5)
BILIRUB SERPL-MCNC: 0.5 MG/DL (ref 0–1.2)
BUN SERPL-MCNC: 13.3 MG/DL (ref 8–23)
BUN/CREAT SERPL: 18.5 (ref 7–25)
CALCIUM SPEC-SCNC: 9.5 MG/DL (ref 8.6–10.5)
CHLORIDE SERPL-SCNC: 107 MMOL/L (ref 98–107)
CO2 SERPL-SCNC: 25 MMOL/L (ref 22–29)
CREAT SERPL-MCNC: 0.72 MG/DL (ref 0.57–1)
DEPRECATED RDW RBC AUTO: 54.7 FL (ref 37–54)
EGFRCR SERPLBLD CKD-EPI 2021: 92.3 ML/MIN/1.73
EOSINOPHIL # BLD AUTO: 0.1 10*3/MM3 (ref 0–0.4)
EOSINOPHIL NFR BLD AUTO: 2 % (ref 0.3–6.2)
ERYTHROCYTE [DISTWIDTH] IN BLOOD BY AUTOMATED COUNT: 14.2 % (ref 12.3–15.4)
GLOBULIN UR ELPH-MCNC: 2.9 GM/DL
GLUCOSE SERPL-MCNC: 138 MG/DL (ref 65–99)
HCT VFR BLD AUTO: 37.9 % (ref 34–46.6)
HGB BLD-MCNC: 12.5 G/DL (ref 12–15.9)
IMM GRANULOCYTES # BLD AUTO: 0.03 10*3/MM3 (ref 0–0.05)
IMM GRANULOCYTES NFR BLD AUTO: 0.6 % (ref 0–0.5)
LYMPHOCYTES # BLD AUTO: 1 10*3/MM3 (ref 0.7–3.1)
LYMPHOCYTES NFR BLD AUTO: 19.9 % (ref 19.6–45.3)
MCH RBC QN AUTO: 34.9 PG (ref 26.6–33)
MCHC RBC AUTO-ENTMCNC: 33 G/DL (ref 31.5–35.7)
MCV RBC AUTO: 105.9 FL (ref 79–97)
MONOCYTES # BLD AUTO: 0.19 10*3/MM3 (ref 0.1–0.9)
MONOCYTES NFR BLD AUTO: 3.8 % (ref 5–12)
NEUTROPHILS NFR BLD AUTO: 3.66 10*3/MM3 (ref 1.7–7)
NEUTROPHILS NFR BLD AUTO: 72.9 % (ref 42.7–76)
PLATELET # BLD AUTO: 381 10*3/MM3 (ref 140–450)
PMV BLD AUTO: 8.7 FL (ref 6–12)
POTASSIUM SERPL-SCNC: 4.4 MMOL/L (ref 3.5–5.2)
PROT SERPL-MCNC: 7.2 G/DL (ref 6–8.5)
RBC # BLD AUTO: 3.58 10*6/MM3 (ref 3.77–5.28)
SODIUM SERPL-SCNC: 141 MMOL/L (ref 136–145)
WBC NRBC COR # BLD AUTO: 5.02 10*3/MM3 (ref 3.4–10.8)

## 2025-06-16 PROCEDURE — 1160F RVW MEDS BY RX/DR IN RCRD: CPT | Performed by: NURSE PRACTITIONER

## 2025-06-16 PROCEDURE — 36415 COLL VENOUS BLD VENIPUNCTURE: CPT

## 2025-06-16 PROCEDURE — 99214 OFFICE O/P EST MOD 30 MIN: CPT | Performed by: NURSE PRACTITIONER

## 2025-06-16 PROCEDURE — 85025 COMPLETE CBC W/AUTO DIFF WBC: CPT

## 2025-06-16 PROCEDURE — 1159F MED LIST DOCD IN RCRD: CPT | Performed by: NURSE PRACTITIONER

## 2025-06-16 PROCEDURE — 80053 COMPREHEN METABOLIC PANEL: CPT

## 2025-06-16 PROCEDURE — 1126F AMNT PAIN NOTED NONE PRSNT: CPT | Performed by: NURSE PRACTITIONER

## 2025-06-16 NOTE — PROGRESS NOTES
CHIEF COMPLAINT: Polycythemia    Problem List:  Oncology/Hematology History Overview Note   1.  Collin 2+ polycythemia rubra vera on Hydrea and aspirin  2.  Recurrent DVT and PE due to problem #2 with low titer positive lupus anticoagulant per prior testing by Dr. Major apparently on 2 separate occasions confirmatory 3 months apart  3.  May Thurner  4.  Ascending aortic aneurysm  5.  History of SOLOMON  6.  Obstructive sleep apnea  7.  Contrast-induced nephropathy  8.  Reflux with nausea  9.  Pancreatitis, hospitalized May 2023, treated with pain medications and IV fluids.  10. SBO February 2025  12.  CLARKE    -8/2/2019 initial [facility] medical oncology consultation: May 2016 developed unprovoked left lower extremity deep vein thrombosis treated with left common iliac stent, thrombolyzes, temporary vena cava filter placement and retrieval.  Saw hematologist who told her she might have a low level lupus anticoagulant but she does not have that data and they were told that they did not think that was significant enough to warrant lifetime anticoagulation.  She stopped Xarelto after 8 months.  Starting the end of June she was developing pain in the left lower extremity again with progressive swelling and shortness of breath.  Went to [facility] ER on 4 July.  Was found to have left lower extremity extensive deep vein thrombosis and right lower lobe pulmonary embolus.  She was treated with thrombectomy and thrombolysis by Rigoberto and Pavel.  Previously she could not afford her Xarelto and they have switched her from heparin to Coumadin but before discharge switched her to Xarelto.  Her creatinine during her hospitalization increased to 1.38 from a baseline of 1.2.  She comes to hematology at [facility] on 8/2/2019 with labs showing creatinine of 1.23 on 7/11/2019, 1.69 on 7/17/2019, and she says that it was repeated again but she does not have that lab and was told that it was back down but not at normal.  Her CBC  reveals platelets of 697,000 on 7/17/2019 and 589,000 on 7/10/2019.Labs from 7/10/2019 revealed platelet count of 490,000 with hematocrit 54.7%.  Creatinine on 7/3/2019 was 0.92.  She understandably had a d-dimer elevation to 1.22 on 7/3/2019 with her clot.  Given the magnitude of her clot and the likelihood of may Thurner syndrome, she needs lifetime anticoagulation.  On patients with deep vein thrombosis and/or PE and indefinite anticoagulation, use of Xarelto in patients with GFR less than 30 is recommended to be avoided varied will need to watch that.  With her prior recent elevation of hematocrit and persistent elevation of platelets, we will continue to watch that and if they remain persistent suggesting this being more than just reactive to her PE and clots, then we need to look for myeloproliferative disorder contributing to her clotting.  She is going to get her records from her other hematologist she saw back in 2016 just for completeness sake but I would not repeat, nor would I do for the first time now, her hypercoagulable panel as any hypercoagulable disorder we stumbled upon would not alter her need for lifetime full dose anticoagulation.  The only question is which drug, and whether her creatinine will allow DOAC's.  I will check her BMP weekly for the next month to make sure her creatinine is doing adequate relative to her use of Xarelto and will get her blood count now and again prior to return in a month and if the platelets are remaining elevated we will do Collin 2 with reflex testing.  Since her discharge from the hospital she had significant nausea for which she had an ultrasound that showed fatty liver but normal gallbladder.  She had an EGD in the past few days with Dr. Heath that showed significant reflux and on Protonix her nausea has improved.  She will need close follow-up of her ascending aortic aneurysm as well with her thoracic surgeons.    -9/9/2019 medical oncology follow-up: Platelets  persistently elevated 807,000 with hematocrit 52.7% and white count 8320 on 8/30/2019.  GFR staying over 59 on Xarelto.  We will check Collin 2 with reflex testing and treat with Hydrea plus or minus phlebotomy if we confirm myeloproliferative disorder.  Will need bone marrow before start of that to assess for myelofibrosis if this is confirmed.  Records from Dr.Scott Major of 6/7/2016 mention that she had been anemic during her hospitalization.  Factor V Leiden, prothrombin mutation, Antithrombin III, protein C&S levels were all normal and the anticardiolipin and beta-2 microglobulin were all negative according to Dr. Major.  The lupus anticoagulant was borderline positive at 10.5 with him and he recommended repeat of that done 3 months apart to prove the diagnosis but this came back low positive according to his notes and he suggested switching to aspirin after a full 6 months of anticoagulation.  This was of course before the low-dose DOAC trials have been published she had had thrombolytics and stenting of her May Thurner according to his records.  Given her presence of May Thurner despite the stenting, I would be inclined towards keeping her on anticoagulants regardless.  She brings to me records that includes labs back to 2016 where her white count, hemoglobin and platelets were normal but as recently as June 2019 her hematocrit was 52% whereas back in May 2016 her hemoglobin was actually down in the eights with normochromic normocytic indices.  On Xarelto, there is increased risk with aspirin I will hold on that for present but we may have to make such a decision as Xarelto may not be sufficient for viscosity issues related to her elevated platelets and hematocrit if this is a myeloproliferative disorder.    -9/30/2019 [facility] hematology oncology follow-up visit: 9/9/2019 Collin 2 [id number] mutation positive with erythropoietin level low at 1 commensurate with polycythemia rubra vera and a hematocrit of 53% with  platelets of 807,000 and normal white count.  Normally would do bone marrow to look for myelofibrosis but would not risk taking her off Xarelto at the moment.  We will start her on Hydrea 500 mg daily with CBC weekly and if we get her hematocrit below 45% platelets in the 400,000 range without significant leukopenia that would be my goal.  No phlebotomies for now until we see what Hydrea does.  After we see her back, if her platelets are down and tolerating Hydrea, I will consider stopping the Xarelto and switching her to full dose aspirin for life for the PRV.  This PRV did not cause her original venous thromboembolic phenomenon in fact was anemic when Dr. Major saw her in 2016.  We talked about the potential natural history of PRV including spent phase, leukemic transformation, myelofibrosis.  She will need AAA follow-up with surgeons.    -1/15/2021 [facility] hematology follow-up: 1/8/2021 white count 5790 with hemoglobin 12.6 and platelets 392,000 stable over the last 5 months but her last CBC prior to this was 4 months ago.  CMP unremarkable.  With chronic left lower extremity swelling and ultrasound showing left iliac venous stent obstruction/thrombosis with venous stent and angioplasty by vascular surgery 5/4/2020 as well as a grade 4 left greater saphenous vein reflux for which saphenous vein ablation and phlebectomy performed 6/26/2020, she will continue chronic Xarelto for this and baby aspirin for the JAK2 positive polycythemia rubra vera.  There is increased risk of bleeding but the DOAC does not necessarily mitigate the risk of thrombosis from the JAK2 positive myeloproliferative disorder and the aspirin does not necessarily obviate the need for Xarelto for her May Thurner syndrome venous obstructions occurring despite good control of her blood counts on Hydrea.  On dual DOAC and antiplatelet therapy, it is all the more important to watch her blood counts monthly and I will have her do a video visit with  my nurse practitioner in about 3 months.  I would also ask her to be sure her vascular surgeons are following her history of abdominal aortic aneurysm with serial imaging which I would not do routinely for her myeloproliferative disorder which I am managing.  As stated previously, I do not think myeloproliferative disorder caused her venous thromboembolic phenomena and in fact her original thromboembolic event occurred when she was anemic under the care of Dr. Major in 2016.    -7/29/2021 [facility] hematology telemedicine clinic follow-up: Gisell continues to do well, tolerating Hydrea 1000 mg daily along with Xarelto and aspirin 81 mg.  She has had no bleeding events.  We will continue therapy unchanged.  CBC for the past 2 months looks good with no anemia, WBC normal, platelet count normal.  I reminded her to get her CBC monthly as we wanted to watch her more closely as she is on not only Hydrea but blood thinner with Xarelto along with aspirin putting her more at risk.  She states understanding and will do her CBC monthly, new order entered.    -2/7/2022 [facility] hematology oncology follow-up visit: Today's platelets are 588,000 with hemoglobin 13.5 hematocrit 41.3% with white count 5800.  CMP is pending.  She did not get her CBC monthly as ordered last time and as she is on Xarelto and aspirin that is important.  I will not increase her Hydrea from the 1000 mg dose despite her platelets running a little higher but if they continue to rise we will have to be cautious of acquired von Willebrand's disease which actually further increases her bleeding risk.    -8/15/2022 [facility] Hematology clinic follow-up: Gisell continues to do well on Hydrea 1000 mg daily, I have reviewed her CBCs for the past 6 months and her platelet count overall looks good, most recent platelet count on 8/2/2022 was 455,000, in April her platelet count was normal at 444,000, it has never been higher than 530,000 since February.  She has been  taking turmeric and whether or not this is helping it is hard to say but there is no contraindication to her taking turmeric.  She will continue Hydrea 1000 mg daily unchanged along with aspirin 81 mg daily, she also continues on Xarelto 20 mg daily.  She has had no unusual bleeding, no further clotting events.  We will continue to monitor CBC monthly.  We will see her back in 6 months for follow-up.    -2/20/2023 [facility] hematology oncology follow-up: 1/30/2023 hemoglobin 13 white count 5240 with platelets 436,000 with .7.  We will get her CBC today but her counts have been stable 1000 mg Hydrea, aspirin, and Xarelto for the better part of a year now and we will see her twice a year with CBC once a quarter.    -8/28/2023 [facility] Hematology/Oncology clinic follow-up: Gisell is doing well on Hydrea 1000 mg daily along with aspirin 81 mg daily.  CBC from 8/21/2023 with normal WBC 5.91, hemoglobin 14, hematocrit 43.2%, platelet count 532,000.  Would not recommend any adjustments in her Hydrea at this time.  We will continue to monitor CBC quarterly and I have ordered that today.  She remains on Xarelto for lifetime, she has had no further clotting events, no abnormal bleeding.  We did discuss the pros and cons of Mobic today, she takes this occasionally for her chronic back pain.  She understands to avoid any other NSAID, she can take Tylenol as needed.  She takes over-the-counter reflux medication.  She understands there is slight increased risk of bleeding with Mobic since she is on Xarelto but currently she is just taking it as needed and benefit with relief of her pain is worth it.    - 12/20/2023 saw gastroenterology for elevated AST with hepatosplenomegaly with ultrasound showing hepatic steatosis with history of pancreatitis.  Asked her to stop her turmeric that can increase liver enzymes.  Mildly elevated antimitochondrial antibody.  They suspect CLARKE.  Follow-up planned end of March 2024.    -2/20/2024  fibrosis score 0.26 fibrosis stage 0-F1.  Steatosis score 0.63 steatosis grade S2-S3 moderate to severe.  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  ALT 59 AST 64.  Hemoglobin 14.3 hematocrit 44.1 platelets 577,000    -2/26/2024 [facility] hematology oncology follow-up: She did not get quarterly labs as previously ordered.  We will need to push harder on the Hydrea with a goal to get her platelets consistently in the 400,000's.  In August the platelets were 532,000 and in February they were 577,000.  Will get labs today and monthly and have her see my nurse practitioner back in 3 months to make sure that the platelets are trending downward..  If with monthly CBCs being 1000 mg dose that she has been taking for quite some time is not throwing her platelets consistently below 500,000 into the 400,000's or lower, then I would increase her to 1000 mg alternating with 1500 mg.  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  She will need to follow with gastroenterology regarding her hepatic steatosis.  Certainly a fair portion of hersplenomegaly may be related to her myeloproliferative disorder and not just the hepatic steatosis and it is hard to sort out the proportions of each contribution.  November CT chest without contrast done at [facility] showed 40 mm ascending aorta unchanged from August 2022 done by Dr. Muniz..  That needs to continue to be monitored as well by by Dr. Muniz.  I will add a sedimentation rate to her labs today as well.  If that is significantly elevated then some of her platelet elevation could be an acute phase reactant but I suspect it is mostly for myeloproliferative JAK2 positive disease.I do not palpate any hepatosplenomegaly on exam today and her CAT scan in May did not  corroborate any significant hepatosplenomegaly.  Continues lifetime Xarelto for May Thurner.    -6/3/2024 [facility] hematology oncology follow-up:3/28/2024 with 556,000.  C-reactive protein in February 0.31 with sedimentation rate normal 15.  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  April 26 platelets 570,000 .9 otherwise unremarkable CBC.  5/21/2024 platelets 562,000 otherwise unremarkable CBC.  ALT 34 AST 48.  She had only been taking the Hydrea 2 tablets daily.  We will go to 2 tablets alternating with 3 as her white count and hemoglobin are doing well and the platelets are still staying in the 500,000's and our goal is to 400,000's or lower.  We will check her CBC monthly and see her back in 6 months.  This is not likely a reactive thrombocytosis given her lack of elevation of inflammatory markers and no sign of iron deficiency.  She does have CLARKE and follows with gastroenterology [facility] post colonoscopy.  Encouraged her to stay vigilant with Dr. Muniz re AAA.  Continues lifetime Xarelto for May Thurner.    - 12/6/2024 [facility] hematology oncology follow-up: Platelets now for 9/20/2000 with hemoglobin 12.9 and normal white count and differential.  Continue 3 alternating with 2 tablets of the Hydrea and see my nurse practitioner back in 6 months with CBC prior to return to my nurse practitioner.  Liver enzymes improving under watch of gastroenterology.  Dr. Muniz following AAA.  On lifetime Xarelto for May-Thurner.     May-Thurner syndrome   8/2/2019 Initial Diagnosis    May-Thurner syndrome     Polycythemia rubra vera   9/30/2019 Initial Diagnosis    Polycythemia rubra vera (CMS/HCC)         HISTORY OF PRESENT ILLNESS:  The patient is a 64 y.o. female, here for follow up on management of PRV on Hydrea and aspirin.  Gisell overall is doing well, she continues on Hydrea 3 tablets every other day alternating with 2 tablets.  She was hospitalized  in March with small bowel obstruction, she states this is the second time that has occurred in her adulthood.  She does follow with gastroenterology, saw Dr. Heath recently and had an EGD that was negative.  She continues on Xarelto, she has had no abnormal bleeding, no new clotting events.  Is trying to watch her diet as she was told she needed to lose weight due to fatty liver.    Past Medical History:   Diagnosis Date    AAA (abdominal aortic aneurysm)     Abnormal chest CT 2023    enlarged Aortic Aneurysm    Anxiety 2023    Arthritis 2023    Cellulitis of lower limb 2016    From Automated Load;Provider: Radha Lopez;Status: Active    Chronic bilateral low back pain without sciatica 2023    Deep vein thrombophlebitis of calf, left     Encounter for long-term (current) use of high-risk medication 08/15/2022    Fatty liver     GERD (gastroesophageal reflux disease)     Hepatosplenomegaly 2023    Hyperlipidemia     Insomnia 2023    Lumbar spondylosis 2023    May-Thurner syndrome     Neuropathy 2023    Obstructive sleep apnea     Pancreatitis     Polycythemia rubra vera     Postsurgical menopause 2023    Pulmonary embolism     Recurrent major depression 2016    From Automated Load;Provider: Ange Alex;Status: Active    Small bowel obstruction 2023    Stage 3b chronic kidney disease 2023    Thoracic aortic aneurysm without rupture 2023    4.1 cm. 10/6/2023: Pt. has checked yearly at . Last checked 2022. Pt. to schedule f/u.     Past Surgical History:   Procedure Laterality Date    BILATERAL BREAST REDUCTION      CAROTID STENT       SECTION      COLONOSCOPY  2024    Dr. Boyd    ELBOW FUSION      HYSTERECTOMY      ILIAC ARTERY STENT Left 2020    Mesh    OOPHORECTOMY      OTHER SURGICAL HISTORY  2020    Iliac vein carterization    REDUCTION MAMMAPLASTY Bilateral 1999    TONSILLECTOMY         Allergies  "  Allergen Reactions    Clarithromycin Other (See Comments)     [place]       Family History and Social History reviewed and changed as necessary    REVIEW OF SYSTEM:   No new somatic concerns    PHYSICAL EXAM:  Well-developed, well-nourished appearing female in no distress  Heart regular rate and rhythm, no lower extremity edema  Respirations regular and unlabored    Vitals:    06/16/25 1259   BP: 113/65   Pulse: 66   Resp: 16   Temp: 96.5 °F (35.8 °C)   TempSrc: Infrared   SpO2: 96%   Weight: 92.3 kg (203 lb 6.4 oz)   Height: 170.2 cm (67.01\")     Vitals:    06/16/25 1259   PainSc: 0-No pain          ECOG score: 0           Vitals reviewed.    ECOG: (0) Fully Active - Able to Carry On All Pre-disease Performance Without Restriction    Lab Results   Component Value Date    HGB 12.5 06/16/2025    HCT 37.9 06/16/2025    .9 (H) 06/16/2025     06/16/2025    WBC 5.02 06/16/2025    NEUTROABS 3.66 06/16/2025    LYMPHSABS 1.00 06/16/2025    MONOSABS 0.19 06/16/2025    EOSABS 0.10 06/16/2025    BASOSABS 0.04 06/16/2025       Lab Results   Component Value Date    GLUCOSE 138 (H) 06/16/2025    BUN 13.3 06/16/2025    CREATININE 0.72 06/16/2025     06/16/2025    K 4.4 06/16/2025     06/16/2025    CO2 25.0 06/16/2025    CALCIUM 9.5 06/16/2025    PROTEINTOT 7.2 06/16/2025    ALBUMIN 4.3 06/16/2025    BILITOT 0.5 06/16/2025    ALKPHOS 83 06/16/2025    AST 55 (H) 06/16/2025    ALT 41 (H) 06/16/2025             ASSESSMENT & PLAN:  1.  Collin 2+ polycythemia rubra vera on Hydrea and aspirin  2.  Recurrent DVT and PE due to problem #2 with low titer positive lupus anticoagulant per prior testing by Dr. Major apparently on 2 separate occasions confirmatory 3 months apart  3.  May Thurner  4.  Ascending aortic aneurysm  5.  History of SOLOMON  6.  Obstructive sleep apnea  7.  Contrast-induced nephropathy  8.  Reflux with nausea  9.  Pancreatitis, hospitalized May 2023, treated with pain medications and IV " fluids.  10.  Small bowel obstruction February 2025  11.  Elevated liver enzymes  12.  CLARKE    -8/2/2019 initial [facility] [place], operated by [facility] medical oncology consultation: May 2016 developed unprovoked left lower extremity deep vein thrombosis treated with left common iliac stent, thrombolyzes, temporary vena cava filter placement and retrieval.  Saw hematologist who told her she might have a low level lupus anticoagulant but she does not have that data and they were told that they did not think that was significant enough to warrant lifetime anticoagulation.  She stopped Xarelto after 8 months.  Starting the end of June she was developing pain in the left lower extremity again with progressive swelling and shortness of breath.  Went to [facility] ER on 4 July.  Was found to have left lower extremity extensive deep vein thrombosis and right lower lobe pulmonary embolus.  She was treated with thrombectomy and thrombolysis by Rigoberto and Pavel.  Previously she could not afford her Xarelto and they have switched her from heparin to Coumadin but before discharge switched her to Xarelto.  Her creatinine during her hospitalization increased to 1.38 from a baseline of 1.2.  She comes to hematology at [facility] [place], operated by [facility] on 8/2/2019 with labs showing creatinine of 1.23 on 7/11/2019, 1.69 on 7/17/2019, and she says that it was repeated again but she does not have that lab and was told that it was back down but not at normal.  Her CBC reveals platelets of 697,000 on 7/17/2019 and 589,000 on 7/10/2019.Labs from 7/10/2019 revealed platelet count of 490,000 with hematocrit 54.7%.  Creatinine on 7/3/2019 was 0.92.  She understandably had a d-dimer elevation to 1.22 on 7/3/2019 with her clot.  Given the magnitude of her clot and the likelihood of may Thurner syndrome, she needs lifetime anticoagulation.  On patients with deep vein thrombosis and/or PE and indefinite anticoagulation, use of Xarelto in patients with GFR less than 30 is recommended to be  avoided varied will need to watch that.  With her prior recent elevation of hematocrit and persistent elevation of platelets, we will continue to watch that and if they remain persistent suggesting this being more than just reactive to her PE and clots, then we need to look for myeloproliferative disorder contributing to her clotting.  She is going to get her records from her other hematologist she saw back in 2016 just for completeness sake but I would not repeat, nor would I do for the first time now, her hypercoagulable panel as any hypercoagulable disorder we stumbled upon would not alter her need for lifetime full dose anticoagulation.  The only question is which drug, and whether her creatinine will allow DOAC's.  I will check her BMP weekly for the next month to make sure her creatinine is doing adequate relative to her use of Xarelto and will get her blood count now and again prior to return in a month and if the platelets are remaining elevated we will do Collin 2 with reflex testing.  Since her discharge from the hospital she had significant nausea for which she had an ultrasound that showed fatty liver but normal gallbladder.  She had an EGD in the past few days with Dr. Heath that showed significant reflux and on Protonix her nausea has improved.  She will need close follow-up of her ascending aortic aneurysm as well with her thoracic surgeons.    -9/9/2019 medical oncology follow-up: Platelets persistently elevated 807,000 with hematocrit 52.7% and white count 8320 on 8/30/2019.  GFR staying over 59 on Xarelto.  We will check Collin 2 with reflex testing and treat with Hydrea plus or minus phlebotomy if we confirm myeloproliferative disorder.  Will need bone marrow before start of that to assess for myelofibrosis if this is confirmed.  Records from Dr.Scott Major of 6/7/2016 mention that she had been anemic during her hospitalization.  Factor V Leiden, prothrombin mutation, Antithrombin III, protein C&S  levels were all normal and the anticardiolipin and beta-2 microglobulin were all negative according to Dr. Major.  The lupus anticoagulant was borderline positive at 10.5 with him and he recommended repeat of that done 3 months apart to prove the diagnosis but this came back low positive according to his notes and he suggested switching to aspirin after a full 6 months of anticoagulation.  This was of course before the low-dose DOAC trials have been published she had had thrombolytics and stenting of her May Thurner according to his records.  Given her presence of May Thurner despite the stenting, I would be inclined towards keeping her on anticoagulants regardless.  She brings to me records that includes labs back to 2016 where her white count, hemoglobin and platelets were normal but as recently as June 2019 her hematocrit was 52% whereas back in May 2016 her hemoglobin was actually down in the eights with normochromic normocytic indices.  On Xarelto, there is increased risk with aspirin I will hold on that for present but we may have to make such a decision as Xarelto may not be sufficient for viscosity issues related to her elevated platelets and hematocrit if this is a myeloproliferative disorder.    -9/30/2019 [facility] hematology oncology follow-up visit: 9/9/2019 Collin 2 [id number] mutation positive with erythropoietin level low at 1 commensurate with polycythemia rubra vera and a hematocrit of 53% with platelets of 807,000 and normal white count.  Normally would do bone marrow to look for myelofibrosis but would not risk taking her off Xarelto at the moment.  We will start her on Hydrea 500 mg daily with CBC weekly and if we get her hematocrit below 45% platelets in the 400,000 range without significant leukopenia that would be my goal.  No phlebotomies for now until we see what Hydrea does.  After we see her back, if her platelets are down and tolerating Hydrea, I will consider stopping the Xarelto and switching  her to full dose aspirin for life for the PRV.  This PRV did not cause her original venous thromboembolic phenomenon in fact was anemic when Dr. Major saw her in 2016.  We talked about the potential natural history of PRV including spent phase, leukemic transformation, myelofibrosis.  She will need AAA follow-up with surgeons.    -1/15/2021 [facility] hematology follow-up: 1/8/2021 white count 5790 with hemoglobin 12.6 and platelets 392,000 stable over the last 5 months but her last CBC prior to this was 4 months ago.  CMP unremarkable.  With chronic left lower extremity swelling and ultrasound showing left iliac venous stent obstruction/thrombosis with venous stent and angioplasty by vascular surgery 5/4/2020 as well as a grade 4 left greater saphenous vein reflux for which saphenous vein ablation and phlebectomy performed 6/26/2020, she will continue chronic Xarelto for this and baby aspirin for the JAK2 positive polycythemia rubra vera.  There is increased risk of bleeding but the DOAC does not necessarily mitigate the risk of thrombosis from the JAK2 positive myeloproliferative disorder and the aspirin does not necessarily obviate the need for Xarelto for her May Thurner syndrome venous obstructions occurring despite good control of her blood counts on Hydrea.  On dual DOAC and antiplatelet therapy, it is all the more important to watch her blood counts monthly and I will have her do a video visit with my nurse practitioner in about 3 months.  I would also ask her to be sure her vascular surgeons are following her history of abdominal aortic aneurysm with serial imaging which I would not do routinely for her myeloproliferative disorder which I am managing.  As stated previously, I do not think myeloproliferative disorder caused her venous thromboembolic phenomena and in fact her original thromboembolic event occurred when she was anemic under the care of Dr. Major in 2016.    -7/29/2021 [facility] hematology  telemedicine clinic follow-up: Gisell continues to do well, tolerating Hydrea 1000 mg daily along with Xarelto and aspirin 81 mg.  She has had no bleeding events.  We will continue therapy unchanged.  CBC for the past 2 months looks good with no anemia, WBC normal, platelet count normal.  I reminded her to get her CBC monthly as we wanted to watch her more closely as she is on not only Hydrea but blood thinner with Xarelto along with aspirin putting her more at risk.  She states understanding and will do her CBC monthly, new order entered.    -2/7/2022 [facility] hematology oncology follow-up visit: Today's platelets are 588,000 with hemoglobin 13.5 hematocrit 41.3% with white count 5800.  CMP is pending.  She did not get her CBC monthly as ordered last time and as she is on Xarelto and aspirin that is important.  I will not increase her Hydrea from the 1000 mg dose despite her platelets running a little higher but if they continue to rise we will have to be cautious of acquired von Willebrand's disease which actually further increases her bleeding risk.    -8/15/2022 [facility] Hematology clinic follow-up: Gisell continues to do well on Hydrea 1000 mg daily, I have reviewed her CBCs for the past 6 months and her platelet count overall looks good, most recent platelet count on 8/2/2022 was 455,000, in April her platelet count was normal at 444,000, it has never been higher than 530,000 since February.  She has been taking turmeric and whether or not this is helping it is hard to say but there is no contraindication to her taking turmeric.  She will continue Hydrea 1000 mg daily unchanged along with aspirin 81 mg daily, she also continues on Xarelto 20 mg daily.  She has had no unusual bleeding, no further clotting events.  We will continue to monitor CBC monthly.  We will see her back in 6 months for follow-up.    -2/20/2023 [facility] hematology oncology follow-up: 1/30/2023 hemoglobin 13 white count 5240 with platelets  436,000 with .7.  We will get her CBC today but her counts have been stable 1000 mg Hydrea, aspirin, and Xarelto for the better part of a year now and we will see her twice a year with CBC once a quarter.    -8/28/2023 [facility] Hematology/Oncology clinic follow-up: Gisell is doing well on Hydrea 1000 mg daily along with aspirin 81 mg daily.  CBC from 8/21/2023 with normal WBC 5.91, hemoglobin 14, hematocrit 43.2%, platelet count 532,000.  Would not recommend any adjustments in her Hydrea at this time.  We will continue to monitor CBC quarterly and I have ordered that today.  She remains on Xarelto for lifetime, she has had no further clotting events, no abnormal bleeding.  We did discuss the pros and cons of Mobic today, she takes this occasionally for her chronic back pain.  She understands to avoid any other NSAID, she can take Tylenol as needed.  She takes over-the-counter reflux medication.  She understands there is slight increased risk of bleeding with Mobic since she is on Xarelto but currently she is just taking it as needed and benefit with relief of her pain is worth it.    - 12/20/2023 saw gastroenterology for elevated AST with hepatosplenomegaly with ultrasound showing hepatic steatosis with history of pancreatitis.  Asked her to stop her turmeric that can increase liver enzymes.  Mildly elevated antimitochondrial antibody.  They suspect CLARKE.  Follow-up planned end of March 2024.    -2/20/2024 fibrosis score 0.26 fibrosis stage 0-F1.  Steatosis score 0.63 steatosis grade S2-S3 moderate to severe.  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  ALT 59 AST 64.  Hemoglobin 14.3 hematocrit 44.1 platelets 577,000    -2/26/2024 [facility] hematology oncology follow-up: She did not get quarterly labs as previously ordered.  We will need to push harder on the Hydrea with a goal to get her platelets consistently in the 400,000's.  In August the platelets were 532,000 and in February they were 577,000.  Will get labs  today and monthly and have her see my nurse practitioner back in 3 months to make sure that the platelets are trending downward..  If with monthly CBCs being 1000 mg dose that she has been taking for quite some time is not throwing her platelets consistently below 500,000 into the 400,000's or lower, then I would increase her to 1000 mg alternating with 1500 mg.  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  She will need to follow with gastroenterology regarding her hepatic steatosis.  Certainly a fair portion of hersplenomegaly may be related to her myeloproliferative disorder and not just the hepatic steatosis and it is hard to sort out the proportions of each contribution.  November CT chest without contrast done at [facility] showed 40 mm ascending aorta unchanged from August 2022 done by Dr. Muniz..  That needs to continue to be monitored as well by by Dr. Muniz.  I will add a sedimentation rate to her labs today as well.  If that is significantly elevated then some of her platelet elevation could be an acute phase reactant but I suspect it is mostly for myeloproliferative JAK2 positive disease.I do not palpate any hepatosplenomegaly on exam today and her CAT scan in May did not corroborate any significant hepatosplenomegaly.  Continues lifetime Xarelto for May Mehnazer.    -6/3/2024 [facility] hematology oncology follow-up:3/28/2024 with 556,000.  C-reactive protein in February 0.31 with sedimentation rate normal 15.  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  April 26 platelets 570,000 .9 otherwise unremarkable CBC.  5/21/2024 platelets 562,000 otherwise unremarkable CBC.  ALT 34 AST 48.  She had only been taking the Hydrea 2 tablets  daily.  We will go to 2 tablets alternating with 3 as her white count and hemoglobin are doing well and the platelets are still staying in the 500,000's and our goal is to 400,000's or lower.  We will check her CBC monthly and see her back in 6 months.  This is not likely a reactive thrombocytosis given her lack of elevation of inflammatory markers and no sign of iron deficiency.  She does have CLARKE and follows with gastroenterology [facility] post colonoscopy.  Encouraged her to stay vigilant with Dr. Muniz re AAA.  Continues lifetime Xarelto for May Thurner.    - 12/6/2024 [facility] hematology oncology follow-up: Platelets now for 9/20/2000 with hemoglobin 12.9 and normal white count and differential.  Continue 3 alternating with 2 tablets of the Hydrea and see my nurse practitioner back in 6 months with CBC prior to return to my nurse practitioner.  Liver enzymes improving under watch of gastroenterology.  Dr. Muniz following AAA.  On lifetime Xarelto for May-Thurner.    -6/16/2025 [facility] hematology/oncology clinic follow-up: She continues to do well on Hydrea 3 tablets every other day alternating with 2 tablets. Current CBC from today looks good, WBC is normal at 5.02, hemoglobin 12.5, hematocrit 37.9%, platelet count 381,000.  CMP normal other than for mild elevation of liver enzymes with AST of 55, ALT of 41.  She does follow with GI, Dr. Heath.  She states that she is working to try and lose some weight.  She will continue Hydrea unchanged.  I will repeat CBC and CMP in 3 months and again on return in 6 months.  She follows with Dr. Muniz in the fall regarding her aortic aneurysm.  She continues on Xarelto lifetime for May-Thurner, she has had no abnormal bleeding and no new clotting events.    I spent 31 minutes caring for Gisell on this date of service. This time includes time spent by me in the following activities: preparing for the visit, reviewing tests, performing a medically  appropriate examination and/or evaluation, ordering medications, tests, or procedures, documenting information in the medical record, independently interpreting results and communicating that information with the patient/family/caregiver, and care coordination.     Dottie Nova, APRN    06/16/2025

## 2025-06-20 ENCOUNTER — TRANSCRIBE ORDERS (OUTPATIENT)
Dept: LAB | Facility: HOSPITAL | Age: 65
End: 2025-06-20
Payer: MEDICARE

## 2025-06-20 ENCOUNTER — LAB (OUTPATIENT)
Dept: LAB | Facility: HOSPITAL | Age: 65
End: 2025-06-20
Payer: MEDICARE

## 2025-06-20 DIAGNOSIS — L65.9 NON-SCARRING HAIR LOSS: ICD-10-CM

## 2025-06-20 DIAGNOSIS — L65.9 NON-SCARRING HAIR LOSS: Primary | ICD-10-CM

## 2025-06-20 LAB
25(OH)D3 SERPL-MCNC: 46.3 NG/ML (ref 30–100)
FERRITIN SERPL-MCNC: 170.4 NG/ML (ref 13–150)

## 2025-06-20 PROCEDURE — 82306 VITAMIN D 25 HYDROXY: CPT

## 2025-06-20 PROCEDURE — 86038 ANTINUCLEAR ANTIBODIES: CPT

## 2025-06-20 PROCEDURE — 36415 COLL VENOUS BLD VENIPUNCTURE: CPT

## 2025-06-20 PROCEDURE — 82728 ASSAY OF FERRITIN: CPT | Performed by: PHYSICIAN ASSISTANT

## 2025-06-24 LAB — ANA SER QL IF: NEGATIVE

## 2025-06-25 ENCOUNTER — TRANSCRIBE ORDERS (OUTPATIENT)
Dept: NUTRITION | Facility: HOSPITAL | Age: 65
End: 2025-06-25
Payer: MEDICARE

## 2025-06-25 DIAGNOSIS — K75.81 METABOLIC DYSFUNCTION-ASSOCIATED STEATOHEPATITIS (MASH): Primary | ICD-10-CM

## 2025-06-26 ENCOUNTER — TRANSCRIBE ORDERS (OUTPATIENT)
Dept: ADMINISTRATIVE | Facility: HOSPITAL | Age: 65
End: 2025-06-26
Payer: MEDICARE

## 2025-06-26 DIAGNOSIS — K75.81 METABOLIC DYSFUNCTION-ASSOCIATED STEATOHEPATITIS (MASH): Primary | ICD-10-CM

## 2025-07-07 ENCOUNTER — HOSPITAL ENCOUNTER (OUTPATIENT)
Dept: NUTRITION | Facility: HOSPITAL | Age: 65
Setting detail: RECURRING SERIES
Discharge: HOME | End: 2025-07-07

## 2025-07-07 ENCOUNTER — SPECIALTY PHARMACY (OUTPATIENT)
Dept: ONCOLOGY | Facility: HOSPITAL | Age: 65
End: 2025-07-07
Payer: MEDICARE

## 2025-07-07 PROCEDURE — 97802 MEDICAL NUTRITION INDIV IN: CPT

## 2025-07-07 NOTE — CONSULTS
[facility] Nutrition Services          Initial 60 Minute Nutrition Visit    Date: 2025   Patient Name: Gisell Wylie  : 1960   MRN: [id number]   Referring Provider: Wild Bautista MD    Reason for Visit: [facility]  Visit Format: IP    Nutrition Assessment       Social History:   Social History     Socioeconomic History    Marital status:    Tobacco Use    Smoking status: Never    Smokeless tobacco: Never   Vaping Use    Vaping status: Never Used   Substance and Sexual Activity    Alcohol use: No    Drug use: No    Sexual activity: Defer     Active Problem List:   Patient Active Problem List    Diagnosis     Central adiposity [E65]     Macrocytosis [D75.89]     Scalp lesion [L98.9]     Acute cystitis without hematuria [N30.00]     May-Thurner syndrome [I87.1]     Thoracic aortic aneurysm without rupture [I71.20]     SBO (small bowel obstruction) [K56.609]     Hospitalization within last 30 days [Z92.89]     Diarrhea [R19.7]     Irritant contact dermatitis [L24.9]     Acute bronchitis [J20.9]     Idiopathic acute pancreatitis without infection or necrosis [K85.00]     Abnormal results of liver function studies [R94.5]     Screening for osteoporosis [Z13.820]     Abnormal chest CT [R93.89]     Abnormal kidney function [N28.9]     Anxiety [F41.9]     Chronic bilateral low back pain without sciatica [M54.50, G89.29]     Elevated liver function tests [R79.89]     Hepatosplenomegaly [R16.2]     Insomnia [G47.00]     Neuropathy [G62.9]     Arthritis [M19.90]     Postsurgical menopause [E89.40]     Small bowel obstruction [K56.609]     Stage 3b chronic kidney disease [N18.32]     Thoracic aortic aneurysm without rupture [I71.20]     Other hyperlipidemia [E78.49]     History of DVT of lower extremity [Z86.718]     Lumbar spondylosis [M47.816]     Encounter for long-term (current) use of high-risk medication [Z79.899]     Polycythemia rubra vera [D45]     May-Thurner syndrome [I87.1]      Recurrent major depression [F33.9]     Cellulitis of lower limb [L03.119]     Hyperlipidemia [E78.5]       Current Medications:   Current Outpatient Medications:     ammonium lactate (AMLACTIN) 12 % cream, , Disp: , Rfl:     aspirin 81 MG EC tablet, Take 1 tablet by mouth Daily., Disp: , Rfl:     Cholecalciferol 50 MCG (2000 UT) tablet, Take 1 tablet by mouth Daily., Disp: , Rfl:     fluticasone (FLONASE) 50 MCG/ACT nasal spray, 1 spray by Each Nare route Daily., Disp: , Rfl:     gabapentin (NEURONTIN) 400 MG capsule, Take 300 mg by mouth Every Night., Disp: , Rfl:     hydroxyurea (HYDREA) 500 MG capsule, Take 2 capsules by mouth every other day alternating with 3 capsules by mouth every other day., Disp: 75 capsule, Rfl: 11    meloxicam (MOBIC) 7.5 MG tablet, Take 1 tablet by mouth As Needed for Moderate Pain or Mild Pain., Disp: , Rfl:     omeprazole (priLOSEC) 20 MG capsule, Take 1 capsule by mouth Daily. 30 minutes prior to a meal, Disp: 90 capsule, Rfl: 3    pravastatin (PRAVACHOL) 40 MG tablet, Take 1 tablet by mouth Daily., Disp: , Rfl: 0    sertraline (ZOLOFT) 25 MG tablet, Take 1 tablet by mouth Daily., Disp: , Rfl:     XARELTO 20 MG tablet, Take 1 tablet by mouth Every Evening., Disp: , Rfl: 0    Labs: ALT: 41; AST: 55; Chol: 131; T; HDL: 33; LDL: 64; A1c: 5.5; B; Sodium: 143; Potassium: 4.6; Hgb: 12.5; GFR: 92.3    Hunger Vital Sign Food Insecurity Assessment:  Within the past 12 months I/we worried whether our food would run out before I/we got money to buy more: No   Within the past 12 months the food I/we bought just didn't last and I/we didn't have money to get more: No   Use of food assistance programs (WIC, food stamps, food agudelo) No       Food & Nutrition Related History       Food Allergies: Recent visit with allergist revealed: tree nuts, specifically almonds; low side of lactose, chicken, pork, cod and salmon  Food Intolerances: None indicated  Food Behavior: None  Nutrition  "Impact Symptoms: none mentioned  Gastrointestinal conditions that impact intake or food choices: GERD and bowel obstruction; pancreatitis   Details at home: Gisell lives at home with her  and works PT. She does not enjoy cooking and prefers quick/easy meals to prepare  Who prepares most meals: Self; spouse  Who does grocery shopping: Self; spouse  How many meals are purchased from fast food/sit down restaurants per week: Frequently  Difficulty chewin - Normal  Difficulty swallowin - Normal  Diet requirement related to personal preference or cultural belief: None indicated  History of eating disorder/disordered eating habits: None  Language/communication details: English   Barriers to learning: No barriers identified at this time    24 Hour Recall:       What did you have for breakfast? Cheerios  Or   2 eggs, scrambles with a multi grain english muffin   How much? 1 cup   What did you have for a snack?    What did you have for lunch? Turkey sandwich  Or  Chicken salad sandwich on low carb tortilla with applesauce cup   How much?    What did you have for a snack?    What did you have for dinner? 2 eggs and biscut  Or  Steak with potato and mac n'cheese   How much? Apple and peanut butter   What did you have for a snack?      Additional comments: Water: 3-4 x 16oz water bottles/day; no coffee; rarely soda    Anthropometrics      Height:   Ht Readings from Last 1 Encounters:   25 170.2 cm (67.01\")     Weight:   Wt Readings from Last 3 Encounters:   25 92.3 kg (203 lb 6.4 oz)   03/10/25 89.1 kg (196 lb 6.4 oz)   25 94.6 kg (208 lb 8 oz)     BMI: 31.85  Weight Change: See above     Physical Activity     Physical activity comments: Did not discuss in depth at this appointment; will discuss at time of f/u.      Estimated Needs     Estimated Energy Needs: 1200-1400kcal/day (1.2, -500)    Estimated Protein Needs: 80-100g/day (25-30%, provider recommendation of 100g/day)     Estimated Fluid " Needs: Minimum of 64oz water/day     Discussion / Education      Gisell is a 64 year old female referred for [facility] nutrition education who has a history of GERD, small bowel obstruction, HLD, hysterectomy, blood clots and JENNA. Her biggest nutrition related challenge is knowing what eating pattern is recommended for MASH and her motivation is to prevent progression of MASH through weight loss and dietary changes. Gisell describes recently having allergy testing due to past GI concerns (see Food Allergies). She reports that almonds have been at the center of two bowel obstructions and now she completely avoids them. She rated low sensitivity to lactose, chicken, pork, cod and salmon but reports that she does not feel that she has experienced any GI concerns with these foods in the past. She rates herself as a picky eater and states that she does not enjoy cooking, preferring foods that are quick and easy to prepare. She expresses concern with experiencing weigh loss based on her dislike of cooking and having a limited range of nutrient dense foods. She is currently taking Vit D daily supplement as well as Prilosec.     During this appointment, the following was discussed:    Macronutrients: what they are, why they are important, and how to incorporate into a balanced snack/meal.   Carbs: further reviewed difference between complex and simple carbs and that it is the type of carb that is important. Focus on eating fiber rich carbs with each snack/meal, being mindful of servings. RD discussed that Gisell can incorporate potatoes but be mindful of portion, aiming for no more than 1/4 plate or fist size amount and adding in a non-starchy vegetable instead of stacking with a pasta such as mac n' cheese.   Fiber: where it is found and why it is important for weight loss, blood sugar regulations, GI health and lowering cholesterol. RD encouraged Gisell to include a fiber source along with protein source at every  snack/meal.    Fats: reviewed the differences between saturated and unsaturated fats, examples of both and how to incorporate unsaturated fats into a balanced diet.   Protein: RD discussed the importance for protein intake for muscle mass preservation, satiety, blood sugar regulation and increased needs with MASH. As Gisell's dietary intake appears low with protein, RD encouraged her to begin incorporating a plant based or lean animal protein source with every snack/meal. Examples provided. Will focus on amount at time of f/u as RD would like for her to begin the habit before overwhelming Gisell with numbers.   Portions: RD discussed portion sizes for starchy vegetables/grains as well as less nutrient dense foods. RD suggested for meals eaten away from home, to immediately box half of the meal for another time.   5-10% weight loss: Gisell discussed provider recommendation of 20% weight loss to improve upon MASH. RD feels that this is an appropriate long term goal but encouraged her to consider 5-10% as the immediate goal, aiming for ~10lbs over two months.   Nutrition label: RD reviewed the nutrition label and how to use the percentage recommendations to gauge nutritional value. 5%-10% or less of saturated fat, sodium and added sugar as ideal with no more than 20% (which is high and only on occasion). For fiber, 15-20% or more is recommended in foods items that have a plant base (pasta, crackers, frozen meals).   Sodium: <1500-2000mg/day is recommended. Gisell does not salt food but does feel that she may eat more than recommended when eating out.     4-6 smaller snacks/meals would be ideal: Recommended making every snack/meal count by incorporating a balanced plate of lean or plant based proteins (examples provided) with plenty of fruits, vegetables and whole grains.   Plate method: reviewed in depth how this can help with portion control as well as ensure more balanced meals/snacks.   Hydration: Gisell is  currently drinking 3-4 x 16oz water bottles each day. Rarely soda.    Baby steps: by making gradual changes and focusing on 1-2 goals per month, forming successful habits is more realistic and sustainable.      Assessment of patient engagement: Engaged; asked questions    Measurement of understanding: Patient verbalized understanding, Patient able to demonstrate understanding with teach back     Resources Provided:  [facility] Healthy Meal and Snack Ideas and High Fiber Food List; MyPlate Fiber; Macros; Nutrition Label; Med Diet Plate; Med Diet information and [facility] recommendations for servings    Goal (s)      Goal 1: Include protein and fiber source with every snack/meal at least three days per week.      Plan of Care     PES Statement:   Food and nutrition related to knowledge deficit related to no prior nutrition education concerning MASH as evidenced by interview and dietary recall.     Follow Up Visit      Follow Up:   Tuesday, August 12th at 10:30am, IP    Total of 60 minutes spent with patient on nutrition counseling. Education based on Academy of Nutrition and Dietetics guidelines. Patient was provided with RD's contact information. Thank you for this referral.

## 2025-07-07 NOTE — PROGRESS NOTES
Specialty Pharmacy Patient Management Program  Oncology 6-Month Clinical Assessment       Gisell Wylie is a 64 y.o. female with polycythemia vera seen today to assess adherence and side effects.    Reason for Outreach: Routine medication check-in .    Regimen: Hydrea (hydroxyurea) 500 mg capsules  Take 2 capsules by mouth every other day alternating with 3 capsules by mouth every other day       Specialty Pharmacy Goal   Goals Addressed Today         Specialty Pharmacy General Goal (pt-stated)       Clinical goal/therapeutic target: disease control, per the recent oncology clinic notes and labs. And platelets less than 400,000   Latest Reference Range & Units Most Recent 04/26/24 11:01 05/21/24 08:41 06/26/24 14:20 07/24/24 14:49   Platelets 140 - 450 10*3/mm3 502 (H)  7/24/24 14:49 570 (H) 562 (H) 568 (H) 502 (H)   (H): Data is abnormally high     Latest Reference Range & Units 08/28/24 14:13 11/25/24 12:21   Platelets 140 - 450 10*3/mm3 525 (H) 422   (H): Data is abnormally high    1/13/25: I have reviewed the most recent clinic note and labs. Dr. Damon recommends continuing hydroxyurea at this time. The patient is tolerating the medication and is currently on track for goal.  Medication is preventing disease progression as evidenced by imaging or provider/clinic documentation.     Latest Reference Range & Units Most Recent 02/19/25 19:59 02/20/25 04:33 06/16/25 12:30   Platelets 140 - 450 10*3/mm3 381  6/16/25 12:30 416 350 381       7/7/25: I have reviewed the most recent clinic note and labs. Dr. Damon recommends continuing hydroxyurea at this time. The patient is tolerating the medication and is currently on track for goal.  Medication is preventing disease progression as evidenced by imaging or provider/clinic documentation.              Problem List   Problem list reviewed by Sandra Nesbitt, PharmD on 7/7/2025 at  1:59 PM  Patient Active Problem List   Diagnosis Code    May-Thurner syndrome I87.1     Polycythemia rubra vera D45    Encounter for long-term (current) use of high-risk medication Z79.899    Other hyperlipidemia E78.49    History of DVT of lower extremity Z86.718    Abnormal chest CT R93.89    Abnormal kidney function N28.9    Anxiety F41.9    Cellulitis of lower limb L03.119    Chronic bilateral low back pain without sciatica M54.50, G89.29    Elevated liver function tests R79.89    Hepatosplenomegaly R16.2    Insomnia G47.00    Lumbar spondylosis M47.816    Neuropathy G62.9    Arthritis M19.90    Postsurgical menopause E89.40    Recurrent major depression F33.9    Small bowel obstruction K56.609    Stage 3b chronic kidney disease N18.32    Thoracic aortic aneurysm without rupture I71.20    Diarrhea R19.7    Irritant contact dermatitis L24.9    Acute bronchitis J20.9    Idiopathic acute pancreatitis without infection or necrosis K85.00    Abnormal results of liver function studies R94.5    Screening for osteoporosis Z13.820    Hyperlipidemia E78.5    SBO (small bowel obstruction) K56.609    Hospitalization within last 30 days Z92.89    Central adiposity E65    Macrocytosis D75.89    Scalp lesion L98.9    Acute cystitis without hematuria N30.00    May-Thurner syndrome I87.1    Thoracic aortic aneurysm without rupture I71.20       Medication Assessment for Specialty Medication(s):  Medication Assessment  Follow Up Clinical Assessment  Linked Medication(s) Assessed: Hydroxyurea (HYDREA)  Therapeutic appropriateness: Appropriate  Medication tolerability: Tolerating with no to minimal ADRs  Medication plan: Continue therapy with normal follow-up  Quality of Life Improvement Scale: 10-Significantly better  Administration: Patient is taking every day at the same time .   Patient can self administer medications: Yes  Medication Follow-Up Plan: Next clinical assessment  Lab Review: The labs listed below have been reviewed. No dose adjustments are needed for the oral specialty medication(s) based on the  labs.    Lab Results   Component Value Date    GLUCOSE 138 (H) 06/16/2025    CALCIUM 9.5 06/16/2025     06/16/2025    K 4.4 06/16/2025    CO2 25.0 06/16/2025     06/16/2025    BUN 13.3 06/16/2025    CREATININE 0.72 06/16/2025    EGFRIFAFRI 79 09/16/2020    EGFRIFNONA 66 02/07/2022    BCR 18.5 06/16/2025    ANIONGAP 9.0 06/16/2025     Lab Results   Component Value Date    WBC 5.02 06/16/2025    RBC 3.58 (L) 06/16/2025    HGB 12.5 06/16/2025    HCT 37.9 06/16/2025    .9 (H) 06/16/2025    MCH 34.9 (H) 06/16/2025    MCHC 33.0 06/16/2025    RDW 14.2 06/16/2025    RDWSD 54.7 (H) 06/16/2025    MPV 8.7 06/16/2025     06/16/2025    NEUTRORELPCT 72.9 06/16/2025    LYMPHORELPCT 19.9 06/16/2025    MONORELPCT 3.8 (L) 06/16/2025    EOSRELPCT 2.0 06/16/2025    BASORELPCT 0.8 06/16/2025    AUTOIGPER 0.6 (H) 06/16/2025    NEUTROABS 3.66 06/16/2025    LYMPHSABS 1.00 06/16/2025    MONOSABS 0.19 06/16/2025    EOSABS 0.10 06/16/2025    BASOSABS 0.04 06/16/2025    AUTOIGNUM 0.03 06/16/2025    NRBC 0.0 02/20/2025     Drug-drug interactions  Completed medication reconciliation today to assess for drug interactions. Patient denies starting or stopping any medications.    Assessed medication list for interactions, no significant drug interactions noted.   Advised patient to call the clinic if any new medications are started so we can assess for drug-drug interactions.  Drug-food interactions discussed: None  Vaccines are coordinated by the patient's oncologist and primary care provider.    Medications   Medicines reviewed by Sandra Nesbitt, PharmD on 7/7/2025 at  1:59 PM  Prior to Admission medications    Medication Sig Start Date End Date Taking? Authorizing Provider   ammonium lactate (AMLACTIN) 12 % cream  3/4/25   Provider, MD Neelima   aspirin 81 MG EC tablet Take 1 tablet by mouth Daily.    Provider, MD Neelima   Cholecalciferol 50 MCG (2000 UT) tablet Take 1 tablet by mouth Daily.    Provider,  MD Neelima   fluticasone (FLONASE) 50 MCG/ACT nasal spray 1 spray by Each Nare route Daily.    Neelima Witt MD   gabapentin (NEURONTIN) 400 MG capsule Take 300 mg by mouth Every Night. 1/2/24   Neelima Witt MD   hydroxyurea (HYDREA) 500 MG capsule Take 2 capsules by mouth every other day alternating with 3 capsules by mouth every other day. 5/28/25   Yogesh Damon MD   meloxicam (MOBIC) 7.5 MG tablet Take 1 tablet by mouth As Needed for Moderate Pain or Mild Pain.    Neelima Witt MD   omeprazole (priLOSEC) 20 MG capsule Take 1 capsule by mouth Daily. 30 minutes prior to a meal 12/4/24   Lilo Townsend APRN   pravastatin (PRAVACHOL) 40 MG tablet Take 1 tablet by mouth Daily. 6/4/19   Neelima Witt MD   sertraline (ZOLOFT) 25 MG tablet Take 1 tablet by mouth Daily. 8/21/20   Neelima Witt MD   XARELTO 20 MG tablet Take 1 tablet by mouth Every Evening. 8/12/19   Neelima Witt MD       Allergies  Known allergies and reactions were discussed with the patient. The patient's chart has been reviewed for allergy information and updated as necessary.   Allergies   Allergen Reactions    Clarithromycin Other (See Comments)     [place]         Allergies reviewed by Sandra Nesbitt, PharmD on 7/7/2025 at  1:59 PM    Hospitalizations and Urgent Care Visits Since Last Assessment:  Unplanned hospitalizations or inpatient admissions: February 2025 -small bowel obstruction, resolved.  ED Visits: No  Urgent Office Visits: No    Adherence Assessment:  Adherence Questions  Linked Medication(s) Assessed: Hydroxyurea (HYDREA)  On average, how many doses/injections does the patient miss per month?: 0  What are the identified reasons for non-adherence or missed doses? : no problems identified  What is the estimated medication adherence level?: %  Based on the patient/caregiver response and refill history, does this patient require an MTP to track adherence improvements?:  no    Quality of Life Assessment:  Quality of Life Assessment  Quality of Life Improvement Scale: 10-Significantly better    Financial Assessment:  Medication availability/affordability: Patient has had no issues obtaining medication from pharmacy.    Attestation:  I attest the patient was actively involved in and has agreed to the above plan of care. If the prescribed therapy is at any point deemed not appropriate based on the current or future assessments, a consultation will be initiated with the patient's specialty care provider to determine the best course of action. The revised plan of therapy will be documented along with any required assessments and/or additional patient education provided.       All questions addressed and patient had no additional concerns. Patient has pharmacy contact information.    Sandra Nesbitt, PharmD, [facility]  Clinic Specialty Oncology Pharmacist  Phone [phone]      7/7/2025  14:04 EDT

## 2025-07-08 ENCOUNTER — SPECIALTY PHARMACY (OUTPATIENT)
Dept: ONCOLOGY | Facility: HOSPITAL | Age: 65
End: 2025-07-08
Payer: MEDICARE

## 2025-07-08 NOTE — PROGRESS NOTES
Specialty Pharmacy Patient Management Program  Refill Outreach     Gisell was contacted today regarding refills of their medication(s).    Refill Questions      Flowsheet Row Most Recent Value   Changes to allergies? No   Changes to medications? No   New conditions or infections since last clinic visit No   If yes, please describe  n/a   Unplanned office visit, urgent care, ED, or hospital admission in the last 4 weeks  No   How does patient/caregiver feel medication is working? Good   Financial problems or insurance changes  No   Since the previous refill, were any specialty medication doses or scheduled injections missed or delayed?  No   If yes, please provide the amount n/a   Why were doses missed? n/a   Does this patient require a clinical escalation to a pharmacist? No            Delivery Questions      Flowsheet Row Most Recent Value   Delivery method UPS   Delivery address verified with patient/caregiver? Yes   Delivery address Home   Other address preferred n/a   Number of medications in delivery 1   Medication(s) being filled and delivered Hydroxyurea (HYDREA)   Doses left of specialty medications 1 week left   Copay verified? Yes   Copay amount $0.00   Copay form of payment No copayment ($0)   Delivery Date Selection 07/09/25   Signature Required No   Do you consent to receive electronic handouts?  No            Medication Adherence    Adherence tools used: watch          Follow-up: 30 day(s)     Clarisa Israel, Pharmacy Technician  7/8/2025  09:28 EDT

## 2025-08-04 ENCOUNTER — HOSPITAL ENCOUNTER (OUTPATIENT)
Dept: ULTRASOUND IMAGING | Facility: HOSPITAL | Age: 65
Discharge: HOME | End: 2025-08-04
Admitting: INTERNAL MEDICINE
Payer: MEDICARE

## 2025-08-04 DIAGNOSIS — K75.81 METABOLIC DYSFUNCTION-ASSOCIATED STEATOHEPATITIS (MASH): ICD-10-CM

## 2025-08-04 PROCEDURE — 76981 USE PARENCHYMA: CPT

## 2025-08-04 PROCEDURE — 76705 ECHO EXAM OF ABDOMEN: CPT

## 2025-08-06 ENCOUNTER — SPECIALTY PHARMACY (OUTPATIENT)
Dept: ONCOLOGY | Facility: HOSPITAL | Age: 65
End: 2025-08-06
Payer: MEDICARE

## 2025-08-12 ENCOUNTER — HOSPITAL ENCOUNTER (OUTPATIENT)
Dept: NUTRITION | Facility: HOSPITAL | Age: 65
Setting detail: RECURRING SERIES
Discharge: HOME | End: 2025-08-12